# Patient Record
Sex: FEMALE | Employment: FULL TIME | URBAN - METROPOLITAN AREA
[De-identification: names, ages, dates, MRNs, and addresses within clinical notes are randomized per-mention and may not be internally consistent; named-entity substitution may affect disease eponyms.]

---

## 2017-01-02 ENCOUNTER — HOME CARE/HOSPICE - HEALTHEAST (OUTPATIENT)
Dept: HOME HEALTH SERVICES | Facility: HOME HEALTH | Age: 37
End: 2017-01-02

## 2017-01-04 ENCOUNTER — HOME CARE/HOSPICE - HEALTHEAST (OUTPATIENT)
Dept: HOME HEALTH SERVICES | Facility: HOME HEALTH | Age: 37
End: 2017-01-04

## 2017-01-05 ENCOUNTER — HOME CARE/HOSPICE - HEALTHEAST (OUTPATIENT)
Dept: HOME HEALTH SERVICES | Facility: HOME HEALTH | Age: 37
End: 2017-01-05

## 2017-01-09 ENCOUNTER — HOME CARE/HOSPICE - HEALTHEAST (OUTPATIENT)
Dept: HOME HEALTH SERVICES | Facility: HOME HEALTH | Age: 37
End: 2017-01-09

## 2017-01-09 ENCOUNTER — COMMUNICATION - HEALTHEAST (OUTPATIENT)
Dept: HOME HEALTH SERVICES | Facility: HOME HEALTH | Age: 37
End: 2017-01-09

## 2017-01-10 ENCOUNTER — HOME CARE/HOSPICE - HEALTHEAST (OUTPATIENT)
Dept: HOME HEALTH SERVICES | Facility: HOME HEALTH | Age: 37
End: 2017-01-10

## 2017-01-12 ENCOUNTER — HOME CARE/HOSPICE - HEALTHEAST (OUTPATIENT)
Dept: HOME HEALTH SERVICES | Facility: HOME HEALTH | Age: 37
End: 2017-01-12

## 2017-01-13 ENCOUNTER — HOME CARE/HOSPICE - HEALTHEAST (OUTPATIENT)
Dept: HOME HEALTH SERVICES | Facility: HOME HEALTH | Age: 37
End: 2017-01-13

## 2017-01-17 ENCOUNTER — HOME CARE/HOSPICE - HEALTHEAST (OUTPATIENT)
Dept: HOME HEALTH SERVICES | Facility: HOME HEALTH | Age: 37
End: 2017-01-17

## 2017-01-18 ENCOUNTER — HOME CARE/HOSPICE - HEALTHEAST (OUTPATIENT)
Dept: HOME HEALTH SERVICES | Facility: HOME HEALTH | Age: 37
End: 2017-01-18

## 2017-01-19 ENCOUNTER — HOME CARE/HOSPICE - HEALTHEAST (OUTPATIENT)
Dept: HOME HEALTH SERVICES | Facility: HOME HEALTH | Age: 37
End: 2017-01-19

## 2017-01-19 ENCOUNTER — AMBULATORY - HEALTHEAST (OUTPATIENT)
Dept: HOME HEALTH SERVICES | Facility: HOME HEALTH | Age: 37
End: 2017-01-19

## 2017-01-26 ENCOUNTER — HOME CARE/HOSPICE - HEALTHEAST (OUTPATIENT)
Dept: HOME HEALTH SERVICES | Facility: HOME HEALTH | Age: 37
End: 2017-01-26

## 2017-02-04 ENCOUNTER — HOME CARE/HOSPICE - HEALTHEAST (OUTPATIENT)
Dept: HOME HEALTH SERVICES | Facility: HOME HEALTH | Age: 37
End: 2017-02-04

## 2017-02-09 ENCOUNTER — HOME CARE/HOSPICE - HEALTHEAST (OUTPATIENT)
Dept: HOME HEALTH SERVICES | Facility: HOME HEALTH | Age: 37
End: 2017-02-09

## 2018-11-21 ENCOUNTER — TELEPHONE (OUTPATIENT)
Dept: FAMILY MEDICINE | Facility: CLINIC | Age: 38
End: 2018-11-21

## 2018-11-21 NOTE — TELEPHONE ENCOUNTER
OB intake completed over the phone with the use of a language line . Patricia is a new patient to our clinic, with confirmed UPT at [facility]. Patricia would like to complete OB care at [facility] and deliver at [facility]. Patricia is a Nigerian-speaking female from [facility] and is 38yo; Patricia and her  are excited for this pregnancy. Patricia is  with history of ectopic pregnancy in , SAB at unknown gestational age, and  r/t premature infant in  at approximately 28weeks gestational age. Patricia was unsure of gestational age for first pregnancy but notes the infant was premature. All previous deliveries were done in [place]. Her most recent pregnancy, in , was delivered via  in [place]. No complications noted. Patient reports LMP 10/08/2018 with ELI of 07/15/2019. 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 Patient unsure of treatment performed. Patient also endorses an oophorectomy in  following ectopic pregnancy. No records available, will review during dating US. Patient denies any allergies to medications, no other significant medical history noted. Patient is currently taking prenatal vitamins, and she wishes to see a female provider. Patient noted anxiety with this pregnancy r/t previous ectopic pregnancy. Reassured patient we will monitor with US and regular scheduled appointments. No current symptoms during this pregnancy, patient noted fatigue and intermittent nausea relieved with food. NOB appointment scheduled with  for 2018. Requested patient come into clinic for DONNELL for records from [facility], patient did not verbalize understanding and stated she will bring records to NOB appointment.     Average Risk Category  No significant risk factors: Yes    At Risk Category  (up to 3)  Teen pregnancy: No  Poor social situation: No  Domestic abuse: No  Financial difficulties: No  Smoker: No  H/O  deliver: Yes(unknown GA)  H/O drug abuse: No  Non-English speaking: Yes(Monegasque speaking)  Advanced maternal age: Yes  GDM risks: No  Previous C/S: Yes (in )  H/O PIH: No  H/O STIs: No  H/O mental health concerns: No  Onset care > 20 weeks: No  Other: Ectopic pregnancy    High Risk Category (4 or more At Risk or)  Diabetes/GDM: No  Multiple gestation: No  Chronic hypertension: No  Significant hx of asthma: No  Fetal demise > 20 weeks: No  Positive tox screen: No  Current mental health treatment: No  Other:     Risk: High Risk r/t ectopic pregnancy, advanced maternal age, non-english speaking, h/o  deliver  Date determined: 2018  Rimma HELMS

## 2018-11-21 NOTE — TELEPHONE ENCOUNTER
[facility] phone call message- general phone call:    Reason for call: Patient stated she would like to begin receiving care at [facility]. She stated she is pregnant and had her [facility]. She stated [facility] mentioned she is 6 weeks along but she believes she is now about 7 weeks. She would like to begin her care at [facility] as a new ob patient. I notified the patient a nurse will call her to do intake. Please call and advise.    Return call needed: Yes    OK to leave a message on voice mail? Yes    Primary language: Estonian      needed? Yes    Call taken on November 21, 2018 at 9:10 AM by Padma Kiran

## 2018-11-26 ENCOUNTER — HEALTH MAINTENANCE LETTER (OUTPATIENT)
Age: 38
End: 2018-11-26

## 2018-12-20 ENCOUNTER — OFFICE VISIT (OUTPATIENT)
Dept: FAMILY MEDICINE | Facility: CLINIC | Age: 38
End: 2018-12-20
Payer: COMMERCIAL

## 2018-12-20 ENCOUNTER — TRANSFERRED RECORDS (OUTPATIENT)
Dept: HEALTH INFORMATION MANAGEMENT | Facility: CLINIC | Age: 38
End: 2018-12-20

## 2018-12-20 VITALS
SYSTOLIC BLOOD PRESSURE: 128 MMHG | WEIGHT: 182 LBS | OXYGEN SATURATION: 100 % | RESPIRATION RATE: 16 BRPM | BODY MASS INDEX: 38.2 KG/M2 | HEART RATE: 93 BPM | DIASTOLIC BLOOD PRESSURE: 80 MMHG | TEMPERATURE: 98.4 F | HEIGHT: 58 IN

## 2018-12-20 DIAGNOSIS — Z32.01 PREGNANCY EXAMINATION OR TEST, POSITIVE RESULT: ICD-10-CM

## 2018-12-20 DIAGNOSIS — O09.91 SUPERVISION OF HIGH RISK PREGNANCY IN FIRST TRIMESTER: Primary | ICD-10-CM

## 2018-12-20 DIAGNOSIS — O09.521 ELDERLY MULTIGRAVIDA IN FIRST TRIMESTER: ICD-10-CM

## 2018-12-20 DIAGNOSIS — Z32.01 PREGNANCY EXAMINATION OR TEST, POSITIVE RESULT: Primary | ICD-10-CM

## 2018-12-20 DIAGNOSIS — O09.891 HISTORY OF PRETERM DELIVERY, CURRENTLY PREGNANT IN FIRST TRIMESTER: ICD-10-CM

## 2018-12-20 PROBLEM — H57.02 ANISOCORIA: Status: ACTIVE | Noted: 2017-02-02

## 2018-12-20 PROBLEM — G51.0 BELL'S PALSY: Status: ACTIVE | Noted: 2018-12-20

## 2018-12-20 PROBLEM — Z86.2 HISTORY OF IMMUNE THROMBOCYTOPENIA: Status: ACTIVE | Noted: 2018-12-20

## 2018-12-20 LAB
BILIRUBIN UR: NEGATIVE
BLOOD UR: ABNORMAL
GLUCOSE URINE: NEGATIVE
HCT VFR BLD AUTO: 36.3 % (ref 35–47)
HEMOGLOBIN: 11.7 G/DL (ref 11.7–15.7)
HIV 1+2 AB+HIV1 P24 AG SERPL QL IA: NEGATIVE
KETONES UR QL: NEGATIVE
LEUKOCYTE ESTERASE UR: NEGATIVE
MCH RBC QN AUTO: 34 PG (ref 26.5–35)
MCHC RBC AUTO-ENTMCNC: 32.2 G/DL (ref 32–36)
MCV RBC AUTO: 105.5 FL (ref 78–100)
NITRITE UR QL STRIP: NEGATIVE
OTHER: NORMAL %
PH UR STRIP: 6.5 [PH] (ref 5–7)
PLATELET # BLD AUTO: 386 K/UL (ref 150–450)
PROTEIN UR: NEGATIVE
RBC # BLD AUTO: 3.44 M/UL (ref 3.8–5.2)
SP GR UR STRIP: 1.01
UROBILINOGEN UR STRIP-ACNC: ABNORMAL
WBC # BLD AUTO: 7.8 K/UL (ref 4–11)

## 2018-12-20 RX ORDER — VITAMIN A ACETATE, BETA CAROTENE, ASCORBIC ACID, CHOLECALCIFEROL, .ALPHA.-TOCOPHEROL ACETATE, DL-, THIAMINE MONONITRATE, RIBOFLAVIN, NIACINAMIDE, PYRIDOXINE HYDROCHLORIDE, FOLIC ACID, CYANOCOBALAMIN, CALCIUM CARBONATE, FERROUS FUMARATE, ZINC OXIDE, CUPRIC OXIDE 3080; 12; 120; 400; 1; 1.84; 3; 20; 22; 920; 25; 200; 27; 10; 2 [IU]/1; UG/1; MG/1; [IU]/1; MG/1; MG/1; MG/1; MG/1; MG/1; [IU]/1; MG/1; MG/1; MG/1; MG/1; MG/1
1 TABLET, FILM COATED ORAL DAILY
COMMUNITY
Start: 2018-11-15 | End: 2018-12-20

## 2018-12-20 ASSESSMENT — MIFFLIN-ST. JEOR: SCORE: 1396.43

## 2018-12-20 NOTE — PROGRESS NOTES
"Patricia Govea is a 38 year old  female who presents to clinic for a new OB visit.  Her last menstrual period was 10/5/18, unsure.    Estimated Date of Delivery: 2019 via LMP    She has not had bleeding since her LMP. She has not had any abdominal pain or cramping since her LMP. She has had mild nausea. Weight loss has not occurred. This was not planned pregnancy.     OTHER CONCERNS: None    Significant Obstetrical History:   - Hx ruptured ectopic in 2006 at 6w4d. S/p laproscopic partial left salpingectomy   - C/s at 28w in  at [facility] in [facility], possibly secondary to placental abruption, states she was having \"a lot of bleeding\" and they told her if she did not have the c/s the infant might die. No records available currently. Unsure surgical scar. Infant had to spend 2 months in the hospital, no issues following NICU stay.   - Second vaginal delivery, was a sucessful  2006. At [facility] - able to review discharge summary stating successful , no other records available     Significant Past Medical History:   - Hx Guillain [place] after influenza vaccine in   - Hx ITP in , presented with hemoperitoneum after abdominal trauma, required RBC and platelet transfusions. Bone marrow biopsy negative for malignancy  - Hx H pylori in , treated with triple therapy   - Hypertension with steroids during issues with ITP  - Hx of mild dysplasia with MIKHAIL 1 in 2007. Repeat PAP 10/2007 was normal. Repeat pap in 2009 with LSIL. Repeat in  normal     Pre Term Labor Risk Assessment  Is the patient's age <18 or >40? No  Patint's BMI is Body mass index is 37.95 kg/m .. Does patient have a BMI < 18.5? No  If previous pregnancy, was delivery within previous 6 months? No  Have you ever delivered a baby prior to 37 weeks gestation? Yes   Did conception for this pregnancy occur via In Vitro Fertilization? 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)     History of immune thrombocytopenia     Bell's palsy     Anisocoria     Abnormal Pap smear of cervix       Obstetric History       T1      L2     SAB0   TAB0   Ectopic1   Multiple0   Live Births2       # Outcome Date GA Lbr Amol/2nd Weight Sex Delivery Anes PTL Lv   4 Current            3 Term 2006    F    SHANNAN   2 Ectopic 2006 6w4d    ECTOPIC      1   28w0d   M CS-Unspec   SHANNAN      Complications: Abruptio Placenta        Past Medical History:   Diagnosis Date     Acute idiopathic thrombocytopenic purpura (H)     bone marrow biopsy negative for malignancy     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 pylori infection        Past Surgical History:   Procedure Laterality Date      SECTION       LAPAROSCOPIC SALPINGECTOMY Left 2006       Current Outpatient Medications   Medication Sig Dispense Refill     Prenatal Vit-Fe Fumarate-FA (PRENATAL MULTIVITAMIN  PLUS IRON) 27-1 MG TABS Take by mouth daily         Do you have a history of any of the following medical conditions?  Condition Yes/No   Hypertension YES   Heart disease, mitral valve prolapse, rheumatic fever No   Asthma or another chronic lung disease No   An autoimmune disorder YES   Kidney disease No   Frequent urinary tract infections No   Migraine headaches No   Stroke, loss of sensation/function, seizures, or other neuro problem No   Diabetes No   Thyroid problems or have you taken thyroid medication No   Hepatitis, liver disease, jaundice No   Blood clots, phlebitis, pulmonary embolism or varicose veins No   Excessive bleeding after surgery or dental work No   Heavy menstrual periods requiring treatment  No   Anemia YES   Blood transfusions PRBC and platelets        Would you refuse a blood transfusion? No   Breast problems No   Abnormalities of the uterus No   Abnormal pap smear YES   Have you been treated for an emotional disturbance? No   Have you been physically, sexually, or emotionally hurt by someone? No   Have you been in a major accident or suffered serious trauma? No   Have you had surgical procedures? No        Have you ever had any complications from anesthesia? No   Have you ever been hospitalized for a nonsurgical reason? YES     Notes for positives: see above pertinent past medical history of explanations     Prenatal Genetic Screening  Do you have a history of any of the following Yes/No        A metabolic disorder (e.g. Insulin-dependent DM, PKU) No        Recurrent pregnancy loss No     Do you, the baby's father, or anyone in your families have Yes/No        Thalassemia AND MCV <80 No        Hemophilia No        Neural tube defect No        Congenital heart defect No        Sickle cell disease or trait No        Muscular dystrophy No        Cystic fibrosis No        Mental retardation or autism No        Down's syndrome No        Isaiah-Sach's disease No        Hawley's chorea No        Any other inherited genetic or chromosomal disorder No        A child with birth defects not listed above No     Infection History  At high risk for coming in contact with HIV No   Ever treated for tuberculosis No   Ever received the BCG vaccine for tuberculosis UNSURE   Ever had genital herpes (or has your partner) No   Had a rash or viral illness since LMP No   Ever had a sexually transmitted infection No   Ever had chicken pox or the vaccine UNSURE   Ever had any other serious infectious disease No   Up to date with immunizations Yes   STI History None      PERSONAL/SOCIAL HISTORY  Social History     Tobacco Use     Smoking status: Never Smoker     Smokeless tobacco: Never Used   Substance Use Topics     Alcohol  "use: No     Frequency: Never     Drug use: No       Have you used any tobacco products, alcohol or any other drugs during this pregnancy before or after your knew you were pregnant? No    REVIEW OF SYSTEMS  C: NEGATIVE for fever, chills, change in weight  E: NEGATIVE for vision changes or irritation  ENT: NEGATIVE for ear, mouth and throat problems  R: NEGATIVE for significant cough or SOB  B: NEGATIVE for masses, tenderness or discharge  CV: NEGATIVE for chest pain, palpitations or peripheral edema  GI: NEGATIVE for nausea, abdominal pain, heartburn, or change in bowel habits  : NEGATIVE for unusual urinary or vaginal symptoms.   M: NEGATIVE for significant arthralgias or myalgia  N: NEGATIVE for weakness, dizziness or paresthesias  P: NEGATIVE for changes in mood or affect    PHYSICAL EXAM:  /80   Pulse 93   Temp 98.4  F (36.9  C) (Oral)   Resp 16   Ht 1.475 m (4' 10.07\")   Wt 82.6 kg (182 lb)   LMP 10/05/2018   SpO2 100%   BMI 37.95 kg/m     GENERAL:  Pleasant pregnant female, alert, well groomed.  SKIN:  Warm and dry, without lesions or rashes  EYES:  PERRLA, EOM intact  MOUTH:  Buccal mucosa pink, moist without lesions.    NECK:  Thyroid without enlargement and nodules.  No cervical lymphadenopathy.  LUNGS:  Clear to auscultation.  HEART:  RRR without murmur.  ABDOMEN: Soft without masses , tenderness or organomegaly.  No CVA tenderness. Well healed scar from  section. Fundus palpable at symphysis pubis.   MUSCULOSKELETAL:  Full range of motion.  EXTREMITIES:  No edema. No significant varicosities.   GENITALIA:  Normal appearing anatomy, no lesions noted.  VAGINA:  Pink, normal rugae and discharge normal and physiologic  CERVIX:  smooth, without discharge and parous os; firm, closed and long on bimanual exam.    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 Successful  following in   - Hx of prior ruptured pregnancy s/p laproscopic partial left salpingectomy   - Hx of Guillain [place] after influenza vaccine in 2016  - Hx ITP in   - Hypertension with steroids during issues with ITP    - Routine prenatal labs today. CBC, type and screen, rubella, HIV, gonorrhea/chlamydia, RPR, hepatitis B, varicella, urinalysis with culture.   - Pap smear due, done today.   - Early ultrasound for dating to be done later today.     Referral(s): OB-Gyn for TOLAC at 32 weeks.  Perinatology for possible progesterone given prior  delivery at 28w     TOLAC counseling done, including cited 76.9% chance of successful vaginal delivery (via [email or web address]). Previous  for ?Placental abruption at [facility] in [place]. DONNELL signed to obtain operative report to confirm uterine incision type. Unable to determine appropriateness at this time for TOLAC due to inability to confirm uterine incision type.    Discussed:  -Recommended weight gain of < 15 lbs. for BMI of Body mass index is 37.95 kg/m ..  -Influenza vaccine - not able to give given Guillain [place] Syndrome with administration in the past   -Genetic screening options, including false positive rate of 5% with screening tests and diagnostic options (chorionic villus sampling, amniocentesis), and patient declines.  -Safe medications during pregnancy. Is currently taking prenatal vitamin daily.   -Healthy habits including not using tobacco or alcohol, exercising regularly and maintaining healthy diet  -Information given on tips for dealing with nausea, healthy habits,  exposures, safety, prenatal appointment schedule, and when to call the doctor.  -Recommendations for breastfeeding.    Will follow up in 4 weeks for routine pre- care.    Pat Ghotra DO (PGY2)  [facility] Resident  Pager: [phone]      Precepted with: Matthew Mooney MD

## 2018-12-20 NOTE — PROGRESS NOTES
Preceptor Attestation:   Patient seen, evaluated and discussed with the resident. I have verified the content of the note, which accurately reflects my assessment of the patient and the plan of care.  Supervising Physician:Matthew Mooney MD  [facility]

## 2018-12-21 ENCOUNTER — TELEPHONE (OUTPATIENT)
Dept: FAMILY MEDICINE | Facility: CLINIC | Age: 38
End: 2018-12-21

## 2018-12-21 LAB
ABO + RH BLD: NORMAL
BLD GP AB SCN SERPL QL: POSITIVE
C TRACH RRNA CVX QL NAA+PROBE: NEGATIVE
COLLECTION METHOD: NORMAL
CULTURE: NORMAL
FINAL DIAGNOSIS: NORMAL
HBV SURFACE AG SERPL QL IA: NEGATIVE
HPV 16 DNA: NEGATIVE
HPV 18 DNA: NEGATIVE
HPV SOURCE: NORMAL
LEAD BLD-MCNC: NORMAL UG/DL
LEAD RETEST: NO
Lab: NORMAL
N GONORRHOEA RRNA SPEC QL NAA+PROBE: NEGATIVE
OTHER HR HPV: NEGATIVE
REPEAT ABO/RH TYPING (HML): NORMAL
RUBV IGG SERPL QL IA: POSITIVE
SPECIMEN DESCRIPTION: NORMAL
TREPONEMA ANTIBODY (SYPHILIS): NEGATIVE
VZV IGG SER QL IF: NEGATIVE

## 2018-12-21 NOTE — TELEPHONE ENCOUNTER
[facility] phone call message- general phone call:    Reason for call: Caller stated he wants to know if the patient is using IV-IG. He stated the last time patient was there in December 2017 she was using IV-IG. Notified it may take 2 business days for a response. Please call and advise.    Return call needed: Yes    OK to leave a message on voice mail? No    Primary language: Cambodian      needed? Yes    Call taken on December 21, 2018 at 1:44 PM by Padma Kiran

## 2018-12-21 NOTE — TELEPHONE ENCOUNTER
Called blood bank and informed patient no longer receiving IV-IG. Bruna verbalized understanding. Rimma HELMS

## 2018-12-23 PROBLEM — O09.91 SUPERVISION OF HIGH RISK PREGNANCY IN FIRST TRIMESTER: Status: ACTIVE | Noted: 2018-12-23

## 2018-12-23 PROBLEM — R87.619 ABNORMAL PAP SMEAR OF CERVIX: Status: ACTIVE | Noted: 2018-12-23

## 2018-12-23 PROBLEM — O09.891 HISTORY OF PRETERM DELIVERY, CURRENTLY PREGNANT IN FIRST TRIMESTER: Status: ACTIVE | Noted: 2018-12-23

## 2018-12-23 LAB — LEAD BLDV-MCNC: <2 UG/DL (ref 0–4.9)

## 2018-12-23 SDOH — HEALTH STABILITY: MENTAL HEALTH: HOW OFTEN DO YOU HAVE A DRINK CONTAINING ALCOHOL?: NEVER

## 2018-12-23 NOTE — PATIENT INSTRUCTIONS
BIRTH AND 17-alpha hydroxyprogesterone caproate (17P) TREATMENT    What is  birth?      birth is the delivery of a baby before 37 weeks of gestation.  Approximately 1 in every 12 babies in [place] is born premature (that s approximately 6,000 babies every year)!     birth is often unexpected, but can happen for many reasons. There are some known risk factors, which include:   -pregnancy with twins or triplets   -being  race  -some problems with the uterus or cervix   -some medical problems like high blood pressure   -smoking, drinking, or using illegal drugs while pregnant   -infections like urinary tract infection, bacterial vaginosis and chlamydia while    pregnant  -depression, stress, and anxiety    But the biggest risk factor is having a previous  baby. In fact, women who have one  birth have a 30-50% chance of their next baby coming early too.     What are the risks to a baby that delivers prematurely?     birth is the number one cause of  death worldwide. This risk increases the earlier the baby is born.  Prematurity can also cause serious long term health problems for babies such as breathing problems, infections, bleeding into the brain, as well as long term developmental problems like cerebral palsy, learning impairments, hearing and vision problems.    How can I prevent  birth in my pregnancy?  Overall, the best thing to prevent  delivery is to stay healthy during your pregnancy, and follow up with your doctor for your regular visits and screening tests.  If you have a history of  birth in one of your past pregnancies, you may benefit from weekly injections of 17P.     What is 17P?  The full name is 17-alpha hydroxyprogesterone caproate. This is a form of your body s natural  pregnancy hormone , progesterone. The brand name of this is Anyi, and it is FDA approved for prevention of  birth.  This medication  is given in once weekly injections from week 16-20 through the 36th week of pregnancy. Research shows that women taking 17P can reduce their risk of  birth from 50% to 36%. The treatment has also been shown to reduce the risk of complications after birth, such as bleeding in the brain and need for supplemental oxygen.    It is important to complete the treatment once you start, as the risk of  birth goes up if you stop the injections early.    How can I get started on the Anyi treatment?  First, you should talk with your doctor to find out if this is a good option for you.  It is safe for pregnant women and for the fetus, but if you have some medical problems like uncontrolled blood pressure, breast cancer, or liver disease you should talk about it with your doctor first.  Your clinic will work with you to help determine insurance coverage and preauthorization if needed.  Then you will schedule weekly visits for 17P injections in addition to your routine prenatal visits with your doctor.    Side Effects of Mekena  The most common side effects  reported by Clover users are   injection site reactions (pain [35%], swelling  [17%], pruritus (itching) [6%], nodule [5%]), urticaria (rash) (12%), pruritus (generalized itching (8%), nausea (6%), and diarrhea (2%).

## 2018-12-26 NOTE — RESULT ENCOUNTER NOTE
Patient called back and results were given to her. To make appt if any concerns or questions before next scheduled OB appt. Patient agrees.   ~ Deuce DUGAN CMA ([place])

## 2018-12-27 ENCOUNTER — TELEPHONE (OUTPATIENT)
Dept: FAMILY MEDICINE | Facility: CLINIC | Age: 38
End: 2018-12-27

## 2018-12-27 ENCOUNTER — RECORDS - HEALTHEAST (OUTPATIENT)
Dept: ADMINISTRATIVE | Facility: OTHER | Age: 38
End: 2018-12-27

## 2018-12-27 PROBLEM — Z12.4 ENCOUNTER FOR SCREENING FOR CERVICAL CANCER: Status: ACTIVE | Noted: 2018-12-23

## 2018-12-27 LAB
CYTOLOGY CVX/VAG DOC THIN PREP: NORMAL
HIGH RISK?: NO
HPV REFLEX?: NORMAL
LAB AP ABNORMAL BLEEDING: NO
LAB AP BIRTH CONTROL/HORMONES: NORMAL
LAB AP CASE REPORT: NORMAL
LAB AP CERVICAL APPEARANCE: NORMAL
LAB AP MALIGNANT?: NORMAL
LAB AP PATIENT STATUS: NORMAL
LAB AP PREVIOUS ABNL: NO
LAB AP PREVIOUS NORMAL: NORMAL
LAST MENS PERIOD: NORMAL
SPECIMEN ADEQUACY:: NORMAL

## 2018-12-27 NOTE — TELEPHONE ENCOUNTER
Spoke to Bob at [facility], the blood sample was sent to them and wasn't enough to run all her test, they will have to draw again for the antibody screening. Due to her positive labs as well, not enough blood. Also, the blood clotted or have something in it, so they weren't able to get it done, unsure, will need redraw. Dr. Drew informed and we can probably draw at next visit for antibody screen or have pt return for lab only appt.  Pending provider respond.  CXiong, AV

## 2018-12-27 NOTE — TELEPHONE ENCOUNTER
[facility] phone call message- general phone call:    Reason for call: Caller stated they were unable to finish the the sample they received. He stated in order to complete it they would need 3 more lavender tubes for the sample. He also stated if they could receive a medication list if possible. Please call and advise.     Return call needed: Yes    OK to leave a message on voice mail? Yes    Primary language: Djiboutian      needed? Yes    Call taken on December 27, 2018 at 2:39 PM by Padma Kiran

## 2018-12-28 ENCOUNTER — RECORDS - HEALTHEAST (OUTPATIENT)
Dept: ADMINISTRATIVE | Facility: OTHER | Age: 38
End: 2018-12-28

## 2018-12-28 LAB
DEPARTMENT: NORMAL
PERFORMING LAB: NORMAL
SCAN RESULT: NORMAL
SEE NOTE: NORMAL

## 2019-01-10 DIAGNOSIS — O09.91 SUPERVISION OF HIGH RISK PREGNANCY IN FIRST TRIMESTER: Primary | ICD-10-CM

## 2019-01-10 DIAGNOSIS — O09.91 SUPERVISION OF HIGH RISK PREGNANCY IN FIRST TRIMESTER: ICD-10-CM

## 2019-01-10 LAB
OTHER: NORMAL %
OTHER: NORMAL %

## 2019-01-17 ENCOUNTER — OFFICE VISIT (OUTPATIENT)
Dept: FAMILY MEDICINE | Facility: CLINIC | Age: 39
End: 2019-01-17
Payer: COMMERCIAL

## 2019-01-17 VITALS
DIASTOLIC BLOOD PRESSURE: 84 MMHG | BODY MASS INDEX: 38.36 KG/M2 | WEIGHT: 184 LBS | RESPIRATION RATE: 16 BRPM | OXYGEN SATURATION: 100 % | SYSTOLIC BLOOD PRESSURE: 142 MMHG | TEMPERATURE: 98.1 F | HEART RATE: 105 BPM

## 2019-01-17 DIAGNOSIS — Z86.2 HISTORY OF ITP: ICD-10-CM

## 2019-01-17 DIAGNOSIS — R31.29 MICROSCOPIC HEMATURIA: ICD-10-CM

## 2019-01-17 DIAGNOSIS — Z86.2 HISTORY OF IMMUNE THROMBOCYTOPENIA: ICD-10-CM

## 2019-01-17 DIAGNOSIS — D69.6 THROMBOCYTOPENIA (H): Primary | ICD-10-CM

## 2019-01-17 DIAGNOSIS — R58 ECCHYMOSIS: ICD-10-CM

## 2019-01-17 LAB
BACTERIA: NORMAL
BILIRUBIN UR: NEGATIVE
BLOOD UR: ABNORMAL
CASTS: NORMAL /LPF
CRYSTAL URINE: NORMAL /LPF
EPITHELIAL CELLS UR: NORMAL /LPF (ref 0–2)
GLUCOSE URINE: NEGATIVE
HCT VFR BLD AUTO: 36.1 % (ref 35–47)
HEMOGLOBIN: 11.8 G/DL (ref 11.7–15.7)
KETONES UR QL: ABNORMAL
LEUKOCYTE ESTERASE UR: NEGATIVE
MCH RBC QN AUTO: 34.7 PG (ref 26.5–35)
MCHC RBC AUTO-ENTMCNC: 32.7 G/DL (ref 32–36)
MCV RBC AUTO: 106.1 FL (ref 78–100)
MUCOUS URINE: NORMAL LPF
NITRITE UR QL STRIP: NEGATIVE
PH UR STRIP: 5.5 [PH] (ref 5–7)
PLATELET # BLD AUTO: 4 K/UL (ref 150–450)
PROTEIN UR: ABNORMAL
RBC # BLD AUTO: 3.4 M/UL (ref 3.8–5.2)
RBC URINE: NORMAL /HPF
SP GR UR STRIP: 1.01
UROBILINOGEN UR STRIP-ACNC: ABNORMAL
WBC # BLD AUTO: 10.1 K/UL (ref 4–11)
WBC URINE: <2 /HPF

## 2019-01-17 NOTE — PROGRESS NOTES
Patricia Govea is a 38 year old  female who presents to clinic for bruising. She is currently 14w6d with an estimated date of delivery of 2019 via LMP at 10/8/18 consistent with US at 11w. She denies headache, chest pain, shortness of breath, abdominal pain/contractions, vaginal bleeding, or abnormal discharge. She has not felt baby move.     Bruising   - History of ITP last in    - At that time had an abdominal hematoma and required transfusion   - Reports busing of her thighs over the last week which caused her to be concerned   - Has noticed increased blood in her urine recently (blood tinged)   - Denies any vaginal bleeding   - Platelet count wnl at last check 18    Obstetric History       T1      L2     SAB0   TAB0   Ectopic1   Multiple0   Live Births2       # Outcome Date GA Lbr Amol/2nd Weight Sex Delivery Anes PTL Lv   4 Current            3 Term 2006    F    SHANNAN   2 Ectopic 2006 6w4d    ECTOPIC      1   28w0d   M CS-Unspec   SHANNAN      Complications: Abruptio Placenta        Physical exam:  /84   Pulse 105   Temp 98.1  F (36.7  C) (Oral)   Resp 16   Wt 83.5 kg (184 lb)   LMP 10/05/2018   SpO2 100%   BMI 38.36 kg/m      General: alert, female in no acute distress  Heart: regular rate and rhythm, no murmur  Lungs: Clear to auscultation bilaterally   Abdomen: gravid uterus appropriate for gestation age above pubic symphysis, non-tender. Petechia present over lower abdomen   Extremities: Ecchymoses present on superior and medial thighs.   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  Thrombocytopenia (H)  2. History of ITP  3. Ecchymosis  Patient could not stay for the results of her CBC. Platelet count resulted at 4. Given history of ITP, most likely ITP, however cannot rule out other etiologies without further workup. Called on call hematologist who recommended admission, CBC, CMP, transfusion 1u platelets, and initiation of prednisone 60mg daily. Patient informed about the recommendation and will proceed to hospital. Will be directly admitted. Discussed with on call physician who will be accepting her.     4. Microscopic hematuria  Microscopic hematuria from initial prenatal labs, given history of increased blood tinged urine will rule out infection today and obtain UA.   - Urine Microscopic ([facility])  - Urinalysis (P )  - Urine Culture ([facility])    Pat Ghotra DO (PGY2)  [facility] Resident  Pager: [phone]      Precepted with: Matthew Mooney MD

## 2019-01-18 LAB — CULTURE: NORMAL

## 2019-01-22 ENCOUNTER — COMMUNICATION - HEALTHEAST (OUTPATIENT)
Dept: ONCOLOGY | Facility: HOSPITAL | Age: 39
End: 2019-01-22

## 2019-01-22 ENCOUNTER — DOCUMENTATION ONLY (OUTPATIENT)
Dept: FAMILY MEDICINE | Facility: CLINIC | Age: 39
End: 2019-01-22

## 2019-01-22 ENCOUNTER — AMBULATORY - HEALTHEAST (OUTPATIENT)
Dept: ONCOLOGY | Facility: HOSPITAL | Age: 39
End: 2019-01-22

## 2019-01-22 ENCOUNTER — RECORDS - HEALTHEAST (OUTPATIENT)
Dept: ADMINISTRATIVE | Facility: OTHER | Age: 39
End: 2019-01-22

## 2019-01-22 DIAGNOSIS — Z86.2 HISTORY OF IMMUNE THROMBOCYTOPENIA: ICD-10-CM

## 2019-01-22 PROBLEM — D69.6 THROMBOCYTOPENIA DURING PREGNANCY (H): Status: ACTIVE | Noted: 2019-01-17

## 2019-01-22 PROBLEM — O99.119 THROMBOCYTOPENIA DURING PREGNANCY (H): Status: ACTIVE | Noted: 2019-01-17

## 2019-01-22 PROBLEM — D69.3 ACUTE ITP (H): Status: ACTIVE | Noted: 2019-01-22

## 2019-01-22 LAB
DEPARTMENT: NORMAL
PERFORMING LAB: NORMAL
SCAN RESULT: NORMAL
SEE NOTE: NORMAL

## 2019-01-22 ASSESSMENT — MIFFLIN-ST. JEOR
SCORE: 1465.13
SCORE: 1462.4

## 2019-01-22 NOTE — PROGRESS NOTES
Patient, currently [id number], and  came into clinic with concerns of intermittent bleeding from her nose (when she blows her nose), and is concerned her platelet levels are low. Discussed with , given patient's history, advised to report to [facility] for lab work and assessment. Patient weepy when she was told she would have to go to [facility]. Provided reassurance that  was informed and is aware of the situation. Patient verbalized understanding and was transported to [facility] by her . Rimma HELMS

## 2019-01-25 ASSESSMENT — MIFFLIN-ST. JEOR: SCORE: 1471.48

## 2019-01-28 ENCOUNTER — SURGERY - HEALTHEAST (OUTPATIENT)
Dept: SURGERY | Facility: HOSPITAL | Age: 39
End: 2019-01-28

## 2019-01-28 ENCOUNTER — ANESTHESIA - HEALTHEAST (OUTPATIENT)
Dept: SURGERY | Facility: HOSPITAL | Age: 39
End: 2019-01-28

## 2019-01-28 ENCOUNTER — AMBULATORY - HEALTHEAST (OUTPATIENT)
Dept: MATERNAL FETAL MEDICINE | Facility: HOSPITAL | Age: 39
End: 2019-01-28

## 2019-01-28 ENCOUNTER — RECORDS - HEALTHEAST (OUTPATIENT)
Dept: ULTRASOUND IMAGING | Facility: HOSPITAL | Age: 39
End: 2019-01-28

## 2019-01-28 ENCOUNTER — RECORDS - HEALTHEAST (OUTPATIENT)
Dept: ADMINISTRATIVE | Facility: OTHER | Age: 39
End: 2019-01-28

## 2019-01-28 DIAGNOSIS — O26.90 PREGNANCY, ANTEPARTUM, COMPLICATIONS: ICD-10-CM

## 2019-01-28 DIAGNOSIS — O26.90 PREGNANCY RELATED CONDITIONS, UNSPECIFIED, UNSPECIFIED TRIMESTER: ICD-10-CM

## 2019-01-29 ENCOUNTER — COMMUNICATION - HEALTHEAST (OUTPATIENT)
Dept: ADMINISTRATIVE | Facility: HOSPITAL | Age: 39
End: 2019-01-29

## 2019-01-30 ENCOUNTER — AMBULATORY - HEALTHEAST (OUTPATIENT)
Dept: ONCOLOGY | Facility: HOSPITAL | Age: 39
End: 2019-01-30

## 2019-01-30 DIAGNOSIS — D69.3 ACUTE ITP (H): ICD-10-CM

## 2019-02-01 ENCOUNTER — TELEPHONE (OUTPATIENT)
Dept: FAMILY MEDICINE | Facility: CLINIC | Age: 39
End: 2019-02-01

## 2019-02-01 NOTE — TELEPHONE ENCOUNTER
Date of discharge: 01/31/2019  Facility of discharge: [facility]  Patient concerns about condition: No concerns at this time.  Patient concerns about medications: No concerns at this time.  Full med reconciliation will be completed at clinic visit.  Patient concerns about transitioning: No concerns at this time.  Clinic office visit appointment date: 02/11/2019  Patient reminded to bring all medications (prescription and over-the-counter) to clinic appointment: Yes    Using the date of discharge as day 1, the 30th day post discharge is 02/28/2019.

## 2019-02-05 ENCOUNTER — AMBULATORY - HEALTHEAST (OUTPATIENT)
Dept: ONCOLOGY | Facility: HOSPITAL | Age: 39
End: 2019-02-05

## 2019-02-05 ENCOUNTER — AMBULATORY - HEALTHEAST (OUTPATIENT)
Dept: INFUSION THERAPY | Facility: HOSPITAL | Age: 39
End: 2019-02-05

## 2019-02-05 DIAGNOSIS — D69.3 ACUTE ITP (H): ICD-10-CM

## 2019-02-05 LAB
BASOPHILS # BLD AUTO: 0 THOU/UL (ref 0–0.2)
BASOPHILS NFR BLD AUTO: 0 % (ref 0–2)
EOSINOPHIL # BLD AUTO: 0.1 THOU/UL (ref 0–0.4)
EOSINOPHIL NFR BLD AUTO: 0 % (ref 0–6)
ERYTHROCYTE [DISTWIDTH] IN BLOOD BY AUTOMATED COUNT: 13.3 % (ref 11–14.5)
HCT VFR BLD AUTO: 28.6 % (ref 35–47)
HGB BLD-MCNC: 10.3 G/DL (ref 12–16)
LYMPHOCYTES # BLD AUTO: 1.1 THOU/UL (ref 0.8–4.4)
LYMPHOCYTES NFR BLD AUTO: 6 % (ref 20–40)
MCH RBC QN AUTO: 34.8 PG (ref 27–34)
MCHC RBC AUTO-ENTMCNC: 36 G/DL (ref 32–36)
MCV RBC AUTO: 97 FL (ref 80–100)
MONOCYTES # BLD AUTO: 0.6 THOU/UL (ref 0–0.9)
MONOCYTES NFR BLD AUTO: 3 % (ref 2–10)
NEUTROPHILS # BLD AUTO: 15.3 THOU/UL (ref 2–7.7)
NEUTROPHILS NFR BLD AUTO: 90 % (ref 50–70)
PLATELET # BLD AUTO: 149 THOU/UL (ref 140–440)
PMV BLD AUTO: 10.7 FL (ref 8.5–12.5)
RBC # BLD AUTO: 2.96 MILL/UL (ref 3.8–5.4)
WBC: 17.6 THOU/UL (ref 4–11)

## 2019-02-07 ENCOUNTER — TELEPHONE (OUTPATIENT)
Dept: FAMILY MEDICINE | Facility: CLINIC | Age: 39
End: 2019-02-07

## 2019-02-07 NOTE — TELEPHONE ENCOUNTER
Called patient to see how she is doing since discharge from the hospital. Patricia stated she is doing well, no current bleeding concerns or pain. She stated she went to her appointment this week to check labs. Advised Patricia to call the clinic any day for questions or concerns, she verbalized understanding. Rimma HELMS

## 2019-02-15 DIAGNOSIS — O99.119 THROMBOCYTOPENIA DURING PREGNANCY (H): Primary | ICD-10-CM

## 2019-02-15 DIAGNOSIS — D69.6 THROMBOCYTOPENIA DURING PREGNANCY (H): Primary | ICD-10-CM

## 2019-02-18 ENCOUNTER — AMBULATORY - HEALTHEAST (OUTPATIENT)
Dept: INFUSION THERAPY | Facility: HOSPITAL | Age: 39
End: 2019-02-18

## 2019-02-18 ENCOUNTER — COMMUNICATION - HEALTHEAST (OUTPATIENT)
Dept: ONCOLOGY | Facility: HOSPITAL | Age: 39
End: 2019-02-18

## 2019-02-18 ENCOUNTER — AMBULATORY - HEALTHEAST (OUTPATIENT)
Dept: ONCOLOGY | Facility: HOSPITAL | Age: 39
End: 2019-02-18

## 2019-02-18 DIAGNOSIS — Z86.2 HISTORY OF IMMUNE THROMBOCYTOPENIA: ICD-10-CM

## 2019-02-18 LAB
BASOPHILS # BLD AUTO: 0.1 THOU/UL (ref 0–0.2)
BASOPHILS NFR BLD AUTO: 0 % (ref 0–2)
EOSINOPHIL # BLD AUTO: 0.1 THOU/UL (ref 0–0.4)
EOSINOPHIL NFR BLD AUTO: 1 % (ref 0–6)
ERYTHROCYTE [DISTWIDTH] IN BLOOD BY AUTOMATED COUNT: 13.7 % (ref 11–14.5)
HCT VFR BLD AUTO: 30.3 % (ref 35–47)
HGB BLD-MCNC: 10.5 G/DL (ref 12–16)
LYMPHOCYTES # BLD AUTO: 1.8 THOU/UL (ref 0.8–4.4)
LYMPHOCYTES NFR BLD AUTO: 16 % (ref 20–40)
MCH RBC QN AUTO: 33.3 PG (ref 27–34)
MCHC RBC AUTO-ENTMCNC: 34.7 G/DL (ref 32–36)
MCV RBC AUTO: 96 FL (ref 80–100)
MONOCYTES # BLD AUTO: 0.8 THOU/UL (ref 0–0.9)
MONOCYTES NFR BLD AUTO: 7 % (ref 2–10)
NEUTROPHILS # BLD AUTO: 8.3 THOU/UL (ref 2–7.7)
NEUTROPHILS NFR BLD AUTO: 77 % (ref 50–70)
PLATELET # BLD AUTO: 13 THOU/UL (ref 140–440)
PMV BLD AUTO: ABNORMAL FL (ref 8.5–12.5)
RBC # BLD AUTO: 3.15 MILL/UL (ref 3.8–5.4)
WBC: 11.3 THOU/UL (ref 4–11)

## 2019-02-20 NOTE — PROGRESS NOTES
Maternal-Fetal Medicine Consultation    Patricia Govea  : 1980  MRN: [id number]    REFERRAL:  Patricia Govea is a 38 year old sent by Dr. Murphy for ITP in pregnancy.     HPI:  Patricia Govea is a 38 year old  at 19w5d by LMP consistent with 11w1d US presenting with concerns regarding ITP in pregnancy. Patient was first diagnosed with ITP in . She was refractory to steroids and IVIG at that time, however responded to Rituxan. Diagnosed with relapse in 2018 with Plt count of 1,000/mm3 after developing hematuria and ecchymosis. Again was refractory to steroids and IVIG so underwent splenectomy on 19. Initially had a response with Plt count of 149k/mm3 on 18, however again developed hematuria and was found to have a Plt count of 1k/mm3. Was started on Rituximab by hem/onc on 2/15/19. Currently on steroid taper as well. Last Plt count 8k/mm3 on 19.    Currently denies hematuria, epistaxis, rectal or vaginal bleeding, or ecchymosis.     Pregnancy complicated by:  - ITP with relapse during pregnancy s/p splenectomy on 19 after refractory to steroids and IVIG. Currently on Rituximab since 2/15/19 and steroid taper. Last reported platelet count reported in Epic was 13k/mm3 on 19.   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    Dating:    LMP: 10/5/18  Dating ultrasound: 11w1d on 18  ELI 7/10/19 by LMP    Obstetrics History:  Obstetric History       T1      L2     SAB0   TAB0    Ectopic1   Multiple0   Live Births2       # Outcome Date GA Lbr Amol/2nd Weight Sex Delivery Anes PTL Lv   4 Current            3 Term 2006    F    SHANNAN   2 Ectopic 2006 6w4d    ECTOPIC      1   28w0d   M CS-Unspec   SHANNAN      Complications: Abruptio Placenta          Gynecologic History:  - Last Pap: NILM, HPV: neg (2018)  - History of CIN1 in , LSIL pap in .   - Denies prior cervical surgery or procedures  - Denies any history of frequent UTIs, vaginal infections, or STIs    Past Medical History:    Past Medical History:   Diagnosis Date     Acute idiopathic thrombocytopenic purpura (H)     bone marrow biopsy negative for malignancy     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 pylori infection        Past Surgical History:  Past Surgical History:   Procedure Laterality Date      SECTION       LAPAROSCOPIC SALPINGECTOMY FOR ECTOPIC Left        Current Medications:  Current Outpatient Medications   Medication     predniSONE (DELTASONE) 20 MG tablet     Prenatal Vit-Fe Fumarate-FA (PRENATAL MULTIVITAMIN  PLUS IRON) 27-1 MG TABS     No current facility-administered medications for this visit.         Drug and lactation database from the [place] [employer]:  http://toxnet.nlm.nih.gov/cgi-bin/sis/htmlgen?LACT  Allergies:  Influenza vaccines    Social History:   Social History     Socioeconomic History     Marital status: Single     Spouse name: Not on file     Number of children: Not on file     Years of education: Not on file     Highest education level: Not on file   Occupational History     Not on file   Social Needs     Financial resource strain: Not on file     Food insecurity:     Worry: Not on file     Inability: Not on file     Transportation needs:     Medical: Not on file     Non-medical: Not on file   Tobacco Use     Smoking status: Never Smoker     Smokeless tobacco: Never Used   Substance and Sexual  Activity     Alcohol use: No     Frequency: Never     Drug use: No     Sexual activity: No   Lifestyle     Physical activity:     Days per week: Not on file     Minutes per session: Not on file     Stress: Not on file   Relationships     Social connections:     Talks on phone: Not on file     Gets together: Not on file     Attends [employer] service: Not on file     Active member of club or organization: Not on file     Attends meetings of clubs or organizations: Not on file     Relationship status: Not on file     Intimate partner violence:     Fear of current or ex partner: Not on file     Emotionally abused: Not on file     Physically abused: Not on file     Forced sexual activity: Not on file   Other Topics Concern     Not on file   Social History Narrative     Not on file       Family History:  No pertinent family history    ROS:  10-point ROS negative except as in HPI     PHYSICAL EXAM:  /62 (BP Location: Left arm, Patient Position: Sitting, Cuff Size: Adult Large)   Pulse 90   Wt 86.4 kg (190 lb 8 oz)   LMP 10/05/2018   BMI 39.72 kg/m      Gen: NAD, well appearing  Chest: Non-labored breathing  Abdomen: Gravid    Labs:   Date: 18  ABO/Rh: O Pos  ABS: warm auto-antibody positive   TPA: NR  HepBSAg: NR  HIV: NR  Rubella: Immune  Varicella: Non-immune  GC/Chlam: Neg  Urine Cx: Neg  Pap/HPV: NILM, HPV neg   HgbA1c: None    Date: 19  Hgb: 11.3  HCT: 30.3  MCV: 96  Plt: 13        Genetic Testing:   Declined    Ultrasounds:   DATE  GA  ASSESSMENT  18 11w1d  Dating  19  16w3d  Growth US     Please see Ultrasound report for Comprehensive ultrasound performed today under Imaging tab.     ASSESSMENT:  Patricia Govea is a 38 year old  at 19w5d by LMP consistent with 11w1d US presenting with concerns regarding ITP in pregnancy.  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 Currently on Rituximab since 2/15/19 and  steroid taper. Last Plt count reported to be 8k/mm3 on 19.   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 Started on Rituximab on 2/15/19 due to refractory disease despite splenectomy, s/p 2/4 doses. Patient is followed by hematology through [facility]. Risks and benefits of Rituximab in pregnancy (mainly  leukopenia) have previously been discussed with patient. Given her refractory disease, she understands benefits outweigh risks in this case.   - Continue following with hematology with [facility] to finish Rituximab treatment. Hematology service at the [facility] has been made aware of this patient, plan for transfer of hematology care here as she will be getting her OB care with us. Recommend weekly Plts until Plt count stabilizes, then q monthly   or more frequently per hematology. Discussed with Dr. Bradley Adame and has appointment scheduled with hematology.  - Treatment can be extended to used of immunosuppressive therapy (cyclosporine or azathioprine) or exchange plasmapheresis if needed to reach platelet count within safe range for patient and for delivery.  - Discussed maternal bleeding risk with Plt count <10K including intracranial or intraabdominal bleeding. Will continue to need close Plt monitoring until above this threshold as above.   - Ideally Plts >20K for vaginal delivery and >50K for  section. Given severe, refractory disease, would recommend obtaining Plt count at 36-37 weeks in order to optimize Plt count prior to delivery. If <30K,  consider admission for steroid course and IVIG.  - Recommend delivery between 38-39 weeks, timed with optimized Plt count  - Obtain Plt count on admission to L&D  - Avoid FSE and operative vaginal delivery due to risk of fetal hemorrhage as there is a 5-15% risk of  thrombocytopenia with maternal ITP. No reported  ICH among women with ITP in pregnancy.  - Anesthesia consult in third trimester  - Warm auto-antibodies on initial T&S, obtain type and cross with admission to L&D    #H/o PTD at 28w  - Per patient report, this was iatrogenic due to heavy vaginal bleeding, clinically sounds c/w placental abruption. No indication for progesterone given no h/o  labor    #Partial placenta previa at 16 weeks, resolved today    #AMA  - Patient previously declined genetic counseling or screening  - Comprehensive anatomy US today    #H/o  section at 28 weeks with unknown uterine scar  - Suspect c/s secondary to placental abruption given patient history, no records available as this occurred in  in [place]  - History of successful term  in   - TOLAC appropriate if patient desires    #PNC  -ERIN to [facility]  - NOB labs as above. No Hgb ELP in records, order at next visit given persistent anemia  - Continue PNV with iron  - GCT between 25-28 weeks  - Recommend fetal growth scan in third trimester at 28-32 weeks.    Return in 2-4 weeks for OB visit.    I acted as a scribe for Dr. Scott Vergara MD  Obstetrics and Gyncology, PGY-2  19 9:40 AM     Physician Attestation   I, Ashok Chavez, saw this patient with the resident and agree with the resident s findings and plan of care as documented in the resident s note.      I personally reviewed vital signs, medications and labs.    Key findings: ITP in patient unresponsive to IVIG or steroid treatment. Currently receiving biologic agent for immunosuppression. Can continue as needed as long as not used within 2-3 weeks of  delivery.    Ashok Chavez  Date of Service (when I saw the patient): 02/26/19    Time Spent on this Encounter   I, Ashok Chavez, spent a total of 30 minutes bedside and on the inpatient unit today managing the care of Patricia Govea.  Over 50% of my time on the unit was spent counseling the patient and /or coordinating care regarding management and transfer of care.. See note for details.

## 2019-02-22 ENCOUNTER — PRE VISIT (OUTPATIENT)
Dept: MATERNAL FETAL MEDICINE | Facility: CLINIC | Age: 39
End: 2019-02-22

## 2019-02-22 ENCOUNTER — AMBULATORY - HEALTHEAST (OUTPATIENT)
Dept: INFUSION THERAPY | Facility: HOSPITAL | Age: 39
End: 2019-02-22

## 2019-02-22 ENCOUNTER — INFUSION - HEALTHEAST (OUTPATIENT)
Dept: INFUSION THERAPY | Facility: HOSPITAL | Age: 39
End: 2019-02-22

## 2019-02-22 DIAGNOSIS — D69.3 ACUTE ITP (H): ICD-10-CM

## 2019-02-26 ENCOUNTER — OFFICE VISIT (OUTPATIENT)
Dept: MATERNAL FETAL MEDICINE | Facility: CLINIC | Age: 39
End: 2019-02-26
Attending: OBSTETRICS & GYNECOLOGY
Payer: COMMERCIAL

## 2019-02-26 ENCOUNTER — OFFICE VISIT (OUTPATIENT)
Dept: INTERPRETER SERVICES | Facility: CLINIC | Age: 39
End: 2019-02-26

## 2019-02-26 ENCOUNTER — HOSPITAL ENCOUNTER (OUTPATIENT)
Dept: ULTRASOUND IMAGING | Facility: CLINIC | Age: 39
Discharge: HOME | End: 2019-02-26
Attending: OBSTETRICS & GYNECOLOGY | Admitting: OBSTETRICS & GYNECOLOGY
Payer: COMMERCIAL

## 2019-02-26 VITALS
HEART RATE: 90 BPM | DIASTOLIC BLOOD PRESSURE: 62 MMHG | SYSTOLIC BLOOD PRESSURE: 129 MMHG | WEIGHT: 190.5 LBS | BODY MASS INDEX: 39.72 KG/M2

## 2019-02-26 DIAGNOSIS — O99.119 THROMBOCYTOPENIA DURING PREGNANCY (H): ICD-10-CM

## 2019-02-26 DIAGNOSIS — D69.6 THROMBOCYTOPENIA DURING PREGNANCY (H): ICD-10-CM

## 2019-02-26 PROCEDURE — T1013 SIGN LANG/ORAL INTERPRETER: HCPCS | Mod: U3,ZF

## 2019-02-26 PROCEDURE — 76811 OB US DETAILED SNGL FETUS: CPT

## 2019-02-26 PROCEDURE — G0463 HOSPITAL OUTPT CLINIC VISIT: HCPCS | Mod: 25,ZF

## 2019-02-26 RX ORDER — PREDNISONE 20 MG/1
20 TABLET ORAL DAILY
COMMUNITY
End: 2019-05-23

## 2019-02-26 NOTE — PROGRESS NOTES
E&M15   Pt presents to [facility] for assessment and evaluation of her pregnancy due to maternal ITP. Pt states she is doing well thru . Pt states no issues with her ITP at this time. Pt states no lof or bleeding. No contractions or cramping. States she has not felt any fetal movement at this time. Us done today and reviewed by Dr. Chavez. See epic for findings. Obv done today. See flow sheet. Pt was seen by Dr. Vergara and Dr. Chavez. Questions answered. Will return in 4 weeks for obv and Rl2. Pt states understanding. Discharged stable. Pt has PCC number to call if further issues arise. Sarah Ponce RN

## 2019-02-27 ENCOUNTER — TELEPHONE (OUTPATIENT)
Dept: MATERNAL FETAL MEDICINE | Facility: CLINIC | Age: 39
End: 2019-02-27

## 2019-02-27 NOTE — TELEPHONE ENCOUNTER
Call to pt with  re appointment for center for bleeding and clotting set for March 4th at 10 am with Dr. Adame. Pt states understanding. No further questions at this time. Sarah Ponce RN

## 2019-03-01 ENCOUNTER — OFFICE VISIT - HEALTHEAST (OUTPATIENT)
Dept: ONCOLOGY | Facility: HOSPITAL | Age: 39
End: 2019-03-01

## 2019-03-01 ENCOUNTER — INFUSION - HEALTHEAST (OUTPATIENT)
Dept: INFUSION THERAPY | Facility: HOSPITAL | Age: 39
End: 2019-03-01

## 2019-03-01 ENCOUNTER — AMBULATORY - HEALTHEAST (OUTPATIENT)
Dept: INFUSION THERAPY | Facility: HOSPITAL | Age: 39
End: 2019-03-01

## 2019-03-01 DIAGNOSIS — Z86.2 HISTORY OF IMMUNE THROMBOCYTOPENIA: ICD-10-CM

## 2019-03-01 DIAGNOSIS — D69.3 ACUTE ITP (H): ICD-10-CM

## 2019-03-01 DIAGNOSIS — D69.6 THROMBOCYTOPENIA DURING PREGNANCY (H): ICD-10-CM

## 2019-03-01 DIAGNOSIS — O99.119 THROMBOCYTOPENIA DURING PREGNANCY (H): ICD-10-CM

## 2019-03-01 LAB
BASOPHILS # BLD AUTO: 0 THOU/UL (ref 0–0.2)
BASOPHILS NFR BLD AUTO: 0 % (ref 0–2)
EOSINOPHIL COUNT (ABSOLUTE): 0.1 THOU/UL (ref 0–0.4)
EOSINOPHIL NFR BLD AUTO: 1 % (ref 0–6)
ERYTHROCYTE [DISTWIDTH] IN BLOOD BY AUTOMATED COUNT: 13.4 % (ref 11–14.5)
HCT VFR BLD AUTO: 31.4 % (ref 35–47)
HGB BLD-MCNC: 10.9 G/DL (ref 12–16)
LYMPHOCYTES # BLD AUTO: 1.7 THOU/UL (ref 0.8–4.4)
LYMPHOCYTES NFR BLD AUTO: 14 % (ref 20–40)
MCH RBC QN AUTO: 33 PG (ref 27–34)
MCHC RBC AUTO-ENTMCNC: 34.7 G/DL (ref 32–36)
MCV RBC AUTO: 95 FL (ref 80–100)
METAMYELOCYTES (ABSOLUTE): 0.1 THOU/UL
METAMYELOCYTES NFR BLD MANUAL: 1 %
MONOCYTES # BLD AUTO: 0.2 THOU/UL (ref 0–0.9)
MONOCYTES NFR BLD AUTO: 2 % (ref 2–10)
MYELOCYTES (ABSOLUTE): 0.1 THOU/UL
MYELOCYTES NFR BLD MANUAL: 1 %
PLAT MORPH BLD: ABNORMAL
PLATELET # BLD AUTO: 1 THOU/UL (ref 140–440)
PMV BLD AUTO: ABNORMAL FL (ref 8.5–12.5)
RBC # BLD AUTO: 3.3 MILL/UL (ref 3.8–5.4)
TOTAL NEUTROPHILS-ABS(DIFF): 9.9 THOU/UL (ref 2–7.7)
TOTAL NEUTROPHILS-REL(DIFF): 81 % (ref 50–70)
WBC: 12.2 THOU/UL (ref 4–11)

## 2019-03-08 ENCOUNTER — INFUSION - HEALTHEAST (OUTPATIENT)
Dept: INFUSION THERAPY | Facility: HOSPITAL | Age: 39
End: 2019-03-08

## 2019-03-08 DIAGNOSIS — Z86.2 HISTORY OF IMMUNE THROMBOCYTOPENIA: ICD-10-CM

## 2019-03-08 DIAGNOSIS — D69.3 ACUTE ITP (H): ICD-10-CM

## 2019-03-08 LAB
BASOPHILS # BLD AUTO: 0 THOU/UL (ref 0–0.2)
BASOPHILS NFR BLD AUTO: 0 % (ref 0–2)
EOSINOPHIL # BLD AUTO: 0.1 THOU/UL (ref 0–0.4)
EOSINOPHIL NFR BLD AUTO: 1 % (ref 0–6)
ERYTHROCYTE [DISTWIDTH] IN BLOOD BY AUTOMATED COUNT: 14.1 % (ref 11–14.5)
HCT VFR BLD AUTO: 29.4 % (ref 35–47)
HGB BLD-MCNC: 10.1 G/DL (ref 12–16)
LYMPHOCYTES # BLD AUTO: 2.1 THOU/UL (ref 0.8–4.4)
LYMPHOCYTES NFR BLD AUTO: 21 % (ref 20–40)
MCH RBC QN AUTO: 33 PG (ref 27–34)
MCHC RBC AUTO-ENTMCNC: 34.4 G/DL (ref 32–36)
MCV RBC AUTO: 96 FL (ref 80–100)
MONOCYTES # BLD AUTO: 0.8 THOU/UL (ref 0–0.9)
MONOCYTES NFR BLD AUTO: 8 % (ref 2–10)
NEUTROPHILS # BLD AUTO: 6.5 THOU/UL (ref 2–7.7)
NEUTROPHILS NFR BLD AUTO: 70 % (ref 50–70)
PLATELET # BLD AUTO: 94 THOU/UL (ref 140–440)
PMV BLD AUTO: 13.4 FL (ref 8.5–12.5)
RBC # BLD AUTO: 3.06 MILL/UL (ref 3.8–5.4)
WBC: 10.1 THOU/UL (ref 4–11)

## 2019-03-14 ENCOUNTER — OFFICE VISIT (OUTPATIENT)
Dept: HEMATOLOGY | Facility: CLINIC | Age: 39
End: 2019-03-14
Attending: INTERNAL MEDICINE
Payer: COMMERCIAL

## 2019-03-14 VITALS
WEIGHT: 195 LBS | OXYGEN SATURATION: 97 % | TEMPERATURE: 98.7 F | DIASTOLIC BLOOD PRESSURE: 82 MMHG | HEIGHT: 59 IN | HEART RATE: 117 BPM | RESPIRATION RATE: 12 BRPM | BODY MASS INDEX: 39.31 KG/M2 | SYSTOLIC BLOOD PRESSURE: 126 MMHG

## 2019-03-14 DIAGNOSIS — D69.3 IDIOPATHIC THROMBOCYTOPENIC PURPURA (H): Primary | ICD-10-CM

## 2019-03-14 PROCEDURE — G0463 HOSPITAL OUTPT CLINIC VISIT: HCPCS

## 2019-03-14 PROCEDURE — 99204 OFFICE O/P NEW MOD 45 MIN: CPT | Performed by: INTERNAL MEDICINE

## 2019-03-14 RX ORDER — ACETAMINOPHEN 325 MG/1
325-650 TABLET ORAL EVERY 6 HOURS PRN
COMMUNITY
End: 2021-07-23

## 2019-03-14 ASSESSMENT — MIFFLIN-ST. JEOR: SCORE: 1470.14

## 2019-03-14 ASSESSMENT — PAIN SCALES - GENERAL: PAINLEVEL: NO PAIN (0)

## 2019-03-14 NOTE — PATIENT INSTRUCTIONS
1. We will continue to follow your numbers and be a support for your doctor. He can call us with any questions, comments or concerns.  2. If your numbers remain stable (above 20) for the next few weeks you can return to work.  3. We recommend that your platelets be checked before delivery. If your number falls below 70 we do not recommend an epidural.  4. If your number remains above 100 we feel it is safe for you to deliver at your preferred hospital.   5. Please call us if you have any questions, comments or concerns about your upcoming delivery or your platelet counts.

## 2019-03-14 NOTE — PROGRESS NOTES
[facility]  [address] [place] [id number]  Phone: [phone], Fax: [phone]    Outpatient Visit Note:    Patient: Patricia Govea  MRN: [id number]  : 1980  ANASTASIA: Mar 14, 2019    Reason for Consultation:  Patricia Govea is referred by Dr. Ashok Chavez MD, for evaluation and treatment of ITP in pregnancy.    History of Present Illness:  Patricia Govea is a 38 year old female with a historical diagnosis of ITP who is 22 weeks pregnant. She was first diagnosed with ITP in  when she was brought to the hospital after a syncopal episode. During that hospitalization she had throbocytopenia to 5 and bone marrow biopsy revealed morphologically normal cells and increased megakaryocytes. She received 16 units of platelets, GCS, IVIG and prednisone.      In regards to this current pregnancy, she was admitted 19 for worsening thrombocytopenia and was given prednisone and IVIG. Her platelets initially improved and she was discharged home. Later that month, she began having epistaxis and was found to have a platelet count of 1. She was again admitted and received 2 more dose of IVIG and prednisone, however her platelet counts were refractory to this treatment. Splenectomy was then performed on 19 with good response in her platelet counts.     Platelets 1 -> 26 -> 5 -> (splenectomy) -> 90 -> 134 -> 165 -> 129 -> 137 -> 132 -> 91    Was again admitted on 19 with hematuria and thrombocytopenia during which she received 1 unit platelets, 2 doses of IVIG and a rituxan infusion. She was discharged and has received 3 subsequent infusions of rituxan as an outpatient.     Today, she reports feeling well. She is currently without bleeding and denies hematuria, blood in her stool, oral ulcerations, or ecchymoses. Her appetite has been increasing over the past several weeks and she has been gaining weight appropriately.       Past  Medical History:  Past Medical History:   Diagnosis Date     Acute idiopathic thrombocytopenic purpura (H)     bone marrow biopsy negative for malignancy     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 pylori infection        Past Surgical History:  Past Surgical History:   Procedure Laterality Date      SECTION  2000     LAPAROSCOPIC SALPINGECTOMY Left 2006       Medications:  Current Outpatient Medications   Medication Sig Dispense Refill     acetaminophen (TYLENOL) 325 MG tablet Take 325-650 mg by mouth every 6 hours as needed for mild pain       predniSONE (DELTASONE) 20 MG tablet Take 20 mg by mouth daily.       Prenatal Vit-Fe Fumarate-FA (PRENATAL MULTIVITAMIN  PLUS IRON) 27-1 MG TABS Take by mouth daily          Allergies:  Allergies   Allergen Reactions     Influenza Vaccines Other (See Comments)     Guillain Harvey Syndrome following 2016 administration     Blood-Group Specific Substance Other (See Comments)     Warm autoantibodies present.RBC for transfusion may be delayed up to 24 hrs. Draw 3 lavender and 1 red tube for Type and Screen requests.       ROS:  Denies current bleeding. No gum bleeding, No nose bleed. Denies any hematuria or blood in stools. Denies any ecchymosis. Denies any lower extremities swelling or pain. Denies any fever, no chest pain. Denies any shortness of breath.     Social History:  Denies any tobacco use. No significant alcohol use. Denies any illicit drug use.     Family History:  No known family history of abnormal bleeding.     Objectives:  Pleasant 38 year old female in no acute distress.  Vitals: B/P: 126/82, T: 98.7, P: 117, R: 12, Wt: 195 lbs 0 oz  Exam:   Gen: Appears well, no distress  HEENT: No scleral icterus or hemorrahge, no wet purpura, no lymphadenopathy  CV: regular, no murmurs  Pulm: clear  Abd: soft, nontender, no splenomegaly, Size appropriate for 22 weeks of pregnancy.   Ext: no edema    Labs:  Plt: 94  Hgb:  10.1    Imaging:  None.     Assessment:  In summary, Patricia Govea is a 38 year old female who is 22 weeks pregnant with recurrent severe ITP which is now responsive to rituximab after splenectomy failure.      Plan:  -- she will complete rituximab with her primary hematologist/oncologist  -- Discussed thresholds for intervention (70 for epidural, 50 for , 30 for all ITP patients to prevent bleeding).  -- Explained that she likely could deliver in the community if platelets continue to be stable or rise, but assuming this could worsen as we approach delivery then deliver here    The patient is given our center's contact information and is instructed to call if she should have any further questions or concerns.  Otherwise, we will plan on seeing her back as needed as the pregnancy progresses.      Marley Nguyen RN, nurse clinician is present throughout the entire clinic visit with the patient today.  Patient understands and agrees with the above plan and recommendation.    Adriel Dawn MS  [employer]     Physician Attestation   I saw this patient with the student who acted as my scribe for the visit.  I have edited the note to reflect my history, exam and medical decision making.    Total Time Spent:  I spent a total of 45 minutes face-to-face with Patricia Govea during today's office visit.  Over 50% of this time was spent counseling the patient and/or coordinating care regarding ITP.        Bradley Adame MD   of Medicine  [employer]

## 2019-03-14 NOTE — NURSING NOTE
Patricia Govea is here for a visit with Dr. Adame. She is being seen for ITP and pregnancy guidance. An  is present.    RN welcomed and introduced Patricia to our center and provided her with a contact card containing our information.    Medications, and allergies were then reviewed, updated and reconciled with outside records.    I asked Patricia if she had any specific concerns that she wanted to address and communicated these with the provider. I then reviewed which provider she was going to be seeing today and the expected timing of that provider.    Together we reviewed the plan that she would continue to follow her platelet counts with her PCP. She will make sure to have a platelet level drawn prior to pregnancy. We will continue to assist with planning as needed. The AVS instructions were then updated and given to the patient.    I then thanked Patricia for coming and encouraged her to call should she have any questions or concerns.    Marley Nguyen RN - Nurse Clinician - [facility] - [phone]

## 2019-03-15 ENCOUNTER — AMBULATORY - HEALTHEAST (OUTPATIENT)
Dept: INFUSION THERAPY | Facility: HOSPITAL | Age: 39
End: 2019-03-15

## 2019-03-15 ENCOUNTER — OFFICE VISIT - HEALTHEAST (OUTPATIENT)
Dept: ONCOLOGY | Facility: HOSPITAL | Age: 39
End: 2019-03-15

## 2019-03-15 DIAGNOSIS — D69.3 ACUTE ITP (H): ICD-10-CM

## 2019-03-15 LAB
BASOPHILS # BLD AUTO: 0 THOU/UL (ref 0–0.2)
BASOPHILS NFR BLD AUTO: 0 % (ref 0–2)
EOSINOPHIL # BLD AUTO: 0 THOU/UL (ref 0–0.4)
EOSINOPHIL NFR BLD AUTO: 0 % (ref 0–6)
ERYTHROCYTE [DISTWIDTH] IN BLOOD BY AUTOMATED COUNT: 13.9 % (ref 11–14.5)
HCT VFR BLD AUTO: 32.5 % (ref 35–47)
HGB BLD-MCNC: 10.9 G/DL (ref 12–16)
LYMPHOCYTES # BLD AUTO: 2.9 THOU/UL (ref 0.8–4.4)
LYMPHOCYTES NFR BLD AUTO: 25 % (ref 20–40)
MCH RBC QN AUTO: 32.6 PG (ref 27–34)
MCHC RBC AUTO-ENTMCNC: 33.5 G/DL (ref 32–36)
MCV RBC AUTO: 97 FL (ref 80–100)
MONOCYTES # BLD AUTO: 0.5 THOU/UL (ref 0–0.9)
MONOCYTES NFR BLD AUTO: 4 % (ref 2–10)
NEUTROPHILS # BLD AUTO: 8.1 THOU/UL (ref 2–7.7)
NEUTROPHILS NFR BLD AUTO: 71 % (ref 50–70)
PLATELET # BLD AUTO: 188 THOU/UL (ref 140–440)
PMV BLD AUTO: 10.4 FL (ref 8.5–12.5)
RBC # BLD AUTO: 3.34 MILL/UL (ref 3.8–5.4)
WBC: 11.9 THOU/UL (ref 4–11)

## 2019-03-22 ENCOUNTER — AMBULATORY - HEALTHEAST (OUTPATIENT)
Dept: INFUSION THERAPY | Facility: HOSPITAL | Age: 39
End: 2019-03-22

## 2019-03-22 ENCOUNTER — AMBULATORY - HEALTHEAST (OUTPATIENT)
Dept: ONCOLOGY | Facility: HOSPITAL | Age: 39
End: 2019-03-22

## 2019-03-22 DIAGNOSIS — D69.3 ACUTE ITP (H): ICD-10-CM

## 2019-03-22 LAB
BASOPHILS # BLD AUTO: 0 THOU/UL (ref 0–0.2)
BASOPHILS NFR BLD AUTO: 0 % (ref 0–2)
EOSINOPHIL # BLD AUTO: 0.2 THOU/UL (ref 0–0.4)
EOSINOPHIL NFR BLD AUTO: 1 % (ref 0–6)
ERYTHROCYTE [DISTWIDTH] IN BLOOD BY AUTOMATED COUNT: 14 % (ref 11–14.5)
HCT VFR BLD AUTO: 32.4 % (ref 35–47)
HGB BLD-MCNC: 11.2 G/DL (ref 12–16)
LYMPHOCYTES # BLD AUTO: 3 THOU/UL (ref 0.8–4.4)
LYMPHOCYTES NFR BLD AUTO: 28 % (ref 20–40)
MCH RBC QN AUTO: 32.7 PG (ref 27–34)
MCHC RBC AUTO-ENTMCNC: 34.6 G/DL (ref 32–36)
MCV RBC AUTO: 95 FL (ref 80–100)
MONOCYTES # BLD AUTO: 0.7 THOU/UL (ref 0–0.9)
MONOCYTES NFR BLD AUTO: 7 % (ref 2–10)
NEUTROPHILS # BLD AUTO: 6.6 THOU/UL (ref 2–7.7)
NEUTROPHILS NFR BLD AUTO: 63 % (ref 50–70)
PLATELET # BLD AUTO: 210 THOU/UL (ref 140–440)
PMV BLD AUTO: 10 FL (ref 8.5–12.5)
RBC # BLD AUTO: 3.43 MILL/UL (ref 3.8–5.4)
WBC: 10.6 THOU/UL (ref 4–11)

## 2019-03-22 NOTE — PROGRESS NOTES
Maternal-Fetal Medicine OB Visit    S:  Patient is feeling well. States she finished taking her prednisone last Thursday and her platelet count last Friday was 210K. She denies any hematuria, epistaxis, rectal or vaginal bleeding, or ecchymosis. No contractions or LOF. Feeling good fetal movement. Wondering if she will be able to transfer care back to her regular OB at [facility] - sees Dr. Ghotra and was planning on delivering at [facility]. Also said her hematologist said she could go back to work, wondering if we feel this is safe. 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     O:  /72 (BP Location: Left arm, Patient Position: Chair)   Pulse 85   Resp 18   Wt 88 kg (193 lb 14.4 oz)   LMP 10/05/2018   BMI 39.16 kg/m     Gen: Well appearing, NAD  Resp: Non-labored breathing  Abd: Gravid    Labs:  Plts: 8 ()>1 (3/1)>94 (3/8)>188 (3/15)>210 (3/22)    Please see Ultrasound report for Comprehensive ultrasound performed today under Imaging tab. Normal fetal growth. Normal amniotic fluid volume.     ASSESSMENT:  Patricia Govea is a 38 year old  at 24w4d by LMP consistent with 11w1d US here for routine OB visit.   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 She is s/p 4/4 doses Rituximab and steroid taper (last dose 3/21) with stable platelet count of 210K on 3/22/19.  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 Started on Rituximab on 2/15/19 due to refractory disease  despite splenectomy, s/p 4/4 doses and steroid taper now with stable platelet count. Patient is followed by hematology through [facility]. Risks and benefits of Rituximab in pregnancy (mainly  leukopenia) have previously been discussed with patient. Given her refractory disease, she understands benefits outweigh risks in this case.   - Continue following with hematology with [facility]. Hematology service at the [facility] is also seeing patient to help coordinate care as she will be getting her OB care with us. Recommend monthly platelets now that counts have stabilized or more frequently per hematology.  - Treatment can be extended to used of immunosuppressive therapy (cyclosporine or azathioprine) or exchange plasmapheresis if needed to reach platelet count within safe range for patient and for delivery.  - Discussed maternal bleeding risk with Plt count <10K including intracranial or intraabdominal bleeding.   - Ideally Plts >20K for vaginal delivery and >50K for  section. Given severe, refractory disease, would recommend obtaining Plt count at 36-37 weeks in order to optimize Plt count prior to delivery. If <30K, consider admission for steroid course and IVIG.  - Recommend delivery between 38-39 weeks, timed with optimized Plt count  - Obtain Plt count on admission to L&D  - Avoid FSE and operative vaginal delivery due to risk of fetal hemorrhage as there is a 5-15% risk of  thrombocytopenia with maternal ITP. No reported  ICH among women with ITP in pregnancy.  - Anesthesia consult in third trimester  - Warm auto-antibodies on initial T&S, obtain type and cross with admission to L&D  - Discussed with patient we plan to continue seeing her for her prenatal care for now, but that she should reach out to her primary OB to discuss if they would feel comfortable having her deliver with them if her platelet count remains stable at the end of pregnancy. Will plan to assess at 34 weeks.  Will send today's note to Dr. Ghotra to keep their team informed.   - Discussed with patient it is safe for her to return to work    #H/o PTD at 28w  - Per patient report, this was iatrogenic due to heavy vaginal bleeding, clinically sounds c/w placental abruption. No indication for progesterone given no h/o  labor    #Partial placenta previa at 16 weeks, resolved today. Normal fetal growth. Follow up US 6 weeks.     #AMA  - Patient previously declined genetic counseling or screening  - Comprehensive anatomy US today    #H/o  section at 28 weeks with unknown uterine scar  - Suspect c/s secondary to placental abruption given patient history, no records available as this occurred in  in [place]  - History of successful term  in   - TOLAC appropriate if patient desires    #PNC  - NOB labs as above. No Hgb ELP in records, ordered today given persistent anemia  - Continue PNV with iron  - Will plan 1 hour GCT next week at the time of her next platelet count as she will then be 2 weeks from her steroid taper.   - Normal comprehensive anatomy scan at 20 weeks, follow up today with normal growth  - Fetal growth scan in third trimester at 32 weeks.    Return in 4 weeks for OB visit and 6 weeks for growth ultrasound and OB visit.    I acted as a scribe for Dr. Jeffrey Vergara MD  Obstetrics and Gyncology, PGY-2    This note, as scribed, accurately reflects the examination, my impressions, and the plan as I discussed it with the patient.       Ana Murphy MD

## 2019-03-26 ENCOUNTER — HOSPITAL ENCOUNTER (OUTPATIENT)
Dept: ULTRASOUND IMAGING | Facility: CLINIC | Age: 39
Discharge: HOME | End: 2019-03-26
Attending: OBSTETRICS & GYNECOLOGY | Admitting: OBSTETRICS & GYNECOLOGY
Payer: COMMERCIAL

## 2019-03-26 ENCOUNTER — AMBULATORY - HEALTHEAST (OUTPATIENT)
Dept: MATERNAL FETAL MEDICINE | Facility: HOSPITAL | Age: 39
End: 2019-03-26

## 2019-03-26 ENCOUNTER — OFFICE VISIT (OUTPATIENT)
Dept: MATERNAL FETAL MEDICINE | Facility: CLINIC | Age: 39
End: 2019-03-26
Attending: OBSTETRICS & GYNECOLOGY
Payer: COMMERCIAL

## 2019-03-26 VITALS
BODY MASS INDEX: 39.16 KG/M2 | HEART RATE: 85 BPM | SYSTOLIC BLOOD PRESSURE: 120 MMHG | RESPIRATION RATE: 18 BRPM | DIASTOLIC BLOOD PRESSURE: 72 MMHG | WEIGHT: 193.9 LBS

## 2019-03-26 DIAGNOSIS — O99.119 THROMBOCYTOPENIA DURING PREGNANCY (H): ICD-10-CM

## 2019-03-26 DIAGNOSIS — D69.6 THROMBOCYTOPENIA DURING PREGNANCY (H): ICD-10-CM

## 2019-03-26 PROCEDURE — 76816 OB US FOLLOW-UP PER FETUS: CPT

## 2019-03-26 PROCEDURE — G0463 HOSPITAL OUTPT CLINIC VISIT: HCPCS | Mod: 25,ZF

## 2019-03-26 NOTE — NURSING NOTE
Patricia accompanied by [place] interprter. Pt seen in clinic today for follow up growth US and OB visit at 24w2d gestation due to pregnancy c/b ITP (see report/notes). VSS. Pt reports + fetal movement. Pt denies SOB, bleeding, or buising. Pt denies bldg/lof/change in discharge/contractions/headache/vision changes/chest pain/edema.  Dr. Vergara and Dr. Murphy met with pt and discussed POC. Pt finished prednisone last week. Continues to follow with hematology with weekly lab draws. Plan 1hr GCT an hgb electropheresis to be drawn on 4/5 with hematology labs. Pt aware she should go to [facility] lab on 4/5 for these labs. Plan to return to [facility] in 4 weeks for OB visit and 6 weeks for OB visit and follow up growth US.  New pt folder and contact information reviewed with patient. [place] teaching/pt sheets given and reviewed. Pt discharged stable and ambulatory.         Bruna Ornelas RN

## 2019-03-29 ENCOUNTER — TELEPHONE (OUTPATIENT)
Dept: MATERNAL FETAL MEDICINE | Facility: CLINIC | Age: 39
End: 2019-03-29

## 2019-03-29 ENCOUNTER — AMBULATORY - HEALTHEAST (OUTPATIENT)
Dept: ONCOLOGY | Facility: HOSPITAL | Age: 39
End: 2019-03-29

## 2019-03-29 ENCOUNTER — AMBULATORY - HEALTHEAST (OUTPATIENT)
Dept: INFUSION THERAPY | Facility: HOSPITAL | Age: 39
End: 2019-03-29

## 2019-03-29 ENCOUNTER — DOCUMENTATION ONLY (OUTPATIENT)
Dept: MATERNAL FETAL MEDICINE | Facility: CLINIC | Age: 39
End: 2019-03-29

## 2019-03-29 DIAGNOSIS — D69.3 ACUTE ITP (H): ICD-10-CM

## 2019-03-29 LAB
BASOPHILS # BLD AUTO: 0 THOU/UL (ref 0–0.2)
BASOPHILS NFR BLD AUTO: 0 % (ref 0–2)
EOSINOPHIL # BLD AUTO: 0.1 THOU/UL (ref 0–0.4)
EOSINOPHIL NFR BLD AUTO: 2 % (ref 0–6)
ERYTHROCYTE [DISTWIDTH] IN BLOOD BY AUTOMATED COUNT: 14 % (ref 11–14.5)
HCT VFR BLD AUTO: 32.2 % (ref 35–47)
HGB BLD-MCNC: 11.3 G/DL (ref 12–16)
LYMPHOCYTES # BLD AUTO: 2.7 THOU/UL (ref 0.8–4.4)
LYMPHOCYTES NFR BLD AUTO: 30 % (ref 20–40)
MCH RBC QN AUTO: 33 PG (ref 27–34)
MCHC RBC AUTO-ENTMCNC: 35.1 G/DL (ref 32–36)
MCV RBC AUTO: 94 FL (ref 80–100)
MONOCYTES # BLD AUTO: 0.7 THOU/UL (ref 0–0.9)
MONOCYTES NFR BLD AUTO: 8 % (ref 2–10)
NEUTROPHILS # BLD AUTO: 5.4 THOU/UL (ref 2–7.7)
NEUTROPHILS NFR BLD AUTO: 61 % (ref 50–70)
PLATELET # BLD AUTO: 325 THOU/UL (ref 140–440)
PMV BLD AUTO: 9.4 FL (ref 8.5–12.5)
RBC # BLD AUTO: 3.42 MILL/UL (ref 3.8–5.4)
WBC: 9 THOU/UL (ref 4–11)

## 2019-03-29 NOTE — TELEPHONE ENCOUNTER
Pt called requesting a return to work note. Note faxed to pt employer. Nothing further at this time. Pt has follow up at this time. Sarah Ponce RN

## 2019-04-05 ENCOUNTER — TRANSFERRED RECORDS (OUTPATIENT)
Dept: HEALTH INFORMATION MANAGEMENT | Facility: CLINIC | Age: 39
End: 2019-04-05

## 2019-04-05 ENCOUNTER — AMBULATORY - HEALTHEAST (OUTPATIENT)
Dept: LAB | Facility: HOSPITAL | Age: 39
End: 2019-04-05

## 2019-04-05 DIAGNOSIS — D69.6 THROMBOCYTOPENIA DURING PREGNANCY (H): ICD-10-CM

## 2019-04-05 DIAGNOSIS — O99.119 THROMBOCYTOPENIA DURING PREGNANCY (H): ICD-10-CM

## 2019-04-05 DIAGNOSIS — D69.3 ACUTE ITP (H): ICD-10-CM

## 2019-04-05 LAB
FASTING STATUS PATIENT QL REPORTED: YES
GLUCOSE 1H P 50 G GLC PO SERPL-MCNC: 153 MG/DL (ref 70–139)

## 2019-04-08 LAB
MISCELLANEOUS TEST DEPT. - HE HISTORICAL: NORMAL
PERFORMING LAB: NORMAL
SPECIMEN STATUS: NORMAL
TEST NAME: NORMAL

## 2019-04-12 ENCOUNTER — OFFICE VISIT - HEALTHEAST (OUTPATIENT)
Dept: ONCOLOGY | Facility: HOSPITAL | Age: 39
End: 2019-04-12

## 2019-04-12 ENCOUNTER — AMBULATORY - HEALTHEAST (OUTPATIENT)
Dept: INFUSION THERAPY | Facility: HOSPITAL | Age: 39
End: 2019-04-12

## 2019-04-12 DIAGNOSIS — D69.6 THROMBOCYTOPENIA DURING PREGNANCY (H): ICD-10-CM

## 2019-04-12 DIAGNOSIS — O99.119 THROMBOCYTOPENIA DURING PREGNANCY (H): ICD-10-CM

## 2019-04-12 DIAGNOSIS — D69.3 ACUTE ITP (H): ICD-10-CM

## 2019-04-12 LAB
BASOPHILS # BLD AUTO: 0 THOU/UL (ref 0–0.2)
BASOPHILS NFR BLD AUTO: 0 % (ref 0–2)
EOSINOPHIL # BLD AUTO: 0.1 THOU/UL (ref 0–0.4)
EOSINOPHIL NFR BLD AUTO: 1 % (ref 0–6)
ERYTHROCYTE [DISTWIDTH] IN BLOOD BY AUTOMATED COUNT: 14 % (ref 11–14.5)
HCT VFR BLD AUTO: 33.7 % (ref 35–47)
HGB BLD-MCNC: 11.5 G/DL (ref 12–16)
LYMPHOCYTES # BLD AUTO: 3 THOU/UL (ref 0.8–4.4)
LYMPHOCYTES NFR BLD AUTO: 32 % (ref 20–40)
MCH RBC QN AUTO: 32.2 PG (ref 27–34)
MCHC RBC AUTO-ENTMCNC: 34.1 G/DL (ref 32–36)
MCV RBC AUTO: 94 FL (ref 80–100)
MONOCYTES # BLD AUTO: 0.9 THOU/UL (ref 0–0.9)
MONOCYTES NFR BLD AUTO: 10 % (ref 2–10)
NEUTROPHILS # BLD AUTO: 5.4 THOU/UL (ref 2–7.7)
NEUTROPHILS NFR BLD AUTO: 58 % (ref 50–70)
PLATELET # BLD AUTO: 411 THOU/UL (ref 140–440)
PMV BLD AUTO: 9.6 FL (ref 8.5–12.5)
RBC # BLD AUTO: 3.57 MILL/UL (ref 3.8–5.4)
WBC: 9.5 THOU/UL (ref 4–11)

## 2019-04-19 ENCOUNTER — AMBULATORY - HEALTHEAST (OUTPATIENT)
Dept: MATERNAL FETAL MEDICINE | Facility: HOSPITAL | Age: 39
End: 2019-04-19

## 2019-04-19 ENCOUNTER — TELEPHONE (OUTPATIENT)
Dept: MATERNAL FETAL MEDICINE | Facility: CLINIC | Age: 39
End: 2019-04-19

## 2019-04-19 NOTE — TELEPHONE ENCOUNTER
Writer called and spoke with Aptricia via [employer] phone  explaining that she failed the 1 hour GTT and needs to complete a 3 hour. Patricia requests to do that next Friday at  with her next lab draw. Orders placed for GCT and RPR in [facility].   Savanna Crowder RN.

## 2019-04-23 ENCOUNTER — HOSPITAL ENCOUNTER (OUTPATIENT)
Dept: ULTRASOUND IMAGING | Facility: CLINIC | Age: 39
Discharge: HOME | End: 2019-04-23
Attending: OBSTETRICS & GYNECOLOGY | Admitting: OBSTETRICS & GYNECOLOGY
Payer: COMMERCIAL

## 2019-04-23 ENCOUNTER — OFFICE VISIT (OUTPATIENT)
Dept: MATERNAL FETAL MEDICINE | Facility: CLINIC | Age: 39
End: 2019-04-23
Attending: OBSTETRICS & GYNECOLOGY
Payer: COMMERCIAL

## 2019-04-23 ENCOUNTER — OFFICE VISIT (OUTPATIENT)
Dept: INTERPRETER SERVICES | Facility: CLINIC | Age: 39
End: 2019-04-23
Payer: COMMERCIAL

## 2019-04-23 VITALS
HEART RATE: 96 BPM | SYSTOLIC BLOOD PRESSURE: 120 MMHG | WEIGHT: 195.8 LBS | BODY MASS INDEX: 39.55 KG/M2 | DIASTOLIC BLOOD PRESSURE: 44 MMHG

## 2019-04-23 DIAGNOSIS — O99.119 THROMBOCYTOPENIA DURING PREGNANCY (H): ICD-10-CM

## 2019-04-23 DIAGNOSIS — D69.6 THROMBOCYTOPENIA DURING PREGNANCY (H): ICD-10-CM

## 2019-04-23 PROCEDURE — G0463 HOSPITAL OUTPT CLINIC VISIT: HCPCS | Mod: ZF

## 2019-04-23 PROCEDURE — 76819 FETAL BIOPHYS PROFIL W/O NST: CPT

## 2019-04-23 PROCEDURE — T1013 SIGN LANG/ORAL INTERPRETER: HCPCS | Mod: U3,ZF

## 2019-04-23 NOTE — PROGRESS NOTES
Pt presents to [facility] for assessment and evaluation of her pregnancy due to thrombocytopenia. Pt here with . Pt states she is doing well. Offers no complaints at this time. States she has felt slightly less fm over the last few days. No lof or bleeding. No contractions. Fht by Kymberly Roger. States she will be having her labs along with her 3 hour GTT at [facility] this Friday. No issues with bleeding or nose bleeds. Has not required any additional mediations for her platelets at this time. Obv done. See flow sheets. Pt met with Dr. Finch and Dr. Grimaldo. Dr. Grimaldo called pharmacy re tdap and at this time will hold due to allergy to influenza. Pt given handout on fetal kick count in both english and Lithuanian and states understanding. Bpp done today due to decreased fm. Has a follow up growth in 2 weeks with obv. Knows when to call with further concerns. Will be called with lab results. Questions answered today. Discharged stable. Sarah Ponce RN     Name: Giana Martins  ID:[id number]  Agency Name: [employer]

## 2019-04-23 NOTE — PROGRESS NOTES
"Maternal-Fetal Medicine OB Visit     S:  Patient is feeling well today, only complaints is some discomfort and numbness, especially in the am or when writing in her thumb, pointer and middle finger. She denies cramping, bleeding or leakage of fluid, though does report decreased fetal movements over the past two days. She has an appointment on Friday for GTT and 28 week labs, reports feeling very \"hungry\" during her steroid course so was concerned about her elevated Glucola test.      O:  Wt 90 kg (198 lb 8 oz)   LMP 10/05/2018   BMI 40.09 kg/m    Gen: Well appearing, NAD  Resp: Non-labored breathing  Abd: Gravid  FHR 135s     Labs:  Plts: 8 ()>1 (3/1)>94 (3/8)>188 (3/15)>210 (3/22)> 411 ()     A/P:  Patricia Govea is a 38 year old  at 28w4d by LMP consistent with 11w1d US here for routine OB visit.   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   -s/p / doses Rituximab and steroid taper (last dose 3/21) with stable platelet count now 411 on .  - Continue following with Hematology with [facility]. Hematology service at the [facility] is also seeing patient to help coordinate care as she will be getting her OB care with us. Recommend monthly platelets now that counts have stabilized(or per Heme recommendations).   - Treatment can be extended to use of immunosuppressive therapy (cyclosporine or azathioprine) or exchange plasmapheresis if needed to reach platelet count within safe range for patient and for delivery.   - Ideally Plts >20K for vaginal delivery and >50K for  section. Given severe, refractory disease, would recommend obtaining Plt count at 36-37 weeks in order to optimize Plt count prior to delivery. If <30K, consider admission for steroid course and IVIG.  - Recommend delivery between 38-39 weeks, timed with optimized Plt count  - Obtain Plt count on admission to L&D  - Avoid FSE and operative vaginal delivery " Please address the my chart message.  MINDY Mohan RN     due to risk of fetal hemorrhage as there is a 5-15% risk of  thrombocytopenia with maternal ITP. No reported  ICH among women with ITP in pregnancy.  - Anesthesia consult in third trimester   - Discussed with patient we plan to continue seeing her for her prenatal care for now, but that she should reach out to her primary OB to discuss if they would feel comfortable having her deliver with them if her platelet count remains stable at the end of pregnancy. Will plan to assess at 34 weeks.      #H/o PTD at 28w  - Per patient report, this was iatrogenic due to heavy vaginal bleeding, clinically sounds c/w placental abruption. No indication for progesterone given no h/o  labor.     #AMA  - Patient previously declined genetic counseling or screening     #H/o  section at 28 weeks with unknown uterine scar  - Suspect c/s secondary to placental abruption given patient history, no records available as this occurred in  in [place]  - History of successful term  in   - TOLAC appropriate if patient desires     #PNC   - NOB labs as above. Hgb ELP performed  - Continue PNV with iron  - 1hr GCT back at 153 (2 weeks from steroid taper) - has not yet completed 3hr GTT - plans to have this done with 28 week labs on Friday at [facility]. Will defer TdaP due to history of G-B with influenza vaccine and similar concerns per pharmacy with TdaP vaccine.  - s/p Normal comprehensive anatomy scan at 20 weeks, normal growth 3/26   - Fetal growth scan in third trimester at 32 weeks.  - Patient with decreased FM today, plan for BPP (see imaging tab for results), info on kick counts given and discussed.     Return in 2 weeks for OB visit and growth ultrasound.     I acted as a scribe for Dr. Dillan Finch MD  [facility] Fellow    Attending Attestation:    The documentation recorded by the scribe accurately reflects the services I personally performed and the decisions made by me.  The patient  was seen for an outpatient visit. The majority of time (>50%) was spent on counseling and coordination of care of this patient and/or family members. Total face-to-face time was 10 minutes.     Naye Grimaldo DO  Maternal-Fetal Medicine

## 2019-04-26 ENCOUNTER — AMBULATORY - HEALTHEAST (OUTPATIENT)
Dept: MATERNAL FETAL MEDICINE | Facility: HOSPITAL | Age: 39
End: 2019-04-26

## 2019-04-26 ENCOUNTER — AMBULATORY - HEALTHEAST (OUTPATIENT)
Dept: LAB | Facility: HOSPITAL | Age: 39
End: 2019-04-26

## 2019-04-26 ENCOUNTER — AMBULATORY - HEALTHEAST (OUTPATIENT)
Dept: INFUSION THERAPY | Facility: HOSPITAL | Age: 39
End: 2019-04-26

## 2019-04-26 DIAGNOSIS — D69.3 ACUTE ITP (H): ICD-10-CM

## 2019-04-26 DIAGNOSIS — O24.410 DIET CONTROLLED GESTATIONAL DIABETES MELLITUS (GDM), ANTEPARTUM: Primary | ICD-10-CM

## 2019-04-26 DIAGNOSIS — O99.810 ABNORMAL MATERNAL GLUCOSE TOLERANCE, ANTEPARTUM: ICD-10-CM

## 2019-04-26 LAB
BASOPHILS # BLD AUTO: 0 THOU/UL (ref 0–0.2)
BASOPHILS NFR BLD AUTO: 0 % (ref 0–2)
EOSINOPHIL # BLD AUTO: 0.1 THOU/UL (ref 0–0.4)
EOSINOPHIL NFR BLD AUTO: 1 % (ref 0–6)
ERYTHROCYTE [DISTWIDTH] IN BLOOD BY AUTOMATED COUNT: 14.5 % (ref 11–14.5)
FASTING STATUS PATIENT QL REPORTED: YES
GLUCOSE 1H P 100 G GLC PO SERPL-MCNC: 198 MG/DL (ref 70–179)
GLUCOSE 2H P 100 G GLC PO SERPL-MCNC: 154 MG/DL (ref 70–154)
GLUCOSE 3H P 100 G GLC PO SERPL-MCNC: 56 MG/DL (ref 70–139)
GLUCOSE P FAST SERPL-MCNC: 88 MG/DL (ref 70–94)
HCT VFR BLD AUTO: 34.2 % (ref 35–47)
HGB BLD-MCNC: 12.1 G/DL (ref 12–16)
LYMPHOCYTES # BLD AUTO: 2.4 THOU/UL (ref 0.8–4.4)
LYMPHOCYTES NFR BLD AUTO: 23 % (ref 20–40)
MCH RBC QN AUTO: 33.1 PG (ref 27–34)
MCHC RBC AUTO-ENTMCNC: 35.4 G/DL (ref 32–36)
MCV RBC AUTO: 93 FL (ref 80–100)
MONOCYTES # BLD AUTO: 0.7 THOU/UL (ref 0–0.9)
MONOCYTES NFR BLD AUTO: 6 % (ref 2–10)
NEUTROPHILS # BLD AUTO: 7.4 THOU/UL (ref 2–7.7)
NEUTROPHILS NFR BLD AUTO: 70 % (ref 50–70)
PLATELET # BLD AUTO: 458 THOU/UL (ref 140–440)
PMV BLD AUTO: 9.4 FL (ref 8.5–12.5)
RBC # BLD AUTO: 3.66 MILL/UL (ref 3.8–5.4)
WBC: 10.7 THOU/UL (ref 4–11)

## 2019-05-02 ENCOUNTER — TELEPHONE (OUTPATIENT)
Dept: OBGYN | Facility: CLINIC | Age: 39
End: 2019-05-02

## 2019-05-02 NOTE — TELEPHONE ENCOUNTER
Diabetes Education Scheduling Outreach #1:    Call to patient to schedule. Left message with phone number to call to schedule.    Plan for 2nd outreach attempt within 1 business day.    Bobby Finley OnCall  Diabetes and Nutrition Scheduling

## 2019-05-06 NOTE — TELEPHONE ENCOUNTER
Pt declined to scheduled due to location.  Was also offered [place].     [facility] Specialist  Diabetes & Nutrition Scheduling  [address] [place] [address] [phone]

## 2019-05-06 NOTE — TELEPHONE ENCOUNTER
Diabetes Education Scheduling Outreach #1:    Call to patient to schedule. Left message with phone number to call to schedule.    Plan for 2nd outreach attempt within 1 week.    Bobby Finley OnCall  Diabetes and Nutrition Scheduling

## 2019-05-07 ENCOUNTER — TELEPHONE (OUTPATIENT)
Dept: FAMILY MEDICINE | Facility: CLINIC | Age: 39
End: 2019-05-07

## 2019-05-07 ENCOUNTER — OFFICE VISIT (OUTPATIENT)
Dept: INTERPRETER SERVICES | Facility: CLINIC | Age: 39
End: 2019-05-07
Payer: COMMERCIAL

## 2019-05-07 ENCOUNTER — OFFICE VISIT (OUTPATIENT)
Dept: MATERNAL FETAL MEDICINE | Facility: CLINIC | Age: 39
End: 2019-05-07
Attending: OBSTETRICS & GYNECOLOGY
Payer: COMMERCIAL

## 2019-05-07 ENCOUNTER — HOSPITAL ENCOUNTER (OUTPATIENT)
Dept: ULTRASOUND IMAGING | Facility: CLINIC | Age: 39
Discharge: HOME | End: 2019-05-07
Attending: OBSTETRICS & GYNECOLOGY | Admitting: OBSTETRICS & GYNECOLOGY
Payer: COMMERCIAL

## 2019-05-07 VITALS
BODY MASS INDEX: 39.34 KG/M2 | HEART RATE: 89 BPM | DIASTOLIC BLOOD PRESSURE: 62 MMHG | SYSTOLIC BLOOD PRESSURE: 115 MMHG | WEIGHT: 194.8 LBS

## 2019-05-07 DIAGNOSIS — O99.119 THROMBOCYTOPENIA DURING PREGNANCY (H): ICD-10-CM

## 2019-05-07 DIAGNOSIS — D69.6 THROMBOCYTOPENIA DURING PREGNANCY (H): ICD-10-CM

## 2019-05-07 PROCEDURE — 76816 OB US FOLLOW-UP PER FETUS: CPT

## 2019-05-07 PROCEDURE — 76819 FETAL BIOPHYS PROFIL W/O NST: CPT | Performed by: OBSTETRICS & GYNECOLOGY

## 2019-05-07 PROCEDURE — T1013 SIGN LANG/ORAL INTERPRETER: HCPCS | Mod: U3,ZF

## 2019-05-07 PROCEDURE — G0463 HOSPITAL OUTPT CLINIC VISIT: HCPCS | Mod: 25,ZF

## 2019-05-07 NOTE — PROGRESS NOTES
"Maternal-Fetal Medicine OB Visit     S:  Patient feels well.  No complaints.  +FM.  No LOF or VB.  Denies contractions.  Plts now stable, off rituximab.  Sees Renzo on Friday, May 10th.  Desires transfer of care back to [facility] in order to deliver at [facility].        O:  Vital signs:                      Weight: 88.4 kg (194 lb 12.8 oz)  Estimated body mass index is 39.34 kg/m  as calculated from the following:    Height as of 3/14/19: 1.499 m (4' 11\").    Weight as of this encounter: 88.4 kg (194 lb 12.8 oz).    Gen: Well appearing, NAD  Resp: Non-labored breathing  Abd: Gravid  See US for details.      Labs:  Plts: 8 ()>1 (3/1)>94 (3/8)>188 (3/15)>210 (3/22)> 411 ()> 458 ()     A/P:  Patricia Govea is a 38 year old  at 30w4d by LMP consistent with 11w1d US here for routine OB visit.     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   -s/p / doses Rituximab and steroid taper (last dose 3/21) with stable platelet count now 458 on .  - Continue following with Hematology with [facility]. Hematology service at the [facility] is also seeing patient to help coordinate care as she will be getting her OB care with us. Recommend monthly platelets now that counts have stabilized(or per Heme recommendations).   - Treatment can be extended to use of immunosuppressive therapy (cyclosporine or azathioprine) or exchange plasmapheresis if needed to reach platelet count within safe range for patient and for delivery.   - Ideally Plts >20K for vaginal delivery and >50K for  section. Given severe, refractory disease, would recommend obtaining Plt count at 36-37 weeks in order to optimize Plt count prior to delivery. If <30K, consider admission for steroid course and IVIG.  - Unless platelets begin to drop again   - Obtain Plt count on admission to L&D  - Avoid FSE and operative vaginal delivery due to risk of fetal " hemorrhage as there is a 5-15% risk of  thrombocytopenia with maternal ITP. No reported  ICH among women with ITP in pregnancy.  - Anesthesia consult in third trimester if platelets are <100,000k   - Discussed with primary ob (Dr. Pat Ghotra).  Since platelets are stable s/p splenectomy and rituximab she is comfortable caring for patient.  Care transferred back.  They will call patient to arrange next follow up.      #H/o PTD at 28w  - Per patient report, this was iatrogenic due to heavy vaginal bleeding, clinically sounds c/w placental abruption. No indication for progesterone given no h/o  labor.     #AMA  - Patient previously declined genetic counseling or screening     #H/o  section at 28 weeks with unknown uterine scar  - Suspect c/s secondary to placental abruption given patient history, no records available as this occurred in  in [place]  - History of successful term  in   - TOLAC appropriate if patient desires     #PNC   - NOB labs as above. Hgb ELP performed  - Continue PNV with iron  - 1hr GCT back at 153 (2 weeks from steroid taper) -completed 3hr GTT, passed ( ) 88/198/154/56   Will defer TdaP due to history of G-B with influenza vaccine and similar concerns per pharmacy with TdaP vaccine.  - s/p Normal comprehensive anatomy scan at 20 weeks, normal growth 3/26   - Fetal growth scan in third trimester (normal today).       Patient to now follow with [facility], Dr. Ghotra.     Alisson Langley DO [facility]  Maternal Fetal Medicine Specialist  Pager: [phone]  Mobile: [phone]    The patient was seen for an established outpatient visit.  I spent a total of 15 minutes face to face with Patricia Govea during today's visit. Over 50% of this time was spent counseling the patient and/or coordinating care regarding ITP.

## 2019-05-07 NOTE — TELEPHONE ENCOUNTER
[facility] phone call message- general phone call:    Reason for call: Caller states the patient is looking to transfer OB care from their clinic to [facility]. She states Dr. Alisson Langley would like to speak with another provider regarding the patient wanting to transfer care. Please call and advise.     Return call needed: Yes    OK to leave a message on voice mail? Yes    Primary language: Niuean      needed? Yes    Call taken on May 7, 2019 at 10:38 AM by Padma Kiran

## 2019-05-07 NOTE — PROGRESS NOTES
Pt presents to [facility] for assessment and evaluation of her pregnancy due to thrombocytopenia. Pt states thru  she is doing well. States she continues to have some numbness in her fingers. States she is using braces for her hands at night but her fingers are still feeling numb at times. States +fm, no lof or bleeding, no contractions. Us done today and reviewed by Dr. Langley. See epic for today's findings. Obv done. See flow sheet. Pt was seen by Dr. Langley. Discussed platelets are within normal limits at this time and she would like to go back to her PCP she was seeing before. Call to [facility] and message left for Dr. Pat Ghotra to call and discuss ERIN back to clinic and having pt deliver at [facility] as this is closer to pt home. No further visits at [facility] at this time. She will continue with PNC at her PCP if appropriate. If not [facility] will call to set up more appointments. Will await doctor to doctor communication. Questions answered. Discharged stable. Sarah Ponce RN    [facility]  [place] [id number]

## 2019-05-07 NOTE — TELEPHONE ENCOUNTER
Called Krysta and advised  is available tomorrow to reach out to Dr.Katherine Langley. Krysta verbalized understanding and stated  is in tomorrow. Was advised to call the same [phone] and they will alert . Rimma HELMS

## 2019-05-08 ENCOUNTER — TELEPHONE (OUTPATIENT)
Dept: FAMILY MEDICINE | Facility: CLINIC | Age: 39
End: 2019-05-08

## 2019-05-08 DIAGNOSIS — O34.219 HISTORY OF SUCCESSFUL VAGINAL BIRTH AFTER CESAREAN, CURRENTLY PREGNANT: Primary | ICD-10-CM

## 2019-05-08 NOTE — TELEPHONE ENCOUNTER
Scheduled routine OB visit with Dr Ghotra as Patricia will be returning for prenatal care at [facility].     TOLAC consultation appointment scheduled with [facility]- Dr Teresa Knapp, May 17, 2019 at 1030am. Patricia verbally informed of both appts. Patient asked to arrive at [facility] by 1015am to complete new patient paperwork. Referral, demographics information, pregnancy episode,labs and ultrasound faxed to [facility] [phone].    Per request by Patricia, OB/Gyn appointment information also mailed to her home address. Jazmín HELMS

## 2019-05-08 NOTE — TELEPHONE ENCOUNTER
Called Dr. Alisson Langley today. I will be taking over prenatal care of patient and no additional MFM visits needed going forward. Patient to be scheduled for next prenatal visit and referral sent to OB for TOLAC consult.     Pat Ghotra DO (PGY2)  [facility] Resident  Pager: [phone]

## 2019-05-10 ENCOUNTER — AMBULATORY - HEALTHEAST (OUTPATIENT)
Dept: INFUSION THERAPY | Facility: HOSPITAL | Age: 39
End: 2019-05-10

## 2019-05-10 ENCOUNTER — OFFICE VISIT - HEALTHEAST (OUTPATIENT)
Dept: ONCOLOGY | Facility: HOSPITAL | Age: 39
End: 2019-05-10

## 2019-05-10 DIAGNOSIS — O99.119 THROMBOCYTOPENIA DURING PREGNANCY (H): ICD-10-CM

## 2019-05-10 DIAGNOSIS — D69.6 THROMBOCYTOPENIA DURING PREGNANCY (H): ICD-10-CM

## 2019-05-10 DIAGNOSIS — D69.3 ACUTE ITP (H): ICD-10-CM

## 2019-05-10 DIAGNOSIS — Z86.2 HISTORY OF IMMUNE THROMBOCYTOPENIA: ICD-10-CM

## 2019-05-10 LAB
ANION GAP SERPL CALCULATED.3IONS-SCNC: 9 MMOL/L (ref 5–18)
BASOPHILS # BLD AUTO: 0 THOU/UL (ref 0–0.2)
BASOPHILS NFR BLD AUTO: 0 % (ref 0–2)
BUN SERPL-MCNC: 3 MG/DL (ref 8–22)
CALCIUM SERPL-MCNC: 9.5 MG/DL (ref 8.5–10.5)
CHLORIDE BLD-SCNC: 107 MMOL/L (ref 98–107)
CO2 SERPL-SCNC: 20 MMOL/L (ref 22–31)
CREAT SERPL-MCNC: 0.48 MG/DL (ref 0.6–1.1)
EOSINOPHIL # BLD AUTO: 0.1 THOU/UL (ref 0–0.4)
EOSINOPHIL NFR BLD AUTO: 1 % (ref 0–6)
ERYTHROCYTE [DISTWIDTH] IN BLOOD BY AUTOMATED COUNT: 14.8 % (ref 11–14.5)
GFR SERPL CREATININE-BSD FRML MDRD: >60 ML/MIN/1.73M2
GLUCOSE BLD-MCNC: 90 MG/DL (ref 70–125)
HCT VFR BLD AUTO: 35.7 % (ref 35–47)
HGB BLD-MCNC: 12.6 G/DL (ref 12–16)
LYMPHOCYTES # BLD AUTO: 2.4 THOU/UL (ref 0.8–4.4)
LYMPHOCYTES NFR BLD AUTO: 26 % (ref 20–40)
MCH RBC QN AUTO: 32.5 PG (ref 27–34)
MCHC RBC AUTO-ENTMCNC: 35.3 G/DL (ref 32–36)
MCV RBC AUTO: 92 FL (ref 80–100)
MONOCYTES # BLD AUTO: 0.8 THOU/UL (ref 0–0.9)
MONOCYTES NFR BLD AUTO: 9 % (ref 2–10)
NEUTROPHILS # BLD AUTO: 5.8 THOU/UL (ref 2–7.7)
NEUTROPHILS NFR BLD AUTO: 64 % (ref 50–70)
PLATELET # BLD AUTO: 485 THOU/UL (ref 140–440)
PMV BLD AUTO: 9.5 FL (ref 8.5–12.5)
POTASSIUM BLD-SCNC: 3.8 MMOL/L (ref 3.5–5)
RBC # BLD AUTO: 3.88 MILL/UL (ref 3.8–5.4)
SODIUM SERPL-SCNC: 136 MMOL/L (ref 136–145)
WBC: 9.2 THOU/UL (ref 4–11)

## 2019-05-21 NOTE — PROGRESS NOTES
Patricia Govea is a 38 year old  female who presents to clinic for a follow up OB visit. She is currently 32w5d with an estimated date of delivery of 2019 via LMP, consistent with US at 11w. She denies headache, chest pain, shortness of breath, abdominal pain/contractions, vaginal bleeding, or abnormal discharge. She has felt baby move.     New concerns today:     Carpal Tunnel Syndrome   - Bilateral numbness, tingling and pain in digits 1-3. Swelling on 1-3 digits only. No swelling of legs or feet   - Has used an OTC wrist brace at night only, but frequently takes it off in the middle of the night as it gets too hot.   - Feels this has been somewhat helpful, but pain is very bothersome to her   - Denies weakness     Recently had TOLAC consult with OB and patient reports that they are okay with TOLAC as long as infant is cephalic. On most recent US breech     OB History    Para Term  AB Living   4 2 1 1 1 2   SAB TAB Ectopic Multiple Live Births   0 0 1 0 2      # Outcome Date GA Lbr Amol/2nd Weight Sex Delivery Anes PTL Lv   4 Current            3 Term 2006    F    SHANNAN   2 Ectopic 2006 6w4d    ECTOPIC      1   28w0d   M CS-Unspec   SHANNAN      Complications: Abruptio Placenta       Physical exam:  LMP 10/05/2018     General: alert, female in no acute distress  Abdomen: gravid uterus appropriate for gestation age at 33 cm above pubic symphysis, non-tender, FHTs 130  Extremities: no lower extremity edema. Ph[facility] sign positive. Full ROM of wrists. 5/5 strength in hand and wrist. Decreased sensation over digits 1-3 on palmar side. Tinels sign negative at wrist. Swelling present of digits 1-3 bilaterally     Plts: 8 ()>1 (3)>94 (3)>188 (3/15)>210 (3/22)> 411 ()> 458 () > 485 (5/10)    Assessment/Plan:  Patient Active Problem List   Diagnosis     GBS (Guillain Cohagen syndrome) (H)     History of immune thrombocytopenia     Bell's palsy     Anisocoria      Encounter for screening for cervical cancer      Supervision of high risk pregnancy in first trimester     History of  delivery, currently pregnant in first trimester     Thrombocytopenia during pregnancy (H)     Acute ITP (H)     Will discuss breastfeeding plans at next appointment   Post-partum contraception plans: Will discuss at next appointment   Reviewed  care and baby-friendly practices done at hospital after delivery.    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 S/p 4/4 doses Rituximab and steroid taper (last dose 3/21) with stable platelet count now 485 on 5/10. Platelet count slightly elevated, as to be expected in post-splenectomy state. Recheck labs planned for  and 10.  - Given Rituximab administration risk of leukopenia in  after delivery (though several studies showing no increased risk of infection). No live vaccines until 6 months of age  - Continue following with [facility] with [facility]. Recommend monthly platelets now that counts have stabilized (or per Heme recommendations).   - Ideally Plts >20K for vaginal delivery and >50K for  section.  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 No reported  ICH among women with ITP in pregnancy.  - Anesthesia consult in third trimester if platelets are <100,000k    Bilateral Carpal Tunnel Syndrome    Exam consistent with bilateral carpal tunnel syndrome. Discussed that this is common in pregnancy and will likely improve following delivery. Has tried OTC wrist splints, but not using regularly as they are uncomfortable and hot. Patient given wrist splints today in clinic and  recommended wearing as much as possible and at night.     Blood Pressure   Initial BP reading slightly elevated at 146/91, but normal on recheck at 133/81. No headache, vision changes, RUQ pain. Discussed letting us know ASAP if these occur or presenting to [facility]. Given initial elevated reading will recheck in 1w with follow up exam.      H/o  delivery at 28w  Per patient report, this was iatrogenic due to heavy vaginal bleeding, clinically sounds c/w placental abruption. No indication for progesterone given no h/o  labor.     AMA  Declined genetic counseling or screening     H/o  section at 28 weeks with unknown uterine scar  Suspect c/s secondary to placental abruption given patient history, no records available as this occurred in  in [place]. History of successful term  in   - [facility] felt TOLAC was appropriate if patient desired. Patient sent to OB for TOLAC consult as well and per patient reported appropriate for TOLAC if cephalic presentation (currently breech), though will obtain formal notes.      Anemia in pregnancy   Most recent Hb 12.6. Hgb ELP performed and normal.  - Continue PNV with iron      - 1hr GCT back at 153 (2 weeks from steroid taper) -completed 3hr GTT, passed () 88/198/154/56   Defer TdaP due to history of GB with influenza vaccine and similar concerns per pharmacy with TdaP vaccine.  - Normal comprehensive anatomy scan at 20 weeks, normal growth 3/26 and    - Varicella negative - needs vaccination immediately postpartum and 6w postpartum   - Contraception: desires tubal ligation. Forms signed 19. Copied form and faxed to [facility] at visit today     Follow up in 1 week for prenatal visit.     Pat Ghotra DO (PGY2)  [facility] Resident  Pager: [phone]      Precepted with: Matthew Mooney MD

## 2019-05-22 ENCOUNTER — OFFICE VISIT (OUTPATIENT)
Dept: FAMILY MEDICINE | Facility: CLINIC | Age: 39
End: 2019-05-22
Payer: COMMERCIAL

## 2019-05-22 VITALS
RESPIRATION RATE: 18 BRPM | BODY MASS INDEX: 40.55 KG/M2 | DIASTOLIC BLOOD PRESSURE: 81 MMHG | WEIGHT: 193.2 LBS | TEMPERATURE: 98 F | OXYGEN SATURATION: 96 % | HEART RATE: 94 BPM | HEIGHT: 58 IN | SYSTOLIC BLOOD PRESSURE: 133 MMHG

## 2019-05-22 DIAGNOSIS — O34.219 HISTORY OF CESAREAN DELIVERY, CURRENTLY PREGNANT: ICD-10-CM

## 2019-05-22 DIAGNOSIS — O09.93 SUPERVISION OF HIGH RISK PREGNANCY IN THIRD TRIMESTER: Primary | ICD-10-CM

## 2019-05-22 DIAGNOSIS — O99.119 THROMBOCYTOPENIA DURING PREGNANCY (H): ICD-10-CM

## 2019-05-22 DIAGNOSIS — G56.03 BILATERAL CARPAL TUNNEL SYNDROME: ICD-10-CM

## 2019-05-22 DIAGNOSIS — O09.891 HISTORY OF PRETERM DELIVERY, CURRENTLY PREGNANT IN FIRST TRIMESTER: ICD-10-CM

## 2019-05-22 DIAGNOSIS — D69.6 THROMBOCYTOPENIA DURING PREGNANCY (H): ICD-10-CM

## 2019-05-22 ASSESSMENT — MIFFLIN-ST. JEOR: SCORE: 1444.1

## 2019-05-22 NOTE — NURSING NOTE
Due to patient being non-English speaking/uses sign language, an  was used for this visit. Only for face-to-face interpretation by an external agency, date and length of interpretation can be found on the scanned worksheet.     name: Maribell Campbell  Agency: [employer]  Language: Czech   Telephone number: [phone]  Type of interpretation: Face-to-face, spoken

## 2019-05-22 NOTE — NURSING NOTE
Consent for sterilization faxed to [facility]. Completed form placed in medical records to be scanned in. Rimma HELMS

## 2019-05-22 NOTE — PATIENT INSTRUCTIONS
[facility] Information  Your resident OB Doctor is Dr. Pat Ghotra.    If you have any further concerns or wish to schedule another appointment, please call our office at [phone] during normal business hours (8-5, M-F).     For uncomplicated pregnancies, you will be seeing your doctor once a month until you are 28 weeks, then you will see your doctor twice a month. You will begin weekly visits at 36 weeks until you deliver.     If you have urgent medical questions that cannot wait, you may call [phone] at any time of day.     If you have a medical emergency, please call [phone].     When to call or come in to [facility] ([phone] for Labor & Delivery unit)  If you notice a decrease in your baby's movement.   If your begin to experience contractions that are 5 minutes or less apart and lasting for over 45 seconds, or if contractions are becoming more painful.  If you have any bleeding or leakage of fluids.   If you have a headache not resolved with tylenol, right upper abdominal pain, or sudden onset of swelling.  You know your body best. Never hesitate to call or go to the hospital if something doesn't feel right!    After-baby Birth Control Methods   It is important to consider contraception after your baby is born if you would like to prevent a pregnancy in the near future. If you are breast feeding, talk with your doctor to determine the best method for you. It is recommended that you wait 12 months after the birth of your baby to get pregnant again.   Natural Family Planning  It is possible to avoid pregnancy or time them based on your family goals without any hormones or medications or need for condoms. Talk with Dr. Knox about the many options that are available to track your menstrual cycle and identify days which you are fertile and not fertile. The Sympto-thermal method is as effective as birth control pills.  Condoms   Latex condoms can prevent pregnancy and STDs. Condoms  work best when used with spermicide that is placed inside the vagina as well as inside the condom. Use only water-based lubricants. Petroleum based products (such as Vaseline and many massage oils) can weaken the latex and cause it to break.   IUD   IUD's are made of flexible plastic and must be inserted into your uterus by a doctor. The IUD works by stopping the fertilized egg from attaching itself to the uterus. IUDs may increase the risk of getting a pelvic infection (PID), which can lead to infertility if not diagnosed and treated early. This is a great option after delivery of your baby! These last for 3, 5, or 10 years depending up on which type you choose, but can be removed earlier if you decide you would like to get pregnant sooner.    Tubal Ligation & Vasectomy   These are good choices for women and men who know that they do not want to have any more children.     HORMONES   Birth control pills, hormone implants and shots work by stopping ovulation (release of the egg from the ovary). Implants are placed in the upper arm by a minor surgical incision. They last for five years, but can be removed by your doctor if you decide to get pregnant. Hormone injections must be repeated every three months. The Pill must be taken every day.   All hormones can have side effects and create certain health risks. They are very effective in preventing pregnancy but they do not prevent STDs. You can talk more about the risks and benefits with your doctor.     Controlling Back Pain  As your body changes during pregnancy, your back must work in new ways. Back pain is due to many causes. Physical changes in your body can strain your back and its supporting muscles. Also, hormones (chemicals that carry messages throughout the body) increase during pregnancy. This can affect how the muscles and joints work together. All of these changes can lead to pain in the upper or lower back or pelvis. Some pregnant women have sciatica, pain  caused by pressure on the sciatic nerve running down the back of the leg. Ask your healthcare provider for specific tips and exercises to help control your back pain.    Tips to Help You Rest  Good rest and sleep will help you feel better. Here are some ideas:  Ask your partner to massage your shoulders, neck, or back.  Limit the errands you do each day.  Lie down in the afternoon or after work for a few minutes.  Take a warm bath before you go to sleep.  Drink warm milk or teas without caffeine.  Avoid coffee, black tea, and cola.    Preventing Heartburn  Avoid spicy or acidic foods.   Eat small amounts more often.  Wait 2 hours after eating before lying down   Sleep with your upper body raised 6 inches.  May use Tums as needed. Talk to your doctor about other medications to try.

## 2019-05-23 PROBLEM — D69.3 ACUTE ITP (H): Status: RESOLVED | Noted: 2019-01-22 | Resolved: 2019-05-23

## 2019-05-23 PROBLEM — D69.3 CHRONIC ITP (IDIOPATHIC THROMBOCYTOPENIA) (H): Status: ACTIVE | Noted: 2019-01-22

## 2019-05-23 PROBLEM — Z86.2 HISTORY OF IMMUNE THROMBOCYTOPENIA: Status: RESOLVED | Noted: 2018-12-20 | Resolved: 2019-05-23

## 2019-05-29 ENCOUNTER — OFFICE VISIT (OUTPATIENT)
Dept: FAMILY MEDICINE | Facility: CLINIC | Age: 39
End: 2019-05-29
Payer: COMMERCIAL

## 2019-05-29 VITALS
HEART RATE: 83 BPM | TEMPERATURE: 98.6 F | SYSTOLIC BLOOD PRESSURE: 133 MMHG | OXYGEN SATURATION: 97 % | RESPIRATION RATE: 20 BRPM | HEIGHT: 58 IN | DIASTOLIC BLOOD PRESSURE: 84 MMHG | WEIGHT: 196 LBS | BODY MASS INDEX: 41.14 KG/M2

## 2019-05-29 DIAGNOSIS — O99.119 THROMBOCYTOPENIA DURING PREGNANCY (H): ICD-10-CM

## 2019-05-29 DIAGNOSIS — O09.93 SUPERVISION OF HIGH RISK PREGNANCY IN THIRD TRIMESTER: Primary | ICD-10-CM

## 2019-05-29 DIAGNOSIS — D69.6 THROMBOCYTOPENIA DURING PREGNANCY (H): ICD-10-CM

## 2019-05-29 DIAGNOSIS — G56.03 BILATERAL CARPAL TUNNEL SYNDROME: ICD-10-CM

## 2019-05-29 ASSESSMENT — MIFFLIN-ST. JEOR: SCORE: 1466.18

## 2019-05-29 NOTE — NURSING NOTE
"Chief Complaint   Patient presents with     Prenatal Care     33w 5d     Medication Reconciliation     completed.        /84   Pulse 83   Temp 98.6  F (37  C) (Oral)   Resp 20   Ht 1.485 m (4' 10.47\")   Wt 88.9 kg (196 lb)   LMP 10/05/2018   SpO2 97%   BMI 40.32 kg/m          Due to patient being non-English speaking/uses sign language, an  was used for this visit. Only for face-to-face interpretation by an external agency, date and length of interpretation can be found on the scanned worksheet.     name: Ashok Adair  Language: Bulgarian  Agency: [facility]  Phone number: [phone]  Type of interpretation: Face to Face, Spoken    "

## 2019-05-29 NOTE — PROGRESS NOTES
Patricia Govea is a 38 year old  female who presents to clinic for a follow up OB visit. She is currently 33w5d with an estimated date of delivery of 2019 via LMP, consistent with US at 11w. She denies headache, chest pain, shortness of breath, abdominal pain/contractions, vaginal bleeding, or abnormal discharge. She has felt baby move.     New concerns today:  None    BP: 133/84    Carpal Tunnel Syndrome   - Pain has improved with the braces, but sometimes has worse pain   - Overall feels she can tolerate pain with current regimen until delivery     OB History    Para Term  AB Living   4 2 1 1 1 2   SAB TAB Ectopic Multiple Live Births   0 0 1 0 2      # Outcome Date GA Lbr Amol/2nd Weight Sex Delivery Anes PTL Lv   4 Current            3 Term 2006    F    SHANNAN   2 Ectopic 2006 6w4d    ECTOPIC      1   28w0d   M CS-Unspec   SHANNAN      Complications: Abruptio Placenta       Physical exam:  LMP 10/05/2018     General: alert, female in no acute distress   Abdomen: gravid uterus appropriate for gestation age at 33 cm above pubic symphysis, non-tender, FHTs 130  Extremities: no lower extremity edema.     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  Baby BOY!   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 S/p 4/4 doses Rituximab and steroid taper (last dose 3/21) with stable platelet count now 485 on 5/10. Platelet count slightly elevated, as to be expected in post-splenectomy state. Recheck labs planned for  and 10.  - Given Rituximab administration risk of leukopenia in  after delivery (though several studies showing no increased risk of infection). No live vaccines until 6 months of age  - Continue following with Hematology with [facility]. Recommend monthly platelets now that counts have stabilized (or per Heme recommendations).   - Ideally Plts >20K for vaginal delivery and >50K for  section.  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 No reported  ICH among women with ITP in pregnancy.  - Anesthesia consult in third trimester if platelets are <100,000k    Bilateral Carpal Tunnel Syndrome    Discussed that this is common in pregnancy and will likely improve following delivery. Improvement with bilateral splinting and encouraged her to continue to wear as much as possible.     H/o  delivery at 28w  Per patient report, this was iatrogenic due to heavy vaginal bleeding, clinically sounds c/w placental abruption. No indication for progesterone given no h/o  labor.     AMA  Declined genetic counseling or screening     H/o  section at 28 weeks with unknown uterine scar  Suspect c/s secondary to placental abruption given patient history, no records available as this occurred in  in [place]. History of successful term  in   - M felt TOLAC was appropriate if patient desired. Patient sent to OB for TOLAC consult as well and per patient reported appropriate  for TOLAC if cephalic presentation (currently breech), though will obtain formal notes.      Anemia in pregnancy   Most recent Hb 12.6. Hgb ELP performed and normal.  - Continue PNV with iron      - 1hr GCT back at 153 (2 weeks from steroid taper) -completed 3hr GTT, passed (4/26) 88/198/154/56   Defer TdaP due to history of GB with influenza vaccine and similar concerns per pharmacy with TdaP vaccine.  - Normal comprehensive anatomy scan at 20 weeks, normal growth 3/26 and 5/7   - Varicella negative - needs vaccination immediately postpartum and 6w postpartum   - Contraception: desires tubal ligation. Forms signed 5/7/19. Copied form and faxed to [facility] at visit today     Follow up in 2 weeks for prenatal visit.     Pat Ghotra DO (PGY2)  [facility] Resident  Pager: [phone]      Precepted with: Dr. Han Burnett

## 2019-05-29 NOTE — PATIENT INSTRUCTIONS
[facility] Information  Your resident OB Doctor is Dr. Pat Ghotra.    If you have any further concerns or wish to schedule another appointment, please call our office at [phone] during normal business hours (8-5, M-F).     For uncomplicated pregnancies, you will be seeing your doctor once a month until you are 28 weeks, then you will see your doctor twice a month. You will begin weekly visits at 36 weeks until you deliver.     If you have urgent medical questions that cannot wait, you may call [phone] at any time of day.     If you have a medical emergency, please call [phone].     When to call or come in to [facility] ([phone] for Labor & Delivery unit)  If you notice a decrease in your baby's movement.   If your begin to experience contractions that are 5 minutes or less apart and lasting for over 45 seconds, or if contractions are becoming more painful.  If you have any bleeding or leakage of fluids.   If you have a headache not resolved with tylenol, right upper abdominal pain, or sudden onset of swelling.  You know your body best. Never hesitate to call or go to the hospital if something doesn't feel right!    Preparing for the hospital  You re likely feeling anxious as your child s birth approaches. This is normal. To give yourself some peace of mind, pack a bag 3-4 weeks before your due date. Here is a list of things to remember:  Personal care items like toothbrush, hair brush, lip balm, lotion, shampoo, glasses, contacts  Nightgown, bathrobe, slippers  Several pairs of underwear  Comfortable clothes for you to wear home  Camera with new batteries  Cell phone and   Insurance information and any other paperwork needed for your hospital stay  Clothes for baby to wear home  An infant, rear-facing car seat for bringing home your baby (this is required by law)    Managing Labor Pain   There are many ways to manage pain during labor. It can often be done with no anesthesia or  strong pain medications. Talk to your health care provider about any options you would like to explore.     Help from Relaxation  Some of these are learned in special classes. Your health care provider can help you find classes. The hospital has a tub you can use during early labor. These methods may be of help to you:   Breathing techniques   A warm tub between contractions   Massage and therapeutic touch by your support person or labor    Reading materials that are comforting or inspiring   Music that is soothing   Hypnosis   Acupuncture and acupressure   Heat and cold application    Help From Analgesics   Analgesics are mild medications that reduce pain. They can be used along with some relaxation methods. They can give you pain relief without total loss of feeling. They may lessen the pain of strong contractions. You may feel well enough to nap between contractions. They have little effect on your baby if given early in labor. This may be done by injection or by IV.     Help From Anesthetics   Anesthesia involves blockage of all feeling including pain. It can be given in the form of local anesthesia or general anesthesia.  Anesthesia is a type of medication to prevent pain. It is often used in labor. It may numb only one region of your body. This is called regional anesthesia. Or it may let you sleep during surgery. This is called general anesthesia. This type of medication is given by a trained specialist. When possible, regional anesthesia will be used. This is so you can be awake during your baby s birth.         Regional Anesthesia   Regional anesthesia may be used to numb your lower body for a vaginal or  birth. It does not go into your bloodstream. This means that little or none of it will reach your baby. There are two kinds:   Epidural. This is most often given while you sit up or lie on your side. A needle with a flexible tube (catheter) is put in your lower back. The needle is then  removed. The anesthetic is sent through the catheter. A pump may be attached. This gives you a constant level of anesthetic. An epidural often only partly affects muscle control. This means you should still be able to push for a vaginal birth.   Spinal. This is most often given in one dose right before delivery. It acts fast. You may sit up or lie down when it is injected. It may affect muscle control in your lower body. This includes the ability to push.    General Anesthesia   General anesthesia lets you sleep and keeps you free of pain during surgery. It may be used for a . It may be given as an injection. It may be given as an inhaled gas. Or it may be given as both. Delivery often occurs before the medication has reached the baby.

## 2019-06-03 ENCOUNTER — TELEPHONE (OUTPATIENT)
Dept: FAMILY MEDICINE | Facility: CLINIC | Age: 39
End: 2019-06-03

## 2019-06-03 NOTE — TELEPHONE ENCOUNTER
Called Patricia Govea with  ID# [id number]( [facility]) to inform pt and her  that I will route this message to Pat Ghotra MD to complete letter and we can then mail it or pt can . Left a message to call back with any questions.      Shanell Ritchie, CMA       normal patient pattern

## 2019-06-03 NOTE — PROGRESS NOTES
Preceptor Attestation:   Patient seen, evaluated and discussed with the resident. I have verified the content of the note, which accurately reflects my assessment of the patient and the plan of care.    Supervising Physician:Han Burnett MD    [facility]

## 2019-06-03 NOTE — TELEPHONE ENCOUNTER
[facility] phone call message- general phone call:    Reason for call: Spouse called regarding a medical letter for his wife. He states the medical letter will be for the patients  for legal purposes. He states the letter needs to states the patients diagnosis and problems and explain specifically what the diagnosis is and the problems. He states this letter will be used to see if the patients sister in law is able to get a visa to help take care of the patient. He states he took some time off to help the patient but is unable to now due to having to go back to work again. He states they are trying to have the patients sister in law (Clau) get a visa so that she has someone to help her. He would like for the letter to explain how having someone take care of the patient will benefit or how having her sister in law will benefit her care. Spouse states to call with any questions, spouse spoke Arabic. Please call and advise.     Return call needed: Yes    OK to leave a message on voice mail? Yes    Primary language: Albanian      needed? Yes    Call taken on Mayelin 3, 2019 at 11:58 AM by Padma Kiran

## 2019-06-07 ENCOUNTER — AMBULATORY - HEALTHEAST (OUTPATIENT)
Dept: ONCOLOGY | Facility: HOSPITAL | Age: 39
End: 2019-06-07

## 2019-06-07 ENCOUNTER — AMBULATORY - HEALTHEAST (OUTPATIENT)
Dept: INFUSION THERAPY | Facility: HOSPITAL | Age: 39
End: 2019-06-07

## 2019-06-07 DIAGNOSIS — O99.119 THROMBOCYTOPENIA DURING PREGNANCY (H): ICD-10-CM

## 2019-06-07 DIAGNOSIS — D69.6 THROMBOCYTOPENIA DURING PREGNANCY (H): ICD-10-CM

## 2019-06-07 LAB
BASOPHILS # BLD AUTO: 0 THOU/UL (ref 0–0.2)
BASOPHILS NFR BLD AUTO: 0 % (ref 0–2)
EOSINOPHIL # BLD AUTO: 0.1 THOU/UL (ref 0–0.4)
EOSINOPHIL NFR BLD AUTO: 1 % (ref 0–6)
ERYTHROCYTE [DISTWIDTH] IN BLOOD BY AUTOMATED COUNT: 15.3 % (ref 11–14.5)
HCT VFR BLD AUTO: 35.7 % (ref 35–47)
HGB BLD-MCNC: 12.6 G/DL (ref 12–16)
LYMPHOCYTES # BLD AUTO: 2.9 THOU/UL (ref 0.8–4.4)
LYMPHOCYTES NFR BLD AUTO: 33 % (ref 20–40)
MCH RBC QN AUTO: 32.3 PG (ref 27–34)
MCHC RBC AUTO-ENTMCNC: 35.3 G/DL (ref 32–36)
MCV RBC AUTO: 92 FL (ref 80–100)
MONOCYTES # BLD AUTO: 0.7 THOU/UL (ref 0–0.9)
MONOCYTES NFR BLD AUTO: 8 % (ref 2–10)
NEUTROPHILS # BLD AUTO: 5 THOU/UL (ref 2–7.7)
NEUTROPHILS NFR BLD AUTO: 58 % (ref 50–70)
PLATELET # BLD AUTO: 463 THOU/UL (ref 140–440)
PMV BLD AUTO: 9.8 FL (ref 8.5–12.5)
RBC # BLD AUTO: 3.9 MILL/UL (ref 3.8–5.4)
WBC: 8.7 THOU/UL (ref 4–11)

## 2019-06-11 NOTE — PROGRESS NOTES
Patricia Govea is a 38 year old  female who presents to clinic for a follow up OB visit. She is currently 34w5d with an estimated date of delivery of 2019 via LMP, consistent with US at 11w. She denies headache, chest pain, shortness of breath, abdominal pain/contractions, vaginal bleeding, or abnormal discharge. She has felt baby move.     New concerns today:  None    Occasionally gets swelling in her legs which resolves with elevation at the end of the day. Denies any headache, vision changes, right upper quadrant pain and BP is normal today    Notes one small clot of blood when she wiped after using the bathroom the other day. None noted since. No recent intercourse      OB History    Para Term  AB Living   4 2 1 1 1 2   SAB TAB Ectopic Multiple Live Births   0 0 1 0 2      # Outcome Date GA Lbr Amol/2nd Weight Sex Delivery Anes PTL Lv   4 Current            3 Term 2006    F    SHANNAN   2 Ectopic 2006 6w4d    ECTOPIC      1   28w0d   M CS-Unspec   SHANNAN      Complications: Abruptio Placenta       Physical exam:  LMP 10/05/2018     General: alert, female in no acute distress   Abdomen: gravid uterus appropriate for gestation age at 36 cm above pubic symphysis, non-tender, FHTs 140  Extremities: no lower extremity edema.     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  Baby BOY!   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 S/p 4/4 doses Rituximab and steroid taper (last dose 3/21) with stable platelet count now 485 on 5/10. Platelet count slightly elevated, as to be expected in post-splenectomy state. Recheck labs planned for  and 10.  - Given Rituximab administration risk of leukopenia in  after delivery (though several studies showing no increased risk of infection). No live vaccines until 6 months of age  - Continue following with Hematology with [facility]. Recommend monthly platelets now that counts have stabilized (or per Heme recommendations).   - Ideally Plts >20K for vaginal delivery and >50K for  section.  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 No reported  ICH among women with ITP in pregnancy.    Spotting   Overall does not sound concerning given one episode which only consisted of one spot of dried blood. However patient getting US today at OB clinic to check position of infant and verify viability. Discussed letting us know right away if any further bleeding occurs.     Bilateral Carpal Tunnel Syndrome    Discussed that this is common in pregnancy and will likely improve following delivery. Improvement with bilateral splinting and encouraged her to continue to wear as much as possible.     H/o  delivery at 28w  Per patient report, this was iatrogenic due to heavy vaginal bleeding, clinically sounds c/w placental abruption. No indication for progesterone given no h/o  labor.     AMA  Declined genetic counseling or screening     H/o   section at 28 weeks with unknown uterine scar  Suspect c/s secondary to placental abruption given patient history, no records available as this occurred in  in [place]. History of successful term  in   - M felt TOLAC was appropriate if patient desired. Patient sent to OB for TOLAC consult as well and per patient reported appropriate for TOLAC if cephalic presentation, though will obtain formal notes.      Anemia in pregnancy   Most recent Hb 12.6. Hgb ELP performed and normal.  - Continue PNV with iron      - 1hr GCT back at 153 (2 weeks from steroid taper) -completed 3hr GTT, passed () 88/198/154/56   Defer TdaP due to history of GB with influenza vaccine and similar concerns per pharmacy with TdaP vaccine.  - Normal comprehensive anatomy scan at 20 weeks, normal growth 3/26 and    - Varicella negative - needs vaccination immediately postpartum and 6w postpartum   - Contraception: desires tubal ligation. Forms signed 19.   - GBS obtained and discussed today     Follow up in 1 week for prenatal visit.     Pat Ghotra DO (PGY2)  [facility] Resident  Pager: [phone]      Precepted with: Dr. Christie York MD

## 2019-06-12 ENCOUNTER — OFFICE VISIT (OUTPATIENT)
Dept: FAMILY MEDICINE | Facility: CLINIC | Age: 39
End: 2019-06-12
Payer: COMMERCIAL

## 2019-06-12 ENCOUNTER — TRANSFERRED RECORDS (OUTPATIENT)
Dept: HEALTH INFORMATION MANAGEMENT | Facility: CLINIC | Age: 39
End: 2019-06-12

## 2019-06-12 VITALS
OXYGEN SATURATION: 98 % | DIASTOLIC BLOOD PRESSURE: 68 MMHG | WEIGHT: 195 LBS | RESPIRATION RATE: 20 BRPM | SYSTOLIC BLOOD PRESSURE: 105 MMHG | TEMPERATURE: 98.9 F | BODY MASS INDEX: 40.93 KG/M2 | HEART RATE: 90 BPM | HEIGHT: 58 IN

## 2019-06-12 DIAGNOSIS — O99.119 THROMBOCYTOPENIA DURING PREGNANCY (H): ICD-10-CM

## 2019-06-12 DIAGNOSIS — D69.6 THROMBOCYTOPENIA DURING PREGNANCY (H): ICD-10-CM

## 2019-06-12 DIAGNOSIS — O09.893 HISTORY OF PRETERM DELIVERY, CURRENTLY PREGNANT, THIRD TRIMESTER: ICD-10-CM

## 2019-06-12 DIAGNOSIS — O26.853 SPOTTING AFFECTING PREGNANCY IN THIRD TRIMESTER: ICD-10-CM

## 2019-06-12 DIAGNOSIS — O09.93 SUPERVISION OF HIGH RISK PREGNANCY IN THIRD TRIMESTER: Primary | ICD-10-CM

## 2019-06-12 ASSESSMENT — MIFFLIN-ST. JEOR: SCORE: 1461.64

## 2019-06-12 NOTE — NURSING NOTE
"Chief Complaint   Patient presents with     Prenatal Care     35w 5d. Some bloody show after wiping yesterday morning. None since. Denies any pain.      Medication Reconciliation     completed.        /68   Pulse 90   Temp 98.9  F (37.2  C) (Oral)   Resp 20   Ht 1.485 m (4' 10.47\")   Wt 88.5 kg (195 lb)   LMP 10/05/2018   SpO2 98%   BMI 40.11 kg/m        Due to patient being non-English speaking/uses sign language, an  was used for this visit. Only for face-to-face interpretation by an external agency, date and length of interpretation can be found on the scanned worksheet.     name: Albert Austin  Agency: [employer]  Language: Kazakh   Telephone number: [phone]  Type of interpretation: Face-to-face, spoken      "

## 2019-06-12 NOTE — LETTER
2019      To whom it may concern,     Patricia Govea is currently under my care as she is pregnant with a due date of 7/15/19. Her pregnancy is complicated by idiopathic thrombocytopenia in pregnancy (low platelets in pregnancy), refractory to splenectomy, steroids and IVIG in January, now responsive to Rituximab. This diagnosis required/requires frequent lab visits and follow up with hematology and perinatology. Patient also required additional care during recovery time in January secondary to splenectomy surgery. Pregnancy is also complicated by bilateral carpal tunnel syndrome, hx  delivery, and TOLAC. Bilateral carpal tunnel syndrome now makes it difficult for patient to lift and perform everyday tasks secondary to wrist pain. Given multiple medical issues during this pregnancy it would be beneficial for patient to have additional assistance for household work and care of other children.    Sincerely,        Pat Ghotra, DO

## 2019-06-12 NOTE — PATIENT INSTRUCTIONS
[facility] Information  Your resident OB Doctor is Dr. Pat Ghotra.    If you have any further concerns or wish to schedule another appointment, please call our office at [phone] during normal business hours (8-5, M-F).     For uncomplicated pregnancies, You will begin weekly visits at 36 weeks until you deliver.     If you have urgent medical questions that cannot wait, you may call [phone] at any time of day.     If you have a medical emergency, please call 911.     When to call or come in to [facility] ([phone] for Labor & Delivery unit)  If you notice a decrease in your baby's movement.   If your begin to experience contractions that are 5 minutes or less apart and lasting for over 45 seconds, or if contractions are becoming more painful.  If you have any bleeding or leakage of fluids.   If you have a headache not resolved with tylenol, right upper abdominal pain, or sudden onset of swelling.  You know your body best. Never hesitate to call or go to the hospital if something doesn't feel right!    Preparing for the hospital  You re likely feeling anxious as your child s birth approaches. This is normal. To give yourself some peace of mind, pack a bag 3-4 weeks before your due date. Here is a list of things to remember:  Personal care items like toothbrush, hair brush, lip balm, lotion, shampoo, glasses, contacts  Nightgown, bathrobe, slippers  Several pairs of underwear  Comfortable clothes for you to wear home  Camera with new batteries  Cell phone and   Insurance information and any other paperwork needed for your hospital stay  Clothes for baby to wear home  An infant, rear-facing car seat for bringing home your baby (this is required by law)    What Is Group B Strep?   Group B strep (streptococcus) is a common bacteria. It can grow in a woman s vagina, rectum, or urinary tract. It is almost always harmless in adults. But in rare cases, a woman who has group B strep  can infect her baby during the birth. Infection can cause serious illness in the . The good news is that treating the mother during labor reduces the risk of the baby becoming infected. And if a  is infected, the infection can be treated. You will be screened for Group B strep at 35-36 weeks gestation.    Facts about group B strep   Learning more about group B strep can help you understand how testing and treatment can help. Here are some basic facts about group B strep:   It is not a sexually transmitted disease.   It is not the same as strep throat. (This is caused by group A strep.)   It often has no symptoms and may cause no problems in adults.   Group B strep can be transmitted during vaginal delivery. It cannot be passed during  (surgical) birth.   A mother with group B strep rarely infects her . (Infection occurs only about 1% to 2% of the time.)   When a mother is treated during labor and delivery, her baby almost never becomes infected.   Certain factors during pregnancy increase the risk of a baby becoming infected.    Possible effects on your baby   Group B strep can infect the blood. It can also cause inflammation of the baby s lungs, brain, or spinal cord. Long-term effects can include blindness, deafness, mental retardation, or cerebral palsy. And in rare cases, infection causes death. Infection is most often detected soon after the baby is born.     How your baby may become infected   Group B strep often lives in the vagina or rectum. If the amniotic sac breaks early, bacteria from the vagina can travel to the uterus, reaching the baby. Or, as the baby passes through the birth canal, it can come in contact with the bacteria. In rare cases, group B strep can also be passed to the baby after delivery. This is called late-onset group B strep. The source of this type of infection is not well understood. But some experts believe that it happens if the baby is exposed to group B  strep in the home, from the parents or siblings, or in the community.     What increases the risk?   Certain risk factors increase the chance that a baby will be infected. They include:   Breaking or leaking of the amniotic sac earlier than 37 weeks gestation   Labor earlier than 37 weeks gestation   Breaking of the amniotic sac more than 18 hours before labor begins   Fever during labor   A urinary tract infection with group B strep at any point in the pregnancy   A previous baby born with a group B strep infection    BaBaby Feeding in the Hospital: Information, Support and      Resources    As you prepare for the birth of your child, you will want to consider options for feeding your baby cluding breast-feeding and/or baby formula. The American Academy of Pediatrics recommends exclusive breast-feeding for the first six months (although any amount of breast-feeding is beneficial).  However, we also understand that breast-feeding is  a personal choice and not for everyone. Whether or not you choose to breast-feed, your decision will be respected by our staff.        There are numerous benefits of breast-feeding; here are a few to consider:    Provides antibodies to protect your baby from infections and diseases    The cost: formula can cost over $1,500 per year    Convenience, no warming up or sterilizing bottles and supplies    The physical contact with breastfeeding can make babies feel secure, warm and comforted    Whatever my feeding choice, what can I expect after I deliver my baby?    Your baby will usually be placed skin-to-skin immediately following birth. The skin to skin contact between you and your baby will be a special and memorable time. The bonding and attachment comforts your baby and has a positive effect on baby s brain development.     Having your baby  room in  with you also helps you start to learn your baby s body rhythms and sleep cycle.      You will also begin to learn your baby s cues  (signals) that he or she is ready to feed.    When do I start to feed my baby?   As soon as possible after your baby s birth, you will be encouraged to begin feeding. In the first couple of weeks, your baby will eat often. Breastfeeding babies usually eat at least 8 times in 24 hours. Babies fed formula usually eat at least 7 times in 24 hours.      Breast-feeding tips:    Get comfortable and use pillows for support.    Have your baby at the level of your breast, facing you,  tummy to tummy.       Touch your nipple to your baby s lips so your baby s mouth opens wide (rooting reflex).  Aim the nipple toward the roof of your baby s mouth. When your baby opens his or her mouth, pull your baby toward your breast to help your baby  latch on  to your nipple and much of the areola area.    Hand expressing your breast milk can assist with latching your baby to your breast, if needed.    Ask for help, breastfeeding may seem awkward or uncomfortable at first, this is normal. There are numerous resources available at [facility], clinics and beyond.     If your goal is to exclusively breastfeed, avoid using any formula or artificial nipples (including bottles and pacifiers) while you and your baby are learning to breastfeed unless there is a medical reason.       Mixing breastfeeding and formula can interfere with how you begin building your milk supply. It can impact how you and your baby learn to breastfeeding together and alter the natural growth of  good  bacteria in your baby s stomach.    Delay a pacifier or a bottle in the first few weeks until breastfeeding is well established. This is often around 3 weeks of age.    Ask your nurse to show you how to hand express. Breast milk can be kept in the refrigerator orfreezer for later use.     Hospital Resources:  [facility] Clinics located at [facility], [facility] and [facility]  Call: [phone].    Inpatient  support    Outpatient appointments    Telephone consultation    Breast-feeding classes available through [employer]            Online Resources:    [email or web address] sign up for free online weekly e-mail    [email or web address]    [email or web address]    [employer] (La Leche League)    Normalfed.com    [email or web address]    Workandpump.com    Breast-feeding Supplies & Pumps:  Talk to your insurance provider or WIC (Women, Infants and Children) to learn more about options available to you. Recent health insurance changes may include additional coverage for supplies and pumps.    Public Health:  Women, Infants and Children Nutrition program (WIC): provides breast-feeding support and education in addition to formal feeding moms. 139-IJM-5707 or [email or web address]    Family Health Home Visiting: [facility] Nurse home visits are available. Talk to your provider to see if you qualify. Most [facility] have a program available.    Additional Resources:  La Leche League is an international, nonprofit, nonsectarian organization offering information, education, and support to mothers who want to breast-feed their babies. Local groups offer phone help and monthly meetings. Visit [employer] or [employer] and us the  Find local support  drop down menu or click on the  Resources  tab.    [place] Breastfeeding Resources: 7-868-890-BABY (222) toll free    National Breastfeeding Help Line trained breastfeeding peer counselors can help answer common breast-feeding questions by phone. Monday-Friday: English/Kyrgyz  5-471- 823-4195 toll free, 1-479.285.3611 (TTY)    Care Connection: 928-001-CARE (4436)

## 2019-06-13 ENCOUNTER — AMBULATORY - HEALTHEAST (OUTPATIENT)
Dept: MATERNAL FETAL MEDICINE | Facility: HOSPITAL | Age: 39
End: 2019-06-13

## 2019-06-13 ENCOUNTER — TELEPHONE (OUTPATIENT)
Dept: FAMILY MEDICINE | Facility: CLINIC | Age: 39
End: 2019-06-13

## 2019-06-13 DIAGNOSIS — O26.90 PREGNANCY, ANTEPARTUM, COMPLICATIONS: ICD-10-CM

## 2019-06-13 NOTE — TELEPHONE ENCOUNTER
[facility] phone call message- general phone call:    Reason for call: Caller return record with note to patient. Note state glucose is elevated, if patient pass the screening she is fine if not please reevaluated. Per our clinic patient has pass her screening but per their end they would like patient to be reevaluate for gestation diabetes as her blood sugar is high.    Return call needed: No    OK to leave a message on voice mail? Yes    Primary language: Paraguayan      needed? Yes    Call taken on June 13, 2019 at 1:45 PM by Jose Antonio Gonzales

## 2019-06-14 LAB
ALLERGIC TO PENICILLIN: NO
GP B STREP AG SPEC QL LA: NEGATIVE

## 2019-06-18 ENCOUNTER — OFFICE VISIT (OUTPATIENT)
Dept: FAMILY MEDICINE | Facility: CLINIC | Age: 39
End: 2019-06-18
Payer: COMMERCIAL

## 2019-06-18 VITALS
DIASTOLIC BLOOD PRESSURE: 86 MMHG | HEART RATE: 90 BPM | HEIGHT: 58 IN | TEMPERATURE: 98.7 F | SYSTOLIC BLOOD PRESSURE: 135 MMHG | RESPIRATION RATE: 20 BRPM | BODY MASS INDEX: 41.1 KG/M2 | OXYGEN SATURATION: 97 % | WEIGHT: 195.8 LBS

## 2019-06-18 DIAGNOSIS — R73.9 ELEVATED BLOOD SUGAR: Primary | ICD-10-CM

## 2019-06-18 ASSESSMENT — MIFFLIN-ST. JEOR: SCORE: 1465.27

## 2019-06-18 NOTE — RESULT ENCOUNTER NOTE
Patient calls back. Results given without . Patient understood and agrees with plan. Has appt at 10:00 today and will ask Dr. Ghotra if questions.   ~ Deuce DUGAN CMA ([place]i)

## 2019-06-18 NOTE — NURSING NOTE
Due to patient being non-English speaking/uses sign language, an  was used for this visit. Only for face-to-face interpretation by an external agency, date and length of interpretation can be found on the scanned worksheet.     name: Fozia Lindsey  Agency: [employer]  Language: Turkish   Telephone number: [phone]  Type of interpretation: Face-to-face, spoken

## 2019-06-18 NOTE — PROGRESS NOTES
Patricia Govea is a 38 year old  female who presents to clinic for a follow up OB visit. She is currently 36w3d with an estimated date of delivery of 2019 via LMP, consistent with US at 11w. She denies headache, chest pain, shortness of breath, abdominal pain/contractions, vaginal bleeding, or abnormal discharge. She has felt baby move.     New concerns today: None  /86 - denies vision changes, headaches, RUQ pain  Elevated glucose noted with blood work at her OB visit recently (random glucose of 148), note was sent to our clinic recommending screening for gestational diabetes be repeated. Passed prior 3 hour glucose test (1 elevated reading). Discussed with patient and she is in agreement to have her 3 hour glucose test repeated to assure she does not have gestational diabetes.     OB History    Para Term  AB Living   4 2 1 1 1 2   SAB TAB Ectopic Multiple Live Births   0 0 1 0 2      # Outcome Date GA Lbr Amol/2nd Weight Sex Delivery Anes PTL Lv   4 Current            3 Term 2006    F    SHANNAN   2 Ectopic 2006 6w4d    ECTOPIC      1   28w0d   M CS-Unspec   SHANNAN      Complications: Abruptio Placenta       Physical exam:  LMP 10/05/2018     General: alert, female in no acute distress  Abdomen: gravid uterus appropriate for gestation age at 37 cm above pubic symphysis, non-tender, FHTs 137, Leopold's maneuver reveals cephalic positioning  Gyn: 2cm/40%/-3, normal discharge  Extremities: no edema    Plts: 8 ()>1 (3/1)>94 (3/8)>188 (3/15)>210 (3/22)> 411 ()> 458 () > 485 (5/10) > 463 ()    Assessment/Plan:  Patient Active Problem List   Diagnosis     GBS (Guillain Briscoe syndrome) (H)     Bell's palsy     Anisocoria     Encounter for screening for cervical cancer      Supervision of high risk pregnancy in third trimester     History of  delivery, currently pregnant, third trimester     Thrombocytopenia during pregnancy (H)     Chronic ITP  (idiopathic thrombocytopenia) (H)     Plans to breastfeed   Post-partum contraception plans: tubal ligation  Pain control: epidural   Reviewed  care and baby-friendly practices done at hospital after delivery.  Baby BOY!   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 S/p 4/4 doses Rituximab and steroid taper (last dose 3/21) with stable platelet count now. Platelet count slightly elevated, as to be expected in post-splenectomy state.  - Given Rituximab administration risk of leukopenia in  after delivery (though several studies showing no increased risk of infection). No live vaccines until 6 months of age  - Continue following with Hematology at [facility]. Recommend monthly platelets now that counts have stabilized (or per Heme recommendations).   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 No reported  ICH among women with ITP in pregnancy.    Elevated Random Glucose   Random glucose mildly elevated to 148. Clinic called by OB who recommended repeat 3 hour glucose tolerance test. Prior 3 hour testing passed with one elevated reading (shortly after completing steroids). Discussed with patient and she is willing to have this test performed again after discussing possible side effects of undiagnosed GDM.    - Repeat 3 hour glucose test      Bilateral Carpal Tunnel Syndrome    Improvement with bilateral splinting and encouraged her to continue to wear as much as possible.     H/o  delivery at 28w  Per patient report, this was iatrogenic due to heavy vaginal bleeding, clinically sounds c/w placental abruption. No indication for  progesterone given no h/o  labor.     AMA  Declined genetic counseling or screening     H/o  section at 28 weeks with unknown uterine scar  Suspect c/s secondary to placental abruption given patient history, no records available as this occurred in  in [place]. History of successful term  in   - MFM felt TOLAC was appropriate if patient desired and fetus vertex      Anemia in pregnancy   Most recent Hb 12.6. Hgb ELP performed and normal.  - Continue PNV with iron       - 1hr GCT back at 153 (2 weeks from steroid taper) -completed 3hr GTT, passed () 88/198/154/56   Defer TdaP due to history of GB with influenza vaccine and similar concerns per pharmacy with TdaP vaccine.  - Varicella negative - needs vaccination immediately postpartum and 6w postpartum   - Contraception: desires tubal ligation. Forms signed 19.      Follow up in 1 week for prenatal visit.     Pat Ghotra DO (PGY2)  [facility] Resident  Pager: [phone]      Precepted with: Jeannie Ramsey DO

## 2019-06-18 NOTE — PATIENT INSTRUCTIONS
[facility] Information  Your resident OB Doctor is Dr. Pat Ghotra.    If you have any further concerns or wish to schedule another appointment, please call our office at [phone] during normal business hours (8-5, M-F).     For uncomplicated pregnancies, You will begin weekly visits at 36 weeks until you deliver.     If you have urgent medical questions that cannot wait, you may call [phone] at any time of day.     If you have a medical emergency, please call 911.     When to call or come in to [facility] ([phone] for Labor & Delivery unit)  If you notice a decrease in your baby's movement.   If your begin to experience contractions that are 5 minutes or less apart and lasting for over 45 seconds, or if contractions are becoming more painful.  If you have any bleeding or leakage of fluids.   If you have a headache not resolved with tylenol, right upper abdominal pain, or sudden onset of swelling.  You know your body best. Never hesitate to call or go to the hospital if something doesn't feel right!    Preparing for the hospital  You re likely feeling anxious as your child s birth approaches. This is normal. To give yourself some peace of mind, pack a bag 3-4 weeks before your due date. Here is a list of things to remember:  Personal care items like toothbrush, hair brush, lip balm, lotion, shampoo, glasses, contacts  Nightgown, bathrobe, slippers  Several pairs of underwear  Comfortable clothes for you to wear home  Camera with new batteries  Cell phone and   Insurance information and any other paperwork needed for your hospital stay  Clothes for baby to wear home  An infant, rear-facing car seat for bringing home your baby (this is required by law)    What Is Group B Strep?   Group B strep (streptococcus) is a common bacteria. It can grow in a woman s vagina, rectum, or urinary tract. It is almost always harmless in adults. But in rare cases, a woman who has group B strep  can infect her baby during the birth. Infection can cause serious illness in the . The good news is that treating the mother during labor reduces the risk of the baby becoming infected. And if a  is infected, the infection can be treated. You will be screened for Group B strep at 35-36 weeks gestation.    Facts about group B strep   Learning more about group B strep can help you understand how testing and treatment can help. Here are some basic facts about group B strep:   It is not a sexually transmitted disease.   It is not the same as strep throat. (This is caused by group A strep.)   It often has no symptoms and may cause no problems in adults.   Group B strep can be transmitted during vaginal delivery. It cannot be passed during  (surgical) birth.   A mother with group B strep rarely infects her . (Infection occurs only about 1% to 2% of the time.)   When a mother is treated during labor and delivery, her baby almost never becomes infected.   Certain factors during pregnancy increase the risk of a baby becoming infected.    Possible effects on your baby   Group B strep can infect the blood. It can also cause inflammation of the baby s lungs, brain, or spinal cord. Long-term effects can include blindness, deafness, mental retardation, or cerebral palsy. And in rare cases, infection causes death. Infection is most often detected soon after the baby is born.     How your baby may become infected   Group B strep often lives in the vagina or rectum. If the amniotic sac breaks early, bacteria from the vagina can travel to the uterus, reaching the baby. Or, as the baby passes through the birth canal, it can come in contact with the bacteria. In rare cases, group B strep can also be passed to the baby after delivery. This is called late-onset group B strep. The source of this type of infection is not well understood. But some experts believe that it happens if the baby is exposed to group B  strep in the home, from the parents or siblings, or in the community.     What increases the risk?   Certain risk factors increase the chance that a baby will be infected. They include:   Breaking or leaking of the amniotic sac earlier than 37 weeks gestation   Labor earlier than 37 weeks gestation   Breaking of the amniotic sac more than 18 hours before labor begins   Fever during labor   A urinary tract infection with group B strep at any point in the pregnancy   A previous baby born with a group B strep infection    BaBaby Feeding in the Hospital: Information, Support and      Resources    As you prepare for the birth of your child, you will want to consider options for feeding your baby cluding breast-feeding and/or baby formula. The American Academy of Pediatrics recommends exclusive breast-feeding for the first six months (although any amount of breast-feeding is beneficial).  However, we also understand that breast-feeding is  a personal choice and not for everyone. Whether or not you choose to breast-feed, your decision will be respected by our staff.        There are numerous benefits of breast-feeding; here are a few to consider:    Provides antibodies to protect your baby from infections and diseases    The cost: formula can cost over $1,500 per year    Convenience, no warming up or sterilizing bottles and supplies    The physical contact with breastfeeding can make babies feel secure, warm and comforted    Whatever my feeding choice, what can I expect after I deliver my baby?    Your baby will usually be placed skin-to-skin immediately following birth. The skin to skin contact between you and your baby will be a special and memorable time. The bonding and attachment comforts your baby and has a positive effect on baby s brain development.     Having your baby  room in  with you also helps you start to learn your baby s body rhythms and sleep cycle.      You will also begin to learn your baby s cues  (signals) that he or she is ready to feed.    When do I start to feed my baby?   As soon as possible after your baby s birth, you will be encouraged to begin feeding. In the first couple of weeks, your baby will eat often. Breastfeeding babies usually eat at least 8 times in 24 hours. Babies fed formula usually eat at least 7 times in 24 hours.      Breast-feeding tips:    Get comfortable and use pillows for support.    Have your baby at the level of your breast, facing you,  tummy to tummy.       Touch your nipple to your baby s lips so your baby s mouth opens wide (rooting reflex).  Aim the nipple toward the roof of your baby s mouth. When your baby opens his or her mouth, pull your baby toward your breast to help your baby  latch on  to your nipple and much of the areola area.    Hand expressing your breast milk can assist with latching your baby to your breast, if needed.    Ask for help, breastfeeding may seem awkward or uncomfortable at first, this is normal. There are numerous resources available at [facility], clinics and beyond.     If your goal is to exclusively breastfeed, avoid using any formula or artificial nipples (including bottles and pacifiers) while you and your baby are learning to breastfeed unless there is a medical reason.       Mixing breastfeeding and formula can interfere with how you begin building your milk supply. It can impact how you and your baby learn to breastfeeding together and alter the natural growth of  good  bacteria in your baby s stomach.    Delay a pacifier or a bottle in the first few weeks until breastfeeding is well established. This is often around 3 weeks of age.    Ask your nurse to show you how to hand express. Breast milk can be kept in the refrigerator orfreezer for later use.     Hospital Resources:  [facility] Lactation Clinics located at [facility], [facility] and [facility]  Call: [phone].    Inpatient  support    Outpatient appointments    Telephone consultation    Breast-feeding classes available through [employer]            Online Resources:    [email or web address] sign up for free online weekly e-mail    [email or web address]    Breastfeedingmadesimple.com    STATS Group.Epivios (La Leche League)    Normalfed.com    [facility] Hospit[facility].gov/breastfeeding    Workandpump.com    Breast-feeding Supplies & Pumps:  Talk to your insurance provider or WIC (Women, Infants and Children) to learn more about options available to you. Recent health insurance changes may include additional coverage for supplies and pumps.    Public Health:  Women, Infants and Children Nutrition program (WIC): provides breast-feeding support and education in addition to formal feeding moms. 526-SYO-5419 or [email or web address]/fh/wic    Family Health Home Visiting: [facility] Nurse home visits are available. Talk to your provider to see if you qualify. Most [facility] have a program available.    Additional Resources:  La Leche League is an international, nonprofit, nonsectarian organization offering information, education, and support to mothers who want to breast-feed their babies. Local groups offer phone help and monthly meetings. Visit [employer].Epivios or Taplister and us the  Find local support  drop down menu or click on the  Resources  tab.    [place] Breastfeeding Resources: 9-551-452-BABY (222) toll free    National Breastfeeding Help Line trained breastfeeding peer counselors can help answer common breast-feeding questions by phone. Monday-Friday: English/Divehi  0-644- [phone] toll free, 1-921.217.2708 (TTY)    Care Connection: 616-259-CARE (1344)

## 2019-06-19 ENCOUNTER — AMBULATORY - HEALTHEAST (OUTPATIENT)
Dept: MATERNAL FETAL MEDICINE | Facility: HOSPITAL | Age: 39
End: 2019-06-19

## 2019-06-19 DIAGNOSIS — O26.90 PREGNANCY, ANTEPARTUM, COMPLICATIONS: ICD-10-CM

## 2019-06-20 DIAGNOSIS — R73.9 ELEVATED BLOOD SUGAR: ICD-10-CM

## 2019-06-20 LAB
GLU, 1 HOUR, 100 G: 129 MG/DL
GLU, 2 HOUR, 100 G: 154 MG/DL
GLU, 3 HOUR, 100 G: 59 MG/DL
GLUCOSE P FAST SERPL-MCNC: 70 MG/DL

## 2019-06-20 NOTE — LETTER
June 21, 2019      Patricia Ibarra Jeferson  [address] [place] [address] [address]   [facility] [id number]        Dear Patricia,    Please see below for your test results.    The results of your 3 hour glucose test show that you do not have gestational diabetes which is great!     Please call our clinic with any questions. We look forward to seeing you again.     Resulted Orders   Glucose Tolerance 100 GM 3 HR (LabDAQ)   Result Value Ref Range    Glucose Fasting 100 Grams 70 <95 mg/dL mg/dl    Glucose 1 Hour 100 Grams 129 <180 mg/dL mg/dl    Glucose 2 Hour 100 Grams 154 <155 mg/dL mg/dl    Glucose 3 Hour 100 Grams 59 <140 mg/dL mg/dl    Narrative    pt inform of results. --HHer, CMA       If you have any questions, please call the clinic to make an appointment.    Sincerely,    Dr. Ghotra

## 2019-06-21 ENCOUNTER — RECORDS - HEALTHEAST (OUTPATIENT)
Dept: ULTRASOUND IMAGING | Facility: HOSPITAL | Age: 39
End: 2019-06-21

## 2019-06-21 ENCOUNTER — OFFICE VISIT - HEALTHEAST (OUTPATIENT)
Dept: MATERNAL FETAL MEDICINE | Facility: HOSPITAL | Age: 39
End: 2019-06-21

## 2019-06-21 ENCOUNTER — RECORDS - HEALTHEAST (OUTPATIENT)
Dept: ADMINISTRATIVE | Facility: OTHER | Age: 39
End: 2019-06-21

## 2019-06-21 DIAGNOSIS — O36.5930 MATERNAL CARE FOR OTHER KNOWN OR SUSPECTED POOR FETAL GROWTH, THIRD TRIMESTER, NOT APPLICABLE OR UNSPECIFIED: ICD-10-CM

## 2019-06-21 DIAGNOSIS — O26.90 PREGNANCY RELATED CONDITIONS, UNSPECIFIED, UNSPECIFIED TRIMESTER: ICD-10-CM

## 2019-06-24 NOTE — PROGRESS NOTES
Preceptor Attestation:   Patient seen, evaluated and discussed with the resident. I have verified the content of the note, which accurately reflects my assessment of the patient and the plan of care.  Supervising Physician:Jeannie Ramsey DO [facility]

## 2019-06-28 ENCOUNTER — DOCUMENTATION ONLY (OUTPATIENT)
Dept: FAMILY MEDICINE | Facility: CLINIC | Age: 39
End: 2019-06-28

## 2019-06-28 ENCOUNTER — TELEPHONE (OUTPATIENT)
Dept: FAMILY MEDICINE | Facility: CLINIC | Age: 39
End: 2019-06-28

## 2019-06-28 ENCOUNTER — OFFICE VISIT - HEALTHEAST (OUTPATIENT)
Dept: ONCOLOGY | Facility: HOSPITAL | Age: 39
End: 2019-06-28

## 2019-06-28 ENCOUNTER — AMBULATORY - HEALTHEAST (OUTPATIENT)
Dept: INFUSION THERAPY | Facility: HOSPITAL | Age: 39
End: 2019-06-28

## 2019-06-28 DIAGNOSIS — D69.6 THROMBOCYTOPENIA DURING PREGNANCY (H): ICD-10-CM

## 2019-06-28 DIAGNOSIS — Z86.2 HISTORY OF IMMUNE THROMBOCYTOPENIA: ICD-10-CM

## 2019-06-28 DIAGNOSIS — O99.119 THROMBOCYTOPENIA DURING PREGNANCY (H): ICD-10-CM

## 2019-06-28 LAB
BASOPHILS # BLD AUTO: 0.1 THOU/UL (ref 0–0.2)
BASOPHILS NFR BLD AUTO: 1 % (ref 0–2)
EOSINOPHIL # BLD AUTO: 0.2 THOU/UL (ref 0–0.4)
EOSINOPHIL NFR BLD AUTO: 3 % (ref 0–6)
ERYTHROCYTE [DISTWIDTH] IN BLOOD BY AUTOMATED COUNT: 15.7 % (ref 11–14.5)
HCT VFR BLD AUTO: 31.9 % (ref 35–47)
HGB BLD-MCNC: 11.2 G/DL (ref 12–16)
LYMPHOCYTES # BLD AUTO: 2.6 THOU/UL (ref 0.8–4.4)
LYMPHOCYTES NFR BLD AUTO: 31 % (ref 20–40)
MCH RBC QN AUTO: 33 PG (ref 27–34)
MCHC RBC AUTO-ENTMCNC: 35.1 G/DL (ref 32–36)
MCV RBC AUTO: 94 FL (ref 80–100)
MONOCYTES # BLD AUTO: 0.7 THOU/UL (ref 0–0.9)
MONOCYTES NFR BLD AUTO: 8 % (ref 2–10)
NEUTROPHILS # BLD AUTO: 4.9 THOU/UL (ref 2–7.7)
NEUTROPHILS NFR BLD AUTO: 58 % (ref 50–70)
PLATELET # BLD AUTO: 612 THOU/UL (ref 140–440)
PMV BLD AUTO: 8.9 FL (ref 8.5–12.5)
RBC # BLD AUTO: 3.39 MILL/UL (ref 3.8–5.4)
WBC: 8.5 THOU/UL (ref 4–11)

## 2019-06-28 NOTE — TELEPHONE ENCOUNTER
Called patient with use of Latvian language line  to schedule patient for next week with . Patient needs hospital f/u r/t complicated pregnancy and to discuss  status per 's request. No answer, left VM to call clinic and schedule. Patient to be scheduled for the week of 2019. Rimma HELMS

## 2019-06-28 NOTE — PROGRESS NOTES
Patricia successfully delivered a term (Mane) at 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 Platelet count of 444 on admission (slightly elevated to be expected in post splenectomy state). She was followed closely by MFM throughout her pregnancy. Patricia also had hypertension during labor(170s systolic), HELLP labs negative, unable to give antihypertensives r/t rapidly progressing labor. Mane was delivered via  and Patricia had delayed PP hemorrhage of 1373mL requiring pitocin, cytotec, hemabate and TXA.     Mane was born with Trisomy 21 and brought to NICU for hypoxia, hypoglycemia and hypotonia secondary to trisomy 21. [place] still in NICU.      requested close follow-up of Patricia within 2 weeks of delivery in clinic r/t complicated pregnancy and post partum. Outreach call has been made and will continue to call.     Rimma HELMS

## 2019-07-08 NOTE — PROGRESS NOTES
Patricia Govea is a 38 year old  female presenting for postpartum follow up.    Date of delivery was 19 via .    Complications noted during the pregnancy include:   - ITP in pregnancy, refractory to steroids, IVIG and splenectomy. Followed by Rituximab with stable platelets following. Platelet count normal on admission and discharge (slightly elevated to be expected in post splenectomy state).   - AMA, mother declined genetic testing in pregnancy   - Anemia in pregancy. Normal hemoglobin on admission  - Varicella non-immune     Complications at the time of delivery include  - Elevated blood pressures at time of delivery, though delivered quickly reduction in BPs following, therefore did not receive any medications. Slightly elevated BPs at the end of pregnancy and postpartum, not reaching threshold for treatment for preeclampsia. Suspect possible underlying chronic hypertension, therefore recommended follow up today with BP check.   - Delayed postpartum hemorrhage of 1300mL. Hemoglobin stable and minimal bleeding at discharge. Hemoglobin 11.2 at appointment with Heme on     The patient notes no worrisome bleeding since since the time of delivery.  She has no continued pain.  Patient is pumping and bottle feeding infant in NICU and has tried breast feeding once.  Patient states she has had some worries and feels lightly down about her baby as she is concerned he is still in the NICU. Patient is interested in contraception at this time. Requesting Depo. Infant born with Trisomy 21 and still in the NICU secondary to respiratory distress.    ROS:  General: No fevers.  Cardiac: No chest pain or palpitations.  Breasts: No redness, warmth, pain.   Pulmonary: No shortness of breath or respiratory distress.  GI: No abdominal pain.  Normal bowel habits, no incontinence.  : No dysuria, hematuria, no incontinence.  Extremities: No edema.    Allergies   Allergen Reactions     Influenza Vaccines  Other (See Comments)     Guillain [place] Syndrome following 2016 administration     Blood-Group Specific Substance Other (See Comments)     Warm autoantibodies present.RBC for transfusion may be delayed up to 24 hrs. Draw 3 lavender and 1 red tube for Type and Screen requests.       Current Outpatient Medications   Medication Sig Dispense Refill     acetaminophen (TYLENOL) 325 MG tablet Take 325-650 mg by mouth every 6 hours as needed for mild pain       Prenatal Vit-Fe Fumarate-FA (PRENATAL MULTIVITAMIN  PLUS IRON) 27-1 MG TABS Take by mouth daily         Past Medical History:   Diagnosis Date     Acute idiopathic thrombocytopenic purpura (H)     bone marrow biopsy negative for malignancy     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 pylori infection        OB History    Para Term  AB Living   4 3 2 1 1 3   SAB TAB Ectopic Multiple Live Births   0 0 1 0 3      # Outcome Date GA Lbr Amol/2nd Weight Sex Delivery Anes PTL Lv   4 Term 19 37w2d  2.69 kg (5 lb 14.9 oz) M  None N SHANNAN      Birth Comments: Infant in NICU at mother's discharge for hypoxia, hypoglycemia and hypotonia secondary to trisomy 21      Complications: Preeclampsia/Hypertension      Name: Mane      Apgar1: 8  Apgar5: 9   3 Term 2006    F    SHANNAN   2 Ectopic 2006 6w4d    ECTOPIC      1   28w0d   M CS-Unspec   SHANNAN      Complications: Abruptio Placenta      Obstetric Comments    ITP in pregnancy, refractory to steroids, IVIG and splenectomy. Followed by Rituximab with stable platelets following. Platelet count of 444 on admission (slightly elevated to be expected in post splenectomy state).    - AMA, mother declined genetic testing in pregnancy    - Anemia in pregancy. Normal hemoglobin on admission   - Varicella non-immune   -HTN during labor, HELLP labs negative      Delayed postpartum hemorrhage of 1300mL requiring pitocin, cytotec, hemabate and TXA.   Rimma RN       BP  "133/83   Pulse 81   Temp 98  F (36.7  C) (Oral)   Ht 1.47 m (4' 9.87\")   Wt 80.7 kg (178 lb)   SpO2 98%   BMI 37.36 kg/m     General: no apparent distress, appears well  Cardiovascular: regular rate and rhythm without murmur  Pulmonary: clear to auscultation bilaterally without wheezing or crackles  Abdomen: Soft, non-tender.  No rebound or guarding.   Lower extremity: no significant swelling    Assessment/Plan  1. Routine Post Partum Care: Overall doing well. Will continue to monitor closely for post partum depression given son still in the NICU and having medical issues. Will continue to work on breastfeeding and pumping while son is in the NICU. Recommended continuing prenatal vitamin during breastfeeding. Ok to resume normal activities without restriction. Hemoglobin okay at recent recheck with hematology.    2. Encounter for initial prescription of injectable contraceptive  Desires Depo for contraception. HCG drawn today per protocol, and noted to be elevated. Denies any intercourse since delivery. HCG can be elevated up to 4 weeks postpartum, so okay to give DEPO today. Discussed potential side effect of spotting between periods. Patient has tolerated this medication well in the past.   - medroxyPROGESTERone (DEPO-PROVERA) injection 150 mg  7/9/2019  Plan Documentation  Service ordered Depo Provera injection (150mg IM) may be given every 3 months for one year per protoccol.  Plan and order should be renewed one year from 7/9/2019 .  Ordered by Pat Ghotra.    3. Elevated BP without the diagnosis of hypertension   BP elevated at the end or pregnancy, prior to delivery and postpartum, but not reaching threshold for treatment for hypertension.   - Recommend recheck of BP at 6w postpartum     Pat Ghotra DO (PGY3)  [facility] Resident  Pager: [phone]    Precepted with: Rad Knox MD      "

## 2019-07-09 ENCOUNTER — OFFICE VISIT (OUTPATIENT)
Dept: FAMILY MEDICINE | Facility: CLINIC | Age: 39
End: 2019-07-09
Payer: COMMERCIAL

## 2019-07-09 VITALS
WEIGHT: 178 LBS | HEART RATE: 81 BPM | OXYGEN SATURATION: 98 % | TEMPERATURE: 98 F | SYSTOLIC BLOOD PRESSURE: 133 MMHG | BODY MASS INDEX: 37.36 KG/M2 | DIASTOLIC BLOOD PRESSURE: 83 MMHG | HEIGHT: 58 IN

## 2019-07-09 DIAGNOSIS — Z30.013 ENCOUNTER FOR INITIAL PRESCRIPTION OF INJECTABLE CONTRACEPTIVE: ICD-10-CM

## 2019-07-09 DIAGNOSIS — Z32.00 ENCOUNTER FOR PREGNANCY TEST, RESULT UNKNOWN: ICD-10-CM

## 2019-07-09 DIAGNOSIS — R03.0 ELEVATED BLOOD PRESSURE READING WITHOUT DIAGNOSIS OF HYPERTENSION: ICD-10-CM

## 2019-07-09 PROBLEM — O09.93 SUPERVISION OF HIGH RISK PREGNANCY IN THIRD TRIMESTER: Status: RESOLVED | Noted: 2018-12-23 | Resolved: 2019-07-09

## 2019-07-09 PROBLEM — D69.6 THROMBOCYTOPENIA DURING PREGNANCY (H): Status: RESOLVED | Noted: 2019-01-17 | Resolved: 2019-07-09

## 2019-07-09 PROBLEM — O99.119 THROMBOCYTOPENIA DURING PREGNANCY (H): Status: RESOLVED | Noted: 2019-01-17 | Resolved: 2019-07-09

## 2019-07-09 PROBLEM — O09.893 HISTORY OF PRETERM DELIVERY, CURRENTLY PREGNANT, THIRD TRIMESTER: Status: RESOLVED | Noted: 2018-12-23 | Resolved: 2019-07-09

## 2019-07-09 LAB — HCG UR QL: POSITIVE

## 2019-07-09 RX ORDER — DOCUSATE SODIUM 250 MG
250 CAPSULE ORAL DAILY
COMMUNITY
Start: 2019-06-25 | End: 2021-07-23

## 2019-07-09 RX ORDER — MEDROXYPROGESTERONE ACETATE 150 MG/ML
150 INJECTION, SUSPENSION INTRAMUSCULAR ONCE
Status: COMPLETED | OUTPATIENT
Start: 2019-07-09 | End: 2019-07-09

## 2019-07-09 RX ADMIN — MEDROXYPROGESTERONE ACETATE 150 MG: 150 INJECTION, SUSPENSION INTRAMUSCULAR at 10:30

## 2019-07-09 ASSESSMENT — MIFFLIN-ST. JEOR: SCORE: 1375.15

## 2019-07-09 NOTE — PROGRESS NOTES
Preceptor Attestation:   Patient seen, evaluated and discussed with the resident. I have verified the content of the note, which accurately reflects my assessment of the patient and the plan of care.  Supervising Physician:Rad Knox MD  [facility]

## 2019-07-09 NOTE — NURSING NOTE
Due to patient being non-English speaking/uses sign language, an  was used for this visit. Only for face-to-face interpretation by an external agency, date and length of interpretation can be found on the scanned worksheet.     name: Carolina  Agency: [employer]  Language: Persian   Telephone number: [phone]  Type of interpretation: Face-to-face, spoken     Shanell Ritchie CMA

## 2019-07-09 NOTE — NURSING NOTE
Clinic Administered Medication Documentation      Depo Provera Documentation    Prior to injection, verified patient identity using patient's name and date of birth. Medication was administered. Please see MAR and medication order for additional information. Patient instructed to remain in clinic for 15 minutes.    BP: 133/83    LAST PAP/EXAM: 12/20/2018  URINE HCG:positive 2 weeks Post partum,ok per     NEXT INJECTION DUE: 9/24/19 - 10/15/19    Was entire vial of medication used? Yes  Vial/Syringe: Single dose vial  Expiration Date:  10/20

## 2019-07-18 ENCOUNTER — TELEPHONE (OUTPATIENT)
Dept: FAMILY MEDICINE | Facility: CLINIC | Age: 39
End: 2019-07-18

## 2019-07-18 NOTE — TELEPHONE ENCOUNTER
Called patient with Sammarinese language line , no answer. Left  requesting a call back to schedule Mane () in clinic for tomorrow as soon as possible. Rimma HELMS

## 2019-09-26 ENCOUNTER — ALLIED HEALTH/NURSE VISIT (OUTPATIENT)
Dept: FAMILY MEDICINE | Facility: CLINIC | Age: 39
End: 2019-09-26
Payer: COMMERCIAL

## 2019-09-26 VITALS
HEART RATE: 73 BPM | OXYGEN SATURATION: 99 % | TEMPERATURE: 98.9 F | WEIGHT: 180 LBS | HEIGHT: 58 IN | RESPIRATION RATE: 16 BRPM | SYSTOLIC BLOOD PRESSURE: 121 MMHG | DIASTOLIC BLOOD PRESSURE: 82 MMHG | BODY MASS INDEX: 37.79 KG/M2

## 2019-09-26 DIAGNOSIS — Z30.013 ENCOUNTER FOR INITIAL PRESCRIPTION OF INJECTABLE CONTRACEPTIVE: Primary | ICD-10-CM

## 2019-09-26 RX ORDER — MEDROXYPROGESTERONE ACETATE 150 MG/ML
150 INJECTION, SUSPENSION INTRAMUSCULAR
Status: DISCONTINUED | OUTPATIENT
Start: 2019-09-26 | End: 2021-02-18

## 2019-09-26 RX ADMIN — MEDROXYPROGESTERONE ACETATE 150 MG: 150 INJECTION, SUSPENSION INTRAMUSCULAR at 15:13

## 2019-09-26 ASSESSMENT — MIFFLIN-ST. JEOR: SCORE: 1384.22

## 2019-09-26 NOTE — NURSING NOTE
"Chief Complaint   Patient presents with     Contraception     On time for depo injection.        /82   Pulse 73   Temp 98.9  F (37.2  C) (Oral)   Resp 16   Ht 1.47 m (4' 9.87\")   Wt 81.6 kg (180 lb)   SpO2 99%   BMI 37.78 kg/m        Clinic Administered Medication Documentation    MEDICATION LIST:   Depo Provera Documentation    Prior to injection, verified patient identity using patient's name and date of birth. Medication was administered. Please see MAR and medication order for additional information.     BP: 121/82    LAST PAP/EXAM: No results found for: PAP  URINE HCG:not indicated      Was entire vial of medication used? Yes  Vial/Syringe: Single dose vial  Expiration Date:  12/20      Name of provider who requested the medication administration: Dr. Ghotra  Name of provider on site (faculty or community preceptor) at the time of performing the medication administration: Dr. Ramsey.     This medication order was signed by Dr. Drew    Date of next administration: 12/13/2019-1/2/2020  Date of next office visit with provider to renew medication plan (must be seen annually): 7/9/2020    ~ Deuce DUGAN CMA ([place])        "

## 2019-10-25 ENCOUNTER — OFFICE VISIT - HEALTHEAST (OUTPATIENT)
Dept: ONCOLOGY | Facility: HOSPITAL | Age: 39
End: 2019-10-25

## 2019-10-25 ENCOUNTER — AMBULATORY - HEALTHEAST (OUTPATIENT)
Dept: INFUSION THERAPY | Facility: HOSPITAL | Age: 39
End: 2019-10-25

## 2019-10-25 DIAGNOSIS — Z86.2 HISTORY OF IMMUNE THROMBOCYTOPENIA: ICD-10-CM

## 2019-10-25 LAB
BASOPHILS # BLD AUTO: 0.1 THOU/UL (ref 0–0.2)
BASOPHILS NFR BLD AUTO: 1 % (ref 0–2)
EOSINOPHIL # BLD AUTO: 0.2 THOU/UL (ref 0–0.4)
EOSINOPHIL NFR BLD AUTO: 3 % (ref 0–6)
ERYTHROCYTE [DISTWIDTH] IN BLOOD BY AUTOMATED COUNT: 14 % (ref 11–14.5)
HCT VFR BLD AUTO: 38.2 % (ref 35–47)
HGB BLD-MCNC: 13.6 G/DL (ref 12–16)
LYMPHOCYTES # BLD AUTO: 2.8 THOU/UL (ref 0.8–4.4)
LYMPHOCYTES NFR BLD AUTO: 45 % (ref 20–40)
MCH RBC QN AUTO: 32.5 PG (ref 27–34)
MCHC RBC AUTO-ENTMCNC: 35.6 G/DL (ref 32–36)
MCV RBC AUTO: 91 FL (ref 80–100)
MONOCYTES # BLD AUTO: 0.6 THOU/UL (ref 0–0.9)
MONOCYTES NFR BLD AUTO: 10 % (ref 2–10)
NEUTROPHILS # BLD AUTO: 2.5 THOU/UL (ref 2–7.7)
NEUTROPHILS NFR BLD AUTO: 40 % (ref 50–70)
PLATELET # BLD AUTO: 568 THOU/UL (ref 140–440)
PMV BLD AUTO: 8.5 FL (ref 8.5–12.5)
RBC # BLD AUTO: 4.19 MILL/UL (ref 3.8–5.4)
WBC: 6.1 THOU/UL (ref 4–11)

## 2019-11-30 ENCOUNTER — ANESTHESIA - HEALTHEAST (OUTPATIENT)
Dept: SURGERY | Facility: HOSPITAL | Age: 39
End: 2019-11-30

## 2019-11-30 ENCOUNTER — SURGERY - HEALTHEAST (OUTPATIENT)
Dept: SURGERY | Facility: HOSPITAL | Age: 39
End: 2019-11-30

## 2019-11-30 ASSESSMENT — MIFFLIN-ST. JEOR
SCORE: 1423.85
SCORE: 1436.56

## 2019-12-05 ENCOUNTER — OFFICE VISIT (OUTPATIENT)
Dept: FAMILY MEDICINE | Facility: CLINIC | Age: 39
End: 2019-12-05
Payer: COMMERCIAL

## 2019-12-05 VITALS
BODY MASS INDEX: 36.73 KG/M2 | RESPIRATION RATE: 16 BRPM | DIASTOLIC BLOOD PRESSURE: 85 MMHG | SYSTOLIC BLOOD PRESSURE: 126 MMHG | HEIGHT: 59 IN | OXYGEN SATURATION: 98 % | TEMPERATURE: 98 F | WEIGHT: 182.2 LBS | HEART RATE: 85 BPM

## 2019-12-05 DIAGNOSIS — Z90.49 S/P LAPAROSCOPIC CHOLECYSTECTOMY: Primary | ICD-10-CM

## 2019-12-05 RX ORDER — ACETAMINOPHEN 500 MG
1000 TABLET ORAL 3 TIMES DAILY
Qty: 90 TABLET | Refills: 3 | Status: SHIPPED | OUTPATIENT
Start: 2019-12-05 | End: 2024-01-08

## 2019-12-05 RX ORDER — IBUPROFEN 600 MG/1
600 TABLET, FILM COATED ORAL EVERY 6 HOURS PRN
Qty: 90 TABLET | Refills: 3 | Status: SHIPPED | OUTPATIENT
Start: 2019-12-05 | End: 2021-02-18

## 2019-12-05 ASSESSMENT — MIFFLIN-ST. JEOR: SCORE: 1406.69

## 2019-12-05 NOTE — LETTER
December 5, 2019      To whom it may concern,     Please excuse Hamilton Carnes from work from 11/30-12/9 as he has been caring for his child while his wife is recovering from a surgery.       Sincerely,      Pat Ghotra, DO

## 2019-12-05 NOTE — PROGRESS NOTES
HPI:       Patricia Govea is a 38 year old  female with a past medical history of chronic ITP, Guillain Butler Syndrome who presents for the following concern:    Hospital follow up   - Patient was hospitalized from 11/30-12/1 for acute cholecystitis   - Laparoscopic cholecystectomy on 11/30  - Given 12 tablets hydrocodone-acetaminophen at discharge along with ibuprofen. Had been taking hydrocodone q4-6h. Took last dose of hydrocodone-acetaminophen yesterday. Has not tried anything else for pain control since discharge   - Has been eating and drinking normally  - Still having postoperative pain, not constant but becomes worse when coughing, feels over abdomen near incisions and in back.    A  was used for this visit.          PMHX:     Patient Active Problem List   Diagnosis     GBS (Guillain Butler syndrome) (H)     Bell's palsy     Anisocoria     Encounter for screening for cervical cancer      Chronic ITP (idiopathic thrombocytopenia) (H)     Encounter for initial prescription of injectable contraceptive     Elevated blood pressure reading without diagnosis of hypertension       Current Outpatient Medications   Medication Sig Dispense Refill     acetaminophen (TYLENOL) 325 MG tablet Take 325-650 mg by mouth every 6 hours as needed for mild pain       acetaminophen (TYLENOL) 500 MG tablet Take 2 tablets (1,000 mg) by mouth 3 times daily 90 tablet 3     ibuprofen (ADVIL/MOTRIN) 600 MG tablet Take 1 tablet (600 mg) by mouth every 6 hours as needed for moderate pain 90 tablet 3     docusate sodium (COLACE) 250 MG capsule Take 250 mg by mouth daily       Prenatal Vit-Fe Fumarate-FA (PRENATAL MULTIVITAMIN  PLUS IRON) 27-1 MG TABS Take by mouth daily         Social History     Tobacco Use     Smoking status: Never Smoker     Smokeless tobacco: Never Used   Substance Use Topics     Alcohol use: No     Frequency: Never     Drug use: No          Allergies   Allergen Reactions     Influenza  "Vaccines Other (See Comments)     Guillain [place] Syndrome following fall 2016 administration     Blood-Group Specific Substance Other (See Comments)     Warm autoantibodies present.RBC for transfusion may be delayed up to 24 hrs. Draw 3 lavender and 1 red tube for Type and Screen requests.       Family History   Problem Relation Age of Onset     Cancer No family hx of      Diabetes No family hx of      Coronary Artery Disease No family hx of      Heart Disease No family hx of      Hypertension No family hx of      Thrombosis No family hx of             Review of Systems:       10 point review of systems negative except for noted in HPI             Physical Exam:     Vitals:    12/05/19 0907 12/05/19 0912   BP: (!) 152/83 126/85   Pulse: 85    Resp: 16    Temp: 98  F (36.7  C)    TempSrc: Oral    SpO2: 98%    Weight: 82.6 kg (182 lb 3.2 oz)    Height: 1.49 m (4' 10.66\")      Body mass index is 37.23 kg/m .    Exam:  Constitutional: healthy, alert and no distress  Cardiovascular: Regular rate and rhythm. No murmurs.  Respiratory: Lungs clear to auscultation. No wheezing or crackles present   Abdomen:  Abdomen soft, minimal tenderness to palpation over surgical sites. 4 laparoscopic port incisions healing well without signs of infection or dehiscence. Prior healed incision scars from splenectomy noted.  Psychiatric: mentation appears normal and affect normal/bright      Assessment and Plan     1. S/P laparoscopic cholecystectomy   Overall doing very well, pain appears well-controlled. Reassured that pain is within expected limits for a post-cholecystectomy patient  -For pain, will try scheduling tylenol 1000mg TID and prn ibuprofen 600mg q6H, instructed to take with food   -Instructed to follow up if pain is not well tolerated   -Per surgery instructions, able to return to work on Monday and no lifting restrictions.   -Instructed to schedule follow up with surgery within 2 weeks post-op    Options for treatment and " follow-up care were reviewed with the patient and/or guardian. Patricia Govea and/or guardian engaged in the decision making process and verbalized understanding of the options discussed and agreed with the final plan.      Segun Ca, MS-3  [facility] [facility] [employer] [employer] [facility] [employer]    I was present with the medical student who participated in the service and in the documentation of this note. I have verified the history and personally performed the physical exam and medical decision making, and have verified the content of the note, which accurately reflects my assessment of the patient and the plan of care.     Pat Ghotra DO (PGY3)  [facility] Resident  Pager: [phone]      Precepted today with: Rad Knox MD

## 2019-12-05 NOTE — LETTER
December 5, 2019      To whom it may concern,     Please excuse Patricia Govea from work 11/30-12/8 as she had a surgery and required days off for recovers. She will be able to return to work on 12/9 without any restrictions     Sincerely,      Pat Ghotra, DO

## 2019-12-05 NOTE — NURSING NOTE
Due to patient being non-English speaking/uses sign language, an  was used for this visit. Only for face-to-face interpretation by an external agency, date and length of interpretation can be found on the scanned worksheet.     name: emily severino  Agency: [employer]  Language: Tajik   Telephone number: [phone]  Type of interpretation: Face-to-face, spoken

## 2019-12-30 ENCOUNTER — ALLIED HEALTH/NURSE VISIT (OUTPATIENT)
Dept: FAMILY MEDICINE | Facility: CLINIC | Age: 39
End: 2019-12-30
Payer: COMMERCIAL

## 2019-12-30 VITALS
RESPIRATION RATE: 16 BRPM | OXYGEN SATURATION: 98 % | TEMPERATURE: 98 F | HEART RATE: 73 BPM | SYSTOLIC BLOOD PRESSURE: 132 MMHG | DIASTOLIC BLOOD PRESSURE: 80 MMHG

## 2019-12-30 DIAGNOSIS — Z30.013 ENCOUNTER FOR INITIAL PRESCRIPTION OF INJECTABLE CONTRACEPTIVE: Primary | ICD-10-CM

## 2019-12-30 RX ADMIN — MEDROXYPROGESTERONE ACETATE 150 MG: 150 INJECTION, SUSPENSION INTRAMUSCULAR at 08:52

## 2019-12-30 NOTE — NURSING NOTE
Clinic Administered Medication Documentation    MEDICATION LIST:   Depo Provera Documentation    Prior to injection, verified patient identity using patient's name and date of birth. Medication was administered. Please see MAR and medication order for additional information. Patient instructed to return 03/17/20-04/07/20.    BP: 132/80    LAST PAP/EXAM: No results found for: PAP  URINE HCG:not indicated    NEXT INJECTION DUE: 3/17/20 - 4/7/20    Was entire vial of medication used? Yes  Vial/Syringe: Single dose vial  Expiration Date:  12/20      Name of provider who requested the medication administration: Dr. Ghotra  Name of provider on site (faculty or community preceptor) at the time of performing the medication administration: Dr. York    Date of next administration: 03/17/20-04/07/20  Date of next office visit with provider to renew medication plan (must be seen annually): 07/09/2020

## 2020-03-23 ENCOUNTER — ALLIED HEALTH/NURSE VISIT (OUTPATIENT)
Dept: FAMILY MEDICINE | Facility: CLINIC | Age: 40
End: 2020-03-23
Payer: COMMERCIAL

## 2020-03-23 VITALS
WEIGHT: 187 LBS | OXYGEN SATURATION: 98 % | DIASTOLIC BLOOD PRESSURE: 82 MMHG | TEMPERATURE: 98.7 F | BODY MASS INDEX: 39.25 KG/M2 | HEART RATE: 68 BPM | RESPIRATION RATE: 22 BRPM | SYSTOLIC BLOOD PRESSURE: 137 MMHG | HEIGHT: 58 IN

## 2020-03-23 DIAGNOSIS — Z30.013 ENCOUNTER FOR INITIAL PRESCRIPTION OF INJECTABLE CONTRACEPTIVE: Primary | ICD-10-CM

## 2020-03-23 RX ADMIN — MEDROXYPROGESTERONE ACETATE 150 MG: 150 INJECTION, SUSPENSION INTRAMUSCULAR at 15:00

## 2020-03-23 ASSESSMENT — MIFFLIN-ST. JEOR: SCORE: 1410.98

## 2020-03-23 NOTE — NURSING NOTE
Clinic Administered Medication Documentation      Depo Provera Documentation    URINE HCG: not indicated    Depo-Provera Standing Order inclusion/exclusion criteria reviewed.   Patient meets: inclusion criteria     BP: 137/82  LAST PAP/EXAM: No results found for: PAP    Prior to injection, verified patient identity using patient's name and date of birth. Medication was administered. Please see MAR and medication order for additional information.     Was entire vial of medication used? Yes  Vial/Syringe: Single dose vial  Expiration Date:  10/21    Patient instructed to report any adverse reaction to staff immediately .  NEXT INJECTION DUE: 6/8/20 - 6/22/20      Name of provider who requested the medication administration: Dr. Ghotra   Name of provider on site (faculty or community preceptor) at the time of performing the medication administration: Dr. Vargas    Date of next administration: 6/8/20 - 6/22/20   Date of next office visit with provider to renew medication plan (must be seen annually): 7/9/20

## 2020-03-30 RX ORDER — MEDROXYPROGESTERONE ACETATE 150 MG/ML
150 INJECTION, SUSPENSION INTRAMUSCULAR ONCE
Status: CANCELLED | OUTPATIENT
Start: 2020-03-23

## 2021-02-02 ENCOUNTER — COMMUNICATION - HEALTHEAST (OUTPATIENT)
Dept: ONCOLOGY | Facility: HOSPITAL | Age: 41
End: 2021-02-02

## 2021-02-02 ENCOUNTER — AMBULATORY - HEALTHEAST (OUTPATIENT)
Dept: INFUSION THERAPY | Facility: HOSPITAL | Age: 41
End: 2021-02-02

## 2021-02-02 ENCOUNTER — AMBULATORY - HEALTHEAST (OUTPATIENT)
Dept: ONCOLOGY | Facility: HOSPITAL | Age: 41
End: 2021-02-02

## 2021-02-02 DIAGNOSIS — D69.6 THROMBOCYTOPENIA DURING PREGNANCY (H): ICD-10-CM

## 2021-02-02 DIAGNOSIS — O99.119 THROMBOCYTOPENIA DURING PREGNANCY (H): ICD-10-CM

## 2021-02-02 DIAGNOSIS — Z86.2 HISTORY OF IMMUNE THROMBOCYTOPENIA: ICD-10-CM

## 2021-02-02 LAB
ERYTHROCYTE [DISTWIDTH] IN BLOOD BY AUTOMATED COUNT: 13.3 % (ref 11–14.5)
HCT VFR BLD AUTO: 41.3 % (ref 35–47)
HGB BLD-MCNC: 14.7 G/DL (ref 12–16)
MCH RBC QN AUTO: 32.9 PG (ref 27–34)
MCHC RBC AUTO-ENTMCNC: 35.6 G/DL (ref 32–36)
MCV RBC AUTO: 92 FL (ref 80–100)
PLATELET # BLD AUTO: 16 THOU/UL (ref 140–440)
RBC # BLD AUTO: 4.47 MILL/UL (ref 3.8–5.4)
WBC: 5.8 THOU/UL (ref 4–11)

## 2021-02-03 ENCOUNTER — AMBULATORY - HEALTHEAST (OUTPATIENT)
Dept: ONCOLOGY | Facility: HOSPITAL | Age: 41
End: 2021-02-03

## 2021-02-03 DIAGNOSIS — D69.3 IDIOPATHIC THROMBOCYTOPENIC PURPURA (H): ICD-10-CM

## 2021-02-03 DIAGNOSIS — Z86.2 HISTORY OF IMMUNE THROMBOCYTOPENIA: ICD-10-CM

## 2021-02-04 ENCOUNTER — OFFICE VISIT - HEALTHEAST (OUTPATIENT)
Dept: ONCOLOGY | Facility: HOSPITAL | Age: 41
End: 2021-02-04

## 2021-02-04 ENCOUNTER — AMBULATORY - HEALTHEAST (OUTPATIENT)
Dept: INFUSION THERAPY | Facility: HOSPITAL | Age: 41
End: 2021-02-04

## 2021-02-04 ENCOUNTER — INFUSION - HEALTHEAST (OUTPATIENT)
Dept: INFUSION THERAPY | Facility: HOSPITAL | Age: 41
End: 2021-02-04

## 2021-02-04 DIAGNOSIS — D69.3 IDIOPATHIC THROMBOCYTOPENIC PURPURA (H): ICD-10-CM

## 2021-02-04 DIAGNOSIS — Z86.2 HISTORY OF IMMUNE THROMBOCYTOPENIA: ICD-10-CM

## 2021-02-04 LAB
ALBUMIN SERPL-MCNC: 4.1 G/DL (ref 3.5–5)
ALP SERPL-CCNC: 110 U/L (ref 45–120)
ALT SERPL W P-5'-P-CCNC: 20 U/L (ref 0–45)
ANION GAP SERPL CALCULATED.3IONS-SCNC: 8 MMOL/L (ref 5–18)
AST SERPL W P-5'-P-CCNC: 21 U/L (ref 0–40)
BASOPHILS # BLD AUTO: 0.1 THOU/UL (ref 0–0.2)
BASOPHILS NFR BLD AUTO: 2 % (ref 0–2)
BILIRUB SERPL-MCNC: 0.5 MG/DL (ref 0–1)
BUN SERPL-MCNC: 9 MG/DL (ref 8–22)
CALCIUM SERPL-MCNC: 9.1 MG/DL (ref 8.5–10.5)
CHLORIDE BLD-SCNC: 105 MMOL/L (ref 98–107)
CO2 SERPL-SCNC: 25 MMOL/L (ref 22–31)
CREAT SERPL-MCNC: 0.66 MG/DL (ref 0.6–1.1)
EOSINOPHIL # BLD AUTO: 0.2 THOU/UL (ref 0–0.4)
EOSINOPHIL NFR BLD AUTO: 4 % (ref 0–6)
ERYTHROCYTE [DISTWIDTH] IN BLOOD BY AUTOMATED COUNT: 13.5 % (ref 11–14.5)
GFR SERPL CREATININE-BSD FRML MDRD: >60 ML/MIN/1.73M2
GLUCOSE BLD-MCNC: 90 MG/DL (ref 70–125)
HBV SURFACE AG SERPL QL IA: NEGATIVE
HCT VFR BLD AUTO: 43.5 % (ref 35–47)
HGB BLD-MCNC: 15.3 G/DL (ref 12–16)
IMM GRANULOCYTES # BLD: 0 THOU/UL
IMM GRANULOCYTES NFR BLD: 1 %
LDH SERPL L TO P-CCNC: 303 U/L (ref 125–220)
LYMPHOCYTES # BLD AUTO: 1.8 THOU/UL (ref 0.8–4.4)
LYMPHOCYTES NFR BLD AUTO: 37 % (ref 20–40)
MCH RBC QN AUTO: 32.7 PG (ref 27–34)
MCHC RBC AUTO-ENTMCNC: 35.2 G/DL (ref 32–36)
MCV RBC AUTO: 93 FL (ref 80–100)
MONOCYTES # BLD AUTO: 0.4 THOU/UL (ref 0–0.9)
MONOCYTES NFR BLD AUTO: 9 % (ref 2–10)
NEUTROPHILS # BLD AUTO: 2.3 THOU/UL (ref 2–7.7)
NEUTROPHILS NFR BLD AUTO: 48 % (ref 50–70)
PLATELET # BLD AUTO: 8 THOU/UL (ref 140–440)
PMV BLD AUTO: 11 FL (ref 8.5–12.5)
POTASSIUM BLD-SCNC: 4 MMOL/L (ref 3.5–5)
PROT SERPL-MCNC: 8.5 G/DL (ref 6–8)
RBC # BLD AUTO: 4.68 MILL/UL (ref 3.8–5.4)
SODIUM SERPL-SCNC: 138 MMOL/L (ref 136–145)
WBC: 4.9 THOU/UL (ref 4–11)

## 2021-02-04 ASSESSMENT — MIFFLIN-ST. JEOR: SCORE: 1479.19

## 2021-02-05 ENCOUNTER — AMBULATORY - HEALTHEAST (OUTPATIENT)
Dept: ONCOLOGY | Facility: HOSPITAL | Age: 41
End: 2021-02-05

## 2021-02-05 LAB — HBV CORE AB SERPL QL IA: NEGATIVE

## 2021-02-10 ENCOUNTER — AMBULATORY - HEALTHEAST (OUTPATIENT)
Dept: ONCOLOGY | Facility: HOSPITAL | Age: 41
End: 2021-02-10

## 2021-02-10 ENCOUNTER — COMMUNICATION - HEALTHEAST (OUTPATIENT)
Dept: ONCOLOGY | Facility: HOSPITAL | Age: 41
End: 2021-02-10

## 2021-02-10 DIAGNOSIS — D69.3 IDIOPATHIC THROMBOCYTOPENIC PURPURA (H): ICD-10-CM

## 2021-02-11 ENCOUNTER — AMBULATORY - HEALTHEAST (OUTPATIENT)
Dept: INFUSION THERAPY | Facility: HOSPITAL | Age: 41
End: 2021-02-11

## 2021-02-11 ENCOUNTER — INFUSION - HEALTHEAST (OUTPATIENT)
Dept: INFUSION THERAPY | Facility: HOSPITAL | Age: 41
End: 2021-02-11

## 2021-02-11 DIAGNOSIS — D69.3 IDIOPATHIC THROMBOCYTOPENIC PURPURA (H): ICD-10-CM

## 2021-02-11 LAB
BASOPHILS # BLD AUTO: 0.1 THOU/UL (ref 0–0.2)
BASOPHILS NFR BLD AUTO: 2 % (ref 0–2)
EOSINOPHIL # BLD AUTO: 0.3 THOU/UL (ref 0–0.4)
EOSINOPHIL NFR BLD AUTO: 4 % (ref 0–6)
ERYTHROCYTE [DISTWIDTH] IN BLOOD BY AUTOMATED COUNT: 13.2 % (ref 11–14.5)
HCT VFR BLD AUTO: 40.6 % (ref 35–47)
HGB BLD-MCNC: 14.3 G/DL (ref 12–16)
IMM GRANULOCYTES # BLD: 0.1 THOU/UL
IMM GRANULOCYTES NFR BLD: 1 %
LYMPHOCYTES # BLD AUTO: 2.2 THOU/UL (ref 0.8–4.4)
LYMPHOCYTES NFR BLD AUTO: 34 % (ref 20–40)
MCH RBC QN AUTO: 32.7 PG (ref 27–34)
MCHC RBC AUTO-ENTMCNC: 35.2 G/DL (ref 32–36)
MCV RBC AUTO: 93 FL (ref 80–100)
MONOCYTES # BLD AUTO: 0.6 THOU/UL (ref 0–0.9)
MONOCYTES NFR BLD AUTO: 9 % (ref 2–10)
NEUTROPHILS # BLD AUTO: 3.3 THOU/UL (ref 2–7.7)
NEUTROPHILS NFR BLD AUTO: 51 % (ref 50–70)
PLATELET # BLD AUTO: 13 THOU/UL (ref 140–440)
PMV BLD AUTO: ABNORMAL FL
RBC # BLD AUTO: 4.37 MILL/UL (ref 3.8–5.4)
WBC: 6.5 THOU/UL (ref 4–11)

## 2021-02-12 LAB — HCG-TM SERPL-ACNC: <3 IU/L

## 2021-02-16 ENCOUNTER — COMMUNICATION - HEALTHEAST (OUTPATIENT)
Dept: ADMINISTRATIVE | Facility: HOSPITAL | Age: 41
End: 2021-02-16

## 2021-02-18 ENCOUNTER — OFFICE VISIT (OUTPATIENT)
Dept: FAMILY MEDICINE | Facility: CLINIC | Age: 41
End: 2021-02-18
Payer: COMMERCIAL

## 2021-02-18 ENCOUNTER — INFUSION - HEALTHEAST (OUTPATIENT)
Dept: INFUSION THERAPY | Facility: HOSPITAL | Age: 41
End: 2021-02-18

## 2021-02-18 ENCOUNTER — AMBULATORY - HEALTHEAST (OUTPATIENT)
Dept: INFUSION THERAPY | Facility: HOSPITAL | Age: 41
End: 2021-02-18

## 2021-02-18 VITALS
HEIGHT: 59 IN | BODY MASS INDEX: 39.47 KG/M2 | HEART RATE: 100 BPM | WEIGHT: 195.8 LBS | TEMPERATURE: 98.4 F | OXYGEN SATURATION: 96 % | DIASTOLIC BLOOD PRESSURE: 93 MMHG | SYSTOLIC BLOOD PRESSURE: 134 MMHG | RESPIRATION RATE: 20 BRPM

## 2021-02-18 DIAGNOSIS — D69.6 THROMBOCYTOPENIA (H): ICD-10-CM

## 2021-02-18 DIAGNOSIS — R03.0 ELEVATED BLOOD PRESSURE READING WITHOUT DIAGNOSIS OF HYPERTENSION: ICD-10-CM

## 2021-02-18 DIAGNOSIS — D69.3 CHRONIC ITP (IDIOPATHIC THROMBOCYTOPENIA) (H): ICD-10-CM

## 2021-02-18 DIAGNOSIS — D69.3 IDIOPATHIC THROMBOCYTOPENIC PURPURA (H): ICD-10-CM

## 2021-02-18 DIAGNOSIS — Z30.013 ENCOUNTER FOR INITIAL PRESCRIPTION OF INJECTABLE CONTRACEPTIVE: Primary | ICD-10-CM

## 2021-02-18 LAB
BASOPHILS # BLD AUTO: 0.1 THOU/UL (ref 0–0.2)
BASOPHILS NFR BLD AUTO: 2 % (ref 0–2)
EOSINOPHIL # BLD AUTO: 0.3 THOU/UL (ref 0–0.4)
EOSINOPHIL NFR BLD AUTO: 4 % (ref 0–6)
ERYTHROCYTE [DISTWIDTH] IN BLOOD BY AUTOMATED COUNT: 12.8 % (ref 11–14.5)
HCG UR QL: NEGATIVE
HCT VFR BLD AUTO: 41.5 % (ref 35–47)
HGB BLD-MCNC: 14.9 G/DL (ref 12–16)
IMM GRANULOCYTES # BLD: 0.1 THOU/UL
IMM GRANULOCYTES NFR BLD: 1 %
LYMPHOCYTES # BLD AUTO: 1.9 THOU/UL (ref 0.8–4.4)
LYMPHOCYTES NFR BLD AUTO: 27 % (ref 20–40)
MCH RBC QN AUTO: 33.3 PG (ref 27–34)
MCHC RBC AUTO-ENTMCNC: 35.9 G/DL (ref 32–36)
MCV RBC AUTO: 93 FL (ref 80–100)
MONOCYTES # BLD AUTO: 0.6 THOU/UL (ref 0–0.9)
MONOCYTES NFR BLD AUTO: 8 % (ref 2–10)
NEUTROPHILS # BLD AUTO: 4.1 THOU/UL (ref 2–7.7)
NEUTROPHILS NFR BLD AUTO: 59 % (ref 50–70)
PLATELET # BLD AUTO: 5 THOU/UL (ref 140–440)
PMV BLD AUTO: ABNORMAL FL
RBC # BLD AUTO: 4.48 MILL/UL (ref 3.8–5.4)
WBC: 7 THOU/UL (ref 4–11)

## 2021-02-18 PROCEDURE — 96372 THER/PROPH/DIAG INJ SC/IM: CPT | Performed by: STUDENT IN AN ORGANIZED HEALTH CARE EDUCATION/TRAINING PROGRAM

## 2021-02-18 PROCEDURE — 81025 URINE PREGNANCY TEST: CPT | Performed by: STUDENT IN AN ORGANIZED HEALTH CARE EDUCATION/TRAINING PROGRAM

## 2021-02-18 PROCEDURE — 99214 OFFICE O/P EST MOD 30 MIN: CPT | Mod: GC | Performed by: STUDENT IN AN ORGANIZED HEALTH CARE EDUCATION/TRAINING PROGRAM

## 2021-02-18 RX ORDER — MEDROXYPROGESTERONE ACETATE 150 MG/ML
150 INJECTION, SUSPENSION INTRAMUSCULAR
Status: ACTIVE | OUTPATIENT
Start: 2021-02-18 | End: 2022-02-13

## 2021-02-18 RX ADMIN — MEDROXYPROGESTERONE ACETATE 150 MG: 150 INJECTION, SUSPENSION INTRAMUSCULAR at 11:55

## 2021-02-18 ASSESSMENT — MIFFLIN-ST. JEOR: SCORE: 1458.38

## 2021-02-18 ASSESSMENT — PATIENT HEALTH QUESTIONNAIRE - PHQ9: SUM OF ALL RESPONSES TO PHQ QUESTIONS 1-9: 3

## 2021-02-18 NOTE — NURSING NOTE
Due to patient being non-English speaking/uses sign language, an  was used for this visit. Only for face-to-face interpretation by an external agency, date and length of interpretation can be found on the scanned worksheet.     name: Bing  Agency: [employer]  Language: Japanese   Telephone number:   Type of interpretation: ipad

## 2021-02-18 NOTE — PATIENT INSTRUCTIONS
Patient Education     Birth Control: IUD (Intrauterine Device)    The IUD (intrauterine device) is small, flexible, and T-shaped. A trained healthcare provider places it in the uterus. The IUD is one of the most effective birth control methods. It's also reversible. This means it can be removed at any time by a trained healthcare provider. New IUDs are safe and don't have the risks of older types of IUDs.   Pregnancy rates  Talk to your healthcare provider about the effectiveness of this birth control method.  Types of IUDs  IUD insertion is done in the healthcare provider s office. Two types of IUDs are available:    The copper IUD releases a small amount of copper into the uterus. The copper makes it harder for sperm to reach the egg. The device works for at least 10 years.    The progestin IUD releases a hormone called progestin. It causes changes in the uterus to help prevent pregnancy. The device works for 3 to 5 years, depending on which device is chosen. It may be recommended for women who have anemia or heavy and painful periods.  IUDs have thin strings that hang from the opening of the uterus into the vagina. This lets you check that the IUD stays in place.   Things to know about IUDs    IUDs can be used by women who have never been pregnant or by women with a history of sexually transmitted infections (STIs) or tubal pregnancy.    It won't move from the uterus to any other part of the body.    There is a slight risk of the device coming out of the vagina (expulsion).    It may not work in women who have an abnormally shaped uterus.    A copper IUD may cause heavier periods and cramping.    Progestin IUD may cause light periods or no periods at all (irregular bleeding or spotting is possible and normal during first 3 to 6 months).    If you get a sexually transmitted infection with an IUD in place, symptoms may be more severe.    What to report to your healthcare provider  Be sure your healthcare provider  knows if you have:    A sexually transmitted infection (STI) or possible STI    Liver problems    Blood clots (for progestin IUD only)    Breast cancer or a history of breast cancer (progestin IUD only)  Paulie last reviewed this educational content on 5/1/2020 2000-2020 [employer], "[employer]. [address] [place] [id number]. All rights reserved. This information is not intended as a substitute for professional medical care. Always follow your healthcare professional's instructions.

## 2021-02-18 NOTE — NURSING NOTE
Clinic Administered Medication Documentation      Depo Provera Documentation    URINE HCG: negative    Depo-Provera Standing Order inclusion/exclusion criteria reviewed.   Patient meets: inclusion criteria     BP: 134/93  LAST PAP/EXAM: No results found for: PAP    Prior to injection, verified patient identity using patient's name and date of birth. Medication was administered. Please see MAR and medication order for additional information.     Was entire vial of medication used? Yes  Vial/Syringe: Single dose vial  Expiration Date:  8/30/22    Patient instructed to stay in clinic after the injection but patient declined.  NEXT INJECTION DUE: 5/6/21 - 5/20/21      Name of provider who requested the medication administration:Dr. Prajapati  Name of provider on site (faculty or community preceptor) at the time of performing the medication administration: Dr. Bradley    Date of next administration: 5/6/21 to 5/20/21  Date of next office visit with provider to renew medication plan (must be seen annually): 2/18/21

## 2021-02-18 NOTE — PROGRESS NOTES
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  Patient reports going to infusion center later today for 3rd of 4th treatment  By review of records, being treated with rituxan due to ITP relapse  Has responded to rituxan in the past  Platelets 13 one week ago  Has noticed easy bruising as well as bleeding from small scratches    LMP: Last month 20-25th, unsure  Irregular periods  One time period was very heavy when platelets were low, before 19mo son was born  Previous periods in the past 6 months were not heavy  Has used Depo in the past for contraception which decreased menstrual bleeding  By review of records, Depo last administered 7/2019  Was feeling hungry and gained weight with Depo so hadn't plan to continue but desires now to continue due to ITP relapse and low platelets  In general has a big appetite, have to watch what I eat  Previous pregnancies: 19mo (Down syndrome), 13yo  No plan for more pregnancies  Other forms of contraception in the past: None  Had wanted sterilization but on hold due to low platelets     was required for this visit.    Patient Active Problem List   Diagnosis     GBS (Guillain [place] syndrome) (H)     Bell's palsy     Anisocoria     Encounter for screening for cervical cancer      Chronic ITP (idiopathic thrombocytopenia) (H)     Encounter for initial prescription of injectable contraceptive     Elevated blood pressure reading without diagnosis of hypertension       Current Outpatient Medications   Medication  "Sig Dispense Refill     acetaminophen (TYLENOL) 500 MG tablet Take 2 tablets (1,000 mg) by mouth 3 times daily 90 tablet 3     acetaminophen (TYLENOL) 325 MG tablet Take 325-650 mg by mouth every 6 hours as needed for mild pain       docusate sodium (COLACE) 250 MG capsule Take 250 mg by mouth daily       Prenatal Vit-Fe Fumarate-FA (PRENATAL MULTIVITAMIN  PLUS IRON) 27-1 MG TABS Take by mouth daily            Allergies   Allergen Reactions     Influenza Vaccines Other (See Comments)     Guillain [place] Syndrome following fall 2016 administration     Blood-Group Specific Substance Other (See Comments)     Warm autoantibodies present.RBC for transfusion may be delayed up to 24 hrs. Draw 3 lavender and 1 red tube for Type and Screen requests.            Review of Systems:   Complete 6-point ROS negative except as per HPI           Physical Exam:     Vitals:    02/18/21 1106   BP: (!) 134/93   BP Location: Right arm   Patient Position: Sitting   Cuff Size: Adult Regular   Pulse: 100   Resp: 20   Temp: 98.4  F (36.9  C)   TempSrc: Oral   SpO2: 96%   Weight: 88.8 kg (195 lb 12.8 oz)   Height: 1.49 m (4' 10.66\")     Body mass index is 40.01 kg/m .    BP Readings from Last 6 Encounters:   02/18/21 (!) 134/93   03/23/20 137/82   12/30/19 132/80   12/05/19 126/85   09/26/19 121/82   07/09/19 133/83     GENERAL: healthy, alert and no distress  NECK: no tenderness, no adenopathy, no asymmetry, no masses, no stiffness; thyroid- normal to palpation  RESP: lungs clear to auscultation - no rales, no rhonchi, no wheezes  CV: regular rates and rhythm, normal S1 S2, no S3 or S4 and no murmur, no click or rub  ABDOMEN: obese, soft, no tenderness  SKIN: large ecchymosis ~6-7cm in diameter right forearm, smaller ecchymoses scattered left upper arm, left flank, below left mandible      Results for orders placed or performed in visit on 02/18/21 (from the past 24 hour(s))   HCG Qualitative Urine (UPT)  ([facility])   Result Value Ref Range    " HCG Qual Urine Negative Negative       Assessment and Plan     Patricia was seen today for contraception and medication reconciliation.    Diagnoses and all orders for this visit:    Encounter for initial prescription of injectable contraceptive  Restart Depo.  Pregnancy test negative.  Does not desire future pregnancy.  Patient's platelets are severely low due to ITP relapse, and goals of Depo for this patient are for contraception and to decrease risk of heavy menstrual bleeding.  Patient would like to plan for sterilization when platelets have improved.  Also introduced patient to Mirena IUD as a potential option in the future that would also likely decreased menstrual bleeding and could be considered when platelets have improved.  -     HCG Qualitative Urine (UPT)  ([facility])  -     medroxyPROGESTERone (DEPO-PROVERA) injection 150 mg    Chronic ITP (idiopathic thrombocytopenia) (H)  Thrombocytopenia (H)  Follows with Hematology, records reviewed.  Severe thrombocytopenia, undergoing treatment.  Scattered bruising on exam.    Elevated blood pressure reading without diagnosis of hypertension    Schedule BP follow-up.    Options for treatment and follow-up care were reviewed with the patient and/or guardian. Patricia Govea and/or guardian engaged in the decision making process and verbalized understanding of the options discussed and agreed with the final plan.  Precepted with Dr. Mady Prajapati MD  [facility] Medicine Resident  Pager [phone]

## 2021-02-18 NOTE — PROGRESS NOTES
Preceptor Attestation:   Patient seen, evaluated and discussed with the resident. I have verified the content of the note, which accurately reflects my assessment of the patient and the plan of care.  Supervising Physician:Mady Bradley MD  [facility]

## 2021-02-22 ENCOUNTER — AMBULATORY - HEALTHEAST (OUTPATIENT)
Dept: ONCOLOGY | Facility: HOSPITAL | Age: 41
End: 2021-02-22

## 2021-02-22 ENCOUNTER — APPOINTMENT (OUTPATIENT)
Dept: INTERPRETER SERVICES | Facility: CLINIC | Age: 41
End: 2021-02-22
Payer: COMMERCIAL

## 2021-02-23 ENCOUNTER — COMMUNICATION - HEALTHEAST (OUTPATIENT)
Dept: ONCOLOGY | Facility: HOSPITAL | Age: 41
End: 2021-02-23

## 2021-02-24 ENCOUNTER — AMBULATORY - HEALTHEAST (OUTPATIENT)
Dept: ONCOLOGY | Facility: HOSPITAL | Age: 41
End: 2021-02-24

## 2021-02-24 ENCOUNTER — INFUSION - HEALTHEAST (OUTPATIENT)
Dept: INFUSION THERAPY | Facility: HOSPITAL | Age: 41
End: 2021-02-24

## 2021-02-24 ENCOUNTER — AMBULATORY - HEALTHEAST (OUTPATIENT)
Dept: INFUSION THERAPY | Facility: HOSPITAL | Age: 41
End: 2021-02-24

## 2021-02-24 DIAGNOSIS — Z86.2 HISTORY OF IMMUNE THROMBOCYTOPENIA: ICD-10-CM

## 2021-02-24 DIAGNOSIS — D69.3 IDIOPATHIC THROMBOCYTOPENIC PURPURA (H): ICD-10-CM

## 2021-02-24 DIAGNOSIS — R29.898 PROXIMAL LEG WEAKNESS: ICD-10-CM

## 2021-02-24 LAB
BASOPHILS # BLD AUTO: 0.1 THOU/UL (ref 0–0.2)
BASOPHILS NFR BLD AUTO: 1 % (ref 0–2)
EOSINOPHIL # BLD AUTO: 0.2 THOU/UL (ref 0–0.4)
EOSINOPHIL NFR BLD AUTO: 3 % (ref 0–6)
ERYTHROCYTE [DISTWIDTH] IN BLOOD BY AUTOMATED COUNT: 12.7 % (ref 11–14.5)
HCT VFR BLD AUTO: 41.3 % (ref 35–47)
HGB BLD-MCNC: 14.6 G/DL (ref 12–16)
IMM GRANULOCYTES # BLD: 0.1 THOU/UL
IMM GRANULOCYTES NFR BLD: 1 %
LYMPHOCYTES # BLD AUTO: 2.2 THOU/UL (ref 0.8–4.4)
LYMPHOCYTES NFR BLD AUTO: 30 % (ref 20–40)
MCH RBC QN AUTO: 32.4 PG (ref 27–34)
MCHC RBC AUTO-ENTMCNC: 35.4 G/DL (ref 32–36)
MCV RBC AUTO: 92 FL (ref 80–100)
MONOCYTES # BLD AUTO: 0.5 THOU/UL (ref 0–0.9)
MONOCYTES NFR BLD AUTO: 7 % (ref 2–10)
NEUTROPHILS # BLD AUTO: 4.4 THOU/UL (ref 2–7.7)
NEUTROPHILS NFR BLD AUTO: 59 % (ref 50–70)
PLATELET # BLD AUTO: 7 THOU/UL (ref 140–440)
PMV BLD AUTO: ABNORMAL FL
RBC # BLD AUTO: 4.5 MILL/UL (ref 3.8–5.4)
WBC: 7.5 THOU/UL (ref 4–11)

## 2021-02-25 ENCOUNTER — AMBULATORY - HEALTHEAST (OUTPATIENT)
Dept: ONCOLOGY | Facility: HOSPITAL | Age: 41
End: 2021-02-25

## 2021-03-05 ENCOUNTER — OFFICE VISIT - HEALTHEAST (OUTPATIENT)
Dept: ONCOLOGY | Facility: HOSPITAL | Age: 41
End: 2021-03-05

## 2021-03-05 ENCOUNTER — AMBULATORY - HEALTHEAST (OUTPATIENT)
Dept: INFUSION THERAPY | Facility: HOSPITAL | Age: 41
End: 2021-03-05

## 2021-03-05 DIAGNOSIS — Z86.2 HISTORY OF IMMUNE THROMBOCYTOPENIA: ICD-10-CM

## 2021-03-05 DIAGNOSIS — D69.3 IDIOPATHIC THROMBOCYTOPENIC PURPURA (H): ICD-10-CM

## 2021-03-05 LAB
ALBUMIN SERPL-MCNC: 4.1 G/DL (ref 3.5–5)
ALP SERPL-CCNC: 106 U/L (ref 45–120)
ALT SERPL W P-5'-P-CCNC: 19 U/L (ref 0–45)
ANION GAP SERPL CALCULATED.3IONS-SCNC: 7 MMOL/L (ref 5–18)
AST SERPL W P-5'-P-CCNC: 14 U/L (ref 0–40)
BASOPHILS # BLD AUTO: 0.1 THOU/UL (ref 0–0.2)
BASOPHILS NFR BLD AUTO: 1 % (ref 0–2)
BILIRUB SERPL-MCNC: 0.5 MG/DL (ref 0–1)
BUN SERPL-MCNC: 11 MG/DL (ref 8–22)
CALCIUM SERPL-MCNC: 8.9 MG/DL (ref 8.5–10.5)
CHLORIDE BLD-SCNC: 104 MMOL/L (ref 98–107)
CO2 SERPL-SCNC: 25 MMOL/L (ref 22–31)
CREAT SERPL-MCNC: 0.7 MG/DL (ref 0.6–1.1)
EOSINOPHIL # BLD AUTO: 0.3 THOU/UL (ref 0–0.4)
EOSINOPHIL NFR BLD AUTO: 4 % (ref 0–6)
ERYTHROCYTE [DISTWIDTH] IN BLOOD BY AUTOMATED COUNT: 13 % (ref 11–14.5)
GFR SERPL CREATININE-BSD FRML MDRD: >60 ML/MIN/1.73M2
GLUCOSE BLD-MCNC: 118 MG/DL (ref 70–125)
HCT VFR BLD AUTO: 42.1 % (ref 35–47)
HGB BLD-MCNC: 15.3 G/DL (ref 12–16)
IMM GRANULOCYTES # BLD: 0.2 THOU/UL
IMM GRANULOCYTES NFR BLD: 2 %
LYMPHOCYTES # BLD AUTO: 2.3 THOU/UL (ref 0.8–4.4)
LYMPHOCYTES NFR BLD AUTO: 29 % (ref 20–40)
MCH RBC QN AUTO: 33.2 PG (ref 27–34)
MCHC RBC AUTO-ENTMCNC: 36.3 G/DL (ref 32–36)
MCV RBC AUTO: 91 FL (ref 80–100)
MONOCYTES # BLD AUTO: 0.8 THOU/UL (ref 0–0.9)
MONOCYTES NFR BLD AUTO: 10 % (ref 2–10)
NEUTROPHILS # BLD AUTO: 4.4 THOU/UL (ref 2–7.7)
NEUTROPHILS NFR BLD AUTO: 55 % (ref 50–70)
PLATELET # BLD AUTO: 2 THOU/UL (ref 140–440)
PMV BLD AUTO: ABNORMAL FL
POTASSIUM BLD-SCNC: 3.8 MMOL/L (ref 3.5–5)
PROT SERPL-MCNC: 8 G/DL (ref 6–8)
RBC # BLD AUTO: 4.61 MILL/UL (ref 3.8–5.4)
SODIUM SERPL-SCNC: 136 MMOL/L (ref 136–145)
WBC: 8 THOU/UL (ref 4–11)

## 2021-03-09 ENCOUNTER — AMBULATORY - HEALTHEAST (OUTPATIENT)
Dept: INFUSION THERAPY | Facility: HOSPITAL | Age: 41
End: 2021-03-09

## 2021-03-09 ENCOUNTER — AMBULATORY - HEALTHEAST (OUTPATIENT)
Dept: ONCOLOGY | Facility: HOSPITAL | Age: 41
End: 2021-03-09

## 2021-03-09 DIAGNOSIS — Z86.2 HISTORY OF IMMUNE THROMBOCYTOPENIA: ICD-10-CM

## 2021-03-09 LAB
BASOPHILS # BLD AUTO: 0.1 THOU/UL (ref 0–0.2)
BASOPHILS NFR BLD AUTO: 1 % (ref 0–2)
EOSINOPHIL # BLD AUTO: 0.3 THOU/UL (ref 0–0.4)
EOSINOPHIL NFR BLD AUTO: 4 % (ref 0–6)
ERYTHROCYTE [DISTWIDTH] IN BLOOD BY AUTOMATED COUNT: 13.1 % (ref 11–14.5)
HCT VFR BLD AUTO: 40.9 % (ref 35–47)
HGB BLD-MCNC: 14.4 G/DL (ref 12–16)
IMM GRANULOCYTES # BLD: 0.1 THOU/UL
IMM GRANULOCYTES NFR BLD: 1 %
LYMPHOCYTES # BLD AUTO: 1.7 THOU/UL (ref 0.8–4.4)
LYMPHOCYTES NFR BLD AUTO: 22 % (ref 20–40)
MCH RBC QN AUTO: 32.4 PG (ref 27–34)
MCHC RBC AUTO-ENTMCNC: 35.2 G/DL (ref 32–36)
MCV RBC AUTO: 92 FL (ref 80–100)
MONOCYTES # BLD AUTO: 0.8 THOU/UL (ref 0–0.9)
MONOCYTES NFR BLD AUTO: 10 % (ref 2–10)
NEUTROPHILS # BLD AUTO: 4.8 THOU/UL (ref 2–7.7)
NEUTROPHILS NFR BLD AUTO: 62 % (ref 50–70)
PLATELET # BLD AUTO: 3 THOU/UL (ref 140–440)
PMV BLD AUTO: ABNORMAL FL
RBC # BLD AUTO: 4.45 MILL/UL (ref 3.8–5.4)
WBC: 7.7 THOU/UL (ref 4–11)

## 2021-03-16 ENCOUNTER — OFFICE VISIT - HEALTHEAST (OUTPATIENT)
Dept: ONCOLOGY | Facility: HOSPITAL | Age: 41
End: 2021-03-16

## 2021-03-16 ENCOUNTER — COMMUNICATION - HEALTHEAST (OUTPATIENT)
Dept: ONCOLOGY | Facility: HOSPITAL | Age: 41
End: 2021-03-16

## 2021-03-16 ENCOUNTER — AMBULATORY - HEALTHEAST (OUTPATIENT)
Dept: INFUSION THERAPY | Facility: HOSPITAL | Age: 41
End: 2021-03-16

## 2021-03-16 DIAGNOSIS — D69.3 IDIOPATHIC THROMBOCYTOPENIC PURPURA (H): ICD-10-CM

## 2021-03-16 LAB
BASOPHILS # BLD AUTO: 0.1 THOU/UL (ref 0–0.2)
BASOPHILS # BLD AUTO: 0.1 THOU/UL (ref 0–0.2)
BASOPHILS NFR BLD AUTO: 1 % (ref 0–2)
BASOPHILS NFR BLD AUTO: 2 % (ref 0–2)
EOSINOPHIL # BLD AUTO: 0.4 THOU/UL (ref 0–0.4)
EOSINOPHIL # BLD AUTO: 0.4 THOU/UL (ref 0–0.4)
EOSINOPHIL NFR BLD AUTO: 5 % (ref 0–6)
EOSINOPHIL NFR BLD AUTO: 6 % (ref 0–6)
ERYTHROCYTE [DISTWIDTH] IN BLOOD BY AUTOMATED COUNT: 13.2 % (ref 11–14.5)
ERYTHROCYTE [DISTWIDTH] IN BLOOD BY AUTOMATED COUNT: 13.2 % (ref 11–14.5)
HCT VFR BLD AUTO: 41.3 % (ref 35–47)
HCT VFR BLD AUTO: 42.1 % (ref 35–47)
HGB BLD-MCNC: 14.7 G/DL (ref 12–16)
HGB BLD-MCNC: 14.9 G/DL (ref 12–16)
IMM GRANULOCYTES # BLD: 0.1 THOU/UL
IMM GRANULOCYTES # BLD: 0.1 THOU/UL
IMM GRANULOCYTES NFR BLD: 1 %
IMM GRANULOCYTES NFR BLD: 1 %
LYMPHOCYTES # BLD AUTO: 2.1 THOU/UL (ref 0.8–4.4)
LYMPHOCYTES # BLD AUTO: 2.2 THOU/UL (ref 0.8–4.4)
LYMPHOCYTES NFR BLD AUTO: 30 % (ref 20–40)
LYMPHOCYTES NFR BLD AUTO: 31 % (ref 20–40)
MCH RBC QN AUTO: 32.3 PG (ref 27–34)
MCH RBC QN AUTO: 32.4 PG (ref 27–34)
MCHC RBC AUTO-ENTMCNC: 35.4 G/DL (ref 32–36)
MCHC RBC AUTO-ENTMCNC: 35.6 G/DL (ref 32–36)
MCV RBC AUTO: 91 FL (ref 80–100)
MCV RBC AUTO: 92 FL (ref 80–100)
MONOCYTES # BLD AUTO: 0.6 THOU/UL (ref 0–0.9)
MONOCYTES # BLD AUTO: 0.6 THOU/UL (ref 0–0.9)
MONOCYTES NFR BLD AUTO: 8 % (ref 2–10)
MONOCYTES NFR BLD AUTO: 8 % (ref 2–10)
NEUTROPHILS # BLD AUTO: 3.8 THOU/UL (ref 2–7.7)
NEUTROPHILS # BLD AUTO: 3.9 THOU/UL (ref 2–7.7)
NEUTROPHILS NFR BLD AUTO: 53 % (ref 50–70)
NEUTROPHILS NFR BLD AUTO: 54 % (ref 50–70)
PATH REPORT.MICROSCOPIC SPEC OTHER STN: ABNORMAL
PLATELET # BLD AUTO: 5 THOU/UL (ref 140–440)
PLATELET # BLD AUTO: 5 THOU/UL (ref 140–440)
PMV BLD AUTO: ABNORMAL FL
PMV BLD AUTO: ABNORMAL FL
RBC # BLD AUTO: 4.55 MILL/UL (ref 3.8–5.4)
RBC # BLD AUTO: 4.6 MILL/UL (ref 3.8–5.4)
WBC: 7 THOU/UL (ref 4–11)
WBC: 7.3 THOU/UL (ref 4–11)

## 2021-03-16 ASSESSMENT — MIFFLIN-ST. JEOR: SCORE: 1493.71

## 2021-03-17 LAB
LAB AP CHARGES (HE HISTORICAL CONVERSION): NORMAL
PATH REPORT.COMMENTS IMP SPEC: NORMAL
PATH REPORT.COMMENTS IMP SPEC: NORMAL
PATH REPORT.FINAL DX SPEC: NORMAL
PATH REPORT.MICROSCOPIC SPEC OTHER STN: NORMAL
PATH REPORT.RELEVANT HX SPEC: NORMAL

## 2021-03-23 ENCOUNTER — AMBULATORY - HEALTHEAST (OUTPATIENT)
Dept: ONCOLOGY | Facility: HOSPITAL | Age: 41
End: 2021-03-23

## 2021-03-23 ENCOUNTER — AMBULATORY - HEALTHEAST (OUTPATIENT)
Dept: INFUSION THERAPY | Facility: HOSPITAL | Age: 41
End: 2021-03-23

## 2021-03-23 ENCOUNTER — COMMUNICATION - HEALTHEAST (OUTPATIENT)
Dept: ONCOLOGY | Facility: HOSPITAL | Age: 41
End: 2021-03-23

## 2021-03-23 DIAGNOSIS — D69.3 IDIOPATHIC THROMBOCYTOPENIC PURPURA (H): ICD-10-CM

## 2021-03-23 LAB
BASOPHILS # BLD AUTO: 0.1 THOU/UL (ref 0–0.2)
BASOPHILS NFR BLD AUTO: 2 % (ref 0–2)
EOSINOPHIL # BLD AUTO: 0.3 THOU/UL (ref 0–0.4)
EOSINOPHIL NFR BLD AUTO: 5 % (ref 0–6)
ERYTHROCYTE [DISTWIDTH] IN BLOOD BY AUTOMATED COUNT: 13.3 % (ref 11–14.5)
HCT VFR BLD AUTO: 39.6 % (ref 35–47)
HGB BLD-MCNC: 14 G/DL (ref 12–16)
IMM GRANULOCYTES # BLD: 0.1 THOU/UL
IMM GRANULOCYTES NFR BLD: 2 %
LYMPHOCYTES # BLD AUTO: 2.5 THOU/UL (ref 0.8–4.4)
LYMPHOCYTES NFR BLD AUTO: 39 % (ref 20–40)
MCH RBC QN AUTO: 32.6 PG (ref 27–34)
MCHC RBC AUTO-ENTMCNC: 35.4 G/DL (ref 32–36)
MCV RBC AUTO: 92 FL (ref 80–100)
MONOCYTES # BLD AUTO: 0.6 THOU/UL (ref 0–0.9)
MONOCYTES NFR BLD AUTO: 10 % (ref 2–10)
NEUTROPHILS # BLD AUTO: 2.7 THOU/UL (ref 2–7.7)
NEUTROPHILS NFR BLD AUTO: 43 % (ref 50–70)
PLATELET # BLD AUTO: 8 THOU/UL (ref 140–440)
PMV BLD AUTO: ABNORMAL FL
RBC # BLD AUTO: 4.3 MILL/UL (ref 3.8–5.4)
WBC: 6.3 THOU/UL (ref 4–11)

## 2021-03-30 ENCOUNTER — AMBULATORY - HEALTHEAST (OUTPATIENT)
Dept: INFUSION THERAPY | Facility: HOSPITAL | Age: 41
End: 2021-03-30

## 2021-03-30 ENCOUNTER — OFFICE VISIT - HEALTHEAST (OUTPATIENT)
Dept: ONCOLOGY | Facility: HOSPITAL | Age: 41
End: 2021-03-30

## 2021-03-30 DIAGNOSIS — D69.3 IDIOPATHIC THROMBOCYTOPENIC PURPURA (H): ICD-10-CM

## 2021-03-30 LAB
BASOPHILS # BLD AUTO: 0.1 THOU/UL (ref 0–0.2)
BASOPHILS NFR BLD AUTO: 2 % (ref 0–2)
EOSINOPHIL # BLD AUTO: 0.3 THOU/UL (ref 0–0.4)
EOSINOPHIL NFR BLD AUTO: 5 % (ref 0–6)
ERYTHROCYTE [DISTWIDTH] IN BLOOD BY AUTOMATED COUNT: 13.6 % (ref 11–14.5)
HCT VFR BLD AUTO: 39.4 % (ref 35–47)
HGB BLD-MCNC: 13.7 G/DL (ref 12–16)
IMM GRANULOCYTES # BLD: 0.2 THOU/UL
IMM GRANULOCYTES NFR BLD: 2 %
LYMPHOCYTES # BLD AUTO: 3 THOU/UL (ref 0.8–4.4)
LYMPHOCYTES NFR BLD AUTO: 40 % (ref 20–40)
MCH RBC QN AUTO: 32.2 PG (ref 27–34)
MCHC RBC AUTO-ENTMCNC: 34.8 G/DL (ref 32–36)
MCV RBC AUTO: 93 FL (ref 80–100)
MONOCYTES # BLD AUTO: 0.8 THOU/UL (ref 0–0.9)
MONOCYTES NFR BLD AUTO: 10 % (ref 2–10)
NEUTROPHILS # BLD AUTO: 3.1 THOU/UL (ref 2–7.7)
NEUTROPHILS NFR BLD AUTO: 42 % (ref 50–70)
PLATELET # BLD AUTO: 49 THOU/UL (ref 140–440)
PMV BLD AUTO: 9.8 FL (ref 8.5–12.5)
RBC # BLD AUTO: 4.25 MILL/UL (ref 3.8–5.4)
WBC: 7.5 THOU/UL (ref 4–11)

## 2021-04-05 ENCOUNTER — AMBULATORY - HEALTHEAST (OUTPATIENT)
Dept: ONCOLOGY | Facility: HOSPITAL | Age: 41
End: 2021-04-05

## 2021-04-06 ENCOUNTER — COMMUNICATION - HEALTHEAST (OUTPATIENT)
Dept: ONCOLOGY | Facility: HOSPITAL | Age: 41
End: 2021-04-06

## 2021-04-06 ENCOUNTER — AMBULATORY - HEALTHEAST (OUTPATIENT)
Dept: INFUSION THERAPY | Facility: HOSPITAL | Age: 41
End: 2021-04-06

## 2021-04-06 DIAGNOSIS — D69.3 IDIOPATHIC THROMBOCYTOPENIC PURPURA (H): ICD-10-CM

## 2021-04-06 LAB
BASOPHILS # BLD AUTO: 0.1 THOU/UL (ref 0–0.2)
BASOPHILS NFR BLD AUTO: 2 % (ref 0–2)
EOSINOPHIL # BLD AUTO: 0.4 THOU/UL (ref 0–0.4)
EOSINOPHIL NFR BLD AUTO: 4 % (ref 0–6)
ERYTHROCYTE [DISTWIDTH] IN BLOOD BY AUTOMATED COUNT: 13.2 % (ref 11–14.5)
HCT VFR BLD AUTO: 40.1 % (ref 35–47)
HGB BLD-MCNC: 14.1 G/DL (ref 12–16)
IMM GRANULOCYTES # BLD: 0.1 THOU/UL
IMM GRANULOCYTES NFR BLD: 1 %
LYMPHOCYTES # BLD AUTO: 3.7 THOU/UL (ref 0.8–4.4)
LYMPHOCYTES NFR BLD AUTO: 44 % (ref 20–40)
MCH RBC QN AUTO: 32.3 PG (ref 27–34)
MCHC RBC AUTO-ENTMCNC: 35.2 G/DL (ref 32–36)
MCV RBC AUTO: 92 FL (ref 80–100)
MONOCYTES # BLD AUTO: 0.6 THOU/UL (ref 0–0.9)
MONOCYTES NFR BLD AUTO: 7 % (ref 2–10)
NEUTROPHILS # BLD AUTO: 3.5 THOU/UL (ref 2–7.7)
NEUTROPHILS NFR BLD AUTO: 42 % (ref 50–70)
PLATELET # BLD AUTO: 170 THOU/UL (ref 140–440)
PMV BLD AUTO: 10.7 FL (ref 8.5–12.5)
RBC # BLD AUTO: 4.37 MILL/UL (ref 3.8–5.4)
WBC: 8.4 THOU/UL (ref 4–11)

## 2021-04-13 ENCOUNTER — AMBULATORY - HEALTHEAST (OUTPATIENT)
Dept: ONCOLOGY | Facility: HOSPITAL | Age: 41
End: 2021-04-13

## 2021-04-13 ENCOUNTER — AMBULATORY - HEALTHEAST (OUTPATIENT)
Dept: INFUSION THERAPY | Facility: HOSPITAL | Age: 41
End: 2021-04-13

## 2021-04-13 DIAGNOSIS — D69.3 IDIOPATHIC THROMBOCYTOPENIC PURPURA (H): ICD-10-CM

## 2021-04-13 LAB
BASOPHILS # BLD AUTO: 0.1 THOU/UL (ref 0–0.2)
BASOPHILS NFR BLD AUTO: 1 % (ref 0–2)
EOSINOPHIL # BLD AUTO: 0.5 THOU/UL (ref 0–0.4)
EOSINOPHIL NFR BLD AUTO: 6 % (ref 0–6)
ERYTHROCYTE [DISTWIDTH] IN BLOOD BY AUTOMATED COUNT: 13.2 % (ref 11–14.5)
HCT VFR BLD AUTO: 40.9 % (ref 35–47)
HGB BLD-MCNC: 14.2 G/DL (ref 12–16)
IMM GRANULOCYTES # BLD: 0.1 THOU/UL
IMM GRANULOCYTES NFR BLD: 1 %
LYMPHOCYTES # BLD AUTO: 3.4 THOU/UL (ref 0.8–4.4)
LYMPHOCYTES NFR BLD AUTO: 44 % (ref 20–40)
MCH RBC QN AUTO: 32.1 PG (ref 27–34)
MCHC RBC AUTO-ENTMCNC: 34.7 G/DL (ref 32–36)
MCV RBC AUTO: 92 FL (ref 80–100)
MONOCYTES # BLD AUTO: 0.5 THOU/UL (ref 0–0.9)
MONOCYTES NFR BLD AUTO: 7 % (ref 2–10)
NEUTROPHILS # BLD AUTO: 3.2 THOU/UL (ref 2–7.7)
NEUTROPHILS NFR BLD AUTO: 41 % (ref 50–70)
PLATELET # BLD AUTO: 450 THOU/UL (ref 140–440)
PMV BLD AUTO: 9.7 FL (ref 8.5–12.5)
RBC # BLD AUTO: 4.43 MILL/UL (ref 3.8–5.4)
WBC: 7.8 THOU/UL (ref 4–11)

## 2021-04-16 ENCOUNTER — AMBULATORY - HEALTHEAST (OUTPATIENT)
Dept: ONCOLOGY | Facility: HOSPITAL | Age: 41
End: 2021-04-16

## 2021-04-16 ENCOUNTER — AMBULATORY - HEALTHEAST (OUTPATIENT)
Dept: INFUSION THERAPY | Facility: HOSPITAL | Age: 41
End: 2021-04-16

## 2021-04-16 DIAGNOSIS — D69.3 IDIOPATHIC THROMBOCYTOPENIC PURPURA (H): ICD-10-CM

## 2021-04-16 LAB
BASOPHILS # BLD AUTO: 0.1 THOU/UL (ref 0–0.2)
BASOPHILS NFR BLD AUTO: 2 % (ref 0–2)
EOSINOPHIL # BLD AUTO: 0.5 THOU/UL (ref 0–0.4)
EOSINOPHIL NFR BLD AUTO: 7 % (ref 0–6)
ERYTHROCYTE [DISTWIDTH] IN BLOOD BY AUTOMATED COUNT: 13.2 % (ref 11–14.5)
HCT VFR BLD AUTO: 38.3 % (ref 35–47)
HGB BLD-MCNC: 13.3 G/DL (ref 12–16)
IMM GRANULOCYTES # BLD: 0 THOU/UL
IMM GRANULOCYTES NFR BLD: 1 %
LYMPHOCYTES # BLD AUTO: 3.5 THOU/UL (ref 0.8–4.4)
LYMPHOCYTES NFR BLD AUTO: 47 % (ref 20–40)
MCH RBC QN AUTO: 32.1 PG (ref 27–34)
MCHC RBC AUTO-ENTMCNC: 34.7 G/DL (ref 32–36)
MCV RBC AUTO: 93 FL (ref 80–100)
MONOCYTES # BLD AUTO: 0.5 THOU/UL (ref 0–0.9)
MONOCYTES NFR BLD AUTO: 7 % (ref 2–10)
NEUTROPHILS # BLD AUTO: 2.8 THOU/UL (ref 2–7.7)
NEUTROPHILS NFR BLD AUTO: 38 % (ref 50–70)
PLATELET # BLD AUTO: 494 THOU/UL (ref 140–440)
PMV BLD AUTO: 9.1 FL (ref 8.5–12.5)
RBC # BLD AUTO: 4.14 MILL/UL (ref 3.8–5.4)
WBC: 7.4 THOU/UL (ref 4–11)

## 2021-04-20 ENCOUNTER — AMBULATORY - HEALTHEAST (OUTPATIENT)
Dept: ONCOLOGY | Facility: HOSPITAL | Age: 41
End: 2021-04-20

## 2021-04-20 ENCOUNTER — AMBULATORY - HEALTHEAST (OUTPATIENT)
Dept: INFUSION THERAPY | Facility: HOSPITAL | Age: 41
End: 2021-04-20

## 2021-04-20 DIAGNOSIS — D69.3 IDIOPATHIC THROMBOCYTOPENIC PURPURA (H): ICD-10-CM

## 2021-04-20 LAB
BASOPHILS # BLD AUTO: 0.1 THOU/UL (ref 0–0.2)
BASOPHILS NFR BLD AUTO: 2 % (ref 0–2)
EOSINOPHIL # BLD AUTO: 0.6 THOU/UL (ref 0–0.4)
EOSINOPHIL NFR BLD AUTO: 8 % (ref 0–6)
ERYTHROCYTE [DISTWIDTH] IN BLOOD BY AUTOMATED COUNT: 13.2 % (ref 11–14.5)
HCT VFR BLD AUTO: 39.1 % (ref 35–47)
HGB BLD-MCNC: 13.7 G/DL (ref 12–16)
IMM GRANULOCYTES # BLD: 0 THOU/UL
IMM GRANULOCYTES NFR BLD: 1 %
LYMPHOCYTES # BLD AUTO: 3.7 THOU/UL (ref 0.8–4.4)
LYMPHOCYTES NFR BLD AUTO: 47 % (ref 20–40)
MCH RBC QN AUTO: 31.9 PG (ref 27–34)
MCHC RBC AUTO-ENTMCNC: 35 G/DL (ref 32–36)
MCV RBC AUTO: 91 FL (ref 80–100)
MONOCYTES # BLD AUTO: 0.6 THOU/UL (ref 0–0.9)
MONOCYTES NFR BLD AUTO: 8 % (ref 2–10)
NEUTROPHILS # BLD AUTO: 2.9 THOU/UL (ref 2–7.7)
NEUTROPHILS NFR BLD AUTO: 36 % (ref 50–70)
PLATELET # BLD AUTO: 321 THOU/UL (ref 140–440)
PMV BLD AUTO: 8.3 FL (ref 8.5–12.5)
RBC # BLD AUTO: 4.29 MILL/UL (ref 3.8–5.4)
WBC: 8 THOU/UL (ref 4–11)

## 2021-04-23 ENCOUNTER — AMBULATORY - HEALTHEAST (OUTPATIENT)
Dept: ONCOLOGY | Facility: HOSPITAL | Age: 41
End: 2021-04-23

## 2021-04-23 ENCOUNTER — AMBULATORY - HEALTHEAST (OUTPATIENT)
Dept: INFUSION THERAPY | Facility: HOSPITAL | Age: 41
End: 2021-04-23

## 2021-04-23 DIAGNOSIS — D69.3 IDIOPATHIC THROMBOCYTOPENIC PURPURA (H): ICD-10-CM

## 2021-04-23 LAB
BASOPHILS # BLD AUTO: 0.1 THOU/UL (ref 0–0.2)
BASOPHILS NFR BLD AUTO: 2 % (ref 0–2)
EOSINOPHIL # BLD AUTO: 0.5 THOU/UL (ref 0–0.4)
EOSINOPHIL NFR BLD AUTO: 7 % (ref 0–6)
ERYTHROCYTE [DISTWIDTH] IN BLOOD BY AUTOMATED COUNT: 13.1 % (ref 11–14.5)
HCT VFR BLD AUTO: 40.1 % (ref 35–47)
HGB BLD-MCNC: 14.1 G/DL (ref 12–16)
IMM GRANULOCYTES # BLD: 0 THOU/UL
IMM GRANULOCYTES NFR BLD: 1 %
LYMPHOCYTES # BLD AUTO: 3.8 THOU/UL (ref 0.8–4.4)
LYMPHOCYTES NFR BLD AUTO: 47 % (ref 20–40)
MCH RBC QN AUTO: 32.1 PG (ref 27–34)
MCHC RBC AUTO-ENTMCNC: 35.2 G/DL (ref 32–36)
MCV RBC AUTO: 91 FL (ref 80–100)
MONOCYTES # BLD AUTO: 0.5 THOU/UL (ref 0–0.9)
MONOCYTES NFR BLD AUTO: 7 % (ref 2–10)
NEUTROPHILS # BLD AUTO: 3 THOU/UL (ref 2–7.7)
NEUTROPHILS NFR BLD AUTO: 37 % (ref 50–70)
PLATELET # BLD AUTO: 199 THOU/UL (ref 140–440)
PMV BLD AUTO: 8.8 FL (ref 8.5–12.5)
RBC # BLD AUTO: 4.39 MILL/UL (ref 3.8–5.4)
WBC: 8 THOU/UL (ref 4–11)

## 2021-04-28 ENCOUNTER — AMBULATORY - HEALTHEAST (OUTPATIENT)
Dept: INFUSION THERAPY | Facility: HOSPITAL | Age: 41
End: 2021-04-28

## 2021-04-28 ENCOUNTER — OFFICE VISIT - HEALTHEAST (OUTPATIENT)
Dept: ONCOLOGY | Facility: HOSPITAL | Age: 41
End: 2021-04-28

## 2021-04-28 DIAGNOSIS — D69.3 IDIOPATHIC THROMBOCYTOPENIC PURPURA (H): ICD-10-CM

## 2021-04-28 LAB
BASOPHILS # BLD AUTO: 0.1 THOU/UL (ref 0–0.2)
BASOPHILS NFR BLD AUTO: 1 % (ref 0–2)
EOSINOPHIL # BLD AUTO: 0.3 THOU/UL (ref 0–0.4)
EOSINOPHIL NFR BLD AUTO: 4 % (ref 0–6)
ERYTHROCYTE [DISTWIDTH] IN BLOOD BY AUTOMATED COUNT: 13.3 % (ref 11–14.5)
HCT VFR BLD AUTO: 39.4 % (ref 35–47)
HGB BLD-MCNC: 13.8 G/DL (ref 12–16)
IMM GRANULOCYTES # BLD: 0 THOU/UL
IMM GRANULOCYTES NFR BLD: 0 %
LYMPHOCYTES # BLD AUTO: 3.3 THOU/UL (ref 0.8–4.4)
LYMPHOCYTES NFR BLD AUTO: 39 % (ref 20–40)
MCH RBC QN AUTO: 31.9 PG (ref 27–34)
MCHC RBC AUTO-ENTMCNC: 35 G/DL (ref 32–36)
MCV RBC AUTO: 91 FL (ref 80–100)
MONOCYTES # BLD AUTO: 0.6 THOU/UL (ref 0–0.9)
MONOCYTES NFR BLD AUTO: 8 % (ref 2–10)
NEUTROPHILS # BLD AUTO: 4 THOU/UL (ref 2–7.7)
NEUTROPHILS NFR BLD AUTO: 48 % (ref 50–70)
PLATELET # BLD AUTO: 151 THOU/UL (ref 140–440)
PMV BLD AUTO: 9 FL (ref 8.5–12.5)
RBC # BLD AUTO: 4.33 MILL/UL (ref 3.8–5.4)
WBC: 8.4 THOU/UL (ref 4–11)

## 2021-04-28 ASSESSMENT — MIFFLIN-ST. JEOR: SCORE: 1520.92

## 2021-05-04 ENCOUNTER — AMBULATORY - HEALTHEAST (OUTPATIENT)
Dept: INFUSION THERAPY | Facility: HOSPITAL | Age: 41
End: 2021-05-04

## 2021-05-04 ENCOUNTER — COMMUNICATION - HEALTHEAST (OUTPATIENT)
Dept: ONCOLOGY | Facility: HOSPITAL | Age: 41
End: 2021-05-04

## 2021-05-04 DIAGNOSIS — D69.3 IDIOPATHIC THROMBOCYTOPENIC PURPURA (H): ICD-10-CM

## 2021-05-04 LAB
BASOPHILS # BLD AUTO: 0.1 THOU/UL (ref 0–0.2)
BASOPHILS NFR BLD AUTO: 1 % (ref 0–2)
EOSINOPHIL # BLD AUTO: 0.5 THOU/UL (ref 0–0.4)
EOSINOPHIL NFR BLD AUTO: 7 % (ref 0–6)
ERYTHROCYTE [DISTWIDTH] IN BLOOD BY AUTOMATED COUNT: 13.4 % (ref 11–14.5)
HCT VFR BLD AUTO: 39.1 % (ref 35–47)
HGB BLD-MCNC: 13.5 G/DL (ref 12–16)
IMM GRANULOCYTES # BLD: 0 THOU/UL
IMM GRANULOCYTES NFR BLD: 0 %
LYMPHOCYTES # BLD AUTO: 3.9 THOU/UL (ref 0.8–4.4)
LYMPHOCYTES NFR BLD AUTO: 49 % (ref 20–40)
MCH RBC QN AUTO: 31.7 PG (ref 27–34)
MCHC RBC AUTO-ENTMCNC: 34.5 G/DL (ref 32–36)
MCV RBC AUTO: 92 FL (ref 80–100)
MONOCYTES # BLD AUTO: 0.6 THOU/UL (ref 0–0.9)
MONOCYTES NFR BLD AUTO: 8 % (ref 2–10)
NEUTROPHILS # BLD AUTO: 2.7 THOU/UL (ref 2–7.7)
NEUTROPHILS NFR BLD AUTO: 35 % (ref 50–70)
PLATELET # BLD AUTO: 259 THOU/UL (ref 140–440)
PMV BLD AUTO: 8.7 FL (ref 8.5–12.5)
RBC # BLD AUTO: 4.26 MILL/UL (ref 3.8–5.4)
WBC: 7.8 THOU/UL (ref 4–11)

## 2021-05-11 ENCOUNTER — COMMUNICATION - HEALTHEAST (OUTPATIENT)
Dept: ONCOLOGY | Facility: HOSPITAL | Age: 41
End: 2021-05-11

## 2021-05-11 ENCOUNTER — AMBULATORY - HEALTHEAST (OUTPATIENT)
Dept: INFUSION THERAPY | Facility: HOSPITAL | Age: 41
End: 2021-05-11

## 2021-05-11 DIAGNOSIS — D69.3 IDIOPATHIC THROMBOCYTOPENIC PURPURA (H): ICD-10-CM

## 2021-05-11 LAB
BASOPHILS # BLD AUTO: 0.1 THOU/UL (ref 0–0.2)
BASOPHILS NFR BLD AUTO: 1 % (ref 0–2)
EOSINOPHIL # BLD AUTO: 0.3 THOU/UL (ref 0–0.4)
EOSINOPHIL NFR BLD AUTO: 4 % (ref 0–6)
ERYTHROCYTE [DISTWIDTH] IN BLOOD BY AUTOMATED COUNT: 13.2 % (ref 11–14.5)
HCT VFR BLD AUTO: 39.7 % (ref 35–47)
HGB BLD-MCNC: 13.7 G/DL (ref 12–16)
IMM GRANULOCYTES # BLD: 0 THOU/UL
IMM GRANULOCYTES NFR BLD: 0 %
LYMPHOCYTES # BLD AUTO: 3.7 THOU/UL (ref 0.8–4.4)
LYMPHOCYTES NFR BLD AUTO: 49 % (ref 20–40)
MCH RBC QN AUTO: 31.6 PG (ref 27–34)
MCHC RBC AUTO-ENTMCNC: 34.5 G/DL (ref 32–36)
MCV RBC AUTO: 92 FL (ref 80–100)
MONOCYTES # BLD AUTO: 0.6 THOU/UL (ref 0–0.9)
MONOCYTES NFR BLD AUTO: 8 % (ref 2–10)
NEUTROPHILS # BLD AUTO: 2.8 THOU/UL (ref 2–7.7)
NEUTROPHILS NFR BLD AUTO: 38 % (ref 50–70)
PLATELET # BLD AUTO: 314 THOU/UL (ref 140–440)
PMV BLD AUTO: 8.6 FL (ref 8.5–12.5)
RBC # BLD AUTO: 4.33 MILL/UL (ref 3.8–5.4)
WBC: 7.5 THOU/UL (ref 4–11)

## 2021-05-12 ENCOUNTER — ALLIED HEALTH/NURSE VISIT (OUTPATIENT)
Dept: FAMILY MEDICINE | Facility: CLINIC | Age: 41
End: 2021-05-12
Payer: COMMERCIAL

## 2021-05-12 VITALS
HEIGHT: 58 IN | OXYGEN SATURATION: 98 % | WEIGHT: 204 LBS | TEMPERATURE: 98.8 F | BODY MASS INDEX: 42.82 KG/M2 | HEART RATE: 80 BPM | RESPIRATION RATE: 16 BRPM | SYSTOLIC BLOOD PRESSURE: 124 MMHG | DIASTOLIC BLOOD PRESSURE: 78 MMHG

## 2021-05-12 DIAGNOSIS — Z30.013 ENCOUNTER FOR INITIAL PRESCRIPTION OF INJECTABLE CONTRACEPTIVE: Primary | ICD-10-CM

## 2021-05-12 PROCEDURE — 96372 THER/PROPH/DIAG INJ SC/IM: CPT

## 2021-05-12 RX ADMIN — MEDROXYPROGESTERONE ACETATE 150 MG: 150 INJECTION, SUSPENSION INTRAMUSCULAR at 15:22

## 2021-05-12 ASSESSMENT — MIFFLIN-ST. JEOR: SCORE: 1492.47

## 2021-05-12 NOTE — NURSING NOTE
Clinic Administered Medication Documentation      Depo Provera Documentation    URINE HCG: not indicated    Depo-Provera Standing Order inclusion/exclusion criteria reviewed.   Patient meets: inclusion criteria     BP: 124/78  LAST PAP/EXAM: No results found for: PAP    Prior to injection, verified patient identity using patient's name and date of birth. Medication was administered. Please see MAR and medication order for additional information.     Was entire vial of medication used? Yes  Vial/Syringe: Single dose vial  Expiration Date:  09/30/22    Patient instructed to remain in clinic for 15 minutes, report any adverse reaction to staff immediately  and stay in clinic after the injection but patient declined.  NEXT INJECTION DUE: 7/28/21 - 8/11/21      Name of provider who requested the medication administration: Dr. Prajapati  Name of provider on site (faculty or community preceptor) at the time of performing the medication administration: Dr. Burnett    Date of next administration: 7/29/21-8/11/21  Date of next office visit with provider to renew medication plan (must be seen annually): 2/18/22

## 2021-05-25 ENCOUNTER — COMMUNICATION - HEALTHEAST (OUTPATIENT)
Dept: ONCOLOGY | Facility: HOSPITAL | Age: 41
End: 2021-05-25

## 2021-05-25 ENCOUNTER — OFFICE VISIT - HEALTHEAST (OUTPATIENT)
Dept: ONCOLOGY | Facility: HOSPITAL | Age: 41
End: 2021-05-25

## 2021-05-25 ENCOUNTER — AMBULATORY - HEALTHEAST (OUTPATIENT)
Dept: INFUSION THERAPY | Facility: HOSPITAL | Age: 41
End: 2021-05-25

## 2021-05-25 DIAGNOSIS — D69.3 IDIOPATHIC THROMBOCYTOPENIC PURPURA (H): ICD-10-CM

## 2021-05-25 LAB
BASOPHILS # BLD AUTO: 0.1 THOU/UL (ref 0–0.2)
BASOPHILS NFR BLD AUTO: 1 % (ref 0–2)
EOSINOPHIL # BLD AUTO: 0.3 THOU/UL (ref 0–0.4)
EOSINOPHIL NFR BLD AUTO: 4 % (ref 0–6)
ERYTHROCYTE [DISTWIDTH] IN BLOOD BY AUTOMATED COUNT: 13.4 % (ref 11–14.5)
HCT VFR BLD AUTO: 38.9 % (ref 35–47)
HGB BLD-MCNC: 13.7 G/DL (ref 12–16)
IMM GRANULOCYTES # BLD: 0 THOU/UL
IMM GRANULOCYTES NFR BLD: 0 %
LYMPHOCYTES # BLD AUTO: 3.2 THOU/UL (ref 0.8–4.4)
LYMPHOCYTES NFR BLD AUTO: 45 % (ref 20–40)
MCH RBC QN AUTO: 31.9 PG (ref 27–34)
MCHC RBC AUTO-ENTMCNC: 35.2 G/DL (ref 32–36)
MCV RBC AUTO: 91 FL (ref 80–100)
MONOCYTES # BLD AUTO: 0.5 THOU/UL (ref 0–0.9)
MONOCYTES NFR BLD AUTO: 7 % (ref 2–10)
NEUTROPHILS # BLD AUTO: 3.1 THOU/UL (ref 2–7.7)
NEUTROPHILS NFR BLD AUTO: 43 % (ref 50–70)
PLATELET # BLD AUTO: 369 THOU/UL (ref 140–440)
PMV BLD AUTO: 8.5 FL (ref 8.5–12.5)
RBC # BLD AUTO: 4.29 MILL/UL (ref 3.8–5.4)
WBC: 7.1 THOU/UL (ref 4–11)

## 2021-05-25 ASSESSMENT — MIFFLIN-ST. JEOR: SCORE: 1530

## 2021-05-26 NOTE — PROGRESS NOTES
Patient in today for labs.  She waited in lobby for results.  I printed out her results and let her know that her platelet count is 210.  She should continue weekly labs and then see us in clinic as previously scheduled on 4/12/19.  She verbalized understanding.    Cecy Crump RN

## 2021-05-27 VITALS
SYSTOLIC BLOOD PRESSURE: 139 MMHG | HEART RATE: 79 BPM | OXYGEN SATURATION: 96 % | DIASTOLIC BLOOD PRESSURE: 82 MMHG | TEMPERATURE: 99.1 F

## 2021-05-27 VITALS — DIASTOLIC BLOOD PRESSURE: 81 MMHG | HEART RATE: 77 BPM | SYSTOLIC BLOOD PRESSURE: 143 MMHG

## 2021-05-27 VITALS
HEART RATE: 90 BPM | SYSTOLIC BLOOD PRESSURE: 138 MMHG | TEMPERATURE: 99 F | OXYGEN SATURATION: 96 % | DIASTOLIC BLOOD PRESSURE: 95 MMHG

## 2021-05-27 VITALS
HEART RATE: 74 BPM | DIASTOLIC BLOOD PRESSURE: 90 MMHG | OXYGEN SATURATION: 98 % | TEMPERATURE: 98.5 F | SYSTOLIC BLOOD PRESSURE: 146 MMHG

## 2021-05-27 NOTE — PROGRESS NOTES
Patricia was in today for labs.  She waited in the lobby for results.  Plt count today is 325.  Dr Haywood said that we can start going to every 2 week lab checks.  She states that she rather have it done next week when she is in the hospital lab getting other things drawn with her OB doctor.  She then has an appt with Dr Haywood on 4/12/19.  After that time, we can go to every 2 weeks if she would like.    Cecy Crump RN

## 2021-05-27 NOTE — PROGRESS NOTES
[facility] Hematology and Oncology Progress Note    Patient: Patricia Govea  MRN: [id number]  Date of Service: 04/12/2019        Reason for Visit    Chief Complaint   Patient presents with     Benign Hematology     Thrombocytopenia during pregnancy        Assessment and Plan    1. ITP: relapsed. Pt is 27 weeks pregnant.  Pt has completed 4 weekly doses of Rituxan on 3/8/19.  Off prednisone.  Her platelets started improving on the fourth week of her Rituxan.  Ever since then they have continued to improve.  She is feeling good.  No bleeding or bruising.  She will call us if they occur.  We will do her labs every 2-3 weeks and she will see Dr. Haywood in about 6 weeks.    2.  Some swelling in her hands with some numbness: This would not be a typical side effect of Rituxan.  Her hands do look a little swollen which could be from pregnancy her blood pressure looks okay.  I told her to discuss this with her OB/GYN.      ECOG Performance   ECOG Performance Status: 0     Distress Assessment  Distress Assessment Score: No distress    Pain  Currently in Pain: No/denies      Problem List    1. Thrombocytopenia during pregnancy (H)          ______________________________________________________________________________    History of Present Illness    DIAGNOSIS: ITP is pregnancy. Recurrent and refractory. originially had ITP in 2011.     CURRENT TREATMENT: Rituxan and prednisone was her last treatment.  Rituxan last dose was March 8, 2019    PAST TREATMENT: Steroids  IVIG  Splenectomy 1/28/19. Initial response, but then rapid decrease in platelets.     INTERIM HISTORY:   She is here today for a follow-up visit.  He is doing quite well.  She is very happy to see that her platelets have continued to improve.  She does not have any bleeding or bruising.  Her only complaint is that she feels that in the morning her hands are swollen and tight and feel some numbness and tingling in the first 3 fingers on both hands.  It  does get a little bit better as the day goes on and that is worse at night again.  No other complaints.  States she is 27 weeks pregnant with a little boy.  Is due in July    Pain Status  Currently in Pain: No/denies    Review of Systems    Oncology Nurse Assessment/CMA Intake: Constitutional  Constitutional (WDL): All constitutional elements are within defined limits  Fatigue: No Concerns  Fever: No Concerns  Chills: No Concerns  Weight Gain: No Concerns  Weight Loss: No Concerns  Hot flashes/Night Sweats: No Concerns  Neurosensory  Neurosensory (WDL): Exceptions to WDL  Peripheral Motor Neuropathy: Concerns(both hands into fingers)  Ataxia: No Concerns  Peripheral Sensory Neuropathy: Concerns  Confusion: No Concerns  Dizziness: No Concerns  Syncope: No Concerns  Eye   Eye Disorder (WDL): All eye disorder elements are within defined limits  Blurred Vision: No Concerns  Dry Eye: No Concerns  Eye Pain: No Concerns  Watering Eyes: No Concerns  Ear  Ear Disorder (WDL): All ear disorder elements are within defined limits  Ear Pain: No Concerns  Tinnitus: No Concerns  Cardiovascular  Cardiovascular (WDL): All cardiovascular elements are within defined limits  Palpitations: No Concerns  Edema: No Concerns  SVT, DVT/PE: No Concerns  Chest Pain - Cardiac: No Concerns  Pulmonary  Respiratory (WDL): Within Defined Limits  Cough: No Concerns  Dyspnea: No Concerns  Hypoxia: No Concerns  Gastrointestinal  Gastrointestinal (WDL): All gastrointestinal elements are within defined limits  Anorexia: No Concerns  Nausea: No Concerns  Vomiting: No Concerns  Dehydration: No Concerns  Dysgeusia: No Concerns  Dysphagia: No Concerns  Mucositis Oral: No Concerns  Esophagitis: No Concerns  Constipation: No Concerns  Diarrhea: No Concerns  Pharyngitis: No Concerns  Dry Mouth: No Concerns  Genitourinary  Genitourinary (WDL): All genitourinary elements are within defined limits  Urinary Frequency: No Concerns  Urinary Retention: No  Concerns  Urinary Tract Pain: No Concerns  Lymphatic  Lymph (WDL): All lymph disorder elements are within defined limits  Lymphedema: No Concerns  Lymph Node Discomfort: No Concerns  Musculoskeletal and Connective Tissue  Musculoskeletal and Connetive Tissue Disorders (WDL): Exceptions to WDL  Arthralgia: Concerns(hands into fingers)  Bone Pain: No Concerns  Muscle Weakness : No Concerns  Myalgia: No Concerns  Integumentary  Integumentary (WDL): All integumentary elements are within defined limits  Alopecia: No Concerns  Rash Maculo-Papular: No Concerns  Pruritus: No Concerns  Urticaria: No Concerns  Palmar-Plantar Erythrodysesthesia Syndrome: No Concerns  Flushing: No Concerns  Patient Coping  Patient Coping: Accepting  Accompanied by  Accompanied by: Alone  Oral Chemo Adherence         Past History  Past Medical History:   Diagnosis Date     Bell's palsy      Immune thrombocytopenia (H)     2012---saw Dr. Chastity PEÑA Heme     Obesity        PHYSICAL EXAM:  /67   Pulse 97   Wt 196 lb (88.9 kg)   SpO2 97%   BMI 35.85 kg/m    GENERAL: no acute distress. Cooperative in conversation. Here alone  HEENT: pupils are equal, round and reactive. Oromucosa is clean and intact. No ulcerations or mucositis noted. No bleeding noted.  RESP: lungs are clear bilaterally per auscultation. Regular respiratory rate. No wheezes or rhonchi.  CV: Regular, rate and rhythm. No murmurs.  ABD: soft, nontender. Positive bowel sounds. Pregnant belly  MUSCULOSKELETAL: No lower extremity swelling. Mild swelling in bilateral hands.   NEURO: non focal. Alert and oriented x3.   PSYCH: within normal limits. No depression or anxiety.  SKIN: warm dry intact  LYMPH: no cervical, supraclavicular lymphadenopathy      Lab Results    Recent Results (from the past 168 hour(s))   HM1 (CBC with Diff)   Result Value Ref Range    WBC 9.5 4.0 - 11.0 thou/uL    RBC 3.57 (L) 3.80 - 5.40 mill/uL    Hemoglobin 11.5 (L) 12.0 - 16.0 g/dL    Hematocrit 33.7 (L)  35.0 - 47.0 %    MCV 94 80 - 100 fL    MCH 32.2 27.0 - 34.0 pg    MCHC 34.1 32.0 - 36.0 g/dL    RDW 14.0 11.0 - 14.5 %    Platelets 411 140 - 440 thou/uL    MPV 9.6 8.5 - 12.5 fL    Neutrophils % 58 50 - 70 %    Lymphocytes % 32 20 - 40 %    Monocytes % 10 2 - 10 %    Eosinophils % 1 0 - 6 %    Basophils % 0 0 - 2 %    Neutrophils Absolute 5.4 2.0 - 7.7 thou/uL    Lymphocytes Absolute 3.0 0.8 - 4.4 thou/uL    Monocytes Absolute 0.9 0.0 - 0.9 thou/uL    Eosinophils Absolute 0.1 0.0 - 0.4 thou/uL    Basophils Absolute 0.0 0.0 - 0.2 thou/uL       Imaging    No results found.      Signed by: Caprice Elena, CNP

## 2021-05-28 NOTE — PROGRESS NOTES
[facility] Hematology and Oncology Progress Note    Patient: Patricia Govea  MRN: [id number]  Date of Service: 05/10/2019      Assessment and Plan:    1.  Thrombocytopenia in pregnancy: She has responded to Rituxan.  Platelet count is now actually a little high as one would expect in a post splenectomy state.  She is otherwise doing well with the pregnancy.  We will see her back in 4 weeks for labs only.  We will see her in clinic on June 28.  Her due date is July 12.  We will check platelets again July 9 and 10. Notes from the [facility] were reviewed.    2.  Status post splenectomy.  She will need vaccinations updated in 2024.    ECOG Performance   ECOG Performance Status: 0    Distress Assessment  Distress Assessment Score: 1    Pain  Currently in Pain: No/denies    Diagnosis:    1.  Immune thrombocytopenia during pregnancy: Relapsed.    Treatment:    She failed steroids.  She was then treated with splenectomy on January 28, 2019.  She had an initial response but then relapsed.  She then received rituximab, 4 cycles, given February 15 through March 8, 2019.  She has had a complete response.    Interim History:    Patricia returns today for follow-up visit.  She has been doing well.  Pregnancy is proceeding normally.  No bleeding or bruising problems.  No fevers or chills.    Review of Systems:    Constitutional  Constitutional (WDL): Exceptions to WDL  Fatigue: Fatigue relieved by rest  Weight Gain: 5 - <10% from baseline(pregnant)  Neurosensory  Neurosensory (WDL): All neurosensory elements are within defined limits  Cardiovascular  Cardiovascular (WDL): All cardiovascular elements are within defined limits  Pulmonary  Respiratory (WDL): Within Defined Limits  Gastrointestinal  Gastrointestinal (WDL): All gastrointestinal elements are within defined limits  Genitourinary  Genitourinary (WDL): All genitourinary elements are within defined limits  Integumentary  Integumentary (WDL): All  integumentary elements are within defined limits  Patient Coping  Patient Coping: Accepting  Accompanied by  Accompanied by: Alone    Past History:    Past Medical History:   Diagnosis Date     Bell's palsy      Immune thrombocytopenia (H)     2012---saw Dr. Haywood [facility] Heme     Obesity      Physical Exam:    Recent Vitals 5/10/2019   Weight 194 lbs   BSA (m2) 1.96 m2   /66   Pulse 91   Temp 98   Temp src 1   SpO2 97   Some recent data might be hidden     General: patient appears stated age of 38 y.o.. Nontoxic and in no distress.   HEENT: Head: atraumatic, normocephalic. Sclerae anicteric.  Chest:  Normal respiratory effort  Cardiac:  No edema.   Abdomen: abdomen is gravid.  Extremities: normal tone and muscle bulk.    Skin: no lesions or rash. Warm and dry.   CNS: alert and oriented. Grossly non-focal.   Psychiatric: normal mood and affect.     Lab Results:    Recent Results (from the past 168 hour(s))   Basic Metabolic Panel   Result Value Ref Range    Sodium 136 136 - 145 mmol/L    Potassium 3.8 3.5 - 5.0 mmol/L    Chloride 107 98 - 107 mmol/L    CO2 20 (L) 22 - 31 mmol/L    Anion Gap, Calculation 9 5 - 18 mmol/L    Glucose 90 70 - 125 mg/dL    Calcium 9.5 8.5 - 10.5 mg/dL    BUN 3 (L) 8 - 22 mg/dL    Creatinine 0.48 (L) 0.60 - 1.10 mg/dL    GFR MDRD Af Amer >60 >60 mL/min/1.73m2    GFR MDRD Non Af Amer >60 >60 mL/min/1.73m2   HM1 (CBC with Diff)   Result Value Ref Range    WBC 9.2 4.0 - 11.0 thou/uL    RBC 3.88 3.80 - 5.40 mill/uL    Hemoglobin 12.6 12.0 - 16.0 g/dL    Hematocrit 35.7 35.0 - 47.0 %    MCV 92 80 - 100 fL    MCH 32.5 27.0 - 34.0 pg    MCHC 35.3 32.0 - 36.0 g/dL    RDW 14.8 (H) 11.0 - 14.5 %    Platelets 485 (H) 140 - 440 thou/uL    MPV 9.5 8.5 - 12.5 fL    Neutrophils % 64 50 - 70 %    Lymphocytes % 26 20 - 40 %    Monocytes % 9 2 - 10 %    Eosinophils % 1 0 - 6 %    Basophils % 0 0 - 2 %    Neutrophils Absolute 5.8 2.0 - 7.7 thou/uL    Lymphocytes Absolute 2.4 0.8 - 4.4 thou/uL    Monocytes  Absolute 0.8 0.0 - 0.9 thou/uL    Eosinophils Absolute 0.1 0.0 - 0.4 thou/uL    Basophils Absolute 0.0 0.0 - 0.2 thou/uL     Imaging:    No results found.      Signed by: Han Haywood MD

## 2021-05-29 ENCOUNTER — RECORDS - HEALTHEAST (OUTPATIENT)
Dept: ADMINISTRATIVE | Facility: CLINIC | Age: 41
End: 2021-05-29

## 2021-05-29 NOTE — PROGRESS NOTES
"Please see \"Imaging\" tab under \"Chart Review\" for details of today's visit.    Ziyad Leon        "

## 2021-05-29 NOTE — PROGRESS NOTES
Patient in today for labs.  She waited in lobby for results.  Platelet count looks good.  She is back on 6/28/19 for lab and MD.  She verbalized understanding.    Cecy Crump RN

## 2021-05-30 NOTE — PROGRESS NOTES
[facility] Hematology and Oncology Progress Note    Patient: Patricia Govea  MRN: [id number]  Date of Service: 06/28/2019      Assessment and Plan:    1.  Thrombocytopenia in pregnancy: She has delivered a baby.  There are no bleeding complications.  Counts today remain elevated as expected in a postsplenectomy state.  We will see her back in 4 months.  She will let us know the interim if she develops any bruising or bullae.    2.  Status post splenectomy.  She will need vaccinations updated in 2024.    ECOG Performance   ECOG Performance Status: 1    Distress Assessment  Distress Assessment Score: 5(baby born 1 week ago)    Pain  Currently in Pain: No/denies    Diagnosis:    1.  Immune thrombocytopenia during pregnancy: Relapsed.    Treatment:    She failed steroids.  She was then treated with splenectomy on January 28, 2019.  She had an initial response but then relapsed.  She then received rituximab, 4 cycles, given February 15 through March 8, 2019.  She has had a complete response.    Interim History:    Patricia returns today for follow-up visit.  She delivered her baby on June 23, 5 days ago.  Unfortunately they think he may have Down syndrome.  I explained to her that steroids or Rituxan would not have been anyway because this has this would have been determined prior to her treatment.  She is otherwise doing okay.  No acute complaints today.    Review of Systems:    Constitutional  Constitutional (WDL): Exceptions to WDL  Fatigue: Fatigue relieved by rest  Neurosensory  Neurosensory (WDL): Exceptions to WDL  Peripheral Sensory Neuropathy: Asymptomatic, loss of deep tendon reflexes or paresthesia(right and left, first three finger numbness)  Cardiovascular  Cardiovascular (WDL): Exceptions to WDL  Edema: Yes  Pulmonary  Respiratory (WDL): Within Defined Limits  Gastrointestinal  Gastrointestinal (WDL): Exceptions to WDL  Constipation: Occasional or intermittent symptoms, occasional use of stool  softeners, laxatives, dietary modification, or enema  Genitourinary  Genitourinary (WDL): All genitourinary elements are within defined limits  Integumentary  Integumentary (WDL): All integumentary elements are within defined limits  Patient Coping  Patient Coping: Accepting  Accompanied by  Accompanied by: Other(with interpretor)    Past History:    Past Medical History:   Diagnosis Date     Bell's palsy      Immune thrombocytopenia (H)     2012---saw Dr. Haywood [facility] Heme     Obesity      Physical Exam:    Recent Vitals 6/28/2019   Height -   Weight 183 lbs 14 oz   BSA (m2) 1.83 m2   /81   Pulse 96   Temp 97.8   Temp src 1   SpO2 97   Some recent data might be hidden     General: patient appears stated age of 38 y.o.. Nontoxic and in no distress.   HEENT: Head: atraumatic, normocephalic. Sclerae anicteric.  Chest:  Normal respiratory effort  Cardiac:  No edema.   Abdomen: abdomen is soft and nondistended.  Extremities: normal tone and muscle bulk.    Skin: no lesions or rash. Warm and dry.   CNS: alert and oriented. Grossly non-focal.   Psychiatric: normal mood and affect.     Lab Results:    Results for RICH KRISHNA (MRN [id number]) as of 6/29/2019 16:54   Ref. Range 6/28/2019 14:18   WBC Latest Ref Range: 4.0 - 11.0 thou/uL 8.5   RBC Latest Ref Range: 3.80 - 5.40 mill/uL 3.39 (L)   Hemoglobin Latest Ref Range: 12.0 - 16.0 g/dL 11.2 (L)   Hematocrit Latest Ref Range: 35.0 - 47.0 % 31.9 (L)   MCV Latest Ref Range: 80 - 100 fL 94   MCH Latest Ref Range: 27.0 - 34.0 pg 33.0   MCHC Latest Ref Range: 32.0 - 36.0 g/dL 35.1   RDW Latest Ref Range: 11.0 - 14.5 % 15.7 (H)   Platelets Latest Ref Range: 140 - 440 thou/uL 612 (H)   MPV Latest Ref Range: 8.5 - 12.5 fL 8.9   Neutrophils % Latest Ref Range: 50 - 70 % 58   Lymphocytes % Latest Ref Range: 20 - 40 % 31   Monocytes % Latest Ref Range: 2 - 10 % 8   Eosinophils % Latest Ref Range: 0 - 6 % 3   Basophils % Latest Ref Range: 0 - 2 % 1   Neutrophils  Absolute Latest Ref Range: 2.0 - 7.7 thou/uL 4.9   Lymphocytes Absolute Latest Ref Range: 0.8 - 4.4 thou/uL 2.6   Monocytes Absolute Latest Ref Range: 0.0 - 0.9 thou/uL 0.7   Eosinophils Absolute Latest Ref Range: 0.0 - 0.4 thou/uL 0.2   Basophils Absolute Latest Ref Range: 0.0 - 0.2 thou/uL 0.1     Imaging:    No new imaging      Signed by: Han Haywood MD

## 2021-06-02 VITALS — WEIGHT: 190.1 LBS | BODY MASS INDEX: 34.77 KG/M2

## 2021-06-02 VITALS — WEIGHT: 196 LBS | BODY MASS INDEX: 39.59 KG/M2

## 2021-06-02 VITALS — BODY MASS INDEX: 34.92 KG/M2 | WEIGHT: 190.9 LBS

## 2021-06-02 VITALS — HEIGHT: 62 IN | WEIGHT: 187 LBS | BODY MASS INDEX: 34.41 KG/M2

## 2021-06-02 VITALS — BODY MASS INDEX: 39.08 KG/M2 | WEIGHT: 193.5 LBS

## 2021-06-02 NOTE — PROGRESS NOTES
[facility] Hematology and Oncology Progress Note    Patient: Patricia Govea  MRN: [id number]  Date of Service: 10/25/2019      Assessment and Plan:    1.  Thrombocytopenia in pregnancy: Platelet counts have recovered and remained stable.  Clinically she is doing well.  I reviewed with her that she possibly may relapse in the future.  She is aware of the signs and symptoms including hemorrhagic bullae in the mouth and petechiae.  She knows to let us know if she has any new symptoms or any new bleeding or bruising.  I would think that it is unlikely that she would relapse unless she has some other inciting factors such as pregnancy or infection.  She will return to our clinic on an as-needed basis.  Questions were answered.     2.  Status post splenectomy.  She will need vaccinations updated in 2024.  I reviewed this with her as well.    ECOG Performance   ECOG Performance Status: 0    Distress Assessment  Distress Assessment Score: 1    Pain  Currently in Pain: No/denies    Diagnosis:    1.  Immune thrombocytopenia: Initial diagnosis was in 2011.  Relapsed during pregnancy in January 2019.  Extensive work-up at the time of first diagnosis in 2011 was negative, this included a bone marrow biopsy.    Treatment:    At relapse, she failed an initial course of steroids.  She was then treated with splenectomy on January 28, 2019.  She had an initial response but then relapsed.  She was treated with IVIG to which she had a transient response but then relapsed.  She then received rituximab, 4 cycles, given February 15 through March 8, 2019.  She has had a complete response.    She failed steroids initially and achieved remission with subsequent Rituxan at her initial diagnosis.    Interim History:    Patricia returns today for follow-up visit.  Continues to do well.  No complaints today.  No bleeding or bruising.  No petechiae.  Baby is doing okay.  The baby has not had any infectious complications.    Review of  Systems:    Constitutional  Constitutional (WDL): All constitutional elements are within defined limits  Neurosensory  Neurosensory (WDL): All neurosensory elements are within defined limits  Cardiovascular  Cardiovascular (WDL): All cardiovascular elements are within defined limits  Pulmonary  Respiratory (WDL): Within Defined Limits  Gastrointestinal  Gastrointestinal (WDL): All gastrointestinal elements are within defined limits  Genitourinary  Genitourinary (WDL): All genitourinary elements are within defined limits  Integumentary  Integumentary (WDL): All integumentary elements are within defined limits  Patient Coping  Patient Coping: Accepting  Accompanied by  Accompanied by: Family Member(children)    Past History:    Past Medical History:   Diagnosis Date     Bell's palsy      Immune thrombocytopenia (H)     2012---saw Dr. Haywood [facility] Heme     Obesity      Physical Exam:    Recent Vitals 10/25/2019   Height -   Weight 183 lbs   BSA (m2) 1.83 m2   /88   Pulse 72   Temp 98   Temp src 1   SpO2 97   Some recent data might be hidden     General: patient appears stated age of 38 y.o.. Nontoxic and in no distress.   HEENT: Head: atraumatic, normocephalic. Sclerae anicteric.  Chest:  Normal respiratory effort  Cardiac:  No edema.   Abdomen: abdomen is nondistended.  Extremities: normal tone and muscle bulk.    Skin: no lesions or rash. Warm and dry.   CNS: alert and oriented. Grossly non-focal.   Psychiatric: normal mood and affect.     Lab Results:    Recent Results (from the past 240 hour(s))   HM1 (CBC with Diff)    Collection Time: 10/25/19  2:28 PM   Result Value Ref Range    WBC 6.1 4.0 - 11.0 thou/uL    RBC 4.19 3.80 - 5.40 mill/uL    Hemoglobin 13.6 12.0 - 16.0 g/dL    Hematocrit 38.2 35.0 - 47.0 %    MCV 91 80 - 100 fL    MCH 32.5 27.0 - 34.0 pg    MCHC 35.6 32.0 - 36.0 g/dL    RDW 14.0 11.0 - 14.5 %    Platelets 568 (H) 140 - 440 thou/uL    MPV 8.5 8.5 - 12.5 fL    Neutrophils % 40 (L) 50 - 70 %     Lymphocytes % 45 (H) 20 - 40 %    Monocytes % 10 2 - 10 %    Eosinophils % 3 0 - 6 %    Basophils % 1 0 - 2 %    Neutrophils Absolute 2.5 2.0 - 7.7 thou/uL    Lymphocytes Absolute 2.8 0.8 - 4.4 thou/uL    Monocytes Absolute 0.6 0.0 - 0.9 thou/uL    Eosinophils Absolute 0.2 0.0 - 0.4 thou/uL    Basophils Absolute 0.1 0.0 - 0.2 thou/uL     Imaging:    No new imaging      Signed by: Han Haywood MD

## 2021-06-03 VITALS — BODY MASS INDEX: 39.18 KG/M2 | WEIGHT: 194 LBS

## 2021-06-03 VITALS
TEMPERATURE: 98 F | BODY MASS INDEX: 38.41 KG/M2 | DIASTOLIC BLOOD PRESSURE: 88 MMHG | OXYGEN SATURATION: 97 % | HEART RATE: 72 BPM | SYSTOLIC BLOOD PRESSURE: 141 MMHG | WEIGHT: 183 LBS

## 2021-06-03 VITALS — WEIGHT: 183.9 LBS | BODY MASS INDEX: 39.8 KG/M2

## 2021-06-03 NOTE — ANESTHESIA PREPROCEDURE EVALUATION
Anesthesia Evaluation      Patient summary reviewed   No history of anesthetic complications     Airway   Mallampati: II  Neck ROM: full   Pulmonary - negative ROS and normal exam                          Cardiovascular - normal exam  Exercise tolerance: > or = 4 METS   Neuro/Psych    (+) neuromuscular disease (Bell's palsy),      Comments: GBS (Guillain [place] syndrome) from flu vaccine, resolved    Endo/Other    (+) obesity (BMI 35),      Comments: ITP  S/p splenectomy 1/2019    GI/Hepatic/Renal    (-) GERD    Comments: Cholecystitis          Dental - normal exam                        Anesthesia Plan  Planned anesthetic: general endotracheal  RSI with rocuronium  Background propofol  Decadron 10 mg  Zofran  ASA 2 - emergent   Induction: intravenous   Anesthetic plan and risks discussed with: patient  Anesthesia plan special considerations: antiemetics,   Post-op plan: routine recovery

## 2021-06-03 NOTE — ANESTHESIA CARE TRANSFER NOTE
Last vitals:   Vitals:    11/30/19 1308   BP: 140/63   Pulse: (!) 107   Resp: 18   Temp: 37.4  C (99.4  F)   SpO2: 98%     Patient's level of consciousness is drowsy  Spontaneous respirations: yes  Maintains airway independently: yes  Dentition unchanged: yes  Oropharynx: oropharynx clear of all foreign objects    QCDR Measures:  ASA# 20 - Surgical Safety Checklist: WHO surgical safety checklist completed prior to induction    PQRS# 430 - Adult PONV Prevention: 4558F - Pt received => 2 anti-emetic agents (different classes) preop & intraop  ASA# 8 - Peds PONV Prevention: NA - Not pediatric patient, not GA or 2 or more risk factors NOT present  PQRS# 424 - Vidhi-op Temp Management: 4559F - At least one body temp DOCUMENTED => 35.5C or 95.9F within required timeframe  PQRS# 426 - PACU Transfer Protocol: - Transfer of care checklist used  ASA# 14 - Acute Post-op Pain: ASA14B - Patient did NOT experience pain >= 7 out of 10

## 2021-06-03 NOTE — ANESTHESIA POSTPROCEDURE EVALUATION
Patient: Patricia Ibarra Govea  CHOLECYSTECTOMY, LAPAROSCOPIC  Anesthesia type: general    Patient location: PACU  Last vitals:   Vitals Value Taken Time   /77 11/30/2019  3:00 PM   Temp 36.5  C (97.7  F) 11/30/2019  3:00 PM   Pulse 100 11/30/2019  3:00 PM   Resp 16 11/30/2019  3:00 PM   SpO2 96 % 11/30/2019  3:00 PM     Post vital signs: stable  Level of consciousness: awake, alert and responds to simple questions  Post-anesthesia pain: pain controlled  Post-anesthesia nausea and vomiting: no  Pulmonary: unassisted, return to baseline  Cardiovascular: stable, blood pressure at baseline and hypertensive  Hydration: adequate  Anesthetic events: no    QCDR Measures:  ASA# 11 - Vidhi-op Cardiac Arrest: ASA11B - Patient did NOT experience unanticipated cardiac arrest  ASA# 12 - Vidhi-op Mortality Rate: ASA12B - Patient did NOT die  ASA# 13 - PACU Re-Intubation Rate: ASA13B - Patient did NOT require a new airway mgmt  ASA# 10 - Composite Anes Safety: ASA10A - No serious adverse event    Additional Notes:

## 2021-06-04 VITALS — HEIGHT: 61 IN | WEIGHT: 182.8 LBS | BODY MASS INDEX: 34.51 KG/M2

## 2021-06-05 VITALS
HEART RATE: 81 BPM | TEMPERATURE: 98.3 F | DIASTOLIC BLOOD PRESSURE: 104 MMHG | HEIGHT: 61 IN | SYSTOLIC BLOOD PRESSURE: 158 MMHG | WEIGHT: 192.2 LBS | BODY MASS INDEX: 36.29 KG/M2 | OXYGEN SATURATION: 96 %

## 2021-06-05 VITALS
TEMPERATURE: 98.6 F | OXYGEN SATURATION: 98 % | HEART RATE: 89 BPM | WEIGHT: 193 LBS | BODY MASS INDEX: 39.43 KG/M2 | DIASTOLIC BLOOD PRESSURE: 90 MMHG | SYSTOLIC BLOOD PRESSURE: 156 MMHG

## 2021-06-05 VITALS
OXYGEN SATURATION: 98 % | SYSTOLIC BLOOD PRESSURE: 139 MMHG | TEMPERATURE: 98.6 F | DIASTOLIC BLOOD PRESSURE: 84 MMHG | HEART RATE: 73 BPM | WEIGHT: 199.2 LBS | BODY MASS INDEX: 37.64 KG/M2

## 2021-06-05 VITALS
HEIGHT: 61 IN | WEIGHT: 201.4 LBS | SYSTOLIC BLOOD PRESSURE: 131 MMHG | OXYGEN SATURATION: 99 % | HEART RATE: 70 BPM | BODY MASS INDEX: 38.02 KG/M2 | DIASTOLIC BLOOD PRESSURE: 82 MMHG

## 2021-06-05 VITALS
WEIGHT: 195.4 LBS | HEIGHT: 61 IN | BODY MASS INDEX: 36.89 KG/M2 | HEART RATE: 73 BPM | OXYGEN SATURATION: 98 % | SYSTOLIC BLOOD PRESSURE: 171 MMHG | DIASTOLIC BLOOD PRESSURE: 80 MMHG

## 2021-06-14 NOTE — TELEPHONE ENCOUNTER
Call placed to patient after lab results were reviewed by Dr. Haywood.  Platelet count is 16.  Per Dr. Haywood patient needs to come in and be seen by MD/NP sometime this week followed by a Rituxan infusion.  She has had this in the past and this was the only thing that helped get her platelets back up.  Patient was notified and verbalized understanding.  She again denies any bleeding and will await for a call from the schedulers on date and time of appointment.    Cecy Crump RN

## 2021-06-14 NOTE — TELEPHONE ENCOUNTER
Pt stopped in cancer care clinic today to have labs drawn. Pt states she was told to come in for labs if any new bruising/bleeding arises. Pt states he has noticed some new bruising. No bleeding. Pt states she is not pregnant.   Hem 2 drawn per Dr. Rollins's order. Pt left clinic and was told we will call with results and further f/u if needed.

## 2021-06-15 ENCOUNTER — COMMUNICATION - HEALTHEAST (OUTPATIENT)
Dept: ONCOLOGY | Facility: HOSPITAL | Age: 41
End: 2021-06-15

## 2021-06-15 ENCOUNTER — AMBULATORY - HEALTHEAST (OUTPATIENT)
Dept: INFUSION THERAPY | Facility: HOSPITAL | Age: 41
End: 2021-06-15

## 2021-06-15 DIAGNOSIS — D69.3 IDIOPATHIC THROMBOCYTOPENIC PURPURA (H): ICD-10-CM

## 2021-06-15 LAB
BASOPHILS # BLD AUTO: 0.1 THOU/UL (ref 0–0.2)
BASOPHILS NFR BLD AUTO: 1 % (ref 0–2)
EOSINOPHIL # BLD AUTO: 0.2 THOU/UL (ref 0–0.4)
EOSINOPHIL NFR BLD AUTO: 3 % (ref 0–6)
ERYTHROCYTE [DISTWIDTH] IN BLOOD BY AUTOMATED COUNT: 13.6 % (ref 11–14.5)
HCT VFR BLD AUTO: 40.1 % (ref 35–47)
HGB BLD-MCNC: 14.4 G/DL (ref 12–16)
IMM GRANULOCYTES # BLD: 0 THOU/UL
IMM GRANULOCYTES NFR BLD: 0 %
LYMPHOCYTES # BLD AUTO: 3.2 THOU/UL (ref 0.8–4.4)
LYMPHOCYTES NFR BLD AUTO: 44 % (ref 20–40)
MCH RBC QN AUTO: 32.3 PG (ref 27–34)
MCHC RBC AUTO-ENTMCNC: 35.9 G/DL (ref 32–36)
MCV RBC AUTO: 90 FL (ref 80–100)
MONOCYTES # BLD AUTO: 0.6 THOU/UL (ref 0–0.9)
MONOCYTES NFR BLD AUTO: 9 % (ref 2–10)
NEUTROPHILS # BLD AUTO: 3.1 THOU/UL (ref 2–7.7)
NEUTROPHILS NFR BLD AUTO: 44 % (ref 50–70)
PLATELET # BLD AUTO: 514 THOU/UL (ref 140–440)
PMV BLD AUTO: 8.1 FL (ref 8.5–12.5)
RBC # BLD AUTO: 4.46 MILL/UL (ref 3.8–5.4)
WBC: 7.2 THOU/UL (ref 4–11)

## 2021-06-15 NOTE — PROGRESS NOTES
Patient was in today for a lab recheck.  She waited in the lobby for results.  Lab called critical platelet count of 3 to us at 9:41 AM.  Dr. Haywood was notified right away and states that patient should go up to taking 2 pills of Promacta daily versus 1 pill that she is currently doing.  Patient has an appointment in 1 week for lab, provider.  Patient was given this information and verbalized understanding.    Cecy Crump RN

## 2021-06-15 NOTE — TELEPHONE ENCOUNTER
Patient was originally prescribed promacta 25mg. The copay was very high for the patient. The free trial was approved, however the free drug program was denied. I did get the patient a copay card, but the cost of the medication is still too high. Messaged Dr Haywood about this, and he wanted a test claim run through for NPlate injection. This injection requires a prior auth. I messaged Sulma Potts and Gwen Sullivan ( they handle PA's for injectible meds given in the clinic). Do we still want the free trial, are we going to do an appeal for free drug program?

## 2021-06-15 NOTE — PROGRESS NOTES
Patricia Govea, 40 y.o., female arrived ambulatory to clinic at 1201 for Cycle #1 Day #8 of her treatment regimen. Patient assessed and vital signs stable. PIV easily inserted into left hand with great blood return and flushed well with normal saline. Administered premedications and treatment per provider order. She  tolerated infusion well, no s/s of infusion reaction. At completion of infusion, PIV flushed well with normal saline and discontinued. Site covered with gauze and coban wrap. Patricia Govea verbalized understanding of plan of care and return to clinic. Discharged to [facility] at 1437 alert and ambulatory.

## 2021-06-15 NOTE — CONSULTS
[facility] Hematology and Oncology Progress Note    Patient: Patricia Govea  MRN: [id number]  Date of Service: 03/05/2021      Assessment and Plan:    1.  Immune thrombocytopenia, relapsed: She completed her fourth weekly dose of Rituxan on February 24, just over a week ago.  So far no response.  With her previous course of Rituxan at first relapse she had responded by this time point.  She started Promacta 25 mg daily on March 2.  We will continue this now as her main therapeutic regimen.  We will check her platelet count every Tuesday.  Will titrate the Promacta dose to a platelet count of 30,000.  If she has not responded by Tuesday the ninth, I am going to increase the dose to 50 mg daily.    She has not had any clinically significant bleeding.  I reviewed with her today that if she develops any significant headache, bleeding, or bruising that she needs to come to the ER for assessment.  She voiced understanding.    2.  Status post splenectomy.  She will need vaccinations updated in 2024.  I reviewed this with her as well.    ECOG Performance   ECOG Performance Status: 0    Distress Assessment  Distress Assessment Score: No distress    Pain  Currently in Pain: No/denies    Diagnosis:    1.  Immune thrombocytopenia: Initial diagnosis was in 2011.  Relapsed during pregnancy in January 2019.  Extensive work-up at the time of first diagnosis in 2011 was negative, this included a bone marrow biopsy.    Treatment:    She failed steroids initially and achieved remission with subsequent Rituxan at her initial diagnosis.    At first relapse, she failed an initial course of steroids.  She was then treated with splenectomy on January 28, 2019.  She had an initial response but then relapsed.  She was treated with IVIG to which she had a transient response but then relapsed.  She then received rituximab, 4 cycles, given February 15 through March 8, 2019.  She has had a complete response by C4D1.    Current  treatment (second relapse):   Four weekly doses of Rituxan completed February 24, 2021.  Promacta 25 mg daily started March 2, 2021.    Interim History:    Patricia returns today for follow-up visit.  Continues to be stable clinically.  She had a nosebleed a couple days ago which stopped on its own.  No more petechiae.  No headaches.  No fevers.  No acute complaints today.  No abdominal pain.  No neurologic symptoms.    Review of Systems:    Constitutional  Constitutional (WDL): All constitutional elements are within defined limits  Neurosensory  Neurosensory (WDL): All neurosensory elements are within defined limits  Cardiovascular  Cardiovascular (WDL): All cardiovascular elements are within defined limits  Pulmonary  Respiratory (WDL): Within Defined Limits  Gastrointestinal  Gastrointestinal (WDL): All gastrointestinal elements are within defined limits  Genitourinary  Genitourinary (WDL): All genitourinary elements are within defined limits  Integumentary  Integumentary (WDL): All integumentary elements are within defined limits  Patient Coping  Patient Coping: Accepting  Accompanied by  Accompanied by: Alone    Past History:    Past Medical History:   Diagnosis Date     Bell's palsy      Immune thrombocytopenia (H)     2012---saw Dr. Haywood HE Heme     Obesity      Physical Exam:    Recent Vitals 3/5/2021   Height -   Weight 193 lbs   BSA (m2) 1.94 m2   /90   Pulse 89   Temp 98.6   Temp src 1   SpO2 98   Some recent data might be hidden     General: patient appears stated age of 40 y.o.. Nontoxic and in no distress.   HEENT: Head: atraumatic, normocephalic. Sclerae anicteric.  Chest:  Normal respiratory effort  Cardiac:  No edema.   Abdomen: abdomen is nondistended.  Extremities: normal tone and muscle bulk.    Skin: no lesions or rash on visible skin. Warm and dry.   CNS: alert and oriented. Grossly non-focal.   Psychiatric: normal mood and affect.     Lab Results:    Recent Results (from the past 240  hour(s))   HM1 (CBC with Diff)    Collection Time: 02/24/21 12:19 PM   Result Value Ref Range    WBC 7.5 4.0 - 11.0 thou/uL    RBC 4.50 3.80 - 5.40 mill/uL    Hemoglobin 14.6 12.0 - 16.0 g/dL    Hematocrit 41.3 35.0 - 47.0 %    MCV 92 80 - 100 fL    MCH 32.4 27.0 - 34.0 pg    MCHC 35.4 32.0 - 36.0 g/dL    RDW 12.7 11.0 - 14.5 %    Platelets 7 (LL) 140 - 440 thou/uL    MPV      Neutrophils % 59 50 - 70 %    Lymphocytes % 30 20 - 40 %    Monocytes % 7 2 - 10 %    Eosinophils % 3 0 - 6 %    Basophils % 1 0 - 2 %    Immature Granulocyte % 1 (H) <=0 %    Neutrophils Absolute 4.4 2.0 - 7.7 thou/uL    Lymphocytes Absolute 2.2 0.8 - 4.4 thou/uL    Monocytes Absolute 0.5 0.0 - 0.9 thou/uL    Eosinophils Absolute 0.2 0.0 - 0.4 thou/uL    Basophils Absolute 0.1 0.0 - 0.2 thou/uL    Immature Granulocyte Absolute 0.1 (H) <=0.0 thou/uL   Comprehensive Metabolic Panel    Collection Time: 03/05/21  9:31 AM   Result Value Ref Range    Sodium 136 136 - 145 mmol/L    Potassium 3.8 3.5 - 5.0 mmol/L    Chloride 104 98 - 107 mmol/L    CO2 25 22 - 31 mmol/L    Anion Gap, Calculation 7 5 - 18 mmol/L    Glucose 118 70 - 125 mg/dL    BUN 11 8 - 22 mg/dL    Creatinine 0.70 0.60 - 1.10 mg/dL    GFR MDRD Af Amer >60 >60 mL/min/1.73m2    GFR MDRD Non Af Amer >60 >60 mL/min/1.73m2    Bilirubin, Total 0.5 0.0 - 1.0 mg/dL    Calcium 8.9 8.5 - 10.5 mg/dL    Protein, Total 8.0 6.0 - 8.0 g/dL    Albumin 4.1 3.5 - 5.0 g/dL    Alkaline Phosphatase 106 45 - 120 U/L    AST 14 0 - 40 U/L    ALT 19 0 - 45 U/L   HM1 (CBC with Diff)    Collection Time: 03/05/21  9:31 AM   Result Value Ref Range    WBC 8.0 4.0 - 11.0 thou/uL    RBC 4.61 3.80 - 5.40 mill/uL    Hemoglobin 15.3 12.0 - 16.0 g/dL    Hematocrit 42.1 35.0 - 47.0 %    MCV 91 80 - 100 fL    MCH 33.2 27.0 - 34.0 pg    MCHC 36.3 (H) 32.0 - 36.0 g/dL    RDW 13.0 11.0 - 14.5 %    Platelets 2 (LL) 140 - 440 thou/uL    MPV      Neutrophils % 55 50 - 70 %    Lymphocytes % 29 20 - 40 %    Monocytes % 10 2 - 10  %    Eosinophils % 4 0 - 6 %    Basophils % 1 0 - 2 %    Immature Granulocyte % 2 (H) <=0 %    Neutrophils Absolute 4.4 2.0 - 7.7 thou/uL    Lymphocytes Absolute 2.3 0.8 - 4.4 thou/uL    Monocytes Absolute 0.8 0.0 - 0.9 thou/uL    Eosinophils Absolute 0.3 0.0 - 0.4 thou/uL    Basophils Absolute 0.1 0.0 - 0.2 thou/uL    Immature Granulocyte Absolute 0.2 (H) <=0.0 thou/uL     Imaging:    No new imaging      Signed by: Han Haywood MD

## 2021-06-15 NOTE — PROGRESS NOTES
[facility] Hematology and Oncology Progress Note    Patient: Patricia Govea  MRN: [id number]  Date of Service: 02/04/2021        Reason for Visit    Chief Complaint   Patient presents with     Benign Hematology     History of immune thrombocytopenia       Assessment and Plan    1. ITP: relapsed again. Last rituxan was in March 2019. She also had splenectomy in January 2019. Was pregnant the last time. She hasn't responded to steroids well in the past, but did respond to rituxan. We will start weekly dosing today. We will give 4-8 weekly doses. I will have her see Dr. Haywood in 3-4 weeks. Do weekly labs. encourage her to go to ER with any bleeding complications or if she falls/hits head. Pt did sign consent for rituxan. Has had in the past and tolerated.         ECOG Performance   ECOG Performance Status: 0     Distress Assessment  Distress Assessment Score: No distress    Pain  Currently in Pain: No/denies      Problem List    1. History of immune thrombocytopenia  CC OFFICE VISIT LONG        ______________________________________________________________________________    History of Present Illness    DIAGNOSIS:  Recurrent and refractory. originially had ITP in 2011. Then again in 2019 in pregnancy. Now recurrent again.     CURRENT TREATMENT: will start Rituxan again today. Will do weekly for at least 4 weeks.     PAST TREATMENT:   Rituxan and prednisone. Rituxan last dose was March 8, 2019  Steroids  IVIG  Splenectomy 1/28/19. Initial response, but then rapid decrease in platelets.     INTERIM HISTORY:   She is here today for a follow-up visit. She started noticing bruises this week. Also when she is blowing her nose, there is a little blood. No epistaxis. No rectal bleeding. No falls.       Review of Systems    Constitutional  Constitutional (WDL): Exceptions to WDL  Fever: Concerns(occ, goes away with tylenol)  Hot flashes/Night Sweats: Concerns(occ night sweats)  Neurosensory  Neurosensory (WDL):  "Exceptions to WDL  Peripheral Sensory Neuropathy: Concerns(right middle finger, chronic)  Eye   Eye Disorder (WDL): All eye disorder elements are within defined limits  Ear  Ear Disorder (WDL): All ear disorder elements are within defined limits  Cardiovascular  Cardiovascular (WDL): All cardiovascular elements are within defined limits  Pulmonary  Respiratory (WDL): Within Defined Limits  Gastrointestinal  Gastrointestinal (WDL): All gastrointestinal elements are within defined limits  Genitourinary  Genitourinary (WDL): All genitourinary elements are within defined limits  Lymphatic  Lymph (WDL): All lymph disorder elements are within defined limits  Musculoskeletal and Connective Tissue  Musculoskeletal and Connetive Tissue Disorders (WDL): All Musculoskeletal and Connetive Tissue Disorder elements are within defined limits  Integumentary  Integumentary (WDL): Exceptions to WDL(easily bruised)  Patient Coping  Patient Coping: Accepting;Open/discussion  Accompanied by  Accompanied by: Alone  Oral Chemo Adherence           Past History  Past Medical History:   Diagnosis Date     Bell's palsy      Immune thrombocytopenia (H)     2012---saw Dr. Haywood HE Heme     Obesity        PHYSICAL EXAM  BP (!) 158/104   Pulse 81   Temp 98.3  F (36.8  C) (Oral)   Ht 5' 1\" (1.549 m)   Wt 192 lb 3.2 oz (87.2 kg)   SpO2 96%   BMI 36.32 kg/m      GENERAL: no acute distress. Cooperative in conversation. Here alone due to visitor restrictions. Mask on  RESP: Regular respiratory rate. No expiratory wheezes   MUSCULOSKELETAL: no bilateral leg swelling  NEURO: non focal. Alert and oriented x3.   PSYCH: within normal limits. No depression or anxiety.  SKIN: exposed skin is dry intact. Some bruises on arms.       Lab Results    Recent Results (from the past 168 hour(s))   HM2 (CBC W/O DIFF)   Result Value Ref Range    WBC 5.8 4.0 - 11.0 thou/uL    RBC 4.47 3.80 - 5.40 mill/uL    Hemoglobin 14.7 12.0 - 16.0 g/dL    Hematocrit 41.3 " 35.0 - 47.0 %    MCV 92 80 - 100 fL    MCH 32.9 27.0 - 34.0 pg    MCHC 35.6 32.0 - 36.0 g/dL    RDW 13.3 11.0 - 14.5 %    Platelets 16 (LL) 140 - 440 thou/uL   Comprehensive Metabolic Panel   Result Value Ref Range    Sodium 138 136 - 145 mmol/L    Potassium 4.0 3.5 - 5.0 mmol/L    Chloride 105 98 - 107 mmol/L    CO2 25 22 - 31 mmol/L    Anion Gap, Calculation 8 5 - 18 mmol/L    Glucose 90 70 - 125 mg/dL    BUN 9 8 - 22 mg/dL    Creatinine 0.66 0.60 - 1.10 mg/dL    GFR MDRD Af Amer >60 >60 mL/min/1.73m2    GFR MDRD Non Af Amer >60 >60 mL/min/1.73m2    Bilirubin, Total 0.5 0.0 - 1.0 mg/dL    Calcium 9.1 8.5 - 10.5 mg/dL    Protein, Total 8.5 (H) 6.0 - 8.0 g/dL    Albumin 4.1 3.5 - 5.0 g/dL    Alkaline Phosphatase 110 45 - 120 U/L    AST 21 0 - 40 U/L    ALT 20 0 - 45 U/L   HM1 (CBC with Diff)   Result Value Ref Range    WBC 4.9 4.0 - 11.0 thou/uL    RBC 4.68 3.80 - 5.40 mill/uL    Hemoglobin 15.3 12.0 - 16.0 g/dL    Hematocrit 43.5 35.0 - 47.0 %    MCV 93 80 - 100 fL    MCH 32.7 27.0 - 34.0 pg    MCHC 35.2 32.0 - 36.0 g/dL    RDW 13.5 11.0 - 14.5 %    Platelets 8 (LL) 140 - 440 thou/uL    MPV 11.0 8.5 - 12.5 fL    Neutrophils % 48 (L) 50 - 70 %    Lymphocytes % 37 20 - 40 %    Monocytes % 9 2 - 10 %    Eosinophils % 4 0 - 6 %    Basophils % 2 0 - 2 %    Immature Granulocyte % 1 (H) <=0 %    Neutrophils Absolute 2.3 2.0 - 7.7 thou/uL    Lymphocytes Absolute 1.8 0.8 - 4.4 thou/uL    Monocytes Absolute 0.4 0.0 - 0.9 thou/uL    Eosinophils Absolute 0.2 0.0 - 0.4 thou/uL    Basophils Absolute 0.1 0.0 - 0.2 thou/uL    Immature Granulocyte Absolute 0.0 <=0.0 thou/uL       Imaging    No results found.   was involved in entire conversation on [employer].       Signed by: Caprice Elena, WHITNEY

## 2021-06-15 NOTE — PROGRESS NOTES
Patricia came to chemo infusion this afternoon for her 4th dose of Rituxan.  Peripheral lab draw was done and results noted and reviewed with Dr Haywood.  Platelet count today was 7K.  Dr Haywood is ordering dexamethasone for her to take for 4 days.  She was educated on this and verbalizes good understanding.  Peripheral IV was inserted with good blood return.  She received treatment as ordered and tolerated it well while in clinic.  IV was flushed with ns then removed and site covered.  Patricia left clinic ambulatory.  She will return next week for lab and MD visit.  Prior auth is being done to see if Nplate is a option for this patient.

## 2021-06-15 NOTE — PATIENT INSTRUCTIONS - HE
Lab Standing Order on 02/04/2021   Component Date Value Ref Range Status     Sodium 02/04/2021 138  136 - 145 mmol/L Final     Potassium 02/04/2021 4.0  3.5 - 5.0 mmol/L Final     Chloride 02/04/2021 105  98 - 107 mmol/L Final     CO2 02/04/2021 25  22 - 31 mmol/L Final     Anion Gap, Calculation 02/04/2021 8  5 - 18 mmol/L Final     Glucose 02/04/2021 90  70 - 125 mg/dL Final     BUN 02/04/2021 9  8 - 22 mg/dL Final     Creatinine 02/04/2021 0.66  0.60 - 1.10 mg/dL Final     GFR MDRD Af Amer 02/04/2021 >60  >60 mL/min/1.73m2 Final     GFR MDRD Non Af Amer 02/04/2021 >60  >60 mL/min/1.73m2 Final     Bilirubin, Total 02/04/2021 0.5  0.0 - 1.0 mg/dL Final     Calcium 02/04/2021 9.1  8.5 - 10.5 mg/dL Final     Protein, Total 02/04/2021 8.5* 6.0 - 8.0 g/dL Final     Albumin 02/04/2021 4.1  3.5 - 5.0 g/dL Final     Alkaline Phosphatase 02/04/2021 110  45 - 120 U/L Final     AST 02/04/2021 21  0 - 40 U/L Final     ALT 02/04/2021 20  0 - 45 U/L Final     WBC 02/04/2021 4.9  4.0 - 11.0 thou/uL Final     RBC 02/04/2021 4.68  3.80 - 5.40 mill/uL Final     Hemoglobin 02/04/2021 15.3  12.0 - 16.0 g/dL Final     Hematocrit 02/04/2021 43.5  35.0 - 47.0 % Final     MCV 02/04/2021 93  80 - 100 fL Final     MCH 02/04/2021 32.7  27.0 - 34.0 pg Final     MCHC 02/04/2021 35.2  32.0 - 36.0 g/dL Final     RDW 02/04/2021 13.5  11.0 - 14.5 % Final     Platelets 02/04/2021 8* 140 - 440 thou/uL Final     MPV 02/04/2021 11.0  8.5 - 12.5 fL Final     Neutrophils % 02/04/2021 48* 50 - 70 % Final     Lymphocytes % 02/04/2021 37  20 - 40 % Final     Monocytes % 02/04/2021 9  2 - 10 % Final     Eosinophils % 02/04/2021 4  0 - 6 % Final     Basophils % 02/04/2021 2  0 - 2 % Final     Immature Granulocyte % 02/04/2021 1* <=0 % Final     Neutrophils Absolute 02/04/2021 2.3  2.0 - 7.7 thou/uL Final     Lymphocytes Absolute 02/04/2021 1.8  0.8 - 4.4 thou/uL Final     Monocytes Absolute 02/04/2021 0.4  0.0 - 0.9 thou/uL Final     Eosinophils Absolute  02/04/2021 0.2  0.0 - 0.4 thou/uL Final     Basophils Absolute 02/04/2021 0.1  0.0 - 0.2 thou/uL Final     Immature Granulocyte Absolute 02/04/2021 0.0  <=0.0 thou/uL Final   Lab Standing Order on 02/02/2021   Component Date Value Ref Range Status     WBC 02/02/2021 5.8  4.0 - 11.0 thou/uL Final     RBC 02/02/2021 4.47  3.80 - 5.40 mill/uL Final     Hemoglobin 02/02/2021 14.7  12.0 - 16.0 g/dL Final     Hematocrit 02/02/2021 41.3  35.0 - 47.0 % Final     MCV 02/02/2021 92  80 - 100 fL Final     MCH 02/02/2021 32.9  27.0 - 34.0 pg Final     MCHC 02/02/2021 35.6  32.0 - 36.0 g/dL Final     RDW 02/02/2021 13.3  11.0 - 14.5 % Final     Platelets 02/02/2021 16* 140 - 440 thou/uL Final    Slide reviewed, platelet estimate agrees with instrument results    ]

## 2021-06-15 NOTE — TELEPHONE ENCOUNTER
PA Initiation    Medication: Promacta  Insurance Company: [employer]  Pharmacy Filling Rx: [facility]  Start Date: pending PA

## 2021-06-15 NOTE — PROGRESS NOTES
Patricia came to chemo infusion following lab and NP appointments for her first dose of Rituxan.  She has had Rituxan in the past and education on this medication was reviewed.  She has been consented. Peripheral IV inserted with good blood return.  She was premedicated as ordered. Rituxan infused as ordered and she tolerated it well while in clinic without any evidence of reaction.  IV was flushed with ns then removed and site covered.  Patricia left clinic ambulatory.  She will return next week for dose #2.
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
## 2021-06-16 NOTE — TELEPHONE ENCOUNTER
PA Initiation    Medication: Promacta 25mg (2 tablets daily)  Insurance Company: [employer]  Pharmacy Filling Rx: [facility]  Start Date: ASAP    Your Tracking Number is [id number]

## 2021-06-16 NOTE — PROGRESS NOTES
[facility] Hematology and Oncology Progress Note    Patient: Patricia Govea  MRN: [id number]  Date of Service: 03/16/2021        Reason for Visit    Chief Complaint   Patient presents with     Benign Hematology     Idiopathic thrombocytopenic purpura       Assessment and Plan    1. ITP: relapsed again. Last rituxan was in March 2019. We just repeated this in Feb for 4 weeks and it has not helped. She also had splenectomy in January 2019. We started promacta around 3/2. Increased dose last week to 50mg. Tolerating fine. Platelets still not really responding. Mild bleeding. We will continue promacta at same dose. Do labs on tuesdays. See provider in 2 weeks. If platelets are still below 50 next week, we will increase to 75mg daily. We are also going to do peripheral smear.         ECOG Performance   ECOG Performance Status: 0     Distress Assessment  Distress Assessment Score: No distress    Pain  Currently in Pain: No/denies      Problem List    1. Idiopathic thrombocytopenic purpura (H)  Morphology,Smear Review (MORP)    eltrombopag (PROMACTA) 25 MG tablet    HM1(CBC and Differential)        ______________________________________________________________________________    History of Present Illness    DIAGNOSIS:  Recurrent and refractory. originially had ITP in 2011. Then again in 2019 in pregnancy. Now recurrent again.     CURRENT TREATMENT: Rituxan for 4 weeks starting 2/4/21 without any response.   Promacta. Started 25mg on 3/2/21. Increased to 50mg on 3/9.     PAST TREATMENT:   Rituxan and prednisone. Rituxan last dose was March 8, 2019  Steroids  IVIG  Splenectomy 1/28/19. Initial response, but then rapid decrease in platelets.     INTERIM HISTORY:   She is here today for a follow-up visit. She is having mild bruising. Mild nose bleeds, but nothing significant. Mild headache.       Review of Systems    Constitutional  Constitutional (WDL): All constitutional elements are within defined  "limits  Neurosensory  Neurosensory (WDL): All neurosensory elements are within defined limits  Eye   Eye Disorder (WDL): All eye disorder elements are within defined limits  Ear  Ear Disorder (WDL): All ear disorder elements are within defined limits  Cardiovascular  Cardiovascular (WDL): All cardiovascular elements are within defined limits  Pulmonary  Respiratory (WDL): Within Defined Limits  Gastrointestinal  Gastrointestinal (WDL): All gastrointestinal elements are within defined limits  Genitourinary  Genitourinary (WDL): All genitourinary elements are within defined limits  Lymphatic  Lymph (WDL): All lymph disorder elements are within defined limits  Musculoskeletal and Connective Tissue  Musculoskeletal and Connetive Tissue Disorders (WDL): All Musculoskeletal and Connetive Tissue Disorder elements are within defined limits  Integumentary  Integumentary (WDL): Exceptions to WDL(bruising present)  Patient Coping  Patient Coping: Accepting;Open/discussion  Accompanied by  Accompanied by: Alone  Oral Chemo Adherence           Past History  Past Medical History:   Diagnosis Date     Bell's palsy      Immune thrombocytopenia (H)     2012---saw Dr. Haywood HE Heme     Obesity        PHYSICAL EXAM  /80   Pulse 73   Ht 5' 1\" (1.549 m)   Wt 195 lb 6.4 oz (88.6 kg)   SpO2 98%   BMI 36.92 kg/m      GENERAL: no acute distress. Cooperative in conversation. Here alone due to visitor restrictions. Mask on  RESP: Regular respiratory rate. No expiratory wheezes   MUSCULOSKELETAL: no bilateral leg swelling  NEURO: non focal. Alert and oriented x3.   PSYCH: within normal limits. No depression or anxiety.  SKIN: exposed skin is dry intact. Some bruises on arms.       Lab Results    Recent Results (from the past 168 hour(s))   HM1 (CBC with Diff)   Result Value Ref Range    WBC 7.0 4.0 - 11.0 thou/uL    RBC 4.55 3.80 - 5.40 mill/uL    Hemoglobin 14.7 12.0 - 16.0 g/dL    Hematocrit 41.3 35.0 - 47.0 %    MCV 91 80 - 100 " fL    MCH 32.3 27.0 - 34.0 pg    MCHC 35.6 32.0 - 36.0 g/dL    RDW 13.2 11.0 - 14.5 %    Platelets 5 (LL) 140 - 440 thou/uL    MPV      Neutrophils % 54 50 - 70 %    Lymphocytes % 30 20 - 40 %    Monocytes % 8 2 - 10 %    Eosinophils % 5 0 - 6 %    Basophils % 1 0 - 2 %    Immature Granulocyte % 1 (H) <=0 %    Neutrophils Absolute 3.8 2.0 - 7.7 thou/uL    Lymphocytes Absolute 2.1 0.8 - 4.4 thou/uL    Monocytes Absolute 0.6 0.0 - 0.9 thou/uL    Eosinophils Absolute 0.4 0.0 - 0.4 thou/uL    Basophils Absolute 0.1 0.0 - 0.2 thou/uL    Immature Granulocyte Absolute 0.1 (H) <=0.0 thou/uL       Imaging    No results found.   was involved in entire conversation on [employer].   Discussed with Dr. Haywood.     Signed by: Caprice Elena, WHITNEY

## 2021-06-16 NOTE — PROGRESS NOTES
Patient was in today for a lab only check to recheck her blood counts in regards to her diagnosis of ITP.  Patient is currently taking Promacta 75 mg daily.  Lab results from today have been reviewed by Caprice Elena CNP.  Platelet count is 450.  Patient should hold her Promacta until her platelets get down to 150.  Labs should be checked twice weekly until this happens.  Once platelets are down to 150, we will resume Promacta at 50 mg daily.  Patient should have her labs checked again on Friday, 4/16.  She is already scheduled for a lab draw next Tuesday 4/20.  I let her know she may need another lab draw next Friday depending on her platelet count.  Patient verbalized understanding stating that she will stop her Promacta today.  Patient was brought to the  to get that lab appointment for 4/16 made.      Cecy Crump RN

## 2021-06-16 NOTE — PROGRESS NOTES
[facility] Hematology and Oncology Progress Note    Patient: Patricia Govea  MRN: [id number]  Date of Service: 03/30/2021        Reason for Visit    Chief Complaint   Patient presents with     Benign Hematology       Assessment and Plan    1. ITP: relapsed again. Had rituxan in March 2019 and then repeated this in Feb 2020 with relapse for 4 weeks and it has not helped. She also had splenectomy in January 2019. We started promacta around 3/2 at 25mg. Increased dose to 50mg.  Platelets still not really responding. Platelets will still only 8 last week so after 2 weeks at 50mg, we increased to 75mg. Labs are now starting to respond. We will continue that dose.   Do labs on tuesdays. See provider in 3-4 weeks.         ECOG Performance   ECOG Performance Status: 0     Distress Assessment  Distress Assessment Score: No distress    Pain  Currently in Pain: No/denies      Problem List    No diagnosis found.     ______________________________________________________________________________    History of Present Illness    DIAGNOSIS:  Recurrent and refractory. originially had ITP in 2011. Then again in 2019 in pregnancy. Now recurrent again.     CURRENT TREATMENT: Rituxan for 4 weeks starting 2/4/21 without any response.   Promacta. Started 25mg on 3/2/21. Increased to 50mg on 3/9. Increased to 75mg on 3/23/21.     PAST TREATMENT:   Rituxan and prednisone. Rituxan last dose was March 8, 2019  Steroids  IVIG  Splenectomy 1/28/19. Initial response, but then rapid decrease in platelets.     INTERIM HISTORY:   She is here today for a follow-up visit. She is having mild bruising but better. No bleeding.       Review of Systems    Constitutional  Fatigue: None  Fever: None  Chills: None  Weight Gain: 5 - <10% from baseline  Weight Loss: None  Neurosensory  Peripheral Motor Neuropathy: None  Ataxia: None  Peripheral Sensory Neuropathy: None  Confusion: None  Syncope: None  Eye   Eye Disorder (WDL): All eye disorder  elements are within defined limits  Ear  Ear Disorder (WDL): All ear disorder elements are within defined limits  Cardiovascular  Cardiovascular (WDL): All cardiovascular elements are within defined limits  Pulmonary  Respiratory (WDL): Within Defined Limits  Gastrointestinal  Anorexia: None  Constipation: None  Diarrhea: None  Dysphagia: None  Esophagitis: None  Nausea: None  Pharyngitis: None  Vomiting: None  Dysgeusia: None  Dry Mouth: None  Genitourinary  Genitourinary (WDL): All genitourinary elements are within defined limits  Lymphatic  Lymph (WDL): All lymph disorder elements are within defined limits  Musculoskeletal and Connective Tissue  Musculoskeletal and Connetive Tissue Disorders (WDL): All Musculoskeletal and Connetive Tissue Disorder elements are within defined limits  Integumentary  Integumentary (WDL): All integumentary elements are within defined limits  Patient Coping  Patient Coping: Accepting  Distress Assessment  Distress Assessment Score: No distress  Accompanied by  Accompanied by: Alone  Oral Chemo Adherence           Past History  Past Medical History:   Diagnosis Date     Bell's palsy      Immune thrombocytopenia (H)     2012---saw Dr. Haywood HE Heme     Obesity        PHYSICAL EXAM  /84   Pulse 73   Temp 98.6  F (37  C) (Oral)   Wt 199 lb 3.2 oz (90.4 kg)   SpO2 98%   BMI 37.64 kg/m      GENERAL: no acute distress. Cooperative in conversation. Here alone due to visitor restrictions. Mask on  RESP: Regular respiratory rate. No expiratory wheezes   MUSCULOSKELETAL: no bilateral leg swelling  NEURO: non focal. Alert and oriented x3.   PSYCH: within normal limits. No depression or anxiety.  SKIN: exposed skin is dry intact. Some bruises on arms.       Lab Results    Recent Results (from the past 168 hour(s))   HM1 (CBC with Diff)   Result Value Ref Range    WBC 7.5 4.0 - 11.0 thou/uL    RBC 4.25 3.80 - 5.40 mill/uL    Hemoglobin 13.7 12.0 - 16.0 g/dL    Hematocrit 39.4 35.0 -  47.0 %    MCV 93 80 - 100 fL    MCH 32.2 27.0 - 34.0 pg    MCHC 34.8 32.0 - 36.0 g/dL    RDW 13.6 11.0 - 14.5 %    Platelets 49 (LL) 140 - 440 thou/uL    MPV 9.8 8.5 - 12.5 fL    Neutrophils % 42 (L) 50 - 70 %    Lymphocytes % 40 20 - 40 %    Monocytes % 10 2 - 10 %    Eosinophils % 5 0 - 6 %    Basophils % 2 0 - 2 %    Immature Granulocyte % 2 (H) <=0 %    Neutrophils Absolute 3.1 2.0 - 7.7 thou/uL    Lymphocytes Absolute 3.0 0.8 - 4.4 thou/uL    Monocytes Absolute 0.8 0.0 - 0.9 thou/uL    Eosinophils Absolute 0.3 0.0 - 0.4 thou/uL    Basophils Absolute 0.1 0.0 - 0.2 thou/uL    Immature Granulocyte Absolute 0.2 (H) <=0.0 thou/uL       Imaging    No results found.   was involved in entire conversation on telephone.       Signed by: Caprice Elena, WHITNEY

## 2021-06-16 NOTE — PATIENT INSTRUCTIONS - HE
Recent Results (from the past 24 hour(s))   HM1 (CBC with Diff)   Result Value Ref Range    WBC 7.5 4.0 - 11.0 thou/uL    RBC 4.25 3.80 - 5.40 mill/uL    Hemoglobin 13.7 12.0 - 16.0 g/dL    Hematocrit 39.4 35.0 - 47.0 %    MCV 93 80 - 100 fL    MCH 32.2 27.0 - 34.0 pg    MCHC 34.8 32.0 - 36.0 g/dL    RDW 13.6 11.0 - 14.5 %    Platelets 49 (LL) 140 - 440 thou/uL    MPV 9.8 8.5 - 12.5 fL    Neutrophils % 42 (L) 50 - 70 %    Lymphocytes % 40 20 - 40 %    Monocytes % 10 2 - 10 %    Eosinophils % 5 0 - 6 %    Basophils % 2 0 - 2 %    Immature Granulocyte % 2 (H) <=0 %    Neutrophils Absolute 3.1 2.0 - 7.7 thou/uL    Lymphocytes Absolute 3.0 0.8 - 4.4 thou/uL    Monocytes Absolute 0.8 0.0 - 0.9 thou/uL    Eosinophils Absolute 0.3 0.0 - 0.4 thou/uL    Basophils Absolute 0.1 0.0 - 0.2 thou/uL    Immature Granulocyte Absolute 0.2 (H) <=0.0 thou/uL

## 2021-06-16 NOTE — PROGRESS NOTES
Patient is in today for a lab only check.    Notes from previous encounters:    Note from 4/13/2021 is as follows Patient was in today for a lab only check to recheck her blood counts in regards to her diagnosis of ITP.  Patient is currently taking Promacta 75 mg daily.  Lab results from today have been reviewed by Caprice Elena CNP.  Platelet count is 450.  Patient should hold her Promacta until her platelets get down to 150.  Labs should be checked twice weekly until this happens.  Once platelets are down to 150, we will resume Promacta at 50 mg daily.  Patient should have her labs checked again on Friday, 4/16.  She is already scheduled for a lab draw next Tuesday 4/20.  I let her know she may need another lab draw next Friday depending on her platelet count.  Patient verbalized understanding stating that she will stop her Promacta today.  Patient was brought to the  to get that lab appointment for 4/16 made.       4/20/2021 = platelet count of 321.  Patient should continue to hold her Promacta until her platelets get down to 150.  Labs should continue to be checked twice weekly.  Patient does not have another one scheduled for later this week but I advised her to do so before she leaves.  She does have an appointment next week.  Patient verbalized understanding     4/23/2021 - PLT count is 199, pt advised to continue to hold her Promacta and check labs again Wednesday 4/28/21 prior to her OV with Dr Haywood.  Pt verbalized understanding.      Mayuri Valenzuela RN

## 2021-06-16 NOTE — PROGRESS NOTES
Patient is in today for a lab only check.  Note from 4/13/2021 is as follows Patient was in today for a lab only check to recheck her blood counts in regards to her diagnosis of ITP.  Patient is currently taking Promacta 75 mg daily.  Lab results from today have been reviewed by Caprice Elena CNP.  Platelet count is 450.  Patient should hold her Promacta until her platelets get down to 150.  Labs should be checked twice weekly until this happens.  Once platelets are down to 150, we will resume Promacta at 50 mg daily.  Patient should have her labs checked again on Friday, 4/16.  She is already scheduled for a lab draw next Tuesday 4/20.  I let her know she may need another lab draw next Friday depending on her platelet count.  Patient verbalized understanding stating that she will stop her Promacta today.  Patient was brought to the  to get that lab appointment for 4/16 made.      4/20/2021 = platelet count of 321.  Patient should continue to hold her Promacta until her platelets get down to 150.  Labs should continue to be checked twice weekly.  Patient does not have another one scheduled for later this week but I advised her to do so before she leaves.  She does have an appointment next week.  Patient verbalized understanding    Cecy Crump RN

## 2021-06-16 NOTE — PROGRESS NOTES
Patient was in today for lab only and waited in lobby for results.  Critical call came from lab at 1352 that her platelet count was 8.  Patient is currently on promacta for her low platelet count.  Promacta 25mg started 3/2/21.  Promacta 50mg started 3/9/21.  We have asked the patient to increase Promacta to 75mg daily today and we will recheck on 3/30/21 when she comes back for labs and to see Caprice Elena CNP.  She verbalized understanding and I am sending in a new prescription today to show increase in dosage per Caprice Elena CNP.    Cecy Crump RN

## 2021-06-16 NOTE — TELEPHONE ENCOUNTER
Patient was in for a lab only check today to recheck her blood counts in regards to her diagnosis of ITP.  Patient is currently taking Promacta 75 mg daily.  Platelet count today has been reviewed by both Dr. Haywood as well as Caprice Elena CNP.  Platelet count is 170.  Patient should remain on the same dose of Promacta at 75 mg daily and recheck her counts next week.  Patient verbalized understanding.  She was also told that if her platelet count goes above 200, we will likely decrease the dose of Promacta.  She again verbalized understanding.    Cecy Crump RN

## 2021-06-17 NOTE — TELEPHONE ENCOUNTER
Patient was in today for a lab only appointment for a recheck of her platelet count for her diagnosis of idiopathic thrombocytopenic purpura.  Platelet count today is 314.  Patient should continue to hold her Promacta and we will push her appointment out to the week of 5/24 for lab and to see Caprice Elena CNP.  Patient verbalized understanding and was transferred to the  to change her appointment from 5/18 to the week after.    Cecy Crump RN

## 2021-06-17 NOTE — TELEPHONE ENCOUNTER
Patient was in today for a lab only appointment.  We are watching her platelet count for her diagnosis of idiopathic thrombocytopenic purpura.  Platelet count today is 259.  Patient should continue to hold her Promacta and keep her already scheduled weekly lab appointment for next Tuesday as well as lab, NP on 5/18.  Patient verbalized understanding and was appreciative.    Per Caprice Elena, CNP note from 4/28/21... Labs then were actually elevated so we have been holding since 4/16. Still in the normal range. Once they drop below 150, we will restart promacta at 50mg daily. Continue weekly labs. If platelets go back above 200, drop to 25mg. If they go above 400 again, hold medication until back below 150. I will see her back in 2-3 weeks.     Cecy Crump RN

## 2021-06-17 NOTE — TELEPHONE ENCOUNTER
Telephone Encounter by Pat La at 2/15/2021 11:11 AM     Author: Pat La Service: -- Author Type: Financial Resource Guide    Filed: 2/15/2021 11:12 AM Encounter Date: 2/10/2021 Status: Signed    : Pat La (Financial Resource Guide)       Received the fax for LAURA, I faxed this in but it is a little bit blurry so if we could find the original copy and have it emailed to us it might be more legible, but hopefully LAURA can read it.

## 2021-06-17 NOTE — PROGRESS NOTES
"04/28/21 3:21 PM    Patricia Govea is a 40 y.o. female who presents for:    Chief Complaint   Patient presents with     Benign Hematology     Idiopathic thrombocytopenic purpura     Initial Vitals: /82   Pulse 70   Ht 5' 1\" (1.549 m)   Wt 201 lb 6.4 oz (91.4 kg)   SpO2 99%   BMI 38.05 kg/m       Estimated body mass index is 38.05 kg/m  as calculated from the following:    Height as of this encounter: 5' 1\" (1.549 m).    Weight as of this encounter: 201 lb 6.4 oz (91.4 kg).     Body surface area is 1.98 meters squared.      Allergies reviewed: Yes  Medications reviewed: Yes    Refills needed: No    Clinical concerns: no concerns      Marie Wang CMA      "

## 2021-06-17 NOTE — PROGRESS NOTES
[facility] Hematology and Oncology Progress Note    Patient: Patricia Govea  MRN: [id number]  Date of Service: 04/28/2021        Reason for Visit    Chief Complaint   Patient presents with     Benign Hematology     Idiopathic thrombocytopenic purpura       Assessment and Plan    1. ITP: relapsed again. Had rituxan in March 2019 and then repeated this in Feb 2020 with relapse for 4 weeks and it has not helped. She also had splenectomy in January 2019. We started promacta around 3/2/21 at 25mg. Increased dose to 50mg.  Platelets still not really responding. Platelets will still only 8 last week so after 2 weeks at 50mg, we increased to 75mg. Labs then were actually elevated so we have been holding since 4/16. Still in the normal range. Once they drop below 150, we will restart promacta at 50mg daily. Continue weekly labs. If platelets go back above 200, drop to 25mg. If they go above 400 again, hold medication until back below 150. I will see her back in 2-3 weeks.         ECOG Performance   ECOG Performance Status: 0     Distress Assessment  Distress Assessment Score: No distress    Pain  Currently in Pain: No/denies      Problem List    1. Idiopathic thrombocytopenic purpura (H)          ______________________________________________________________________________    History of Present Illness    DIAGNOSIS:  Recurrent and refractory. originially had ITP in 2011. Then again in 2019 in pregnancy. Now recurrent again.     CURRENT TREATMENT: Rituxan for 4 weeks starting 2/4/21 without any response.   Promacta. Started 25mg on 3/2/21. Increased to 50mg on 3/9. Increased to 75mg on 3/23/21. Held starting 4/16/21 due to platelets of 494.     PAST TREATMENT:   Rituxan and prednisone. Rituxan last dose was March 8, 2019  Steroids  IVIG  Splenectomy 1/28/19. Initial response, but then rapid decrease in platelets.     INTERIM HISTORY:   She is here today for a follow-up visit. She is doing well.  No bleeding.  "Currently still holding promacta.       Review of Systems    Constitutional  Constitutional (WDL): All constitutional elements are within defined limits  Neurosensory  Neurosensory (WDL): All neurosensory elements are within defined limits  Eye   Eye Disorder (WDL): All eye disorder elements are within defined limits  Ear  Ear Disorder (WDL): All ear disorder elements are within defined limits  Cardiovascular  Cardiovascular (WDL): All cardiovascular elements are within defined limits  Pulmonary  Respiratory (WDL): Within Defined Limits  Gastrointestinal  Gastrointestinal (WDL): All gastrointestinal elements are within defined limits  Genitourinary  Genitourinary (WDL): All genitourinary elements are within defined limits  Lymphatic  Lymph (WDL): All lymph disorder elements are within defined limits  Musculoskeletal and Connective Tissue  Musculoskeletal and Connetive Tissue Disorders (WDL): All Musculoskeletal and Connetive Tissue Disorder elements are within defined limits  Integumentary  Integumentary (WDL): All integumentary elements are within defined limits  Patient Coping  Patient Coping: Accepting;Open/discussion  Accompanied by  Accompanied by: Alone  Oral Chemo Adherence             Past History  Past Medical History:   Diagnosis Date     Bell's palsy      Immune thrombocytopenia (H)     2012---saw Dr. Haywood HE Heme     Obesity        PHYSICAL EXAM  /82   Pulse 70   Ht 5' 1\" (1.549 m)   Wt 201 lb 6.4 oz (91.4 kg)   SpO2 99%   BMI 38.05 kg/m      GENERAL: no acute distress. Cooperative in conversation. Here alone due to visitor restrictions. Mask on  RESP: Regular respiratory rate. No expiratory wheezes   MUSCULOSKELETAL: no bilateral leg swelling  NEURO: non focal. Alert and oriented x3.   PSYCH: within normal limits. No depression or anxiety.  SKIN: exposed skin is dry intact. Some bruises on arms.       Lab Results    Recent Results (from the past 168 hour(s))   HM1 (CBC with Diff)   Result " Value Ref Range    WBC 8.0 4.0 - 11.0 thou/uL    RBC 4.39 3.80 - 5.40 mill/uL    Hemoglobin 14.1 12.0 - 16.0 g/dL    Hematocrit 40.1 35.0 - 47.0 %    MCV 91 80 - 100 fL    MCH 32.1 27.0 - 34.0 pg    MCHC 35.2 32.0 - 36.0 g/dL    RDW 13.1 11.0 - 14.5 %    Platelets 199 140 - 440 thou/uL    MPV 8.8 8.5 - 12.5 fL    Neutrophils % 37 (L) 50 - 70 %    Lymphocytes % 47 (H) 20 - 40 %    Monocytes % 7 2 - 10 %    Eosinophils % 7 (H) 0 - 6 %    Basophils % 2 0 - 2 %    Immature Granulocyte % 1 (H) <=0 %    Neutrophils Absolute 3.0 2.0 - 7.7 thou/uL    Lymphocytes Absolute 3.8 0.8 - 4.4 thou/uL    Monocytes Absolute 0.5 0.0 - 0.9 thou/uL    Eosinophils Absolute 0.5 (H) 0.0 - 0.4 thou/uL    Basophils Absolute 0.1 0.0 - 0.2 thou/uL    Immature Granulocyte Absolute 0.0 <=0.0 thou/uL   HM1 (CBC with Diff)   Result Value Ref Range    WBC 8.4 4.0 - 11.0 thou/uL    RBC 4.33 3.80 - 5.40 mill/uL    Hemoglobin 13.8 12.0 - 16.0 g/dL    Hematocrit 39.4 35.0 - 47.0 %    MCV 91 80 - 100 fL    MCH 31.9 27.0 - 34.0 pg    MCHC 35.0 32.0 - 36.0 g/dL    RDW 13.3 11.0 - 14.5 %    Platelets 151 140 - 440 thou/uL    MPV 9.0 8.5 - 12.5 fL    Neutrophils % 48 (L) 50 - 70 %    Lymphocytes % 39 20 - 40 %    Monocytes % 8 2 - 10 %    Eosinophils % 4 0 - 6 %    Basophils % 1 0 - 2 %    Immature Granulocyte % 0 <=0 %    Neutrophils Absolute 4.0 2.0 - 7.7 thou/uL    Lymphocytes Absolute 3.3 0.8 - 4.4 thou/uL    Monocytes Absolute 0.6 0.0 - 0.9 thou/uL    Eosinophils Absolute 0.3 0.0 - 0.4 thou/uL    Basophils Absolute 0.1 0.0 - 0.2 thou/uL    Immature Granulocyte Absolute 0.0 <=0.0 thou/uL       Imaging    No results found.  Pt declined  today.       Signed by: Caprice Elena, WHITNEY

## 2021-06-17 NOTE — TELEPHONE ENCOUNTER
Telephone Encounter by Monica Beltre at 2/19/2021 10:30 AM     Author: Monica Beltre Service: -- Author Type: Financial Resource Guide    Filed: 2/19/2021 10:31 AM Encounter Date: 2/10/2021 Status: Signed    : Monica Beltre (Financial Resource Guide)

## 2021-06-23 NOTE — ANESTHESIA POSTPROCEDURE EVALUATION
Patient: Patricia Govea  SPLENECTOMY, ROBOT-ASSISTED, LAPAROSCOPIC, USING DA STEPHEN XI  Anesthesia type: general    Patient location: PACU  Last vitals:   Vitals:    01/28/19 1920   BP: 117/55   Pulse: 92   Resp: 22   Temp:    SpO2: 95%     Post vital signs: stable  Level of consciousness: awake and responds to simple questions  Post-anesthesia pain: pain controlled  Post-anesthesia nausea and vomiting: no  Pulmonary: unassisted  Cardiovascular: stable  Hydration: adequate  Anesthetic events: no    QCDR Measures:  ASA# 11 - Vidhi-op Cardiac Arrest: ASA11B - Patient did NOT experience unanticipated cardiac arrest  ASA# 12 - Vidhi-op Mortality Rate: ASA12B - Patient did NOT die  ASA# 13 - PACU Re-Intubation Rate: ASA13B - Patient did NOT require a new airway mgmt  ASA# 10 - Composite Anes Safety: ASA10A - No serious adverse event    Additional Notes:

## 2021-06-23 NOTE — PROGRESS NOTES
Patient in today for lab only prior to a lab and MD visit next week.  Her Platelet count today is 149, so it has come back to normal limits.  She was happy to hear this.  She waited in the lobby for results.  She will be back next week.    Cecy Crump RN

## 2021-06-23 NOTE — ANESTHESIA CARE TRANSFER NOTE
Last vitals:   Vitals:    01/28/19 1854   BP: 134/68   Pulse: (!) 103   Resp: 17   Temp: 37.1  C (98.7  F)   SpO2: 98%     Volatile agents turned off, muscle relaxant reversed, 4/4 twitches with sustained tetany. Pt breathing spontaneously with adequate tidal volumes, following commands, gently suctioned oropharynx, extubated without issue. 10LPM O2 applied via face mask.Transported by CRNA, SRNA, and RN to recovery.      Patient's level of consciousness is drowsy  Spontaneous respirations: yes  Maintains airway independently: yes  Dentition unchanged: yes  Oropharynx: oropharynx clear of all foreign objects    QCDR Measures:  ASA# 20 - Surgical Safety Checklist: WHO surgical safety checklist completed prior to induction    PQRS# 430 - Adult PONV Prevention: 4558F - Pt received => 2 anti-emetic agents (different classes) preop & intraop  ASA# 8 - Peds PONV Prevention: NA - Not pediatric patient, not GA or 2 or more risk factors NOT present  PQRS# 424 - Vidhi-op Temp Management: 4559F - At least one body temp DOCUMENTED => 35.5C or 95.9F within required timeframe  PQRS# 426 - PACU Transfer Protocol: - Transfer of care checklist used  ASA# 14 - Acute Post-op Pain: ASA14B - Patient did NOT experience pain >= 7 out of 10

## 2021-06-24 NOTE — PROGRESS NOTES
Pt arrived ambulatory to clinic for Cycle # 1 Day # 22 of her Rituxan regimen.  Per Caprice's note, pt should have labs today so HM1 was drawn.  PLTs are 94 today, updated pt who was very happy with results.  IV was started using aseptic technique without difficulties with excellent blood return.  Administered premedications and Rituxan per MD order.  Pt tolerated infusion well, no s/s of infusion reaction.  IV was flushed with NS then D/C'd using 2x2 and Coban.  Pt verbalized understanding of plan of care and return to clinic.

## 2021-06-24 NOTE — PROGRESS NOTES
Patient arrived ambulatory with her  for Rituxan infusion. IV access obtained in right hand with blood return confirmed. Lab results reviewed and noted platelets at 8/ Hgb at 10.4 and reviewed with Dr. Haywood at 0955 and Per Dr. Haywood proceed as scheduled with Rituxan today/pharmacist notified. Pre-medications given as prescribed. Patient tolerated Rituxan and tolerated in multistep fashion over 90 minute infusion as ordered. Patient resting in chair at times during visit and patient did eat lunch today. Patient independent with ambulation to bathroom to void - patient reports no signs of blood in urine. AVS printed and reviewed with patient. Patient discharged to home ambulatory with her . Patient reports she has been directed per her MD to call her MD/report to ED if any signs of bleeding. Patient reports will return as scheduled for next appointment.

## 2021-06-24 NOTE — PATIENT INSTRUCTIONS - HE
Patient Education     Rituximab Solution for injection  What is this medicine?  RITUXIMAB (ri TUX i mab) is a monoclonal antibody. This medicine changes the way the body's immune system works. It is used commonly to treat non-Hodgkin's lymphoma and other conditions. In cancer cells, this drug targets a specific protein within cancer cells and stops the cancer cells from growing. It is also used to treat rhuematoid arthritis (RA). In RA, this medicine slow the inflammatory process and help reduce joint pain and swelling. This medicine is often used with other cancer or arthritis medications.  This medicine may be used for other purposes; ask your health care provider or pharmacist if you have questions.  What should I tell my health care provider before I take this medicine?  They need to know if you have any of these conditions:    blood disorders    heart disease    history of hepatitis B    infection (especially a virus infection such as chickenpox, cold sores, or herpes)    irregular heartbeat    kidney disease    lung or breathing disease, like asthma    lupus    an unusual or allergic reaction to rituximab, mouse proteins, other medicines, foods, dyes, or preservatives    pregnant or trying to get pregnant    breast-feeding  How should I use this medicine?  This medicine is for infusion into a vein. It is administered in a hospital or clinic by a specially trained health care professional.  A special MedGuide will be given to you by the pharmacist with each prescription and refill. Be sure to read this information carefully each time.  Talk to your pediatrician regarding the use of this medicine in children. This medicine is not approved for use in children.  Overdosage: If you think you have taken too much of this medicine contact a poison control center or emergency room at once.  NOTE: This medicine is only for you. Do not share this medicine with others.  What if I miss a dose?  It is important not to miss  a dose. Call your doctor or health care professional if you are unable to keep an appointment.  What may interact with this medicine?    cisplatin    medicines for blood pressure    some other medicines for arthritis    vaccines  This list may not describe all possible interactions. Give your health care provider a list of all the medicines, herbs, non-prescription drugs, or dietary supplements you use. Also tell them if you smoke, drink alcohol, or use illegal drugs. Some items may interact with your medicine.  What should I watch for while using this medicine?  Report any side effects that you notice during your treatment right away, such as changes in your breathing, fever, chills, dizziness or lightheadedness. These effects are more common with the first dose.  Visit your prescriber or health care professional for checks on your progress. You will need to have regular blood work. Report any other side effects. The side effects of this medicine can continue after you finish your treatment. Continue your course of treatment even though you feel ill unless your doctor tells you to stop.  Call your doctor or health care professional for advice if you get a fever, chills or sore throat, or other symptoms of a cold or flu. Do not treat yourself. This drug decreases your body's ability to fight infections. Try to avoid being around people who are sick.  This medicine may increase your risk to bruise or bleed. Call your doctor or health care professional if you notice any unusual bleeding.  Be careful brushing and flossing your teeth or using a toothpick because you may get an infection or bleed more easily. If you have any dental work done, tell your dentist you are receiving this medicine.  Avoid taking products that contain aspirin, acetaminophen, ibuprofen, naproxen, or ketoprofen unless instructed by your doctor. These medicines may hide a fever.  Do not become pregnant while taking this medicine. Women should  inform their doctor if they wish to become pregnant or think they might be pregnant. There is a potential for serious side effects to an unborn child. Talk to your health care professional or pharmacist for more information. Do not breast-feed an infant while taking this medicine.  What side effects may I notice from receiving this medicine?  Side effects that you should report to your doctor or health care professional as soon as possible:    allergic reactions like skin rash, itching or hives, swelling of the face, lips, or tongue    low blood counts - this medicine may decrease the number of white blood cells, red blood cells and platelets. You may be at increased risk for infections and bleeding.    signs of infection - fever or chills, cough, sore throat, pain or difficulty passing urine    signs of decreased platelets or bleeding - bruising, pinpoint red spots on the skin, black, tarry stools, blood in the urine    signs of decreased red blood cells - unusually weak or tired, fainting spells, lightheadedness    breathing problems    confused, not responsive    chest pain    fast, irregular heartbeat    feeling faint or lightheaded, falls    mouth sores    redness, blistering, peeling or loosening of the skin, including inside the mouth    stomach pain    swelling of the ankles, feet, or hands    trouble passing urine or change in the amount of urine  Side effects that usually do not require medical attention (report to your doctor or other health care professional if they continue or are bothersome):    anxiety    headache    loss of appetite    muscle aches    nausea    night sweats  This list may not describe all possible side effects. Call your doctor for medical advice about side effects. You may report side effects to FDA at 2-063-FDA-4386.  Where should I keep my medicine?  This drug is given in a hospital or clinic and will not be stored at home.  NOTE:This sheet is a summary. It may not cover all  possible information. If you have questions about this medicine, talk to your doctor, pharmacist, or health care provider. Copyright  2015 Gold Standard

## 2021-06-24 NOTE — PROGRESS NOTES
[facility] Hematology and Oncology Progress Note    Patient: Patricia Govea  MRN: [id number]  Date of Service: 03/01/2019        Reason for Visit    Chief Complaint   Patient presents with     Benign Hematology     Acute ITP       Assessment and Plan    1. ITP: relapsed. Pt is 21 weeks pregnant.  Pt has now been started on Rituxan.  She has had 2 weeks and is here for week 3.  So far her platelet count has not responded.  She is currently 1000 today.  Is not having any bleeding or bruising complications.  Will continue on weekly Rituxan she will get her third week today.  She will return in a week for labs in her fourth and final dose of Rituxan.  She will then be seen the following Friday which is the 15th with labs.  She will see Dr. Navarro on that day since Dr. Haywood is out of the office.  Patient is slightly anxious about her platelets being low.  I did tell her that at any signs of any bleeding whatsoever she needs to go to the ER immediately.  Does continue on 20 mg of prednisone.  We will taper that off she will do 10 mg daily for the next 7-10 days and then she will stop.  She will just use up the tablets that she currently has.    ECOG Performance   ECOG Performance Status: 0     Distress Assessment  Distress Assessment Score: No distress    Pain  Currently in Pain: No/denies      Problem List    1. Thrombocytopenia during pregnancy (H)          ______________________________________________________________________________    History of Present Illness    DIAGNOSIS: ITP is pregnancy. Recurrent and refractory. originially had ITP in 2011.     CURRENT TREATMENT: Rituxan and prednisone.     PAST TREATMENT: Steroids  IVIG  Splenectomy 1/28/19. Initial response, but then rapid decrease in platelets.     INTERIM HISTORY:   She is here today for a follow-up visit, labs and to continue on Rituxan.  She states that she is having no issues with the Rituxan is tolerating it well.  He has questions about  her prednisone dosing and whether she needs to continue on that.  She is currently taking 2 mg a day which she has been doing for about 2 weeks.  She denies any new bone or back pain.  She states that she has very mild bruising says she noticed a bruise where she had her IV from last week.  Has not noticed any bleeding.  She says if she does blow her nose a lot she gets a little bit of bloody drainage but it is not actually a bloody nose.  No bleeding.  No rectal bleeding.  Not noticed any petechiae    Pain Status  Currently in Pain: No/denies    Review of Systems    Oncology Nurse Assessment/CMA Intake: Constitutional  Constitutional (WDL): All constitutional elements are within defined limits  Fatigue: No Concerns  Fever: No Concerns  Chills: No Concerns  Weight Gain: No Concerns  Weight Loss: No Concerns  Hot flashes/Night Sweats: No Concerns  Neurosensory  Neurosensory (WDL): All neurosensory elements are within defined limits  Peripheral Motor Neuropathy: No Concerns  Ataxia: No Concerns  Peripheral Sensory Neuropathy: No Concerns  Confusion: No Concerns  Dizziness: No Concerns  Syncope: No Concerns  Eye   Eye Disorder (WDL): All eye disorder elements are within defined limits  Blurred Vision: No Concerns  Dry Eye: No Concerns  Eye Pain: No Concerns  Watering Eyes: No Concerns  Ear  Ear Disorder (WDL): All ear disorder elements are within defined limits  Ear Pain: No Concerns  Tinnitus: No Concerns  Cardiovascular  Cardiovascular (WDL): All cardiovascular elements are within defined limits  Palpitations: No Concerns  Edema: No Concerns  SVT, DVT/PE: No Concerns  Chest Pain - Cardiac: No Concerns  Pulmonary  Respiratory (WDL): Within Defined Limits  Cough: No Concerns  Dyspnea: No Concerns  Hypoxia: No Concerns  Gastrointestinal  Gastrointestinal (WDL): All gastrointestinal elements are within defined limits  Anorexia: No Concerns  Nausea: No Concerns  Vomiting: No Concerns  Dehydration: No Concerns  Dysgeusia:  No Concerns  Dysphagia: No Concerns  Mucositis Oral: No Concerns  Esophagitis: No Concerns  Constipation: No Concerns  Diarrhea: No Concerns  Pharyngitis: No Concerns  Dry Mouth: No Concerns  Genitourinary  Genitourinary (WDL): All genitourinary elements are within defined limits  Urinary Frequency: No Concerns  Urinary Retention: No Concerns  Urinary Tract Pain: No Concerns  Lymphatic  Lymph (WDL): All lymph disorder elements are within defined limits  Lymphedema: No Concerns  Lymph Node Discomfort: No Concerns  Musculoskeletal and Connective Tissue  Musculoskeletal and Connetive Tissue Disorders (WDL): All Musculoskeletal and Connetive Tissue Disorder elements are within defined limits  Arthralgia: No Concerns  Bone Pain: No Concerns  Muscle Weakness : No Concerns  Myalgia: No Concerns  Integumentary  Integumentary (WDL): All integumentary elements are within defined limits  Alopecia: No Concerns  Rash Maculo-Papular: No Concerns  Pruritus: No Concerns  Urticaria: No Concerns  Palmar-Plantar Erythrodysesthesia Syndrome: No Concerns  Flushing: No Concerns  Patient Coping  Patient Coping: Accepting  Accompanied by  Accompanied by: Family Member  Oral Chemo Adherence         Past History  Past Medical History:   Diagnosis Date     Bell's palsy      Immune thrombocytopenia (H)     2012---saw Dr. Haywood [facility] Heme     Obesity        PHYSICAL EXAM:  /70   Pulse (!) 108   Temp 97.8  F (36.6  C) (Oral)   Wt 190 lb 1.6 oz (86.2 kg)   SpO2 98%   BMI 34.77 kg/m    GENERAL: no acute distress. Cooperative in conversation. Here with   HEENT: pupils are equal, round and reactive. Oromucosa is clean and intact. No ulcerations or mucositis noted. No bleeding noted.  RESP: lungs are clear bilaterally per auscultation. Regular respiratory rate. No wheezes or rhonchi.  CV: Regular, rate and rhythm. No murmurs.  ABD: soft, nontender. Positive bowel sounds. Pregnant belly  MUSCULOSKELETAL: No lower extremity swelling.    NEURO: non focal. Alert and oriented x3.   PSYCH: within normal limits. No depression or anxiety.  SKIN: warm dry intact, 2 small bruises noted.   LYMPH: no cervical, supraclavicular lymphadenopathy      Lab Results    Recent Results (from the past 168 hour(s))   HM1 (CBC with Diff)   Result Value Ref Range    WBC 12.2 (H) 4.0 - 11.0 thou/uL    RBC 3.30 (L) 3.80 - 5.40 mill/uL    Hemoglobin 10.9 (L) 12.0 - 16.0 g/dL    Hematocrit 31.4 (L) 35.0 - 47.0 %    MCV 95 80 - 100 fL    MCH 33.0 27.0 - 34.0 pg    MCHC 34.7 32.0 - 36.0 g/dL    RDW 13.4 11.0 - 14.5 %    Platelets 1 (LL) 140 - 440 thou/uL    MPV  8.5 - 12.5 fL   Manual Differential   Result Value Ref Range    Total Neutrophils % 81 (H) 50 - 70 %    Lymphocytes % 14 (L) 20 - 40 %    Monocytes % 2 2 - 10 %    Eosinophils %  1 0 - 6 %    Basophils % 0 0 - 2 %    Metamyelocytes % 1 <=1 %    Myelocytes % 1 <=1 %    Total Neutrophils Absolute 9.9 (H) 2.0 - 7.7 thou/ul    Lymphocytes Absolute 1.7 0.8 - 4.4 thou/uL    Monocytes Absolute 0.2 0.0 - 0.9 thou/uL    Eosinophils Absolute 0.1 0.0 - 0.4 thou/uL    Basophils Absolute 0.0 0.0 - 0.2 thou/uL    Metamyelocytes Absolute 0.1 <=0.1 thou/uL    Myelocytes Absolute 0.1 <=0.1 thou/uL    Platelet Estimate Decreased (!) Normal       Imaging    Mr Liver Without Contrast    Result Date: 2/12/2019  MR LIVER WO CONTRAST 2/12/2019 4:44 PM INDICATION: Idiopathic thrombocytopenic purpura while pregnant. Recent splenectomy. Concern for remnant splenic tissue. TECHNIQUE: Routine MRI liver protocol with multiple axial and coronal T1 and T2 sequences. Exam without and with IV contrast. IV CONTRAST: None. COMPARISON: Abdominal ultrasound 01/25/2019. Obstetric ultrasound 01/29/2019. FINDINGS: Post splenectomy. No residual splenic tissue identified by noncontrast MR. Cholelithiasis. No pericholecystic inflammation. 12 mm and 6 mm circumscribed lesions of the right hepatic lobe with moderately high T2-weighted signal intensity of a  benign appearance suggestive of a small hemangioma. Pancreas, adrenal glands, and kidneys are negative. Hernandez intrauterine gestation in transverse position incompletely evaluated.     CONCLUSION: 1.  Post splenectomy. No remnant splenic tissue reliably detected. 2.  Cholelithiasis. 3.  Small hepatic lesions may be hemangiomata. These may be more specifically characterized by nonemergent follow-up ultrasound. 4.  Single intrauterine gestation incompletely evaluated.        Signed by: Caprice Elena, CNP

## 2021-06-25 NOTE — PROGRESS NOTES
[facility] Hematology and Oncology Progress Note    Patient: Patricia Govea  MRN: [id number]  Date of Service: 03/15/2019        Reason for Visit    Chief Complaint   Patient presents with     Benign Hematology     Thrombocytopenia during pregnancy        Assessment and Plan    Refractory ITP    Platelet count today up to 188,000.  Excellent response to Rituxan which she finished just last week.  Continue steroid taper.  Continue observation.  Will monitor CBC weekly and have her return to see Dr. Haywood in about 4 weeks.    Plan: Check weekly CBC  Continue steroid taper  Follow-up with Dr. Haywood in 4 weeks    Measurable disease: CBC    Current therapy: Prednisone taper    Treatment history: Rituxan weekly for 4 weeks completed March 8, 2019  Splenectomy January 28, 2019    Cancer Staging  No matching staging information was found for the patient.    ECOG Performance   ECOG Performance Status: 0    Distress Assessment  Distress Assessment Score: No distress    Pain           Problem List    1. Acute ITP (H)  HM1(CBC and Differential)    HM1(CBC and Differential)        CC: Clinic, [place]    ______________________________________________________________________________    History of Present Illness    Ms. Patricia Govea returns for follow-up.  She was seen 2 weeks ago for visit.  She completed fourth weekly dose of Rituxan last week.  Doing fine with no bleeding symptoms.  No pain or distress.  Continues on steroid taper.  No other issues.    Pain Status  Currently in Pain: No/denies    Review of Systems    Constitutional     Neurosensory     Cardiovascular     Pulmonary     Gastrointestinal     Genitourinary     Integumentary     Patient Coping  Patient Coping: Accepting  Distress Assessment  Distress Assessment Score: No distress  Accompanied by  Accompanied by: Alone    Past History  Past Medical History:   Diagnosis Date     Bell's palsy      Immune thrombocytopenia (H)      ---saw Dr. Haywood [facility] Heme     Obesity          Past Surgical History:   Procedure Laterality Date      SECTION       MA LAP,SPLENECTOMY N/A 2019    Procedure: SPLENECTOMY, ROBOT-ASSISTED, LAPAROSCOPIC, USING DA STEPHEN XI;  Surgeon: Bob Nicole MD;  Location: [facility] [place];  Service: General       Physical Exam    Recent Vitals 3/15/2019   Weight 193 lbs 8 oz   BSA (m2) 1.96 m2   /67   Pulse 104   Temp 97.4   Temp src 1   SpO2 97   Some recent data might be hidden       GENERAL: Alert and oriented. Seated comfortably. In no distress.    HEAD: Atraumatic and normocephalic.  Has a full head of hair.    EYES: DARREL, EOMI.  No pallor.  No icterus.    Oral cavity: no mucosal lesion or tonsillar enlargement.    NECK: supple. JVP normal.  No thyroid enlargement.    LYMPH NODES: No palpable, cervical, axillary or inguinal lymphadenopathy.    CHEST: clear to auscultation bilaterally.  Resonant to percussion throughout bilaterally.  Symmetrical breath movements bilaterally.    CVS: S1 and S2 are heard. Regular rate and rhythm.  No murmur or gallop or rub heard.    ABDOMEN: Soft. Not tender. Not distended.  No palpable hepatomegaly or splenomegaly.  No other mass palpable.  Bowel sounds heard.    EXTREMITIES: Warm.  No peripheral edema.    SKIN: no rash, or bruising or purpura.        Lab Results    Recent Results (from the past 168 hour(s))   HM1 (CBC with Diff)   Result Value Ref Range    WBC 11.9 (H) 4.0 - 11.0 thou/uL    RBC 3.34 (L) 3.80 - 5.40 mill/uL    Hemoglobin 10.9 (L) 12.0 - 16.0 g/dL    Hematocrit 32.5 (L) 35.0 - 47.0 %    MCV 97 80 - 100 fL    MCH 32.6 27.0 - 34.0 pg    MCHC 33.5 32.0 - 36.0 g/dL    RDW 13.9 11.0 - 14.5 %    Platelets 188 140 - 440 thou/uL    MPV 10.4 8.5 - 12.5 fL    Neutrophils % 71 (H) 50 - 70 %    Lymphocytes % 25 20 - 40 %    Monocytes % 4 2 - 10 %    Eosinophils % 0 0 - 6 %    Basophils % 0 0 - 2 %    Neutrophils Absolute 8.1 (H) 2.0 - 7.7 thou/uL     Lymphocytes Absolute 2.9 0.8 - 4.4 thou/uL    Monocytes Absolute 0.5 0.0 - 0.9 thou/uL    Eosinophils Absolute 0.0 0.0 - 0.4 thou/uL    Basophils Absolute 0.0 0.0 - 0.2 thou/uL       Imaging    No results found.      Signed by: Kalpana Navarro MD

## 2021-06-25 NOTE — TELEPHONE ENCOUNTER
Notified speciality pharmacy that Promacta is on hold and will call if/when she needs a refill. Marie Wang, CMA

## 2021-06-25 NOTE — PROGRESS NOTES
[facility] Hematology and Oncology Progress Note    Patient: Patricia Govea  MRN: [id number]  Date of Service: 05/25/2021        Reason for Visit    Chief Complaint   Patient presents with     Benign Hematology     Idiopathic thrombocytopenic purpura       Assessment and Plan    1. ITP: relapsed again. Had rituxan in March 2019 and then repeated this in Feb 2020 with relapse for 4 weeks and it has not helped. She also had splenectomy in January 2019. We started promacta around 3/2/21 at 25mg. Increased dose to 50mg.  Platelets still did not respond well so we increased to 75mg. Labs then were actually elevated so we have been holding since 4/16. Still in the normal range. Once they drop below 150, we will restart promacta at 50mg daily. Continue weekly labs. If platelets go back above 200, drop to 25mg. If they go above 400 again, hold medication until back below 150. Labs every 3 weeks. See Dr. Haywood in 6 weeks.         ECOG Performance   ECOG Performance Status: 0     Distress Assessment  Distress Assessment Score: No distress    Pain  Currently in Pain: No/denies      Problem List    1. Idiopathic thrombocytopenic purpura (H)  HM1(CBC and Differential)    CANCELED: HM1(CBC and Differential)        ______________________________________________________________________________    History of Present Illness    DIAGNOSIS:  Recurrent and refractory. originially had ITP in 2011. Then again in 2019 in pregnancy. Now recurrent again.     CURRENT TREATMENT: Rituxan for 4 weeks starting 2/4/21 without any response.   Promacta. Started 25mg on 3/2/21. Increased to 50mg on 3/9. Increased to 75mg on 3/23/21. Held starting 4/16/21 due to platelets of 494.     PAST TREATMENT:   Rituxan and prednisone. Rituxan last dose was March 8, 2019  Steroids  IVIG  Splenectomy 1/28/19. Initial response, but then rapid decrease in platelets.     INTERIM HISTORY:   She is here today for a follow-up visit. She is doing well.  No  "bleeding or bruising. Currently still holding promacta. No breathing issues. No falls.       Review of Systems    ROS: As above in the history, otherwise the remainder of the ROS is negative and not contributory to current reason for visit.           Past History  Past Medical History:   Diagnosis Date     Bell's palsy      Immune thrombocytopenia (H)     2012---saw Dr. Haywood [facility] Heme     Obesity        PHYSICAL EXAM  /83   Pulse 74   Ht 5' 1\" (1.549 m)   Wt 203 lb 6.4 oz (92.3 kg)   SpO2 98%   BMI 38.43 kg/m      GENERAL: no acute distress. Cooperative in conversation. Here alone due to visitor restrictions. Mask on  RESP: Regular respiratory rate. No expiratory wheezes   MUSCULOSKELETAL: no bilateral leg swelling  NEURO: non focal. Alert and oriented x3.   PSYCH: within normal limits. No depression or anxiety.  SKIN: exposed skin is dry intact.       Lab Results    Recent Results (from the past 168 hour(s))   HM1 (CBC with Diff)   Result Value Ref Range    WBC 7.1 4.0 - 11.0 thou/uL    RBC 4.29 3.80 - 5.40 mill/uL    Hemoglobin 13.7 12.0 - 16.0 g/dL    Hematocrit 38.9 35.0 - 47.0 %    MCV 91 80 - 100 fL    MCH 31.9 27.0 - 34.0 pg    MCHC 35.2 32.0 - 36.0 g/dL    RDW 13.4 11.0 - 14.5 %    Platelets 369 140 - 440 thou/uL    MPV 8.5 8.5 - 12.5 fL    Neutrophils % 43 (L) 50 - 70 %    Lymphocytes % 45 (H) 20 - 40 %    Monocytes % 7 2 - 10 %    Eosinophils % 4 0 - 6 %    Basophils % 1 0 - 2 %    Immature Granulocyte % 0 <=0 %    Neutrophils Absolute 3.1 2.0 - 7.7 thou/uL    Lymphocytes Absolute 3.2 0.8 - 4.4 thou/uL    Monocytes Absolute 0.5 0.0 - 0.9 thou/uL    Eosinophils Absolute 0.3 0.0 - 0.4 thou/uL    Basophils Absolute 0.1 0.0 - 0.2 thou/uL    Immature Granulocyte Absolute 0.0 <=0.0 thou/uL       Imaging    No results found.  Pt declined  today.       Signed by: Caprice Elena, CNP  "

## 2021-06-25 NOTE — TELEPHONE ENCOUNTER
Patient was in today for a lab only blood check as we are watching her platelet count for her diagnosis of idiopathic thrombocytopenic purpura.  Her platelet count today is actually elevated at 514.  Patient is currently not taking any form of treatment for her low platelets.  Caprice Elena CNP has reviewed her blood counts.  No change to the plan and she should continue to keep her follow-up on 7/14/2021 for her lab, MD appointment.  Call placed to patient in above information given.  She verbalized understanding and was appreciative.    Cecy Crump RN

## 2021-06-25 NOTE — PROGRESS NOTES
"Oncology Rooming Note    05/25/21 1:57 PM    Patricia Govea is a 40 y.o. female who presents for:    Chief Complaint   Patient presents with     Benign Hematology     Idiopathic thrombocytopenic purpura       Initial Vitals: /83   Pulse 74   Ht 5' 1\" (1.549 m)   Wt 203 lb 6.4 oz (92.3 kg)   SpO2 98%   BMI 38.43 kg/m       Estimated body mass index is 38.43 kg/m  as calculated from the following:    Height as of this encounter: 5' 1\" (1.549 m).    Weight as of this encounter: 203 lb 6.4 oz (92.3 kg).     Body surface area is 1.99 meters squared.      Allergies reviewed: Yes  Medications reviewed: Yes    Refills needed: No.  Patient was informed to call the pharmacy.         Clinical concerns: no concerns      Marie Wang CMA      "

## 2021-07-06 VITALS
WEIGHT: 203.4 LBS | HEIGHT: 61 IN | DIASTOLIC BLOOD PRESSURE: 83 MMHG | HEART RATE: 74 BPM | OXYGEN SATURATION: 98 % | BODY MASS INDEX: 38.4 KG/M2 | SYSTOLIC BLOOD PRESSURE: 152 MMHG

## 2021-07-08 DIAGNOSIS — Z30.8 ENCOUNTER FOR OTHER CONTRACEPTIVE MANAGEMENT: Primary | ICD-10-CM

## 2021-07-08 NOTE — PROGRESS NOTES
Patient requesting referral for tubal ligation. Had this scheduled prior to COVID. Has been on depo and is not liking the weight gain. Will have patient see OB for consultation and follow up with me for pre-op when able.     1. Encounter for other contraceptive management  - OB/GYN REFERRAL; Future    Bob Carlos MD

## 2021-07-08 NOTE — PATIENT INSTRUCTIONS
Referral for :     OB/GYN    LOCATION/PLACE/Provider :    [facility]   DATE & TIME :    Faxed to location they will call patient   PHONE :     [phone]  FAX :    [phone]  Appointment made by clinic staff/:    Jenna

## 2021-07-14 ENCOUNTER — LAB (OUTPATIENT)
Dept: INFUSION THERAPY | Facility: HOSPITAL | Age: 41
End: 2021-07-14
Payer: COMMERCIAL

## 2021-07-14 ENCOUNTER — ONCOLOGY VISIT (OUTPATIENT)
Dept: ONCOLOGY | Facility: HOSPITAL | Age: 41
End: 2021-07-14
Payer: COMMERCIAL

## 2021-07-14 VITALS
SYSTOLIC BLOOD PRESSURE: 136 MMHG | HEIGHT: 58 IN | DIASTOLIC BLOOD PRESSURE: 78 MMHG | RESPIRATION RATE: 16 BRPM | OXYGEN SATURATION: 97 % | TEMPERATURE: 98.9 F | WEIGHT: 202.7 LBS | HEART RATE: 74 BPM | BODY MASS INDEX: 42.55 KG/M2

## 2021-07-14 DIAGNOSIS — D69.3 CHRONIC ITP (IDIOPATHIC THROMBOCYTOPENIA) (H): Primary | ICD-10-CM

## 2021-07-14 DIAGNOSIS — D69.3 IDIOPATHIC THROMBOCYTOPENIC PURPURA (H): ICD-10-CM

## 2021-07-14 PROBLEM — O99.119 THROMBOCYTOPENIA DURING PREGNANCY (H): Status: RESOLVED | Noted: 2019-01-17 | Resolved: 2019-06-29

## 2021-07-14 PROBLEM — Z34.90 PREGNANT: Status: RESOLVED | Noted: 2019-06-22 | Resolved: 2019-10-28

## 2021-07-14 PROBLEM — Z34.90 INTRAUTERINE PREGNANCY: Status: RESOLVED | Noted: 2019-01-23 | Resolved: 2019-10-28

## 2021-07-14 PROBLEM — D69.6 THROMBOCYTOPENIA DURING PREGNANCY (H): Status: RESOLVED | Noted: 2019-01-17 | Resolved: 2019-06-29

## 2021-07-14 LAB
BASOPHILS # BLD AUTO: 0.1 10E3/UL (ref 0–0.2)
BASOPHILS NFR BLD AUTO: 1 %
EOSINOPHIL # BLD AUTO: 0.2 10E3/UL (ref 0–0.7)
EOSINOPHIL NFR BLD AUTO: 3 %
ERYTHROCYTE [DISTWIDTH] IN BLOOD BY AUTOMATED COUNT: 13.7 % (ref 10–15)
HCT VFR BLD AUTO: 37.8 % (ref 35–47)
HGB BLD-MCNC: 13.6 G/DL (ref 11.7–15.7)
IMM GRANULOCYTES # BLD: 0 10E3/UL
IMM GRANULOCYTES NFR BLD: 0 %
LYMPHOCYTES # BLD AUTO: 3 10E3/UL (ref 0.8–5.3)
LYMPHOCYTES NFR BLD AUTO: 48 %
MCH RBC QN AUTO: 32.3 PG (ref 26.5–33)
MCHC RBC AUTO-ENTMCNC: 36 G/DL (ref 31.5–36.5)
MCV RBC AUTO: 90 FL (ref 78–100)
MONOCYTES # BLD AUTO: 0.6 10E3/UL (ref 0–1.3)
MONOCYTES NFR BLD AUTO: 9 %
NEUTROPHILS # BLD AUTO: 2.5 10E3/UL (ref 1.6–8.3)
NEUTROPHILS NFR BLD AUTO: 39 %
NRBC # BLD AUTO: 0 10E3/UL
NRBC BLD AUTO-RTO: 0 /100
PLATELET # BLD AUTO: 531 10E3/UL (ref 150–450)
RBC # BLD AUTO: 4.21 10E6/UL (ref 3.8–5.2)
WBC # BLD AUTO: 6.4 10E3/UL (ref 4–11)

## 2021-07-14 PROCEDURE — 99213 OFFICE O/P EST LOW 20 MIN: CPT | Performed by: INTERNAL MEDICINE

## 2021-07-14 PROCEDURE — G0463 HOSPITAL OUTPT CLINIC VISIT: HCPCS

## 2021-07-14 PROCEDURE — 36415 COLL VENOUS BLD VENIPUNCTURE: CPT

## 2021-07-14 PROCEDURE — G0463 HOSPITAL OUTPT CLINIC VISIT: HCPCS | Performed by: INTERNAL MEDICINE

## 2021-07-14 PROCEDURE — 85025 COMPLETE CBC W/AUTO DIFF WBC: CPT

## 2021-07-14 ASSESSMENT — PAIN SCALES - GENERAL: PAINLEVEL: NO PAIN (0)

## 2021-07-14 ASSESSMENT — MIFFLIN-ST. JEOR: SCORE: 1486.57

## 2021-07-14 NOTE — PROGRESS NOTES
"Hematology Rooming Note    July 14, 2021 3:43 PM   Patricia Govea is a 40 year old female who presents for:    Chief Complaint   Patient presents with     Hematology     Idiopathic thrombocytopenic purpura     Initial Vitals: /78 (BP Location: Left arm, Patient Position: Sitting, Cuff Size: Adult Large)   Pulse 74   Temp 98.9  F (37.2  C) (Oral)   Resp 16   Ht 1.485 m (4' 10.47\")   Wt 91.9 kg (202 lb 11.2 oz)   SpO2 97%   BMI 41.69 kg/m   Estimated body mass index is 41.69 kg/m  as calculated from the following:    Height as of this encounter: 1.485 m (4' 10.47\").    Weight as of this encounter: 91.9 kg (202 lb 11.2 oz). Body surface area is 1.95 meters squared.  No Pain (0) Comment: Data Unavailable   No LMP recorded.  Allergies reviewed: Yes  Medications reviewed: Yes    Medications: Medication refills not needed today.  Pharmacy name entered into [facility]:      [id number] [address] [facility] [address]    Clinical concerns: No concerns.      Keisha Santos CMA              "

## 2021-07-14 NOTE — LETTER
"    7/14/2021         RE: Patricia Govea  [address] [facility] [address] [address]        Dear Colleague,    Thank you for referring your patient, Patricia Govea, to the [facility]. Please see a copy of my visit note below.    Hematology Rooming Note    July 14, 2021 3:43 PM   Patricia Govea is a 40 year old female who presents for:    Chief Complaint   Patient presents with     Hematology     Idiopathic thrombocytopenic purpura     Initial Vitals: /78 (BP Location: Left arm, Patient Position: Sitting, Cuff Size: Adult Large)   Pulse 74   Temp 98.9  F (37.2  C) (Oral)   Resp 16   Ht 1.485 m (4' 10.47\")   Wt 91.9 kg (202 lb 11.2 oz)   SpO2 97%   BMI 41.69 kg/m   Estimated body mass index is 41.69 kg/m  as calculated from the following:    Height as of this encounter: 1.485 m (4' 10.47\").    Weight as of this encounter: 91.9 kg (202 lb 11.2 oz). Body surface area is 1.95 meters squared.  No Pain (0) Comment: Data Unavailable   No LMP recorded.  Allergies reviewed: Yes  Medications reviewed: Yes    Medications: Medication refills not needed today.  Pharmacy name entered into [employer]:      [id number] - [facility] [address] AT [facility] [address]    Clinical concerns: No concerns.      Keisha Santos, [facility] Hematology and Oncology Progress Note    Patient: Patricia Govea  MRN: [id number]  Date of Service: Jul 14, 2021        Assessment and Plan:    1.  Immune thrombocytopenia: Promacta has been on hold for 3 months.  Her platelet count continues to be normal.  Actually slightly elevated today.  I am not sure that she will need to go back on a steroid.  We will check her labs monthly for 3 months.  We will see her in 3 months.  She will let us know if she develops any signs or symptoms of bleeding/thrombocytopenia.    ECOG Performance    0    Diagnosis:    1.  Immune " thrombocytopenia: Initial diagnosis was in 2011.  Relapsed during pregnancy in January 2019.  Extensive work-up at the time of first diagnosis in 2011 was negative, this included a bone marrow biopsy.    Treatment:    Most recently (second rrelapse), repeated 4 weekly doses of Rituxan February 24, 2021.  She was then started on eltrombopag March 2, 2021.  Treatment dose was increased to 50 mg.  She responded and actually had elevated platelet count.  Promacta has been on hold since April 16, 2021.    Previous ITP treatment:  She failed steroids initially and achieved remission with subsequent Rituxan at her initial diagnosis.     At first relapse, she failed an initial course of steroids.  She was then treated with splenectomy on January 28, 2019.  She had an initial response but then relapsed.  She was treated with IVIG to which she had a transient response but then relapsed.  She then received rituximab, 4 cycles, given February 15 through March 8, 2019.  She has had a complete response by C4D1.    Interim History:    Patricia returns today for a follow-up visit.  She is doing well.  No bleeding or bruising.  No hemorrhagic bullae.  Energy and appetite are good.  No fevers, chills, night sweats.    Review of Systems:    As above in the history.     Review of Systems otherwise Negative for:  General: chills, fever or night sweats  Psychological: anxiety or depression  Ophthalmic: blurry vision, double vision or loss of vision, vision change  ENT: epistaxis, oral lesions, hearing changes  Hematological and Lymphatic: bleeding, bruising, jaundice, swollen lymph nodes  Endocrine: hot flashes, unexpected weight changes  Respiratory: cough, hemoptysis, orthopnea or shortness of breath/LARA  Cardiovascular: chest pain, edema, palpitations or PND  Gastrointestinal: abdominal pain, blood in stools, change in bowel habits, constipation, diarrhea or nausea/vomiting  Genito-Urinary: change in urinary stream, incontinence,  "frequency/urgency  Musculoskeletal: joint pain, stiffness, swelling, muscle pain  Neurological: dizziness, headaches, numbness/tingling  Dermatological: lumps and rash    Past History:    Past Medical History:   Diagnosis Date     Acute idiopathic thrombocytopenic purpura (H) 2004    bone marrow biopsy negative for malignancy     Bell's palsy      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 pylori infection 2004     Immune thrombocytopenia (H)     2012---saw Dr. Haywood [facility] Heme     Obesity      Physical Exam:    /78 (BP Location: Left arm, Patient Position: Sitting, Cuff Size: Adult Large)   Pulse 74   Temp 98.9  F (37.2  C) (Oral)   Resp 16   Ht 1.485 m (4' 10.47\")   Wt 91.9 kg (202 lb 11.2 oz)   SpO2 97%   BMI 41.69 kg/m      General: patient appears stated age of 40 year old. Nontoxic and in no distress.   HEENT: Head: atraumatic, normocephalic. Sclerae anicteric.  Chest:  Normal respiratory effort  Cardiac:  No edema.   Abdomen: abdomen is non-distended  Extremities: normal tone and muscle bulk.  Skin: no lesions or rash on visible skin. Warm and dry.   CNS: alert and oriented. Grossly non-focal.   Psychiatric: normal mood and affect.     Lab Results:    Recent Results (from the past 168 hour(s))   CBC with platelets and differential   Result Value Ref Range    WBC Count 6.4 4.0 - 11.0 10e3/uL    RBC Count 4.21 3.80 - 5.20 10e6/uL    Hemoglobin 13.6 11.7 - 15.7 g/dL    Hematocrit 37.8 35.0 - 47.0 %    MCV 90 78 - 100 fL    MCH 32.3 26.5 - 33.0 pg    MCHC 36.0 31.5 - 36.5 g/dL    RDW 13.7 10.0 - 15.0 %    Platelet Count 531 (H) 150 - 450 10e3/uL    % Neutrophils 39 %    % Lymphocytes 48 %    % Monocytes 9 %    % Eosinophils 3 %    % Basophils 1 %    % Immature Granulocytes 0 %    NRBCs per 100 WBC 0 <1 /100    Absolute Neutrophils 2.5 1.6 - 8.3 10e3/uL    Absolute Lymphocytes 3.0 0.8 - 5.3 10e3/uL    Absolute Monocytes 0.6 0.0 - 1.3 10e3/uL    Absolute Eosinophils 0.2 0.0 - 0.7 " 10e3/uL    Absolute Basophils 0.1 0.0 - 0.2 10e3/uL    Absolute Immature Granulocytes 0.0 <=0.0 10e3/uL    Absolute NRBCs 0.0 10e3/uL     Imaging:    No results found.      Signed by: Han Haywood MD        Again, thank you for allowing me to participate in the care of your patient.        Sincerely,        Han Haywood MD

## 2021-07-15 NOTE — PROGRESS NOTES
[facility] Hematology and Oncology Progress Note    Patient: Patricia Govea  MRN: [id number]  Date of Service: Jul 14, 2021        Assessment and Plan:    1.  Immune thrombocytopenia: Promacta has been on hold for 3 months.  Her platelet count continues to be normal.  Actually slightly elevated today.  I am not sure that she will need to go back on a steroid.  We will check her labs monthly for 3 months.  We will see her in 3 months.  She will let us know if she develops any signs or symptoms of bleeding/thrombocytopenia.    ECOG Performance    0    Diagnosis:    1.  Immune thrombocytopenia: Initial diagnosis was in 2011.  Relapsed during pregnancy in January 2019.  Extensive work-up at the time of first diagnosis in 2011 was negative, this included a bone marrow biopsy.    Treatment:    Most recently (second rrelapse), repeated 4 weekly doses of Rituxan February 24, 2021.  She was then started on eltrombopag March 2, 2021.  Treatment dose was increased to 50 mg.  She responded and actually had elevated platelet count.  Promacta has been on hold since April 16, 2021.    Previous ITP treatment:  She failed steroids initially and achieved remission with subsequent Rituxan at her initial diagnosis.     At first relapse, she failed an initial course of steroids.  She was then treated with splenectomy on January 28, 2019.  She had an initial response but then relapsed.  She was treated with IVIG to which she had a transient response but then relapsed.  She then received rituximab, 4 cycles, given February 15 through March 8, 2019.  She has had a complete response by C4D1.    Interim History:    Patricia returns today for a follow-up visit.  She is doing well.  No bleeding or bruising.  No hemorrhagic bullae.  Energy and appetite are good.  No fevers, chills, night sweats.    Review of Systems:    As above in the history.     Review of Systems otherwise Negative for:  General: chills, fever or night  "sweats  Psychological: anxiety or depression  Ophthalmic: blurry vision, double vision or loss of vision, vision change  ENT: epistaxis, oral lesions, hearing changes  Hematological and Lymphatic: bleeding, bruising, jaundice, swollen lymph nodes  Endocrine: hot flashes, unexpected weight changes  Respiratory: cough, hemoptysis, orthopnea or shortness of breath/LARA  Cardiovascular: chest pain, edema, palpitations or PND  Gastrointestinal: abdominal pain, blood in stools, change in bowel habits, constipation, diarrhea or nausea/vomiting  Genito-Urinary: change in urinary stream, incontinence, frequency/urgency  Musculoskeletal: joint pain, stiffness, swelling, muscle pain  Neurological: dizziness, headaches, numbness/tingling  Dermatological: lumps and rash    Past History:    Past Medical History:   Diagnosis Date     Acute idiopathic thrombocytopenic purpura (H) 2004    bone marrow biopsy negative for malignancy     Bell's palsy      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 pylori infection 2004     Immune thrombocytopenia (H)     2012---saw Dr. Haywood [facility] Heme     Obesity      Physical Exam:    /78 (BP Location: Left arm, Patient Position: Sitting, Cuff Size: Adult Large)   Pulse 74   Temp 98.9  F (37.2  C) (Oral)   Resp 16   Ht 1.485 m (4' 10.47\")   Wt 91.9 kg (202 lb 11.2 oz)   SpO2 97%   BMI 41.69 kg/m      General: patient appears stated age of 40 year old. Nontoxic and in no distress.   HEENT: Head: atraumatic, normocephalic. Sclerae anicteric.  Chest:  Normal respiratory effort  Cardiac:  No edema.   Abdomen: abdomen is non-distended  Extremities: normal tone and muscle bulk.  Skin: no lesions or rash on visible skin. Warm and dry.   CNS: alert and oriented. Grossly non-focal.   Psychiatric: normal mood and affect.     Lab Results:    Recent Results (from the past 168 hour(s))   CBC with platelets and differential   Result Value Ref Range    WBC Count 6.4 4.0 - 11.0 " 10e3/uL    RBC Count 4.21 3.80 - 5.20 10e6/uL    Hemoglobin 13.6 11.7 - 15.7 g/dL    Hematocrit 37.8 35.0 - 47.0 %    MCV 90 78 - 100 fL    MCH 32.3 26.5 - 33.0 pg    MCHC 36.0 31.5 - 36.5 g/dL    RDW 13.7 10.0 - 15.0 %    Platelet Count 531 (H) 150 - 450 10e3/uL    % Neutrophils 39 %    % Lymphocytes 48 %    % Monocytes 9 %    % Eosinophils 3 %    % Basophils 1 %    % Immature Granulocytes 0 %    NRBCs per 100 WBC 0 <1 /100    Absolute Neutrophils 2.5 1.6 - 8.3 10e3/uL    Absolute Lymphocytes 3.0 0.8 - 5.3 10e3/uL    Absolute Monocytes 0.6 0.0 - 1.3 10e3/uL    Absolute Eosinophils 0.2 0.0 - 0.7 10e3/uL    Absolute Basophils 0.1 0.0 - 0.2 10e3/uL    Absolute Immature Granulocytes 0.0 <=0.0 10e3/uL    Absolute NRBCs 0.0 10e3/uL     Imaging:    No results found.      Signed by: Han Haywood MD

## 2021-07-16 DIAGNOSIS — Z11.59 ENCOUNTER FOR SCREENING FOR OTHER VIRAL DISEASES: ICD-10-CM

## 2021-07-22 ENCOUNTER — LAB (OUTPATIENT)
Dept: INFUSION THERAPY | Facility: HOSPITAL | Age: 41
End: 2021-07-22
Attending: INTERNAL MEDICINE
Payer: COMMERCIAL

## 2021-07-22 ENCOUNTER — TELEPHONE (OUTPATIENT)
Dept: ONCOLOGY | Facility: HOSPITAL | Age: 41
End: 2021-07-22

## 2021-07-22 DIAGNOSIS — R04.0 BLEEDING FROM THE NOSE: Primary | ICD-10-CM

## 2021-07-22 DIAGNOSIS — R04.0 BLEEDING FROM THE NOSE: ICD-10-CM

## 2021-07-22 LAB
ACANTHOCYTES BLD QL SMEAR: SLIGHT
BASOPHILS # BLD MANUAL: 0.1 10E3/UL (ref 0–0.2)
BASOPHILS NFR BLD MANUAL: 1 %
ELLIPTOCYTES BLD QL SMEAR: SLIGHT
EOSINOPHIL # BLD MANUAL: 0 10E3/UL (ref 0–0.7)
EOSINOPHIL NFR BLD MANUAL: 0 %
ERYTHROCYTE [DISTWIDTH] IN BLOOD BY AUTOMATED COUNT: 14.1 % (ref 10–15)
HCT VFR BLD AUTO: 41.4 % (ref 35–47)
HGB BLD-MCNC: 14.6 G/DL (ref 11.7–15.7)
HOWELL-JOLLY BOD BLD QL SMEAR: PRESENT
LYMPHOCYTES # BLD MANUAL: 3.4 10E3/UL (ref 0.8–5.3)
LYMPHOCYTES NFR BLD MANUAL: 42 %
MCH RBC QN AUTO: 31.9 PG (ref 26.5–33)
MCHC RBC AUTO-ENTMCNC: 35.3 G/DL (ref 31.5–36.5)
MCV RBC AUTO: 90 FL (ref 78–100)
MONOCYTES # BLD MANUAL: 0.5 10E3/UL (ref 0–1.3)
MONOCYTES NFR BLD MANUAL: 6 %
NEUTROPHILS # BLD MANUAL: 4.1 10E3/UL (ref 1.6–8.3)
NEUTROPHILS NFR BLD MANUAL: 51 %
PLAT MORPH BLD: ABNORMAL
PLATELET # BLD AUTO: 604 10E3/UL (ref 150–450)
POLYCHROMASIA BLD QL SMEAR: SLIGHT
RBC # BLD AUTO: 4.58 10E6/UL (ref 3.8–5.2)
RBC MORPH BLD: ABNORMAL
WBC # BLD AUTO: 8 10E3/UL (ref 4–11)

## 2021-07-22 PROCEDURE — 36415 COLL VENOUS BLD VENIPUNCTURE: CPT

## 2021-07-22 PROCEDURE — 85027 COMPLETE CBC AUTOMATED: CPT

## 2021-07-22 NOTE — TELEPHONE ENCOUNTER
Patient walked into the clinic today she was a little nervous about having a slight nosebleed yesterday.  She states that she got a little nervous that she has an upcoming surgery next week and wanted to make sure her platelet count was okay.  Platelet count today has been reviewed by Caprice Elena CNP who states that it is 604.  No concern as patient does not have any ongoing bleeding, bruising or any other concerns.  Patient verbalized understanding.    Cecy Crump RN

## 2021-07-23 ENCOUNTER — OFFICE VISIT (OUTPATIENT)
Dept: FAMILY MEDICINE | Facility: CLINIC | Age: 41
End: 2021-07-23
Payer: COMMERCIAL

## 2021-07-23 VITALS
HEIGHT: 57 IN | BODY MASS INDEX: 43.23 KG/M2 | TEMPERATURE: 98.4 F | SYSTOLIC BLOOD PRESSURE: 134 MMHG | HEART RATE: 76 BPM | DIASTOLIC BLOOD PRESSURE: 79 MMHG | RESPIRATION RATE: 20 BRPM | WEIGHT: 200.4 LBS | OXYGEN SATURATION: 98 %

## 2021-07-23 DIAGNOSIS — Z01.818 PREOP GENERAL PHYSICAL EXAM: ICD-10-CM

## 2021-07-23 DIAGNOSIS — Z01.818 PREOPERATIVE EXAMINATION: Primary | ICD-10-CM

## 2021-07-23 PROCEDURE — 99214 OFFICE O/P EST MOD 30 MIN: CPT | Mod: GC | Performed by: STUDENT IN AN ORGANIZED HEALTH CARE EDUCATION/TRAINING PROGRAM

## 2021-07-23 ASSESSMENT — MIFFLIN-ST. JEOR: SCORE: 1460.51

## 2021-07-23 NOTE — PROGRESS NOTES
[facility] [address] [place] [address]  Phone: [phone]  Fax: [phone]  Primary Provider: Mike Caal  Pre-op Performing Provider: MIKE CAAL  :[id number]}  PREOPERATIVE EVALUATION:  Today's date: 7/23/2021    Patricia Govea is a 40 year old female who presents for a preoperative evaluation. She presents today with a .     Surgical Information:  Surgery/Procedure: Laparoscopic Bilateral Salpongectomy  Surgery Location: [facility]  Surgeon: Dr. Yanez  Surgery Date: 7/28/21  Time of Surgery: 10am  Where patient plans to recover: At home with family   Fax number for surgical facility: [phone]    Type of Anesthesia Anticipated: General    Assessment & Plan     The proposed surgical procedure is considered LOW risk.    (Z01.818) Preoperative examination  (primary encounter diagnosis)  Comment: Routine preoperative examination for a 40-year-old female here prior to tubal ligation on 7/28/2021. Past medical history includes immune thrombocytopenia. Patient has elevated platelet count on CBC on 7/23/2021. She has low cardiovascular risk, and was approved today to proceed with the scheduled surgery.  Plan:   -We will contact hematology to verify that her ITP does not cause any surgical complications.  -Patricia will return for a COVID test on 7/26.  -Proceed with surgery as scheduled.     Risks and Recommendations:  The patient has the following additional risks and recommendations for perioperative complications:  -Immune thrombocytopenia   - No identified additional risk factors other than previously addressed    Medication Instructions:  Patient is on no chronic medications    RECOMMENDATION:  APPROVAL GIVEN to proceed with proposed procedure, without further diagnostic evaluation.    Review of external notes as documented above     Subjective     HPI related to upcoming procedure:   -Tubal ligation scheduled for  7/28/2021.  -No prior difficulty with anesthesia.   -Patient takes tylenol PRN.   -No NSAID usage.   -  Preop Questions 7/23/2021   1. Have you ever had a heart attack or stroke? No   2. Have you ever had surgery on your heart or blood vessels, such as a stent placement, a coronary artery bypass, or surgery on an artery in your head, neck, heart, or legs? No   3. Do you have chest pain with activity? No   4. Do you have a history of  heart failure? No   5. Do you currently have a cold, bronchitis or symptoms of other infection? No   6. Do you have a cough, shortness of breath, or wheezing? No   7. Do you or anyone in your family have previous history of blood clots? No   8. Do you or does anyone in your family have a serious bleeding problem such as prolonged bleeding following surgeries or cuts? No   9. Have you ever had problems with anemia or been told to take iron pills? No   10. Have you had any abnormal blood loss such as black, tarry or bloody stools, or abnormal vaginal bleeding? No   11. Have you ever had a blood transfusion? No   12. Are you willing to have a blood transfusion if it is medically needed before, during, or after your surgery? Yes   13. Have you or any of your relatives ever had problems with anesthesia? No   14. Do you have sleep apnea, excessive snoring or daytime drowsiness? No   15. Do you have any artifical heart valves or other implanted medical devices like a pacemaker, defibrillator, or continuous glucose monitor? No   16. Do you have artificial joints? No   17. Are you allergic to latex? No   18. Is there any chance that you may be pregnant? No       Preoperative Review of :   reviewed - no record of controlled substances prescribed.    Status of Chronic Conditions:  Immune thrombocytopenia  Review of Systems  Constitutional, neuro, ENT, endocrine, pulmonary, cardiac, gastrointestinal, genitourinary, musculoskeletal, integument and psychiatric systems are negative, except as  otherwise noted.    Patient Active Problem List    Diagnosis Date Noted     Thrombocytopenia (H) 2021     Priority: Medium     Encounter for initial prescription of injectable contraceptive 2019     Priority: Medium     2021  Plan Documentation  Service ordered Depo Provera injection (150mg IM) may be given every 3 months for one year per protoccol.  Plan and order should be renewed one year from 2021.  Ordered by Kusum Prajapati MD       Elevated blood pressure reading without diagnosis of hypertension 2019     Priority: Medium     Chronic ITP (idiopathic thrombocytopenia) (H) 2019     Priority: Medium     Overview:   Added automatically from request for surgery [id number]       Encounter for screening for cervical cancer  2018     Priority: Medium     Hx of mild dysplasia with MIKHAIL 1 in 2007. Repeat PAP 10/2007 was normal. Repeat pap in 2009 with LSIL. Repeat in  normal     Pap 18:   1. Result is:   Normal, HPV negative -> can go back to routine screening now   2. Date next is due: 5 years with HPV  3. Follow up colposcopy is: Not indicated           Bell's palsy 2018     Priority: Medium     Anisocoria 2017     Priority: Medium     Overview:   Right pupil larger than left, reacts sluggishly - present since childhood       GBS (Guillain [place] syndrome) (H) 2016     Priority: Medium      Past Medical History:   Diagnosis Date     Acute idiopathic thrombocytopenic purpura (H)     bone marrow biopsy negative for malignancy     Bell's palsy      Guillain Barré syndrome (H)     After influenza vaccine     H. pylori infection 2004     Immune thrombocytopenia (H)     ---saw Dr. Haywood [facility] Heme     Obesity      Past Surgical History:   Procedure Laterality Date      SECTION  2000      SECTION       LAPAROSCOPIC CHOLECYSTECTOMY  2019     LAPAROSCOPIC CHOLECYSTECTOMY N/A 2019    Procedure: CHOLECYSTECTOMY,  "LAPAROSCOPIC;  Surgeon: Han Lara MD;  Location: [facility];  Service: General     LAPAROSCOPIC SALPINGECTOMY Left 2006     [place] LAP,SPLENECTOMY N/A 1/28/2019    Procedure: SPLENECTOMY, ROBOT-ASSISTED, LAPAROSCOPIC, USING DA STEPHEN XI;  Surgeon: Bob Nicole MD;  Location: [facility];  Service: General     SPLENECTOMY  01/29/2019     Current Outpatient Medications   Medication Sig Dispense Refill     acetaminophen (TYLENOL) 325 MG tablet Take 325-650 mg by mouth every 6 hours as needed for mild pain       acetaminophen (TYLENOL) 500 MG tablet Take 2 tablets (1,000 mg) by mouth 3 times daily 90 tablet 3     docusate sodium (COLACE) 250 MG capsule Take 250 mg by mouth daily (Patient not taking: Reported on 7/14/2021)       Prenatal Vit-Fe Fumarate-FA (PRENATAL MULTIVITAMIN  PLUS IRON) 27-1 MG TABS Take by mouth daily (Patient not taking: Reported on 7/14/2021)         Allergies   Allergen Reactions     Influenza Vaccines Other (See Comments)     Guillain [place] Syndrome following fall 2016 administration     Blood-Group Specific Substance Other (See Comments)     Warm autoantibodies present.RBC for transfusion may be delayed up to 24 hrs. Draw 3 lavender and 1 red tube for Type and Screen requests.        Social History     Tobacco Use     Smoking status: Never Smoker     Smokeless tobacco: Never Used   Substance Use Topics     Alcohol use: No     History   Drug Use No         Objective     /79 (BP Location: Left arm, Patient Position: Sitting, Cuff Size: Adult Regular)   Pulse 76   Temp 98.4  F (36.9  C) (Oral)   Resp 20   Ht 1.46 m (4' 9.48\")   Wt 90.9 kg (200 lb 6.4 oz)   SpO2 98%   BMI 42.64 kg/m      Physical Exam    GENERAL APPEARANCE: healthy, alert and no distress     EYES: EOMI, PERRL     HENT: ear canals and TM's normal and nose and mouth without ulcers or lesions     NECK: no adenopathy, no asymmetry, masses, or scars.     RESP: lungs clear to auscultation - no rales, " rhonchi or wheezes     CV: regular rates and rhythm, normal S1 S2, no S3 or S4 and no murmur, click or rub     ABDOMEN:  soft, nontender, no HSM or masses.     MS: extremities normal- no gross deformities noted, no evidence of inflammation in joints, FROM in all extremities.     SKIN: no suspicious lesions or rashes     NEURO: Normal strength and tone, sensory exam grossly normal, mentation intact and speech normal     PSYCH: mentation appears normal. and affect normal/bright     LYMPHATICS: No cervical adenopathy    Recent Labs   Lab Test 07/22/21  0852 07/14/21  1501 03/05/21  0931 02/04/21  0837 11/29/19  1854   HGB 14.6 13.6 15.3 15.3  --    * 531* 2* 8*  --    INR  --   --   --   --  0.92   NA  --   --  136 138  --    POTASSIUM  --   --  3.8 4.0  --    CR  --   --  0.70 0.66  --         Revised Cardiac Risk Index (RCRI):  The patient has the following serious cardiovascular risks for perioperative complications:   - No serious cardiac risks = 0 points     Patient is able to walk up a flight of stairs without stopping due to angina or shortness of breath (>4 METS)    RCRI Interpretation: 0 points: Class I (very low risk - 0.4% complication rate)        Results cited below have been reviewed and communicated to patient     Eugene Dumas, Ms3    Precepted with Dr. Burnett    I was present with the medical student who participated in the service and in the documentation of this note. I have verified the history and personally performed the physical exam and medical decision making, and have verified the content of the note, which accurately reflects my assessment of the patient and the plan of care.   Bob Carlos MD    Signed Electronically by: Bob Carlos MD  Copy of this evaluation report is provided to requesting physician.

## 2021-07-23 NOTE — NURSING NOTE
Due to patient being non-English speaking/uses sign language, an  was used for this visit. Only for face-to-face interpretation by an external agency, date and length of interpretation can be found on the scanned worksheet.     name: Fozia Horton  Agency: [employer]  Language: Kiswahili   Telephone number:   Type of interpretation: Face-to-face, spoken

## 2021-07-24 NOTE — PATIENT INSTRUCTIONS

## 2021-07-26 DIAGNOSIS — Z20.822 COVID-19 RULED OUT: Primary | ICD-10-CM

## 2021-07-26 DIAGNOSIS — Z11.59 ENCOUNTER FOR SCREENING FOR OTHER VIRAL DISEASES: ICD-10-CM

## 2021-07-26 DIAGNOSIS — Z20.822 COVID-19 RULED OUT: ICD-10-CM

## 2021-07-26 PROCEDURE — 99207 PR NO BILLABLE SERVICE THIS VISIT: CPT

## 2021-07-26 PROCEDURE — U0003 INFECTIOUS AGENT DETECTION BY NUCLEIC ACID (DNA OR RNA); SEVERE ACUTE RESPIRATORY SYNDROME CORONAVIRUS 2 (SARS-COV-2) (CORONAVIRUS DISEASE [COVID-19]), AMPLIFIED PROBE TECHNIQUE, MAKING USE OF HIGH THROUGHPUT TECHNOLOGIES AS DESCRIBED BY CMS-2020-01-R: HCPCS

## 2021-07-27 ENCOUNTER — ANESTHESIA EVENT (OUTPATIENT)
Dept: SURGERY | Facility: AMBULATORY SURGERY CENTER | Age: 41
End: 2021-07-27
Payer: COMMERCIAL

## 2021-07-28 ENCOUNTER — HOSPITAL ENCOUNTER (OUTPATIENT)
Facility: AMBULATORY SURGERY CENTER | Age: 41
End: 2021-07-28
Attending: OBSTETRICS & GYNECOLOGY
Payer: COMMERCIAL

## 2021-07-28 ENCOUNTER — TRANSFERRED RECORDS (OUTPATIENT)
Dept: HEALTH INFORMATION MANAGEMENT | Facility: CLINIC | Age: 41
End: 2021-07-28

## 2021-07-28 ENCOUNTER — ANESTHESIA (OUTPATIENT)
Dept: SURGERY | Facility: AMBULATORY SURGERY CENTER | Age: 41
End: 2021-07-28
Payer: COMMERCIAL

## 2021-07-28 VITALS
HEIGHT: 57 IN | OXYGEN SATURATION: 96 % | TEMPERATURE: 97.8 F | SYSTOLIC BLOOD PRESSURE: 122 MMHG | BODY MASS INDEX: 43.15 KG/M2 | RESPIRATION RATE: 18 BRPM | WEIGHT: 200 LBS | DIASTOLIC BLOOD PRESSURE: 61 MMHG | HEART RATE: 79 BPM

## 2021-07-28 DIAGNOSIS — Z30.2 ENCOUNTER FOR FEMALE STERILIZATION PROCEDURE: ICD-10-CM

## 2021-07-28 LAB
HCG UR QL: NEGATIVE
INTERNAL QC OK POCT: NORMAL

## 2021-07-28 RX ORDER — FENTANYL CITRATE 50 UG/ML
INJECTION, SOLUTION INTRAMUSCULAR; INTRAVENOUS PRN
Status: DISCONTINUED | OUTPATIENT
Start: 2021-07-28 | End: 2021-07-28

## 2021-07-28 RX ORDER — METOPROLOL TARTRATE 1 MG/ML
1-2 INJECTION, SOLUTION INTRAVENOUS EVERY 5 MIN PRN
Status: DISCONTINUED | OUTPATIENT
Start: 2021-07-28 | End: 2021-07-29 | Stop reason: HOSPADM

## 2021-07-28 RX ORDER — BUPIVACAINE HYDROCHLORIDE 2.5 MG/ML
INJECTION, SOLUTION INFILTRATION; PERINEURAL PRN
Status: DISCONTINUED | OUTPATIENT
Start: 2021-07-28 | End: 2021-07-28 | Stop reason: HOSPADM

## 2021-07-28 RX ORDER — LIDOCAINE 40 MG/G
CREAM TOPICAL
Status: DISCONTINUED | OUTPATIENT
Start: 2021-07-28 | End: 2021-07-29 | Stop reason: HOSPADM

## 2021-07-28 RX ORDER — ACETAMINOPHEN 325 MG/1
975 TABLET ORAL ONCE
Status: COMPLETED | OUTPATIENT
Start: 2021-07-28 | End: 2021-07-28

## 2021-07-28 RX ORDER — PROPOFOL 10 MG/ML
INJECTION, EMULSION INTRAVENOUS PRN
Status: DISCONTINUED | OUTPATIENT
Start: 2021-07-28 | End: 2021-07-28

## 2021-07-28 RX ORDER — KETOROLAC TROMETHAMINE 15 MG/ML
INJECTION, SOLUTION INTRAMUSCULAR; INTRAVENOUS PRN
Status: DISCONTINUED | OUTPATIENT
Start: 2021-07-28 | End: 2021-07-28

## 2021-07-28 RX ORDER — KETAMINE HYDROCHLORIDE 10 MG/ML
INJECTION INTRAMUSCULAR; INTRAVENOUS PRN
Status: DISCONTINUED | OUTPATIENT
Start: 2021-07-28 | End: 2021-07-28

## 2021-07-28 RX ORDER — PROPOFOL 10 MG/ML
INJECTION, EMULSION INTRAVENOUS CONTINUOUS PRN
Status: DISCONTINUED | OUTPATIENT
Start: 2021-07-28 | End: 2021-07-28

## 2021-07-28 RX ORDER — GLYCOPYRROLATE 0.2 MG/ML
INJECTION, SOLUTION INTRAMUSCULAR; INTRAVENOUS PRN
Status: DISCONTINUED | OUTPATIENT
Start: 2021-07-28 | End: 2021-07-28

## 2021-07-28 RX ORDER — KETOROLAC TROMETHAMINE 15 MG/ML
15 INJECTION, SOLUTION INTRAMUSCULAR; INTRAVENOUS EVERY 6 HOURS PRN
Status: DISCONTINUED | OUTPATIENT
Start: 2021-07-28 | End: 2021-07-29 | Stop reason: HOSPADM

## 2021-07-28 RX ORDER — HYDROMORPHONE HCL IN WATER/PF 6 MG/30 ML
0.2 PATIENT CONTROLLED ANALGESIA SYRINGE INTRAVENOUS EVERY 5 MIN PRN
Status: SHIPPED | OUTPATIENT
Start: 2021-07-28 | End: 2021-07-28

## 2021-07-28 RX ORDER — SODIUM CHLORIDE, SODIUM LACTATE, POTASSIUM CHLORIDE, CALCIUM CHLORIDE 600; 310; 30; 20 MG/100ML; MG/100ML; MG/100ML; MG/100ML
INJECTION, SOLUTION INTRAVENOUS CONTINUOUS
Status: DISCONTINUED | OUTPATIENT
Start: 2021-07-28 | End: 2021-07-29 | Stop reason: HOSPADM

## 2021-07-28 RX ORDER — ONDANSETRON 4 MG/1
4 TABLET, ORALLY DISINTEGRATING ORAL EVERY 30 MIN PRN
Status: DISCONTINUED | OUTPATIENT
Start: 2021-07-28 | End: 2021-07-29 | Stop reason: HOSPADM

## 2021-07-28 RX ORDER — MAGNESIUM SULFATE HEPTAHYDRATE 40 MG/ML
4 INJECTION, SOLUTION INTRAVENOUS ONCE
Status: COMPLETED | OUTPATIENT
Start: 2021-07-28 | End: 2021-07-28

## 2021-07-28 RX ORDER — ONDANSETRON 2 MG/ML
4 INJECTION INTRAMUSCULAR; INTRAVENOUS EVERY 30 MIN PRN
Status: DISCONTINUED | OUTPATIENT
Start: 2021-07-28 | End: 2021-07-29 | Stop reason: HOSPADM

## 2021-07-28 RX ORDER — IBUPROFEN 800 MG/1
800 TABLET, FILM COATED ORAL ONCE
Status: CANCELLED | OUTPATIENT
Start: 2021-07-28 | End: 2021-07-28

## 2021-07-28 RX ORDER — FENTANYL CITRATE 50 UG/ML
50 INJECTION, SOLUTION INTRAMUSCULAR; INTRAVENOUS EVERY 5 MIN PRN
Status: CANCELLED | OUTPATIENT
Start: 2021-07-28 | End: 2021-07-28

## 2021-07-28 RX ORDER — MEPERIDINE HYDROCHLORIDE 25 MG/ML
12.5 INJECTION INTRAMUSCULAR; INTRAVENOUS; SUBCUTANEOUS
Status: DISCONTINUED | OUTPATIENT
Start: 2021-07-28 | End: 2021-07-29 | Stop reason: HOSPADM

## 2021-07-28 RX ORDER — OXYCODONE HYDROCHLORIDE 5 MG/1
5 TABLET ORAL EVERY 6 HOURS PRN
Qty: 12 TABLET | Refills: 0 | Status: SHIPPED | OUTPATIENT
Start: 2021-07-28 | End: 2021-07-31

## 2021-07-28 RX ORDER — LIDOCAINE HYDROCHLORIDE 20 MG/ML
INJECTION, SOLUTION INFILTRATION; PERINEURAL PRN
Status: DISCONTINUED | OUTPATIENT
Start: 2021-07-28 | End: 2021-07-28

## 2021-07-28 RX ORDER — FENTANYL CITRATE 50 UG/ML
50 INJECTION, SOLUTION INTRAMUSCULAR; INTRAVENOUS EVERY 5 MIN PRN
Status: SHIPPED | OUTPATIENT
Start: 2021-07-28 | End: 2021-07-28

## 2021-07-28 RX ORDER — DEXAMETHASONE SODIUM PHOSPHATE 4 MG/ML
INJECTION, SOLUTION INTRA-ARTICULAR; INTRALESIONAL; INTRAMUSCULAR; INTRAVENOUS; SOFT TISSUE PRN
Status: DISCONTINUED | OUTPATIENT
Start: 2021-07-28 | End: 2021-07-28

## 2021-07-28 RX ORDER — IBUPROFEN 600 MG/1
600 TABLET, FILM COATED ORAL EVERY 6 HOURS PRN
Qty: 20 TABLET | Refills: 0 | Status: SHIPPED | OUTPATIENT
Start: 2021-07-28 | End: 2023-03-24

## 2021-07-28 RX ORDER — ONDANSETRON 2 MG/ML
INJECTION INTRAMUSCULAR; INTRAVENOUS PRN
Status: DISCONTINUED | OUTPATIENT
Start: 2021-07-28 | End: 2021-07-28

## 2021-07-28 RX ORDER — ACETAMINOPHEN 325 MG/1
975 TABLET ORAL ONCE
Status: CANCELLED | OUTPATIENT
Start: 2021-07-28 | End: 2021-07-28

## 2021-07-28 RX ADMIN — PROPOFOL 200 MG: 10 INJECTION, EMULSION INTRAVENOUS at 11:19

## 2021-07-28 RX ADMIN — PROPOFOL 100 MCG/KG/MIN: 10 INJECTION, EMULSION INTRAVENOUS at 11:21

## 2021-07-28 RX ADMIN — MAGNESIUM SULFATE HEPTAHYDRATE 4 G: 40 INJECTION, SOLUTION INTRAVENOUS at 11:03

## 2021-07-28 RX ADMIN — GLYCOPYRROLATE 0.2 MG: 0.2 INJECTION, SOLUTION INTRAMUSCULAR; INTRAVENOUS at 11:43

## 2021-07-28 RX ADMIN — KETOROLAC TROMETHAMINE 15 MG: 15 INJECTION, SOLUTION INTRAMUSCULAR; INTRAVENOUS at 11:49

## 2021-07-28 RX ADMIN — KETAMINE HYDROCHLORIDE 20 MG: 10 INJECTION INTRAMUSCULAR; INTRAVENOUS at 11:31

## 2021-07-28 RX ADMIN — DEXAMETHASONE SODIUM PHOSPHATE 10 MG: 4 INJECTION, SOLUTION INTRA-ARTICULAR; INTRALESIONAL; INTRAMUSCULAR; INTRAVENOUS; SOFT TISSUE at 11:19

## 2021-07-28 RX ADMIN — Medication 30 MG: at 11:19

## 2021-07-28 RX ADMIN — FENTANYL CITRATE 50 MCG: 50 INJECTION, SOLUTION INTRAMUSCULAR; INTRAVENOUS at 11:30

## 2021-07-28 RX ADMIN — LIDOCAINE HYDROCHLORIDE 60 MG: 20 INJECTION, SOLUTION INFILTRATION; PERINEURAL at 11:19

## 2021-07-28 RX ADMIN — ACETAMINOPHEN 975 MG: 325 TABLET ORAL at 10:52

## 2021-07-28 RX ADMIN — FENTANYL CITRATE 50 MCG: 50 INJECTION, SOLUTION INTRAMUSCULAR; INTRAVENOUS at 11:19

## 2021-07-28 RX ADMIN — ONDANSETRON 4 MG: 2 INJECTION INTRAMUSCULAR; INTRAVENOUS at 11:44

## 2021-07-28 RX ADMIN — SODIUM CHLORIDE, SODIUM LACTATE, POTASSIUM CHLORIDE, CALCIUM CHLORIDE: 600; 310; 30; 20 INJECTION, SOLUTION INTRAVENOUS at 11:02

## 2021-07-28 ASSESSMENT — MIFFLIN-ST. JEOR: SCORE: 1451.07

## 2021-07-28 NOTE — DISCHARGE INSTRUCTIONS
You received an IV medication today called Toradol. You received this medication at 11:49am. This is a NSAID. Therefore, do not take any NSAIDS (Ibuprofen products, Advil, Motrin, etc) until 5:50pm.    Tylenol 975mg at 10:52am, do not exceed 4,000mg of Tylenol in a 24 hour period

## 2021-07-28 NOTE — OP NOTE
PROCEDURE NOTE: TUBAL LIGATION     NAME: Patricia Govea   : 1980   MRN: # [id number]     DATE OF SURGERY: 2021     PREOPERATIVE DIAGNOSIS: undesired future fertility     POSTOPERATIVE DIAGNOSIS: undesired future fertility    SURGERY PERFORMED:  Laparoscopic Bilateral Salpingectomy    SURGEON: Lucian Yanez MD     ASSISTANT: OR STAFF    OPERATIVE FINDINGS:  Normal appearance to the tubes, uterus and ovaries    ESTIMATED BLOOD LOSS: 2cc    SPECIMENS: none    COMPLICATIONS: none    CONSENT: Patient was met preoperatively, where we took time discussing the procedure and the risks associated with this procedure.  We also discussed the failure rate of laparoscopic tubal sterilization specifically and increased risk of ectopic pregnancy. Her questions and concerns were answered.  She signed her consent and was brought to the operating room in stable condition.    PROCEDURE:  After the induction of general anesthetic, she was carefully prepped and draped in sterile fashion for the procedure and a timeout was performed.     Her bladder was drained.  A sterile bivalve speculum was placed in the vagina.  A single-toothed tenaculum was used to grasp the anterior slip of the cervix and a uterine manipulator was placed into the uterus.    Attention was then turned to the abdomen.  An incision was made under the umbilicus.  A Veress needle was introduced with a two-pop technique.  An opening pressure of 3-4 was noted, and saline drop test confirmed adequate placement.  Insufflation was done until 15 mm of pressure was established.  5 mm ports were placed in the right and left lower quadrants under direct visualization.Of note, her left fimbria was absent. The remainder of that tube and the right one were normal.   A Ligasure was used to take down each tube and they were removed through the 5 mm ports.  Good hemostasis was noted. 30 cc of Marcaine was instilled in the abdomen.  The  pneumoperitoneum was relieved s and the ports were removed.  The incisions were closed with 4-0 Vicryl suture.  Steri-Strips were applied.  Patient tolerated this procedure well. Sponge, lap and needle counts were correct times two.        Lucian Yanez MD     CC:  Bob Carlos Ronald Frederick Less, MD

## 2021-07-28 NOTE — ANESTHESIA POSTPROCEDURE EVALUATION
Patient: Patricia Govea    Procedure(s):  LAPAROSCOPIC BILATERAL SALPINGECTOMY    Diagnosis:Encounter for female sterilization procedure [Z30.2]  Diagnosis Additional Information: No value filed.    Anesthesia Type:  General    Note:  Disposition: Inpatient; Outpatient   Postop Pain Control: Uneventful            Sign Out: Well controlled pain   PONV: No   Neuro/Psych: Uneventful            Sign Out: Acceptable/Baseline neuro status   Airway/Respiratory: Uneventful            Sign Out: Acceptable/Baseline resp. status   CV/Hemodynamics: Uneventful            Sign Out: Acceptable CV status; No obvious hypovolemia; No obvious fluid overload   Other NRE: NONE   DID A NON-ROUTINE EVENT OCCUR? No           Last vitals:  Vitals Value Taken Time   /80 07/28/21 1230   Temp 97.8  F (36.6  C) 07/28/21 1203   Pulse 78 07/28/21 1233   Resp 18 07/28/21 1230   SpO2 97 % 07/28/21 1233   Vitals shown include unvalidated device data.    Electronically Signed By: Kiarra Breaux MD  July 28, 2021  1:49 PM

## 2021-07-28 NOTE — ANESTHESIA PROCEDURE NOTES
Airway       Patient location during procedure: OR       Procedure Start/Stop Times: 7/28/2021 11:23 AM  Staff -        CRNA: Afua Fulton APRN CRNA       Performed By: CRNA  Consent for Airway        Urgency: elective  Indications and Patient Condition       Indications for airway management: ricardo-procedural       Induction type:intravenous       Mask difficulty assessment: 2 - vent by mask + OA or adjuvant +/- NMBA    Final Airway Details       Final airway type: endotracheal airway       Successful airway: ETT - single  Endotracheal Airway Details        ETT size (mm): 7.0       Cuffed: yes       Successful intubation technique: direct laryngoscopy       DL Blade Type: Lu 2       Grade View of Cords: 1       Adjucts: stylet and tooth guard       Position: Right       Measured from: lips       Secured at (cm): 20    Post intubation assessment        Placement verified by: capnometry, equal breath sounds and chest rise        Number of attempts at approach: 1       Secured with: silk tape       Ease of procedure: easy       Dentition: Intact and Unchanged

## 2021-07-28 NOTE — ANESTHESIA CARE TRANSFER NOTE
Patient: Patricia Govea    Procedure(s):  LAPAROSCOPIC BILATERAL SALPINGECTOMY    Diagnosis: Encounter for female sterilization procedure [Z30.2]  Diagnosis Additional Information: No value filed.    Anesthesia Type:   General     Note:    Oropharynx: oral airway in place and spontaneously breathing  Level of Consciousness: drowsy  Oxygen Supplementation: face mask  Level of Supplemental Oxygen (L/min / FiO2): 6  Independent Airway: airway patency satisfactory and stable  Dentition: dentition unchanged  Vital Signs Stable: post-procedure vital signs reviewed and stable  Report to RN Given: handoff report given  Patient transferred to: PACU    Handoff Report: Identifed the Patient, Identified the Reponsible Provider, Reviewed the pertinent medical history, Discussed the surgical course, Reviewed Intra-OP anesthesia mangement and issues during anesthesia, Set expectations for post-procedure period and Allowed opportunity for questions and acknowledgement of understanding      Vitals:  Vitals Value Taken Time   /66 07/28/21 1203   Temp 97.8  F (36.6  C) 07/28/21 1203   Pulse 83 07/28/21 1204   Resp 20 07/28/21 1203   SpO2 97 % 07/28/21 1204   Vitals shown include unvalidated device data.    Electronically Signed By: CASTRO Willis CRNA  July 28, 2021  12:08 PM

## 2021-07-28 NOTE — ANESTHESIA PREPROCEDURE EVALUATION
Anesthesia Pre-Procedure Evaluation    Patient: Patricia Govea   MRN: [id number] : 1980        Preoperative Diagnosis: Encounter for female sterilization procedure [Z30.2]   Procedure : Procedure(s):  LAPAROSCOPIC BILATERAL SALPINGECTOMY     Past Medical History:   Diagnosis Date     Acute idiopathic thrombocytopenic purpura (H)     bone marrow biopsy negative for malignancy     Bell's palsy      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 pylori infection      History of blood transfusion      Immune thrombocytopenia (H)     ---saw Dr. Haywood HE Heme     Obesity       Past Surgical History:   Procedure Laterality Date      SECTION  2000      SECTION       LAPAROSCOPIC CHOLECYSTECTOMY  2019     LAPAROSCOPIC CHOLECYSTECTOMY N/A 2019    Procedure: CHOLECYSTECTOMY, LAPAROSCOPIC;  Surgeon: Han Lara MD;  [place] [facility] [place]: General     LAPAROSCOPIC SALPINGECTOMY Left      SC LAP,SPLENECTOMY N/A 2019    Procedure: SPLENECTOMY, ROBOT-ASSISTED, LAPAROSCOPIC, USING DA STEPHEN XI;  Surgeon: Bob Nicole MD;  [place] [facility] [place]  Service: General     SPLENECTOMY  2019      Allergies   Allergen Reactions     Influenza Vaccines Other (See Comments)     Guillain [place] Syndrome following 2016 administration     Blood-Group Specific Substance Other (See Comments)     Warm autoantibodies present.RBC for transfusion may be delayed up to 24 hrs. Draw 3 lavender and 1 red tube for Type and Screen requests.      Social History     Tobacco Use     Smoking status: Never Smoker     Smokeless tobacco: Never Used   Substance Use Topics     Alcohol use: No      Wt Readings from Last 1 Encounters:   21 90.7 kg (200 lb)        Anesthesia Evaluation   Pt has had prior anesthetic.     No history of anesthetic complications       ROS/MED HX  ENT/Pulmonary:  - neg pulmonary ROS     Neurologic:  Comment: History of Guillain [place] after flu vaccination      Cardiovascular:  - neg cardiovascular ROS     METS/Exercise Tolerance: >4 METS    Hematologic: Comments: Chronic ITP,   Plts now >600      Musculoskeletal:  - neg musculoskeletal ROS     GI/Hepatic:  - neg GI/hepatic ROS     Renal/Genitourinary:  - neg Renal ROS     Endo:  - neg endo ROS   (+) Obesity,     Psychiatric/Substance Use:  - neg psychiatric ROS     Infectious Disease:       Malignancy:  - neg malignancy ROS     Other:            Physical Exam    Airway  airway exam normal      Mallampati: II   TM distance: > 3 FB   Neck ROM: full   Mouth opening: > 3 cm    Respiratory Devices and Support         Dental  no notable dental history         Cardiovascular   cardiovascular exam normal       Rhythm and rate: regular and normal     Pulmonary           breath sounds clear to auscultation       Other findings: Obese habitus  COV NEG    OUTSIDE LABS:  CBC:   Lab Results   Component Value Date    WBC 8.0 07/22/2021    WBC 6.4 07/14/2021    HGB 14.6 07/22/2021    HGB 13.6 07/14/2021    HCT 41.4 07/22/2021    HCT 37.8 07/14/2021     (H) 07/22/2021     (H) 07/14/2021     BMP:   Lab Results   Component Value Date     03/05/2021     02/04/2021    POTASSIUM 3.8 03/05/2021    POTASSIUM 4.0 02/04/2021    CHLORIDE 104 03/05/2021    CHLORIDE 105 02/04/2021    CO2 25 03/05/2021    CO2 25 02/04/2021    BUN 11 03/05/2021    BUN 9 02/04/2021    CR 0.70 03/05/2021    CR 0.66 02/04/2021     03/05/2021    GLC 90 02/04/2021     COAGS:   Lab Results   Component Value Date    PTT 27 06/23/2019    INR 0.92 11/29/2019    FIBR 433 (H) 06/23/2019     POC:   Lab Results   Component Value Date    HCG Negative 02/18/2021     HEPATIC:   Lab Results   Component Value Date    ALBUMIN 4.1 03/05/2021    PROTTOTAL 8.0 03/05/2021    ALT 19 03/05/2021    AST 14 03/05/2021    ALKPHOS 106 03/05/2021    BILITOTAL 0.5 03/05/2021     OTHER:   Lab Results    Component Value Date    GLEN 8.9 03/05/2021    MAG 1.8 01/29/2019    LIPASE 20 11/29/2019    TSH 1.93 01/24/2019       Anesthesia Plan    ASA Status:  3   NPO Status:  NPO Appropriate    Anesthesia Type: General.     - Airway: ETT   Induction: Intravenous.   Maintenance: Balanced.        Consents    Anesthesia Plan(s) and associated risks, benefits, and realistic alternatives discussed. Questions answered and patient/representative(s) expressed understanding.     - Discussed with:  Patient,       - Extended Intubation/Ventilatory Support Discussed: No.      - Patient is DNR/DNI Status: No    Use of blood products discussed: No .     Postoperative Care    Pain management: IV analgesics, Multi-modal analgesia.   PONV prophylaxis: Ondansetron (or other 5HT-3), Background Propofol Infusion, Dexamethasone or Solumedrol     Comments:     utilized.  MD Kiarra Shane MD

## 2021-07-29 ENCOUNTER — APPOINTMENT (OUTPATIENT)
Dept: INTERPRETER SERVICES | Facility: CLINIC | Age: 41
End: 2021-07-29
Payer: COMMERCIAL

## 2021-08-02 LAB — SARS-COV-2 RNA RESP QL NAA+PROBE: NEGATIVE

## 2021-08-03 PROBLEM — D69.6 THROMBOCYTOPENIA (H): Status: RESOLVED | Noted: 2019-01-22 | Resolved: 2019-06-29

## 2021-08-11 ENCOUNTER — TELEPHONE (OUTPATIENT)
Dept: ONCOLOGY | Facility: HOSPITAL | Age: 41
End: 2021-08-11

## 2021-08-11 ENCOUNTER — LAB (OUTPATIENT)
Dept: INFUSION THERAPY | Facility: HOSPITAL | Age: 41
End: 2021-08-11
Attending: INTERNAL MEDICINE
Payer: COMMERCIAL

## 2021-08-11 DIAGNOSIS — D69.3 CHRONIC ITP (IDIOPATHIC THROMBOCYTOPENIA) (H): ICD-10-CM

## 2021-08-11 LAB
ERYTHROCYTE [DISTWIDTH] IN BLOOD BY AUTOMATED COUNT: 14 % (ref 10–15)
HCT VFR BLD AUTO: 38.6 % (ref 35–47)
HGB BLD-MCNC: 13.5 G/DL (ref 11.7–15.7)
MCH RBC QN AUTO: 32.3 PG (ref 26.5–33)
MCHC RBC AUTO-ENTMCNC: 35 G/DL (ref 31.5–36.5)
MCV RBC AUTO: 92 FL (ref 78–100)
PLATELET # BLD AUTO: 654 10E3/UL (ref 150–450)
RBC # BLD AUTO: 4.18 10E6/UL (ref 3.8–5.2)
WBC # BLD AUTO: 6.3 10E3/UL (ref 4–11)

## 2021-08-11 PROCEDURE — 36415 COLL VENOUS BLD VENIPUNCTURE: CPT

## 2021-08-11 PROCEDURE — 85027 COMPLETE CBC AUTOMATED: CPT

## 2021-08-11 NOTE — TELEPHONE ENCOUNTER
Patient was in today for a lab only check under the direction of Dr Haywood for her diagnosis of immune thrombocytopenia.  promacta has been on hold for about 4 months now.  She is having labs checked every month and have her seen by Dr Haywood again in November.  Dr Haywood has reviewed lab results from today with a platelet count of 654.  No change in plan.  Patient was updated and verbalized understanding.    Cecy Crump RN

## 2021-08-12 ENCOUNTER — TRANSFERRED RECORDS (OUTPATIENT)
Dept: HEALTH INFORMATION MANAGEMENT | Facility: CLINIC | Age: 41
End: 2021-08-12

## 2021-09-15 ENCOUNTER — TELEPHONE (OUTPATIENT)
Dept: ONCOLOGY | Facility: HOSPITAL | Age: 41
End: 2021-09-15

## 2021-09-15 ENCOUNTER — LAB (OUTPATIENT)
Dept: INFUSION THERAPY | Facility: HOSPITAL | Age: 41
End: 2021-09-15
Attending: INTERNAL MEDICINE
Payer: COMMERCIAL

## 2021-09-15 DIAGNOSIS — D69.3 CHRONIC ITP (IDIOPATHIC THROMBOCYTOPENIA) (H): ICD-10-CM

## 2021-09-15 LAB
ERYTHROCYTE [DISTWIDTH] IN BLOOD BY AUTOMATED COUNT: 13.5 % (ref 10–15)
HCT VFR BLD AUTO: 37.6 % (ref 35–47)
HGB BLD-MCNC: 13.2 G/DL (ref 11.7–15.7)
MCH RBC QN AUTO: 32.4 PG (ref 26.5–33)
MCHC RBC AUTO-ENTMCNC: 35.1 G/DL (ref 31.5–36.5)
MCV RBC AUTO: 92 FL (ref 78–100)
PLATELET # BLD AUTO: 593 10E3/UL (ref 150–450)
RBC # BLD AUTO: 4.07 10E6/UL (ref 3.8–5.2)
WBC # BLD AUTO: 7 10E3/UL (ref 4–11)

## 2021-09-15 PROCEDURE — 85027 COMPLETE CBC AUTOMATED: CPT

## 2021-09-15 PROCEDURE — 36415 COLL VENOUS BLD VENIPUNCTURE: CPT

## 2021-09-15 NOTE — TELEPHONE ENCOUNTER
Patient was in today for a lab only check under the direction of Dr. Haywood for her diagnosis of immune thrombocytopenia.  Promacta has been on hold for about 5 months now.  She is having labs checked every month and seeing Dr. Haywood in November.  Platelet count today is 593.  No change in plan.  Patient was updated on this and verbalized understanding.    Cecy Crump RN

## 2021-10-15 ENCOUNTER — TELEPHONE (OUTPATIENT)
Dept: ONCOLOGY | Facility: HOSPITAL | Age: 41
End: 2021-10-15

## 2021-10-15 ENCOUNTER — LAB (OUTPATIENT)
Dept: INFUSION THERAPY | Facility: HOSPITAL | Age: 41
End: 2021-10-15
Attending: INTERNAL MEDICINE
Payer: COMMERCIAL

## 2021-10-15 DIAGNOSIS — D69.3 CHRONIC ITP (IDIOPATHIC THROMBOCYTOPENIA) (H): ICD-10-CM

## 2021-10-15 LAB
ERYTHROCYTE [DISTWIDTH] IN BLOOD BY AUTOMATED COUNT: 13.3 % (ref 10–15)
HCT VFR BLD AUTO: 39.5 % (ref 35–47)
HGB BLD-MCNC: 13.8 G/DL (ref 11.7–15.7)
MCH RBC QN AUTO: 32.9 PG (ref 26.5–33)
MCHC RBC AUTO-ENTMCNC: 34.9 G/DL (ref 31.5–36.5)
MCV RBC AUTO: 94 FL (ref 78–100)
PLATELET # BLD AUTO: 621 10E3/UL (ref 150–450)
RBC # BLD AUTO: 4.2 10E6/UL (ref 3.8–5.2)
WBC # BLD AUTO: 6.7 10E3/UL (ref 4–11)

## 2021-10-15 PROCEDURE — 85027 COMPLETE CBC AUTOMATED: CPT

## 2021-10-15 PROCEDURE — 36415 COLL VENOUS BLD VENIPUNCTURE: CPT

## 2021-11-24 ENCOUNTER — IMMUNIZATION (OUTPATIENT)
Dept: FAMILY MEDICINE | Facility: CLINIC | Age: 41
End: 2021-11-24
Payer: COMMERCIAL

## 2021-11-24 PROCEDURE — 91300 PR COVID VAC PFIZER DIL RECON 30 MCG/0.3 ML IM: CPT

## 2021-11-24 PROCEDURE — 0004A PR COVID VAC PFIZER DIL RECON 30 MCG/0.3 ML IM: CPT

## 2022-01-04 NOTE — PROGRESS NOTES
Received call from Rose in lab about critical low result on 3hr glucose screen. Per Rose, pt reported to be in the lunchroom eating lunch at the time of placing call. 3hr and CBC results called to Bruna (PCC) at [facility] to follow up.   
no

## 2022-01-05 ENCOUNTER — LAB (OUTPATIENT)
Dept: INFUSION THERAPY | Facility: HOSPITAL | Age: 42
End: 2022-01-05
Attending: INTERNAL MEDICINE
Payer: COMMERCIAL

## 2022-01-05 ENCOUNTER — ONCOLOGY VISIT (OUTPATIENT)
Dept: ONCOLOGY | Facility: HOSPITAL | Age: 42
End: 2022-01-05
Attending: INTERNAL MEDICINE
Payer: COMMERCIAL

## 2022-01-05 VITALS
WEIGHT: 207.9 LBS | BODY MASS INDEX: 44.99 KG/M2 | HEART RATE: 73 BPM | OXYGEN SATURATION: 96 % | TEMPERATURE: 98.6 F | SYSTOLIC BLOOD PRESSURE: 176 MMHG | DIASTOLIC BLOOD PRESSURE: 76 MMHG | RESPIRATION RATE: 18 BRPM

## 2022-01-05 DIAGNOSIS — D69.3 CHRONIC ITP (IDIOPATHIC THROMBOCYTOPENIA) (H): ICD-10-CM

## 2022-01-05 DIAGNOSIS — D69.3 CHRONIC ITP (IDIOPATHIC THROMBOCYTOPENIA) (H): Primary | ICD-10-CM

## 2022-01-05 LAB
ERYTHROCYTE [DISTWIDTH] IN BLOOD BY AUTOMATED COUNT: 13.3 % (ref 10–15)
HCT VFR BLD AUTO: 37.6 % (ref 35–47)
HGB BLD-MCNC: 13.7 G/DL (ref 11.7–15.7)
MCH RBC QN AUTO: 33.7 PG (ref 26.5–33)
MCHC RBC AUTO-ENTMCNC: 36.4 G/DL (ref 31.5–36.5)
MCV RBC AUTO: 92 FL (ref 78–100)
PLATELET # BLD AUTO: 583 10E3/UL (ref 150–450)
RBC # BLD AUTO: 4.07 10E6/UL (ref 3.8–5.2)
WBC # BLD AUTO: 7.1 10E3/UL (ref 4–11)

## 2022-01-05 PROCEDURE — 36415 COLL VENOUS BLD VENIPUNCTURE: CPT

## 2022-01-05 PROCEDURE — 99213 OFFICE O/P EST LOW 20 MIN: CPT | Performed by: NURSE PRACTITIONER

## 2022-01-05 PROCEDURE — 85014 HEMATOCRIT: CPT

## 2022-01-05 PROCEDURE — G0463 HOSPITAL OUTPT CLINIC VISIT: HCPCS

## 2022-01-05 RX ORDER — OXYCODONE HYDROCHLORIDE 5 MG/1
TABLET ORAL
COMMUNITY
End: 2023-03-24

## 2022-01-05 ASSESSMENT — PAIN SCALES - GENERAL: PAINLEVEL: NO PAIN (0)

## 2022-01-05 NOTE — PROGRESS NOTES
"Oncology Rooming Note    January 5, 2022 2:01 PM   Patricia Govea is a 41 year old female who presents for:    Chief Complaint   Patient presents with     Hematology     Initial Vitals: BP (!) 176/76   Pulse 73   Temp 98.6  F (37  C)   Resp 18   Wt 94.3 kg (207 lb 14.4 oz)   SpO2 96%   BMI 44.99 kg/m   Estimated body mass index is 44.99 kg/m  as calculated from the following:    Height as of 7/28/21: 1.448 m (4' 9\").    Weight as of this encounter: 94.3 kg (207 lb 14.4 oz). Body surface area is 1.95 meters squared.  No Pain (0) Comment: Data Unavailable   No LMP recorded.  Allergies reviewed: Yes  Medications reviewed: Yes    Medications: Medication refills not needed today.  Pharmacy name entered into EPIC:    [facility]/PHARMACY #[id number] [place], [place] - [address] [id number] [place], [place] - [address] [facility] [address] [facility] PH[facility]AC[facility] [place], [place] - [address] [facility]    Clinical concerns: None       Marie Baxter LPN            "

## 2022-01-05 NOTE — PATIENT INSTRUCTIONS
Lab on 01/05/2022   Component Date Value Ref Range Status     WBC Count 01/05/2022 7.1  4.0 - 11.0 10e3/uL Final     RBC Count 01/05/2022 4.07  3.80 - 5.20 10e6/uL Final     Hemoglobin 01/05/2022 13.7  11.7 - 15.7 g/dL Final     Hematocrit 01/05/2022 37.6  35.0 - 47.0 % Final     MCV 01/05/2022 92  78 - 100 fL Final     MCH 01/05/2022 33.7* 26.5 - 33.0 pg Final     MCHC 01/05/2022 36.4  31.5 - 36.5 g/dL Final     RDW 01/05/2022 13.3  10.0 - 15.0 % Final     Platelet Count 01/05/2022 583* 150 - 450 10e3/uL Final     ]

## 2022-01-05 NOTE — LETTER
"    1/5/2022         RE: Patricia Govea  [address]  [facility] [facility] [address]        Dear Colleague,    Thank you for referring your patient, Patricia Govea, to the [facility] [place]. Please see a copy of my visit note below.    Oncology Rooming Note    January 5, 2022 2:01 PM   Patricia Govea is a 41 year old female who presents for:    Chief Complaint   Patient presents with     Hematology     Initial Vitals: BP (!) 176/76   Pulse 73   Temp 98.6  F (37  C)   Resp 18   Wt 94.3 kg (207 lb 14.4 oz)   SpO2 96%   BMI 44.99 kg/m   Estimated body mass index is 44.99 kg/m  as calculated from the following:    Height as of 7/28/21: 1.448 m (4' 9\").    Weight as of this encounter: 94.3 kg (207 lb 14.4 oz). Body surface area is 1.95 meters squared.  No Pain (0) Comment: Data Unavailable   No LMP recorded.  Allergies reviewed: Yes  Medications reviewed: Yes    Medications: Medication refills not needed today.  Pharmacy name entered into [employer]:    [id number] [place], [place] - [address] [id number] [address] AT [facility]Y [facility] [address] [facility]    Clinical concerns: None       Marie Baxter LPN              [facility] Hematology and Oncology Progress Note    Patient: Patricia Govea  MRN: [id number]  Date of Service: Jan 5, 2022          Reason for Visit    Chief Complaint   Patient presents with     Hematology       Assessment and Plan    Cancer Staging  No matching staging information was found for the patient.    1.  Relapsed ITP: Her latest relapse was March 2021.  She did not respond to 4 weeks of Rituxan.  we then started Promacta.  Started at low dose and increase to 50 mg.  They initially did not respond so we went to 75 mg very briefly and then her platelets have actually been high since then.  We have " been holding that since April 16, 2021.  Her platelets continue to remain slightly elevated.  No signs of bleeding or bruising.  We will continue to hold everything.  She will return for 1 more check in 3 months.  If she continues to be normal at that time we will not need to continue to see her.  Will call us with any bruising or bleeding complications.      ECOG Performance    0 - Independent    Distress Screening (within last 30 days)    1. How concerned are you about your ability to eat? : 0  2. How concerned are you about unintended weight loss or your current weight? : 0  3. How concerned are you about feeling depressed or very sad? : 0  4. How concerned are you about feeling anxious or very scared? : 0  5. Do you struggle with the loss of meaning and cassandra in your life? : Not at all  6. How concerned are you about work and home life issues that may be affected by your cancer? : 0  7. How concerned are you about knowing what resources are available to help you? : 0  8. Do you currently have what you would describe as Restorationism or spiritual struggles?            : Not at all       Pain  Pain Score: No Pain (0)    Problem List    Patient Active Problem List   Diagnosis     GBS (Guillain [place] syndrome) (H)     Bell's palsy     Anisocoria     Encounter for screening for cervical cancer     Chronic ITP (idiopathic thrombocytopenia) (H)     Encounter for initial prescription of injectable contraceptive     Elevated blood pressure reading without diagnosis of hypertension     Thrombocytopenia (H)        ______________________________________________________________________________    History of Present Illness    DIAGNOSIS:  Recurrent and refractory. originially had ITP in 2011. Then again in 2019 in pregnancy. Now recurrent again.      CURRENT TREATMENT: Rituxan for 4 weeks starting 2/4/21 without any response.   Promacta. Started 25mg on 3/2/21. Increased to 50mg on 3/9. Increased to 75mg on 3/23/21. Held starting  4/16/21 due to platelets of 494.      PAST TREATMENT:   Rituxan and prednisone. Rituxan last dose was March 8, 2019  Steroids  IVIG  Splenectomy 1/28/19. Initial response, but then rapid decrease in platelets.      INTERIM HISTORY:   She is here today for a follow-up visit. She is doing well.  No bleeding or bruising.  No breathing issues. No falls.     Review of Systems    Pertinent items are noted in HPI.    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 pylori infection 2004     History of blood transfusion      Immune thrombocytopenia (H)     2012---saw Dr. Haywood [facility] Heme     Obesity        PHYSICAL EXAM  BP (!) 176/76   Pulse 73   Temp 98.6  F (37  C)   Resp 18   Wt 94.3 kg (207 lb 14.4 oz)   SpO2 96%   BMI 44.99 kg/m      GENERAL: no acute distress. Cooperative in conversation. Here alone. Mask on  RESP: Regular respiratory rate. No expiratory wheezes   MUSCULOSKELETAL: no bilateral leg swelling  NEURO: non focal. Alert and oriented x3.   PSYCH: within normal limits. No depression or anxiety.  SKIN: exposed skin is dry intact. No bruising noted    Lab Results    Recent Results (from the past 168 hour(s))   CBC with platelets   Result Value Ref Range    WBC Count 7.1 4.0 - 11.0 10e3/uL    RBC Count 4.07 3.80 - 5.20 10e6/uL    Hemoglobin 13.7 11.7 - 15.7 g/dL    Hematocrit 37.6 35.0 - 47.0 %    MCV 92 78 - 100 fL    MCH 33.7 (H) 26.5 - 33.0 pg    MCHC 36.4 31.5 - 36.5 g/dL    RDW 13.3 10.0 - 15.0 %    Platelet Count 583 (H) 150 - 450 10e3/uL       Imaging    No results found.      Signed by: CASTRO Cameron CNP      Again, thank you for allowing me to participate in the care of your patient.        Sincerely,        CASTRO Cameron CNP

## 2022-01-05 NOTE — PROGRESS NOTES
[facility] Hematology and Oncology Progress Note    Patient: Patricia Govea  MRN: [id number]  Date of Service: Jan 5, 2022          Reason for Visit    Chief Complaint   Patient presents with     Hematology       Assessment and Plan    Cancer Staging  No matching staging information was found for the patient.    1.  Relapsed ITP: Her latest relapse was March 2021.  She did not respond to 4 weeks of Rituxan.  we then started Promacta.  Started at low dose and increase to 50 mg.  They initially did not respond so we went to 75 mg very briefly and then her platelets have actually been high since then.  We have been holding that since April 16, 2021.  Her platelets continue to remain slightly elevated.  No signs of bleeding or bruising.  We will continue to hold everything.  She will return for 1 more check in 3 months.  If she continues to be normal at that time we will not need to continue to see her.  Will call us with any bruising or bleeding complications.      ECOG Performance    0 - Independent    Distress Screening (within last 30 days)    1. How concerned are you about your ability to eat? : 0  2. How concerned are you about unintended weight loss or your current weight? : 0  3. How concerned are you about feeling depressed or very sad? : 0  4. How concerned are you about feeling anxious or very scared? : 0  5. Do you struggle with the loss of meaning and cassandra in your life? : Not at all  6. How concerned are you about work and home life issues that may be affected by your cancer? : 0  7. How concerned are you about knowing what resources are available to help you? : 0  8. Do you currently have what you would describe as Anabaptism or spiritual struggles?            : Not at all       Pain  Pain Score: No Pain (0)    Problem List    Patient Active Problem List   Diagnosis     GBS (Guillain [place] syndrome) (H)     Bell's palsy     Anisocoria     Encounter for screening for cervical cancer      Chronic ITP (idiopathic thrombocytopenia) (H)     Encounter for initial prescription of injectable contraceptive     Elevated blood pressure reading without diagnosis of hypertension     Thrombocytopenia (H)        ______________________________________________________________________________    History of Present Illness    DIAGNOSIS:  Recurrent and refractory. originially had ITP in 2011. Then again in 2019 in pregnancy. Now recurrent again.      CURRENT TREATMENT: Rituxan for 4 weeks starting 2/4/21 without any response.   Promacta. Started 25mg on 3/2/21. Increased to 50mg on 3/9. Increased to 75mg on 3/23/21. Held starting 4/16/21 due to platelets of 494.      PAST TREATMENT:   Rituxan and prednisone. Rituxan last dose was March 8, 2019  Steroids  IVIG  Splenectomy 1/28/19. Initial response, but then rapid decrease in platelets.      INTERIM HISTORY:   She is here today for a follow-up visit. She is doing well.  No bleeding or bruising.  No breathing issues. No falls.     Review of Systems    Pertinent items are noted in HPI.    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 pylori infection 2004     History of blood transfusion      Immune thrombocytopenia (H)     2012---saw Dr. Haywood [facility] Heme     Obesity        PHYSICAL EXAM  BP (!) 176/76   Pulse 73   Temp 98.6  F (37  C)   Resp 18   Wt 94.3 kg (207 lb 14.4 oz)   SpO2 96%   BMI 44.99 kg/m      GENERAL: no acute distress. Cooperative in conversation. Here alone. Mask on  RESP: Regular respiratory rate. No expiratory wheezes   MUSCULOSKELETAL: no bilateral leg swelling  NEURO: non focal. Alert and oriented x3.   PSYCH: within normal limits. No depression or anxiety.  SKIN: exposed skin is dry intact. No bruising noted    Lab Results    Recent Results (from the  past 168 hour(s))   CBC with platelets   Result Value Ref Range    WBC Count 7.1 4.0 - 11.0 10e3/uL    RBC Count 4.07 3.80 - 5.20 10e6/uL    Hemoglobin 13.7 11.7 - 15.7 g/dL    Hematocrit 37.6 35.0 - 47.0 %    MCV 92 78 - 100 fL    MCH 33.7 (H) 26.5 - 33.0 pg    MCHC 36.4 31.5 - 36.5 g/dL    RDW 13.3 10.0 - 15.0 %    Platelet Count 583 (H) 150 - 450 10e3/uL       Imaging    No results found.      Signed by: CASTRO Cameron CNP

## 2022-04-04 ENCOUNTER — ONCOLOGY VISIT (OUTPATIENT)
Dept: ONCOLOGY | Facility: HOSPITAL | Age: 42
End: 2022-04-04
Attending: INTERNAL MEDICINE
Payer: COMMERCIAL

## 2022-04-04 ENCOUNTER — LAB (OUTPATIENT)
Dept: INFUSION THERAPY | Facility: HOSPITAL | Age: 42
End: 2022-04-04
Attending: INTERNAL MEDICINE
Payer: COMMERCIAL

## 2022-04-04 VITALS
BODY MASS INDEX: 42.13 KG/M2 | OXYGEN SATURATION: 98 % | RESPIRATION RATE: 20 BRPM | HEART RATE: 67 BPM | DIASTOLIC BLOOD PRESSURE: 75 MMHG | HEIGHT: 59 IN | TEMPERATURE: 99.2 F | SYSTOLIC BLOOD PRESSURE: 140 MMHG | WEIGHT: 209 LBS

## 2022-04-04 DIAGNOSIS — D69.3 CHRONIC ITP (IDIOPATHIC THROMBOCYTOPENIA) (H): Primary | ICD-10-CM

## 2022-04-04 DIAGNOSIS — D69.3 CHRONIC ITP (IDIOPATHIC THROMBOCYTOPENIA) (H): ICD-10-CM

## 2022-04-04 LAB
ERYTHROCYTE [DISTWIDTH] IN BLOOD BY AUTOMATED COUNT: 13.7 % (ref 10–15)
HCT VFR BLD AUTO: 37.8 % (ref 35–47)
HGB BLD-MCNC: 13.4 G/DL (ref 11.7–15.7)
MCH RBC QN AUTO: 33.6 PG (ref 26.5–33)
MCHC RBC AUTO-ENTMCNC: 35.4 G/DL (ref 31.5–36.5)
MCV RBC AUTO: 95 FL (ref 78–100)
PLATELET # BLD AUTO: 559 10E3/UL (ref 150–450)
RBC # BLD AUTO: 3.99 10E6/UL (ref 3.8–5.2)
WBC # BLD AUTO: 7.5 10E3/UL (ref 4–11)

## 2022-04-04 PROCEDURE — 99213 OFFICE O/P EST LOW 20 MIN: CPT | Performed by: NURSE PRACTITIONER

## 2022-04-04 PROCEDURE — 85027 COMPLETE CBC AUTOMATED: CPT

## 2022-04-04 PROCEDURE — G0463 HOSPITAL OUTPT CLINIC VISIT: HCPCS

## 2022-04-04 PROCEDURE — 36415 COLL VENOUS BLD VENIPUNCTURE: CPT

## 2022-04-04 ASSESSMENT — PAIN SCALES - GENERAL: PAINLEVEL: NO PAIN (0)

## 2022-04-04 NOTE — LETTER
"    4/4/2022         RE: Patricia Govea  [address] [facility] [address]        Dear Colleague,    Thank you for referring your patient, Patricia Govea, to the [facility] [place]. Please see a copy of my visit note below.    Oncology Rooming Note    April 4, 2022 1:48 PM   Patricia Govea is a 41 year old female who presents for:    Chief Complaint   Patient presents with     Oncology Clinic Visit     3 month return Chronic ITP (idiopathic thrombocytopenia), review labs     Initial Vitals: BP (!) 140/75 (BP Location: Left arm, Patient Position: Sitting, Cuff Size: Adult Large)   Pulse 67   Temp 99.2  F (37.3  C) (Oral)   Resp 20   Ht 1.486 m (4' 10.5\")   Wt 94.8 kg (209 lb)   SpO2 98%   BMI 42.94 kg/m   Estimated body mass index is 42.94 kg/m  as calculated from the following:    Height as of this encounter: 1.486 m (4' 10.5\").    Weight as of this encounter: 94.8 kg (209 lb). Body surface area is 1.98 meters squared.  No Pain (0) Comment: Data Unavailable   No LMP recorded.  Allergies reviewed: Yes  Medications reviewed: Yes    Medications: Medication refills not needed today.  Pharm[facility] entered into [employer]: [id number] - [place], [place] - [address] AT [facility] [address]    Clinical concerns:       Keisha Santos, [facility] Hematology and Oncology Progress Note    Patient: Patricia Govea  MRN: [id number]  Date of Service: Apr 4, 2022          Reason for Visit    Chief Complaint   Patient presents with     Hematology     3 month return Chronic ITP (idiopathic thrombocytopenia), review labs       Assessment and Plan    Cancer Staging  No matching staging information was found for the patient.    1.  Relapsed ITP: Her latest relapse was March 2021.  She did not respond to 4 weeks of Rituxan.  we then started Promacta.  Started at low dose and increased to 50 mg.  She " initially did not respond so we went to 75 mg very briefly and then her platelets have actually been high since then.  We have been holding that since April 16, 2021.  Her platelets continue to remain slightly elevated.  No signs of bleeding or bruising.   SHe is now a year out from any treatment and her platelets continue to look a little high, definitely not low or worrisome.  No longer needs to follow-up with us regularly in clinic.  Will call us with any bruising or bleeding complications.      ECOG Performance    0 - Independent    Distress Screening (within last 30 days)    1. How concerned are you about your ability to eat? : 0  2. How concerned are you about unintended weight loss or your current weight? : 0  3. How concerned are you about feeling depressed or very sad? : 0  4. How concerned are you about feeling anxious or very scared? : 0  5. Do you struggle with the loss of meaning and cassandra in your life? : Not at all  6. How concerned are you about work and home life issues that may be affected by your cancer? : 0  7. How concerned are you about knowing what resources are available to help you? : 0  8. Do you currently have what you would describe as Denominational or spiritual struggles?            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 originially had ITP in 2011. Then again in 2019 in pregnancy. Now recurrent again.      CURRENT TREATMENT: Rituxan for 4 weeks  "starting 2/4/21 without any response.   Promacta. Started 25mg on 3/2/21. Increased to 50mg on 3/9. Increased to 75mg on 3/23/21. Held starting 4/16/21 due to platelets of 494.      PAST TREATMENT:   Rituxan and prednisone. Rituxan last dose was March 8, 2019  Steroids  IVIG  Splenectomy 1/28/19. Initial response, but then rapid decrease in platelets.      INTERIM HISTORY:   She is here today for a follow-up visit. She is doing well.  No bleeding or bruising.  No breathing issues. No falls.     Review of Systems    Pertinent items are noted in HPI.    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 pylori infection 2004     History of blood transfusion      Immune thrombocytopenia (H)     2012---saw Dr. Haywood [facility] Heme     Obesity        PHYSICAL EXAM  BP (!) 140/75 (BP Location: Left arm, Patient Position: Sitting, Cuff Size: Adult Large)   Pulse 67   Temp 99.2  F (37.3  C) (Oral)   Resp 20   Ht 1.486 m (4' 10.5\")   Wt 94.8 kg (209 lb)   SpO2 98%   BMI 42.94 kg/m      GENERAL: no acute distress. Cooperative in conversation. Here alone. Mask on  RESP: Regular respiratory rate. No expiratory wheezes   MUSCULOSKELETAL: no bilateral leg swelling  NEURO: non focal. Alert and oriented x3.   PSYCH: within normal limits. No depression or anxiety.  SKIN: exposed skin is dry intact. No bruising noted    Lab Results    Recent Results (from the past 168 hour(s))   CBC with platelets   Result Value Ref Range    WBC Count 7.5 4.0 - 11.0 10e3/uL    RBC Count 3.99 3.80 - 5.20 10e6/uL    Hemoglobin 13.4 11.7 - 15.7 g/dL    Hematocrit 37.8 35.0 - 47.0 %    MCV 95 78 - 100 fL    MCH 33.6 (H) 26.5 - 33.0 pg    MCHC 35.4 31.5 - 36.5 g/dL    RDW 13.7 10.0 - 15.0 %    Platelet Count 559 (H) 150 - 450 10e3/uL       Imaging    No results " found.      Signed by: CASTRO Cameron CNP      Again, thank you for allowing me to participate in the care of your patient.        Sincerely,        CASTRO Cameron CNP

## 2022-04-04 NOTE — PROGRESS NOTES
"Oncology Rooming Note    April 4, 2022 1:48 PM   Patricia Govea is a 41 year old female who presents for:    Chief Complaint   Patient presents with     Oncology Clinic Visit     3 month return Chronic ITP (idiopathic thrombocytopenia), review labs     Initial Vitals: BP (!) 140/75 (BP Location: Left arm, Patient Position: Sitting, Cuff Size: Adult Large)   Pulse 67   Temp 99.2  F (37.3  C) (Oral)   Resp 20   Ht 1.486 m (4' 10.5\")   Wt 94.8 kg (209 lb)   SpO2 98%   BMI 42.94 kg/m   Estimated body mass index is 42.94 kg/m  as calculated from the following:    Height as of this encounter: 1.486 m (4' 10.5\").    Weight as of this encounter: 94.8 kg (209 lb). Body surface area is 1.98 meters squared.  No Pain (0) Comment: Data Unavailable   No LMP recorded.  Allergies reviewed: Yes  Medications reviewed: Yes    Medications: Medication refills not needed today.  Pharmacy name entered into [facility]: [facility] #[id number] - [place], [place] - [address] AT [facility] [address]    Clinical concerns:       Keisha Santos, CMA          '  "

## 2022-04-04 NOTE — PROGRESS NOTES
[facility] Hematology and Oncology Progress Note    Patient: Patricia Govea  MRN: [id number]  Date of Service: Apr 4, 2022          Reason for Visit    Chief Complaint   Patient presents with     Hematology     3 month return Chronic ITP (idiopathic thrombocytopenia), review labs       Assessment and Plan    Cancer Staging  No matching staging information was found for the patient.    1.  Relapsed ITP: Her latest relapse was March 2021.  She did not respond to 4 weeks of Rituxan.  we then started Promacta.  Started at low dose and increased to 50 mg.  She initially did not respond so we went to 75 mg very briefly and then her platelets have actually been high since then.  We have been holding that since April 16, 2021.  Her platelets continue to remain slightly elevated.  No signs of bleeding or bruising.   SHe is now a year out from any treatment and her platelets continue to look a little high, definitely not low or worrisome.  No longer needs to follow-up with us regularly in clinic.  Will call us with any bruising or bleeding complications.      ECOG Performance    0 - Independent    Distress Screening (within last 30 days)    1. How concerned are you about your ability to eat? : 0  2. How concerned are you about unintended weight loss or your current weight? : 0  3. How concerned are you about feeling depressed or very sad? : 0  4. How concerned are you about feeling anxious or very scared? : 0  5. Do you struggle with the loss of meaning and cassandra in your life? : Not at all  6. How concerned are you about work and home life issues that may be affected by your cancer? : 0  7. How concerned are you about knowing what resources are available to help you? : 0  8. Do you currently have what you would describe as Confucianist or spiritual struggles?            : Not at all       Pain  Pain Score: No Pain (0)    Problem List    Patient Active Problem List   Diagnosis     GBS (Guillain [place] syndrome)  "(H)     Bell's palsy     Anisocoria     Encounter for screening for cervical cancer     Chronic ITP (idiopathic thrombocytopenia) (H)     Encounter for initial prescription of injectable contraceptive     Elevated blood pressure reading without diagnosis of hypertension     Thrombocytopenia (H)        ______________________________________________________________________________    History of Present Illness    DIAGNOSIS:  Recurrent and refractory. originially had ITP in 2011. Then again in 2019 in pregnancy. Now recurrent again.      CURRENT TREATMENT: Rituxan for 4 weeks starting 2/4/21 without any response.   Promacta. Started 25mg on 3/2/21. Increased to 50mg on 3/9. Increased to 75mg on 3/23/21. Held starting 4/16/21 due to platelets of 494.      PAST TREATMENT:   Rituxan and prednisone. Rituxan last dose was March 8, 2019  Steroids  IVIG  Splenectomy 1/28/19. Initial response, but then rapid decrease in platelets.      INTERIM HISTORY:   She is here today for a follow-up visit. She is doing well.  No bleeding or bruising.  No breathing issues. No falls.     Review of Systems    Pertinent items are noted in HPI.    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 pylori infection 2004     History of blood transfusion      Immune thrombocytopenia (H)     2012---saw Dr. Haywood [facility] Heme     Obesity        PHYSICAL EXAM  BP (!) 140/75 (BP Location: Left arm, Patient Position: Sitting, Cuff Size: Adult Large)   Pulse 67   Temp 99.2  F (37.3  C) (Oral)   Resp 20   Ht 1.486 m (4' 10.5\")   Wt 94.8 kg (209 lb)   SpO2 98%   BMI 42.94 kg/m      GENERAL: no acute distress. Cooperative in conversation. Here alone. Mask on  RESP: Regular respiratory rate. No expiratory wheezes   MUSCULOSKELETAL: no bilateral leg " swelling  NEURO: non focal. Alert and oriented x3.   PSYCH: within normal limits. No depression or anxiety.  SKIN: exposed skin is dry intact. No bruising noted    Lab Results    Recent Results (from the past 168 hour(s))   CBC with platelets   Result Value Ref Range    WBC Count 7.5 4.0 - 11.0 10e3/uL    RBC Count 3.99 3.80 - 5.20 10e6/uL    Hemoglobin 13.4 11.7 - 15.7 g/dL    Hematocrit 37.8 35.0 - 47.0 %    MCV 95 78 - 100 fL    MCH 33.6 (H) 26.5 - 33.0 pg    MCHC 35.4 31.5 - 36.5 g/dL    RDW 13.7 10.0 - 15.0 %    Platelet Count 559 (H) 150 - 450 10e3/uL       Imaging    No results found.      Signed by: CASTRO Cameron CNP

## 2022-11-18 NOTE — PROGRESS NOTES
Pt came into infusion clinic for her treatment as ordered. Labs Reviewed. Medications explained to pt who verbalized understanding. IV patent throughout infusion. Pt tolerated infusion with no complications. Per Caprice, pt was instructed to decrease Prednisone to 10mg daily then DC. This was written down for pt and she verbalized understanding. Pt left infusion clinic via ambulatory and will RTC as sched.    
2

## 2023-03-24 ENCOUNTER — OFFICE VISIT (OUTPATIENT)
Dept: FAMILY MEDICINE | Facility: CLINIC | Age: 43
End: 2023-03-24
Payer: COMMERCIAL

## 2023-03-24 VITALS
HEIGHT: 58 IN | WEIGHT: 210 LBS | BODY MASS INDEX: 44.08 KG/M2 | OXYGEN SATURATION: 99 % | DIASTOLIC BLOOD PRESSURE: 83 MMHG | HEART RATE: 77 BPM | SYSTOLIC BLOOD PRESSURE: 148 MMHG

## 2023-03-24 DIAGNOSIS — Z12.4 SCREENING FOR CERVICAL CANCER: ICD-10-CM

## 2023-03-24 DIAGNOSIS — Z13.220 SCREENING FOR LIPID DISORDERS: ICD-10-CM

## 2023-03-24 DIAGNOSIS — E66.01 CLASS 3 SEVERE OBESITY DUE TO EXCESS CALORIES WITHOUT SERIOUS COMORBIDITY WITH BODY MASS INDEX (BMI) OF 40.0 TO 44.9 IN ADULT (H): Primary | ICD-10-CM

## 2023-03-24 DIAGNOSIS — Z00.00 ROUTINE GENERAL MEDICAL EXAMINATION AT A HEALTH CARE FACILITY: ICD-10-CM

## 2023-03-24 DIAGNOSIS — E66.813 CLASS 3 SEVERE OBESITY DUE TO EXCESS CALORIES WITHOUT SERIOUS COMORBIDITY WITH BODY MASS INDEX (BMI) OF 40.0 TO 44.9 IN ADULT (H): Primary | ICD-10-CM

## 2023-03-24 LAB — HBA1C MFR BLD: 5.6 % (ref 0–5.6)

## 2023-03-24 PROCEDURE — 87624 HPV HI-RISK TYP POOLED RSLT: CPT

## 2023-03-24 PROCEDURE — G0145 SCR C/V CYTO,THINLAYER,RESCR: HCPCS

## 2023-03-24 PROCEDURE — 83036 HEMOGLOBIN GLYCOSYLATED A1C: CPT

## 2023-03-24 PROCEDURE — 99213 OFFICE O/P EST LOW 20 MIN: CPT | Mod: 25

## 2023-03-24 PROCEDURE — 99396 PREV VISIT EST AGE 40-64: CPT | Mod: GC

## 2023-03-24 PROCEDURE — 80061 LIPID PANEL: CPT

## 2023-03-24 PROCEDURE — 36415 COLL VENOUS BLD VENIPUNCTURE: CPT

## 2023-03-24 RX ORDER — PHENTERMINE HYDROCHLORIDE 15 MG/1
15 CAPSULE ORAL EVERY MORNING
Qty: 90 CAPSULE | Refills: 0 | Status: SHIPPED | OUTPATIENT
Start: 2023-03-24 | End: 2024-01-02

## 2023-03-24 ASSESSMENT — ENCOUNTER SYMPTOMS
FEVER: 0
CONSTIPATION: 0
HEMATURIA: 0
CHILLS: 0
SHORTNESS OF BREATH: 0
ARTHRALGIAS: 0
ABDOMINAL PAIN: 0
PALPITATIONS: 0
JOINT SWELLING: 0
EYE PAIN: 0
NERVOUS/ANXIOUS: 0
MYALGIAS: 0
HEADACHES: 0
DIZZINESS: 0
HEARTBURN: 0
DYSURIA: 0
COUGH: 0
WEAKNESS: 0
DIARRHEA: 0
HEMATOCHEZIA: 0
FREQUENCY: 0
BREAST MASS: 0
NAUSEA: 0
SORE THROAT: 0
PARESTHESIAS: 0

## 2023-03-24 NOTE — PROGRESS NOTES
Preceptor Attestation:   Patient seen, evaluated and discussed with the resident Dr. Montelongo. I have verified the content of the note, which accurately reflects my assessment of the patient and the plan of care.  Supervising Physician:Benjamin Rosenstein, MD, MA  [facility]

## 2023-03-24 NOTE — LETTER
March 30, 2023      Patricia Govea  [address] [place] [address]        Dear  Jeferson,    We are writing to inform you of your test results.    Your test results fall within the expected range(s) or remain unchanged from previous results.  Please continue with current treatment plan.The result of your recent labwork has returned. Your A1C was normal, indicating no diabetes or prediabetes. Your cholesterol was slightly high, but not high enough to start a medication. Your pap smear was normal - we will repeat that in 5 years.       Resulted Orders   Lipid Profile   Result Value Ref Range    Cholesterol 197 <200 mg/dL    Triglycerides 226 (H) <150 mg/dL    Direct Measure HDL 38 (L) >=50 mg/dL    LDL Cholesterol Calculated 114 (H) <=100 mg/dL    Non HDL Cholesterol 159 (H) <130 mg/dL    Narrative    Cholesterol  Desirable:  <200 mg/dL    Triglycerides  Normal:  Less than 150 mg/dL  Borderline High:  150-199 mg/dL  High:  200-499 mg/dL  Very High:  Greater than or equal to 500 mg/dL    Direct Measure HDL  Female:  Greater than or equal to 50 mg/dL   Male:  Greater than or equal to 40 mg/dL    LDL Cholesterol  Desirable:  <100mg/dL  Above Desirable:  100-129 mg/dL   Borderline High:  130-159 mg/dL   High:  160-189 mg/dL   Very High:  >= 190 mg/dL    Non HDL Cholesterol  Desirable:  130 mg/dL  Above Desirable:  130-159 mg/dL  Borderline High:  160-189 mg/dL  High:  190-219 mg/dL  Very High:  Greater than or equal to 220 mg/dL   Hemoglobin A1c   Result Value Ref Range    Hemoglobin A1C 5.6 0.0 - 5.6 %      Comment:      Normal <5.7%   Prediabetes 5.7-6.4%    Diabetes 6.5% or higher     Note: Adopted from ADA consensus guidelines.   Gynecologic Cytology (PAP)   Result Value Ref Range    Interpretation        Negative for Intraepithelial Lesion or Malignancy (NILM)    Comment         Papanicolaou Test Limitations:  Cervical cytology is a screening test with limited sensitivity, and regular  screening is critical for cancer prevention.  Pap tests are primarily effective for the diagnosis/prevention of squamous cell carcinoma, not adenocarcinoma or other cancers.        Specimen Adequacy       Satisfactory for evaluation, endocervical/transformation zone component absent    Clinical Information       none      LMP/Menopause Date       3/24/2023      Reflex Testing Yes regardless of result     Previous Abnormal?       No      Performing Labs       The technical component of this testing was completed at [facility]     HPV High Risk Types DNA Cervical   Result Value Ref Range    Other HR HPV Negative Negative    HPV16 DNA Negative Negative    HPV18 DNA Negative Negative    FINAL DIAGNOSIS       This patient's sample is negative for HPV DNA.        This test was developed and its performance characteristics determined by the [facility]. It has not been cleared or approved by the FDA. The laboratory is regulated under CLIA as qualified to perform high-complexity testing. This test is used for clinical purposes. It should not be regarded as investigational or for research.    METHODOLOGY: The Roche Sohail 4800 system uses automated extraction, simultaneous amplification of HPV (L1 region) and beta-globin, followed by real time detection of fluorescent labeled HPV and beta globin using specific oligonucleotide probes. The test specifically identifies types HPV 16 DNA and HPV 18 DNA while concurrently detecting the rest of the high risk types (31, 33, 35, 39, 45, 51, 52, 56, 58, 59, 66 or 68).    COMMENTS: This test is not intended for use as a screening device for woman under age 30 with normal cervical cytology. Results should be correlated with cytologic and histologic findings. Close clinical followup is recommended.           If you have any questions or concerns, please call the  clinic at the number listed above.       Sincerely,      Bella Montelongo MD

## 2023-03-24 NOTE — PROGRESS NOTES
SUBJECTIVE:   CC: Patricia is an 42 year old who presents for preventive health visit.   Additional Questions 3/24/2023   Roomed by Liss   Patient has been advised of split billing requirements and indicates understanding: Yes  Healthy Habits:     Getting at least 3 servings of Calcium per day:  Yes    Bi-annual eye exam:  NO    Dental care twice a year:  Yes    Sleep apnea or symptoms of sleep apnea:  None    Diet:  Regular (no restrictions)    Frequency of exercise:  None    Taking medications regularly:  Yes    Medication side effects:  None    PHQ-2 Total Score: 0    Additional concerns today:  No    Worried about her weight - used to weigh 170 or so, now 210  Used to be on phentermine - helped with cravings for food, didn't want to eat as much - took in 2016, stopped due to pregnancy  Interested in being on a medication for weight    Today's PHQ-2 Score:   PHQ-2 ( 1999 [employer]) 3/24/2023   Q1: Little interest or pleasure in doing things 0   Q2: Feeling down, depressed or hopeless 0   PHQ-2 Score 0   PHQ-2 Total Score (12-17 Years)- Positive if 3 or more points; Administer PHQ-A if positive -   Q1: Little interest or pleasure in doing things Not at all   Q2: Feeling down, depressed or hopeless Not at all   PHQ-2 Score 0     Social History     Tobacco Use     Smoking status: Never     Passive exposure: Never     Smokeless tobacco: Never   Substance Use Topics     Alcohol use: No     Alcohol Use 3/24/2023   Prescreen: >3 drinks/day or >7 drinks/week? No     Reviewed orders with patient.  Reviewed health maintenance and updated orders accordingly - Yes    Breast Cancer Screening:  Mammogram Screening - Offered annual screening and updated Health Maintenance for mutual plan based on risk factor consideration    History of abnormal Pap smear: Yes - Hx of mild dysplasia with MIKHAIL 1 in 2/2007. Repeat PAP 10/2007 was normal. Repeat pap in 03/2009 with LSIL. Repeat in 2016 normal. Last PAP was 12/2018, noted she is not  "quite due but she wished to complete today.    Review of Systems   Constitutional: Negative for chills and fever.   HENT: Negative for congestion, ear pain, hearing loss and sore throat.    Eyes: Negative for pain and visual disturbance.   Respiratory: Negative for cough and shortness of breath.    Cardiovascular: Negative for chest pain, palpitations and peripheral edema.   Gastrointestinal: Negative for abdominal pain, constipation, diarrhea, heartburn, hematochezia and nausea.   Breasts:  Negative for tenderness, breast mass and discharge.   Genitourinary: Negative for dysuria, frequency, genital sores, hematuria, pelvic pain, urgency, vaginal bleeding and vaginal discharge.   Musculoskeletal: Negative for arthralgias, joint swelling and myalgias.   Skin: Negative for rash.   Neurological: Negative for dizziness, weakness, headaches and paresthesias.   Psychiatric/Behavioral: Negative for mood changes. The patient is not nervous/anxious.       OBJECTIVE:   BP (!) 148/83 (BP Location: Left arm, Patient Position: Sitting, Cuff Size: Adult Large)   Pulse 77   Ht 1.485 m (4' 10.47\")   Wt 95.3 kg (210 lb)   LMP 03/24/2023 (Exact Date)   SpO2 99%   Breastfeeding No   BMI 43.19 kg/m    Physical Exam  Constitutional:       General: She is not in acute distress.     Appearance: Normal appearance. She is obese. She is not ill-appearing.   HENT:      Head: Normocephalic and atraumatic.      Mouth/Throat:      Mouth: Mucous membranes are moist.   Eyes:      Extraocular Movements: Extraocular movements intact.      Conjunctiva/sclera: Conjunctivae normal.   Cardiovascular:      Rate and Rhythm: Normal rate and regular rhythm.      Heart sounds: Normal heart sounds.   Pulmonary:      Effort: Pulmonary effort is normal.      Breath sounds: Normal breath sounds.   Abdominal:      Palpations: Abdomen is soft.   Genitourinary:     General: Normal vulva.      Labia:         Right: No lesion.         Left: No lesion.       " "Vagina: No vaginal discharge.   Musculoskeletal:         General: No swelling.   Skin:     General: Skin is warm and dry.      Findings: No rash.   Neurological:      General: No focal deficit present.      Mental Status: She is alert.   Psychiatric:         Mood and Affect: Mood normal.         Behavior: Behavior normal.       ASSESSMENT/PLAN:   Patricia was seen today for physical, medication request and referral.    Diagnoses and all orders for this visit:    Class 3 severe obesity due to excess calories without serious comorbidity with body mass index (BMI) of 40.0 to 44.9 in adult (H)  -     Lipid Profile; Future  -     Hemoglobin A1c; Future  -     Lipid Profile  -     Hemoglobin A1c  -     phentermine (ADIPEX-P) 15 MG capsule; Take 1 capsule (15 mg) by mouth every morning    Routine general medical examination at a health care facility  -     MA SCREENING DIGITAL BILAT; Future    Screening for lipid disorders  -     Lipid Profile; Future  -     Lipid Profile    Screening for cervical cancer  -     Gynecologic Cytology (PAP); Future  -     Gynecologic Cytology (PAP)  -     HPV Hold (Lab Only)  -     HPV High Risk Types DNA Cervical      Patient has been advised of split billing requirements and indicates understanding: Yes    COUNSELING:  Reviewed preventive health counseling, as reflected in patient instructions       Regular exercise       Healthy diet/nutrition    BMI:   Estimated body mass index is 43.19 kg/m  as calculated from the following:    Height as of this encounter: 1.485 m (4' 10.47\").    Weight as of this encounter: 95.3 kg (210 lb).     Weight management plan: discussed diet and exercise, and will restart phentermine 15 mg. Open to a GLP-1 agonist in the future but wants to try phentermine first.    She reports that she has never smoked. She has never been exposed to tobacco smoke. She has never used smokeless tobacco.    Bella Montelongo MD PGY-2  [facility]    Precepted with: Dr. Rosenstein

## 2023-03-24 NOTE — PROGRESS NOTES
SUBJECTIVE:   CC: Patricia is an 42 year old who presents for preventive health visit.   Additional Questions 3/24/2023   Roomed by Liss   {Split Bill scripting  The purpose of this visit is to discuss your medical history and prevent health problems before you are sick. You may be responsible for a co-pay, coinsurance, or deductible if your visit today includes services such as checking on a sore throat, having an x-ray or lab test, or treating and evaluating a new or existing condition :305050}  {Patient advised of split billing (Optional):497071}  Healthy Habits:     Getting at least 3 servings of Calcium per day:  Yes    Bi-annual eye exam:  NO    Dental care twice a year:  Yes    Sleep apnea or symptoms of sleep apnea:  None    Diet:  Regular (no restrictions)    Frequency of exercise:  None    Taking medications regularly:  Yes    Medication side effects:  None    PHQ-2 Total Score: 0    Additional concerns today:  No    {Add if <65 person on Medicare  - Required Questions (Optional):936866}  {Outside tests to abstract? :415129}    {additional problems to add (Optional):497663}    Today's PHQ-2 Score:   PHQ-2 ( 1999 Pfizer) 3/24/2023   Q1: Little interest or pleasure in doing things 0   Q2: Feeling down, depressed or hopeless 0   PHQ-2 Score 0   PHQ-2 Total Score (12-17 Years)- Positive if 3 or more points; Administer PHQ-A if positive -   Q1: Little interest or pleasure in doing things Not at all   Q2: Feeling down, depressed or hopeless Not at all   PHQ-2 Score 0       Have you ever done Advance Care Planning? (For example, a Health Directive, POLST, or a discussion with a medical provider or your loved ones about your wishes): { :845900}    Social History     Tobacco Use     Smoking status: Never     Passive exposure: Never     Smokeless tobacco: Never   Substance Use Topics     Alcohol use: No     {Rooming staff  Click this link to complete the Prescreen if response below is not for today's visit   "Alcohol Use Prescreen >3 drinks/day or > 7 drinks/week.  If the prescreen question answer is YES, complete the full AUDIT  :590308}    Alcohol Use 3/24/2023   Prescreen: >3 drinks/day or >7 drinks/week? No   {add AUDIT responses (Optional) (A score of 7 for adult men is an indication of hazardous drinking; a score of 8 or more is an indication of an alcohol use disorder.  A score of 7 or more for adult women is an indication of hazardous drinking or an alchohol use disorder):495053}  Reviewed orders with patient.  Reviewed health maintenance and updated orders accordingly - { :777850::\"Yes\"}  {Chronicprobdata (optional):663202}    Breast Cancer Screening:    FHS-7: No flowsheet data found.  {If any of the questions to the BCRA (FHS-7) are answered yes, consider ordering referral for genetic counseling (Optional) :351584::\"click delete button to remove this line now\"}  {AMB Mammogram Decision Support (Optional) :916261}  Pertinent mammograms are reviewed under the imaging tab.    History of abnormal Pap smear: { :621922}  PAP / HPV Latest Ref Rng & Units 12/20/2018   HPV16 NEG Negative   HPV18 NEG Negative   HRHPV NEG Negative     Reviewed and updated as needed this visit by clinical staff    Allergies  Meds              Reviewed and updated as needed this visit by Provider                 {HISTORY OPTIONS (Optional):757002}    Review of Systems   Constitutional: Negative for chills and fever.   HENT: Negative for congestion, ear pain, hearing loss and sore throat.    Eyes: Negative for pain and visual disturbance.   Respiratory: Negative for cough and shortness of breath.    Cardiovascular: Negative for chest pain, palpitations and peripheral edema.   Gastrointestinal: Negative for abdominal pain, constipation, diarrhea, heartburn, hematochezia and nausea.   Breasts:  Negative for tenderness, breast mass and discharge.   Genitourinary: Negative for dysuria, frequency, genital sores, hematuria, pelvic pain, " "urgency, vaginal bleeding and vaginal discharge.   Musculoskeletal: Negative for arthralgias, joint swelling and myalgias.   Skin: Negative for rash.   Neurological: Negative for dizziness, weakness, headaches and paresthesias.   Psychiatric/Behavioral: Negative for mood changes. The patient is not nervous/anxious.      {FEMALE ROS (Optional):209591}     OBJECTIVE:   BP (!) 148/83 (BP Location: Left arm, Patient Position: Sitting, Cuff Size: Adult Large)   Pulse 77   Ht 1.485 m (4' 10.47\")   Wt 95.3 kg (210 lb)   LMP 03/24/2023 (Exact Date)   SpO2 99%   Breastfeeding No   BMI 43.19 kg/m    Physical Exam  {Exam Choices (Optional):466469}    {Diagnostic Test Results (Optional):612692::\"Diagnostic Test Results:\",\"Labs reviewed in Epic\"}    ASSESSMENT/PLAN:   {Diag Picklist:556437}    {Patient advised of split billing (Optional):491099}      COUNSELING:  {FEMALE COUNSELING MESSAGES:896720::\"Reviewed preventive health counseling, as reflected in patient instructions\"}      BMI:   Estimated body mass index is 43.19 kg/m  as calculated from the following:    Height as of this encounter: 1.485 m (4' 10.47\").    Weight as of this encounter: 95.3 kg (210 lb).   {Weight Management Plan needed for ACO:549598}      She reports that she has never smoked. She has never been exposed to tobacco smoke. She has never used smokeless tobacco.      {Counseling Resources  [place] Preventive Services Task Force  Cholesterol Screening  Health diet/nutrition  Pooled Cohorts Equation Calculator  USDA's MyPlate  ASA Prophylaxis  Lung CA Screening  Osteoporosis prevention/bone health :246362}  {Breast Cancer Risk Calculator  BRCA-Related Cancer Risk Assessment FHS-7 Tool :954009}  Bella Montelongo MD  [facility]  "

## 2023-03-25 LAB
CHOLEST SERPL-MCNC: 197 MG/DL
HDLC SERPL-MCNC: 38 MG/DL
LDLC SERPL CALC-MCNC: 114 MG/DL
NONHDLC SERPL-MCNC: 159 MG/DL
TRIGL SERPL-MCNC: 226 MG/DL

## 2023-03-28 LAB
BKR LAB AP GYN ADEQUACY: NORMAL
BKR LAB AP GYN INTERPRETATION: NORMAL
BKR LAB AP HPV REFLEX: NORMAL
BKR LAB AP LMP: NORMAL
BKR LAB AP PREVIOUS ABNORMAL: NORMAL
PATH REPORT.COMMENTS IMP SPEC: NORMAL
PATH REPORT.COMMENTS IMP SPEC: NORMAL
PATH REPORT.RELEVANT HX SPEC: NORMAL

## 2023-03-30 LAB
HUMAN PAPILLOMA VIRUS 16 DNA: NEGATIVE
HUMAN PAPILLOMA VIRUS 18 DNA: NEGATIVE
HUMAN PAPILLOMA VIRUS FINAL DIAGNOSIS: NORMAL
HUMAN PAPILLOMA VIRUS OTHER HR: NEGATIVE

## 2023-03-30 NOTE — RESULT ENCOUNTER NOTE
Please call with results. If no response, please send letter.    Dear Patricia Govea,    Thank you for visiting our clinic on Mar 24, 2023.    The result of your recent labwork has returned. Your A1C was normal, indicating no diabetes or prediabetes. Your cholesterol was slightly high, but not high enough to start a medication. Your pap smear was normal - we will repeat that in 5 years.    If you have any questions or concerns, please feel free to give us a call at [phone].    Sincerely,    Anika Montelongo MD  [facility]

## 2023-04-03 ENCOUNTER — ANCILLARY ORDERS (OUTPATIENT)
Dept: FAMILY MEDICINE | Facility: CLINIC | Age: 43
End: 2023-04-03

## 2023-04-03 ENCOUNTER — ANCILLARY PROCEDURE (OUTPATIENT)
Dept: MAMMOGRAPHY | Facility: HOSPITAL | Age: 43
End: 2023-04-03
Payer: COMMERCIAL

## 2023-04-03 DIAGNOSIS — Z00.00 ROUTINE GENERAL MEDICAL EXAMINATION AT A HEALTH CARE FACILITY: ICD-10-CM

## 2023-04-03 PROCEDURE — 77067 SCR MAMMO BI INCL CAD: CPT

## 2023-05-07 NOTE — ANESTHESIA PREPROCEDURE EVALUATION
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 Severe thrombocytopenia. No evidence of bleeding. Hgb 10.5, stable.    Warm autoantibody, delay in getting compatible RBCs    GI/Hepatic/Renal - negative ROS      Other findings: Lab Results      Component                Value               Date                       WBC                      12.9 (H)            01/28/2019                 HGB                      10.5 (L)            01/28/2019                 HCT                      28.5 (L)            01/28/2019                 MCV                      96                  01/28/2019                 PLT                      5 (LL)              01/28/2019                  Dental - normal exam                        Anesthesia Plan  Planned anesthetic: general endotracheal  Large bore PIV, at least 16g  Platelet 1 unit started just prior to entering OR. Plan to have a slow continuous infusion of platelets intraoperatively. 2 units PRBC ready, another 2 units ordered.  Propofol infusion ~50 mcg/kg/min    ASA 3   Induction: intravenous   Anesthetic plan and risks discussed with: patient and spouse  Anesthesia plan special considerations: antiemetics, arterial catheterization, IV therapy two IVs,   Post-op plan: routine recovery           Presented to ED c/o chest pain rad to back.

## 2023-06-27 ENCOUNTER — OFFICE VISIT (OUTPATIENT)
Dept: FAMILY MEDICINE | Facility: CLINIC | Age: 43
End: 2023-06-27
Payer: COMMERCIAL

## 2023-06-27 VITALS
BODY MASS INDEX: 41.2 KG/M2 | HEIGHT: 59 IN | SYSTOLIC BLOOD PRESSURE: 129 MMHG | TEMPERATURE: 97.7 F | OXYGEN SATURATION: 98 % | WEIGHT: 204.4 LBS | DIASTOLIC BLOOD PRESSURE: 83 MMHG | HEART RATE: 91 BPM

## 2023-06-27 DIAGNOSIS — E66.01 CLASS 3 SEVERE OBESITY DUE TO EXCESS CALORIES WITHOUT SERIOUS COMORBIDITY WITH BODY MASS INDEX (BMI) OF 40.0 TO 44.9 IN ADULT (H): ICD-10-CM

## 2023-06-27 DIAGNOSIS — E66.813 CLASS 3 SEVERE OBESITY DUE TO EXCESS CALORIES WITHOUT SERIOUS COMORBIDITY WITH BODY MASS INDEX (BMI) OF 40.0 TO 44.9 IN ADULT (H): ICD-10-CM

## 2023-06-27 PROCEDURE — 99214 OFFICE O/P EST MOD 30 MIN: CPT | Mod: GC

## 2023-06-27 RX ORDER — PHENTERMINE HYDROCHLORIDE 37.5 MG/1
37.5 TABLET ORAL
Qty: 90 TABLET | Refills: 3 | Status: SHIPPED | OUTPATIENT
Start: 2023-06-27 | End: 2023-12-27

## 2023-06-27 NOTE — PROGRESS NOTES
Preceptor Attestation:  Patient's case reviewed and discussed with the resident, Bella Montelongo MD, and I personally evaluated the patient. I agree with written assessment and plan of care.    Supervising Physician:  Ava Howard MD   [facility]

## 2023-06-27 NOTE — PROGRESS NOTES
"  Assessment & Plan     Class 3 severe obesity due to excess calories without serious comorbidity with body mass index (BMI) of 40.0 to 44.9 in adult (H)  Increase dose of phentermine today - discussed she can split tablets in half and take half in morning and half in afternoon if she feels like it is wearing off. Cautioned against sleep disturbance if taking later in the day.  - phentermine (ADIPEX-P) 37.5 MG tablet  Dispense: 90 tablet; Refill: 3    Return to clinic in 3-6 months or per patient preference.    Bella Montelongo MD PGY-2  [facility]    Precepted with: Dr. Lee Saenz   Patricia is a 42 year old, presenting for the following health issues:  Recheck Medication (F/u)    Weight - restarted phentermine back in March, had been on it in the past but stopped due to pregnancy    Feels like it worked well initially, not working as well now, thinks she was on higher dosage before    210 pounds in March  204 pounds today        Objective    /83   Pulse 91   Temp 97.7  F (36.5  C) (Oral)   Ht 1.486 m (4' 10.5\")   Wt 92.7 kg (204 lb 6.4 oz)   LMP 06/16/2023 (Exact Date)   SpO2 98%   BMI 41.99 kg/m    Body mass index is 41.99 kg/m .  GEN: Patient sitting comfortably in no acute distress.  HEEN: Head is atraumatic, normocephalic, eyes anicteric, mucous membranes moist.  CV: Extremities well-perfused. No obvious lower extremity edema.  PULM: Breathing comfortably on room air. Speaking in full sentences.  NEURO: Alert and oriented x3.  No focal motor abnormalities.  Face symmetric.  PSYCH: Appropriate affect.  SKIN: No rashes, bruising, or other lesions visible on exposed skin.  "

## 2023-06-29 ENCOUNTER — TELEPHONE (OUTPATIENT)
Dept: FAMILY MEDICINE | Facility: CLINIC | Age: 43
End: 2023-06-29
Payer: COMMERCIAL

## 2023-06-29 NOTE — TELEPHONE ENCOUNTER
Prior Authorization needed on:  6/29/23    Medication:  Phentermine Dose:  37.5mg    Pharmacy confirmed as   [id number] [address]   [place] [address]  Phone: [phone] Fax: [phone]  : Yes    Insurance Name:    Insurance Phone:   Insurance Patient ID:     CMM:   KEY: [id number]  PLN: JANE RAMIREZ  Alternatives Suggested:      Florina Yanes MA June 29, 2023 at 11:34 AM

## 2023-07-05 NOTE — TELEPHONE ENCOUNTER
Central Prior Authorization Team  Phone: [phone]    PA Initiation    Medication: PHENTERMINE HCL 37.5 MG PO TABS  Insurance Company: [employer] - Phone [phone] Fax [phone]  Pharmacy Filling the Rx: [facility] #[id number] [place] [address] AT [place] [address]  Filling Pharmacy Phone: [phone]  Filling Pharmacy Fax:    Start Date: 7/5/2023    Faxed pa to insurance. Fax# [phone]

## 2023-07-05 NOTE — TELEPHONE ENCOUNTER
Medication: Phentermine   Patient no longer has active insurance coverage with [employer].       Pharmacy Notified:  Yes- per rp, pt has already paid cash for medication and picked it up.  Patient Notified:  No    Please close encounter when finished.    Thank you,  Central Prior Authorization Team  [phone]

## 2023-12-27 DIAGNOSIS — E66.01 CLASS 3 SEVERE OBESITY DUE TO EXCESS CALORIES WITHOUT SERIOUS COMORBIDITY WITH BODY MASS INDEX (BMI) OF 40.0 TO 44.9 IN ADULT (H): ICD-10-CM

## 2023-12-27 DIAGNOSIS — E66.813 CLASS 3 SEVERE OBESITY DUE TO EXCESS CALORIES WITHOUT SERIOUS COMORBIDITY WITH BODY MASS INDEX (BMI) OF 40.0 TO 44.9 IN ADULT (H): ICD-10-CM

## 2023-12-27 RX ORDER — PHENTERMINE HYDROCHLORIDE 37.5 MG/1
37.5 TABLET ORAL
Qty: 30 TABLET | Refills: 0 | Status: SHIPPED | OUTPATIENT
Start: 2023-12-27 | End: 2024-01-08

## 2023-12-27 NOTE — TELEPHONE ENCOUNTER
Message to physician: Rx refill    Date of last visit: 6/27/2023    Date of next visit if scheduled: none    Potassium   Date Value Ref Range Status   03/05/2021 3.8 3.5 - 5.0 mmol/L Final     Creatinine   Date Value Ref Range Status   03/05/2021 0.70 0.60 - 1.10 mg/dL Final     GFR Estimate   Date Value Ref Range Status   03/05/2021 >60 >60 mL/min/1.73m2 Final       BP Readings from Last 3 Encounters:   06/27/23 129/83   03/24/23 (!) 148/83   04/04/22 (!) 140/75       Hemoglobin A1C   Date Value Ref Range Status   03/24/2023 5.6 0.0 - 5.6 % Final     Comment:     Normal <5.7%   Prediabetes 5.7-6.4%    Diabetes 6.5% or higher     Note: Adopted from ADA consensus guidelines.       Please complete refill and CLOSE ENCOUNTER.  Closing the encounter signifies the refill is complete.

## 2024-01-02 ENCOUNTER — TELEPHONE (OUTPATIENT)
Dept: FAMILY MEDICINE | Facility: CLINIC | Age: 44
End: 2024-01-02
Payer: COMMERCIAL

## 2024-01-02 DIAGNOSIS — E66.01 CLASS 3 SEVERE OBESITY DUE TO EXCESS CALORIES WITHOUT SERIOUS COMORBIDITY WITH BODY MASS INDEX (BMI) OF 40.0 TO 44.9 IN ADULT (H): ICD-10-CM

## 2024-01-02 DIAGNOSIS — E66.813 CLASS 3 SEVERE OBESITY DUE TO EXCESS CALORIES WITHOUT SERIOUS COMORBIDITY WITH BODY MASS INDEX (BMI) OF 40.0 TO 44.9 IN ADULT (H): ICD-10-CM

## 2024-01-02 RX ORDER — PHENTERMINE HYDROCHLORIDE 37.5 MG/1
37.5 CAPSULE ORAL EVERY MORNING
Qty: 90 CAPSULE | Refills: 1 | Status: SHIPPED | OUTPATIENT
Start: 2024-01-02 | End: 2024-01-08

## 2024-01-02 NOTE — TELEPHONE ENCOUNTER
Prior Authorization needed on:  1/2/24    Medication:  Phentermine Dose:  37.5mg    Pharmacy confirmed as   [facility] [place] [address]    : Yes    Insurance Name:    Insurance Phone:   Insurance Patient ID:     Alternatives Suggested:  Please change to yvonne Yanes MA January 2, 2024 at 9:05 AM

## 2024-01-08 ENCOUNTER — OFFICE VISIT (OUTPATIENT)
Dept: FAMILY MEDICINE | Facility: CLINIC | Age: 44
End: 2024-01-08
Payer: COMMERCIAL

## 2024-01-08 VITALS
HEART RATE: 96 BPM | TEMPERATURE: 97.8 F | WEIGHT: 190.12 LBS | RESPIRATION RATE: 18 BRPM | HEIGHT: 59 IN | BODY MASS INDEX: 38.33 KG/M2 | DIASTOLIC BLOOD PRESSURE: 92 MMHG | OXYGEN SATURATION: 99 % | SYSTOLIC BLOOD PRESSURE: 147 MMHG

## 2024-01-08 DIAGNOSIS — E66.09 CLASS 2 OBESITY DUE TO EXCESS CALORIES WITHOUT SERIOUS COMORBIDITY WITH BODY MASS INDEX (BMI) OF 38.0 TO 38.9 IN ADULT: ICD-10-CM

## 2024-01-08 DIAGNOSIS — E66.812 CLASS 2 OBESITY DUE TO EXCESS CALORIES WITHOUT SERIOUS COMORBIDITY WITH BODY MASS INDEX (BMI) OF 38.0 TO 38.9 IN ADULT: ICD-10-CM

## 2024-01-08 DIAGNOSIS — R03.0 ELEVATED BLOOD PRESSURE READING WITHOUT DIAGNOSIS OF HYPERTENSION: Primary | ICD-10-CM

## 2024-01-08 PROBLEM — N87.0 CERVICAL INTRAEPITHELIAL NEOPLASIA GRADE 1: Status: ACTIVE | Noted: 2023-03-24

## 2024-01-08 PROBLEM — D69.6 THROMBOCYTOPENIA (H): Status: RESOLVED | Noted: 2021-02-18 | Resolved: 2024-01-08

## 2024-01-08 PROBLEM — Z30.013 ENCOUNTER FOR INITIAL PRESCRIPTION OF INJECTABLE CONTRACEPTIVE: Status: RESOLVED | Noted: 2019-07-09 | Resolved: 2024-01-08

## 2024-01-08 PROCEDURE — 99214 OFFICE O/P EST MOD 30 MIN: CPT | Mod: 25

## 2024-01-08 PROCEDURE — 91320 SARSCV2 VAC 30MCG TRS-SUC IM: CPT

## 2024-01-08 PROCEDURE — 90480 ADMN SARSCOV2 VAC 1/ONLY CMP: CPT

## 2024-01-08 RX ORDER — PHENTERMINE HYDROCHLORIDE 37.5 MG/1
37.5 TABLET ORAL
Qty: 90 TABLET | Refills: 1 | Status: SHIPPED | OUTPATIENT
Start: 2024-01-08 | End: 2024-01-08

## 2024-01-08 RX ORDER — PHENTERMINE HYDROCHLORIDE 37.5 MG/1
37.5 CAPSULE ORAL EVERY MORNING
Qty: 90 CAPSULE | Refills: 1 | Status: SHIPPED | OUTPATIENT
Start: 2024-01-08 | End: 2024-02-02 | Stop reason: SINTOL

## 2024-01-08 NOTE — PROGRESS NOTES
Preceptor Attestation:   Patient seen, evaluated and discussed with the resident. I have verified the content of the note, which accurately reflects my assessment of the patient and the plan of care.   Supervising Physician:  Han Sams MD

## 2024-01-08 NOTE — PROGRESS NOTES
"  Assessment & Plan     Class 2 obesity due to excess calories without serious comorbidity with body mass index (BMI) of 38.0 to 38.9 in adult  Started phentermine in March 2023, titrated up dosing to 37.5 mg in June 2023, has lost about 20 pounds since the start of this. Was on this medication before having her son as well and found it helpful. Thinks it is working for appetite. She is wanting to avoid injectable medications (GLP-1). Refills sent in, will follow closely with elevated BP and consider trial of alternative agent at follow-up visit if blood pressure still elevated.   - phentermine (ADIPEX-P) 37.5 MG capsule  Dispense: 90 capsule; Refill: 1    Elevated blood pressure reading without diagnosis of hypertension  147/92 and 158/98 today. Was also elevated a couple times in the past couple of years (even before being on the phentermine, so unlikely related). Family history positive for hypertension. She was given a blood pressure cuff from our clinic, instructed on how to use it, and will follow-up with me in 2-4 weeks to further evaluate.     BMI:   Estimated body mass index is 38.4 kg/m  as calculated from the following:    Height as of this encounter: 1.499 m (4' 11\").    Weight as of this encounter: 86.2 kg (190 lb 1.9 oz).   Weight management plan: Discussed healthy diet and exercise guidelines and taking Phentermine    Bella Montelongo MD PGY-3  [facility]    Precepted with: Dr. Latrell Gomez is a 43 year old, presenting for the following health issues:  RECHECK (Follow up weight management)    Feels like phentermine is working for her  There was an insurance issue, but it got figured out  Last seen by me in June, at that point she had been back on phentermine for 3 months and had lost 6 pounds  Now is down another 15 pounds  Sometimes at nighttime she does some snacking, but the medicine helps  It does not wear off throughout the day  Still wary of doing " "any injectable medications    Does have family history of hypertension      Objective    BP (!) 147/92   Pulse 96   Temp 97.8  F (36.6  C) (Oral)   Resp 18   Ht 1.499 m (4' 11\")   Wt 86.2 kg (190 lb 1.9 oz)   LMP  (LMP Unknown)   SpO2 99%   BMI 38.40 kg/m    Body mass index is 38.4 kg/m .  Physical Exam   GEN: Patient sitting comfortably in no acute distress.  HEEN: Head is atraumatic, normocephalic, eyes anicteric, mucous membranes moist.  CV: Extremities well-perfused. No obvious lower extremity edema.  PULM: Breathing comfortably on room air. Speaking in full sentences.  NEURO: Alert and oriented x3.  No focal motor abnormalities.  Face symmetric.  PSYCH: Appropriate affect.  SKIN: No rashes, bruising, or other lesions visible on exposed skin.  "

## 2024-02-02 ENCOUNTER — OFFICE VISIT (OUTPATIENT)
Dept: FAMILY MEDICINE | Facility: CLINIC | Age: 44
End: 2024-02-02
Payer: COMMERCIAL

## 2024-02-02 VITALS
BODY MASS INDEX: 43.41 KG/M2 | TEMPERATURE: 98.5 F | DIASTOLIC BLOOD PRESSURE: 98 MMHG | RESPIRATION RATE: 19 BRPM | HEIGHT: 56 IN | WEIGHT: 193 LBS | HEART RATE: 86 BPM | SYSTOLIC BLOOD PRESSURE: 163 MMHG | OXYGEN SATURATION: 100 %

## 2024-02-02 DIAGNOSIS — E66.01 CLASS 3 SEVERE OBESITY DUE TO EXCESS CALORIES WITHOUT SERIOUS COMORBIDITY WITH BODY MASS INDEX (BMI) OF 40.0 TO 44.9 IN ADULT (H): Primary | ICD-10-CM

## 2024-02-02 DIAGNOSIS — E66.813 CLASS 3 SEVERE OBESITY DUE TO EXCESS CALORIES WITHOUT SERIOUS COMORBIDITY WITH BODY MASS INDEX (BMI) OF 40.0 TO 44.9 IN ADULT (H): Primary | ICD-10-CM

## 2024-02-02 PROBLEM — Z12.4 ENCOUNTER FOR SCREENING FOR CERVICAL CANCER: Status: RESOLVED | Noted: 2018-12-23 | Resolved: 2024-02-02

## 2024-02-02 PROCEDURE — 99214 OFFICE O/P EST MOD 30 MIN: CPT | Mod: GC

## 2024-02-02 RX ORDER — PHENTERMINE HYDROCHLORIDE 37.5 MG/1
37.5 CAPSULE ORAL EVERY MORNING
Qty: 90 CAPSULE | Refills: 1 | Status: CANCELLED | OUTPATIENT
Start: 2024-02-02

## 2024-02-02 ASSESSMENT — PAIN SCALES - GENERAL: PAINLEVEL: NO PAIN (0)

## 2024-02-02 NOTE — PROGRESS NOTES
"  Assessment & Plan     Class 3 severe obesity due to excess calories without serious comorbidity with body mass index (BMI) of 40.0 to 44.9 in adult (H)  Elevated blood pressures while on phentermine (also beforehand) - would not medicate these while still taking phentermine, so instructed her to stop the phentermine. She is interested in a different oral option - discussed contrave. If insurance does not cover, would send in a GLP1. She will call us if this is the case. Will have her continue to monitor BP and if not going down after stopping phentermine, would need to start likely an ARB for blood pressure control.   - naltrexone-bupropion (CONTRAVE) 8-90 MG per 12 hr tablet  Dispense: 30 tablet; Refill: 0      Bella Montelongo MD PGY-3  [facility]    Precepted with: Dr. Lee Saenz   Patricia is a 43 year old, presenting for the following health issues:  Hypertension (High blood pressure ) and Recheck Medication (Phent)      2/2/2024     1:44 PM   Additional Questions   Accompanied by Emilee     Today - 163/98  Still taking phentermine    Home numbers over past 2 weeks:   143/93  142/99  143/97  148/79  142/95  150/97  158/100  142/84  156/99  137/90  147/91  141/96  141/93  123/85    3/2023 - 148/83 - started phentermine at that time  6/2023 - 129/83  1/8/2024 - 147/92    Previously (when not on phentermine):  4/2022 - 140/75  1/2022 - 176/76  2021 - 140/86, 152/83, 131/82      Objective    BP (!) 163/98 (BP Location: Right arm, Patient Position: Sitting, Cuff Size: Adult Regular)   Pulse 86   Temp 98.5  F (36.9  C) (Oral)   Resp 19   Ht 1.43 m (4' 8.3\")   Wt 87.5 kg (193 lb)   LMP 01/20/2024   SpO2 100%   BMI 42.81 kg/m    Body mass index is 42.81 kg/m .  Physical Exam   GEN: Patient sitting comfortably in no acute distress.  HEEN: Head is atraumatic, normocephalic, eyes anicteric, mucous membranes moist.  CV: Extremities well-perfused. No obvious lower extremity " edema.  PULM: Breathing comfortably on room air. Speaking in full sentences.  NEURO: Alert and oriented x3.  No focal motor abnormalities.  Face symmetric.  PSYCH: Appropriate affect.  SKIN: No rashes, bruising, or other lesions visible on exposed skin.

## 2024-03-04 DIAGNOSIS — E66.01 CLASS 3 SEVERE OBESITY DUE TO EXCESS CALORIES WITHOUT SERIOUS COMORBIDITY WITH BODY MASS INDEX (BMI) OF 40.0 TO 44.9 IN ADULT (H): ICD-10-CM

## 2024-03-04 DIAGNOSIS — E66.813 CLASS 3 SEVERE OBESITY DUE TO EXCESS CALORIES WITHOUT SERIOUS COMORBIDITY WITH BODY MASS INDEX (BMI) OF 40.0 TO 44.9 IN ADULT (H): ICD-10-CM

## 2024-03-06 ENCOUNTER — TELEPHONE (OUTPATIENT)
Dept: FAMILY MEDICINE | Facility: CLINIC | Age: 44
End: 2024-03-06
Payer: COMMERCIAL

## 2024-03-06 NOTE — TELEPHONE ENCOUNTER
[facility] phone call message- medication clarification/question:    Full Medication Name: naltrexone-bupropion (CONTRAVE) 8-90 MG per 12 hr tablet        Question: patient called in stating pharmacy told her this medication needs a PA-- please send when able -- thank you      Pharmacy confirmed as    [facility] #[id number] [place] [address] AT [facility] [address]: Yes    OK to leave a message on voice mail? Yes    Primary language: [place]      needed? Yes    Call taken on March 6, 2024 at 10:31 AM by Victoria Howard

## 2024-03-21 NOTE — TELEPHONE ENCOUNTER
Central Prior Authorization Team  Phone: [phone]    PA Initiation    Medication: CONTRAVE 8-90 MG PO TB12  Insurance Company: [employer] - Phone [phone] Fax [phone]  Pharm[facility]illing the Rx: [id number] [place] [address] AT [place] [address]  Filling Pharmacy Phone: [phone]  Filling Pharmacy Fax:    Start Date: 3/21/2024

## 2024-03-21 NOTE — TELEPHONE ENCOUNTER
Prior Authorization Retail Medication Request    Medication/Dose:   Diagnosis and ICD code (if different than what is on RX):    New/renewal/insurance change PA/secondary ins. PA:  Previously Tried and Failed:    Rationale:      Insurance   Primary:  Medicaid - [id number]  Insurance ID:  [id number]    Secondary (if applicable):  Insurance ID:      Pharmacy Information (if different than what is on RX)  Name:    Phone:    Fax:

## 2024-03-21 NOTE — TELEPHONE ENCOUNTER
PRIOR AUTHORIZATION DENIED    Medication: CONTRAVE 8-90 MG PO TB12  Insurance Company: [employer] - Phone [phone] Fax [phone]  Denial Date: 3/21/2024    Denial Reason(s): Excluded medication under pt's plan.      Appeal Information: If provider would like to appeal please provide a letter of medical necessity.

## 2024-03-26 ENCOUNTER — TELEPHONE (OUTPATIENT)
Dept: FAMILY MEDICINE | Facility: CLINIC | Age: 44
End: 2024-03-26
Payer: COMMERCIAL

## 2024-03-26 NOTE — TELEPHONE ENCOUNTER
Per KMS note called pt to see if she can schedule an appointment with PCP to follow up on high BP. No answer. LVMTCB to schedule follow up.

## 2024-03-26 NOTE — TELEPHONE ENCOUNTER
If patient calls please schedule follow up, on VM CMA did state it is important to check BP to make sure no Cardiac issues arise.

## 2024-04-05 ENCOUNTER — ANCILLARY PROCEDURE (OUTPATIENT)
Dept: MAMMOGRAPHY | Facility: HOSPITAL | Age: 44
End: 2024-04-05
Payer: COMMERCIAL

## 2024-04-05 DIAGNOSIS — Z12.31 VISIT FOR SCREENING MAMMOGRAM: ICD-10-CM

## 2024-04-05 PROCEDURE — 77063 BREAST TOMOSYNTHESIS BI: CPT

## 2024-06-23 ENCOUNTER — HEALTH MAINTENANCE LETTER (OUTPATIENT)
Age: 44
End: 2024-06-23

## 2024-12-27 ENCOUNTER — HOSPITAL ENCOUNTER (INPATIENT)
Facility: HOSPITAL | Age: 44
LOS: 3 days | Discharge: HOME | DRG: 813 | End: 2024-12-30
Attending: FAMILY MEDICINE | Admitting: FAMILY MEDICINE

## 2024-12-27 DIAGNOSIS — D69.3 CHRONIC ITP (IDIOPATHIC THROMBOCYTOPENIA) (H): ICD-10-CM

## 2024-12-27 DIAGNOSIS — D69.3 CHRONIC ITP (IDIOPATHIC THROMBOCYTOPENIA) (H): Primary | ICD-10-CM

## 2024-12-27 DIAGNOSIS — D69.6 THROMBOCYTOPENIA: Primary | ICD-10-CM

## 2024-12-27 LAB
ABO + RH BLD: ABNORMAL
APTT PPP: 25 SECONDS (ref 22–38)
BASOPHILS # BLD AUTO: 0.1 10E3/UL (ref 0–0.2)
BASOPHILS NFR BLD AUTO: 2 %
BLD GP AB SCN SERPL QL: POSITIVE
EOSINOPHIL # BLD AUTO: 0.2 10E3/UL (ref 0–0.7)
EOSINOPHIL NFR BLD AUTO: 4 %
ERYTHROCYTE [DISTWIDTH] IN BLOOD BY AUTOMATED COUNT: 13 % (ref 10–15)
HCT VFR BLD AUTO: 38.7 % (ref 35–47)
HGB BLD-MCNC: 13.8 G/DL (ref 11.7–15.7)
HOLD SPECIMEN: NORMAL
IMM GRANULOCYTES # BLD: 0 10E3/UL
IMM GRANULOCYTES NFR BLD: 1 %
INR PPP: 0.95 (ref 0.85–1.15)
LYMPHOCYTES # BLD AUTO: 2.3 10E3/UL (ref 0.8–5.3)
LYMPHOCYTES NFR BLD AUTO: 43 %
MCH RBC QN AUTO: 32.2 PG (ref 26.5–33)
MCHC RBC AUTO-ENTMCNC: 35.7 G/DL (ref 31.5–36.5)
MCV RBC AUTO: 90 FL (ref 78–100)
MONOCYTES # BLD AUTO: 0.9 10E3/UL (ref 0–1.3)
MONOCYTES NFR BLD AUTO: 17 %
NEUTROPHILS # BLD AUTO: 1.8 10E3/UL (ref 1.6–8.3)
NEUTROPHILS NFR BLD AUTO: 34 %
NRBC # BLD AUTO: 0 10E3/UL
NRBC BLD AUTO-RTO: 0 /100
PLAT MORPH BLD: NORMAL
PLATELET # BLD AUTO: <2 10E3/UL (ref 150–450)
RBC # BLD AUTO: 4.29 10E6/UL (ref 3.8–5.2)
RBC MORPH BLD: NORMAL
SPECIMEN EXP DATE BLD: ABNORMAL
WBC # BLD AUTO: 5.4 10E3/UL (ref 4–11)

## 2024-12-27 PROCEDURE — 250N000013 HC RX MED GY IP 250 OP 250 PS 637: Performed by: INTERNAL MEDICINE

## 2024-12-27 PROCEDURE — 85730 THROMBOPLASTIN TIME PARTIAL: CPT | Performed by: FAMILY MEDICINE

## 2024-12-27 PROCEDURE — 85004 AUTOMATED DIFF WBC COUNT: CPT | Performed by: FAMILY MEDICINE

## 2024-12-27 PROCEDURE — 86900 BLOOD TYPING SEROLOGIC ABO: CPT | Performed by: FAMILY MEDICINE

## 2024-12-27 PROCEDURE — 250N000013 HC RX MED GY IP 250 OP 250 PS 637

## 2024-12-27 PROCEDURE — 36415 COLL VENOUS BLD VENIPUNCTURE: CPT | Performed by: FAMILY MEDICINE

## 2024-12-27 PROCEDURE — 86901 BLOOD TYPING SEROLOGIC RH(D): CPT

## 2024-12-27 PROCEDURE — 86978 RBC PRETREATMENT SERUM: CPT | Performed by: FAMILY MEDICINE

## 2024-12-27 PROCEDURE — 86880 COOMBS TEST DIRECT: CPT | Performed by: FAMILY MEDICINE

## 2024-12-27 PROCEDURE — 30233S1 TRANSFUSION OF NONAUTOLOGOUS GLOBULIN INTO PERIPHERAL VEIN, PERCUTANEOUS APPROACH: ICD-10-PCS | Performed by: FAMILY MEDICINE

## 2024-12-27 PROCEDURE — 86870 RBC ANTIBODY IDENTIFICATION: CPT | Performed by: FAMILY MEDICINE

## 2024-12-27 PROCEDURE — 99285 EMERGENCY DEPT VISIT HI MDM: CPT

## 2024-12-27 PROCEDURE — 85610 PROTHROMBIN TIME: CPT | Performed by: FAMILY MEDICINE

## 2024-12-27 PROCEDURE — 120N000001 HC R&B MED SURG/OB

## 2024-12-27 PROCEDURE — 99222 1ST HOSP IP/OBS MODERATE 55: CPT | Mod: AI

## 2024-12-27 PROCEDURE — 86901 BLOOD TYPING SEROLOGIC RH(D): CPT | Performed by: FAMILY MEDICINE

## 2024-12-27 PROCEDURE — 86860 RBC ANTIBODY ELUTION: CPT | Performed by: FAMILY MEDICINE

## 2024-12-27 PROCEDURE — 250N000011 HC RX IP 250 OP 636: Mod: JZ | Performed by: INTERNAL MEDICINE

## 2024-12-27 RX ORDER — AMOXICILLIN 250 MG
1 CAPSULE ORAL 2 TIMES DAILY PRN
Status: DISCONTINUED | OUTPATIENT
Start: 2024-12-27 | End: 2024-12-30 | Stop reason: HOSPADM

## 2024-12-27 RX ORDER — ONDANSETRON 4 MG/1
4 TABLET, ORALLY DISINTEGRATING ORAL EVERY 6 HOURS PRN
Status: DISCONTINUED | OUTPATIENT
Start: 2024-12-27 | End: 2024-12-30 | Stop reason: HOSPADM

## 2024-12-27 RX ORDER — ONDANSETRON 2 MG/ML
4 INJECTION INTRAMUSCULAR; INTRAVENOUS EVERY 6 HOURS PRN
Status: DISCONTINUED | OUTPATIENT
Start: 2024-12-27 | End: 2024-12-30 | Stop reason: HOSPADM

## 2024-12-27 RX ORDER — ACETAMINOPHEN 325 MG/1
650 TABLET ORAL
Status: DISCONTINUED | OUTPATIENT
Start: 2024-12-27 | End: 2024-12-30

## 2024-12-27 RX ORDER — ACETAMINOPHEN 325 MG/1
650 TABLET ORAL ONCE
Status: COMPLETED | OUTPATIENT
Start: 2024-12-27 | End: 2024-12-27

## 2024-12-27 RX ORDER — DIPHENHYDRAMINE HYDROCHLORIDE 50 MG/ML
50 INJECTION, SOLUTION INTRAMUSCULAR; INTRAVENOUS
Status: DISCONTINUED | OUTPATIENT
Start: 2024-12-27 | End: 2024-12-30

## 2024-12-27 RX ORDER — DIPHENHYDRAMINE HYDROCHLORIDE 50 MG/ML
50 INJECTION, SOLUTION INTRAMUSCULAR; INTRAVENOUS ONCE
Status: COMPLETED | OUTPATIENT
Start: 2024-12-27 | End: 2024-12-27

## 2024-12-27 RX ORDER — CALCIUM CARBONATE 500 MG/1
1000 TABLET, CHEWABLE ORAL 4 TIMES DAILY PRN
Status: DISCONTINUED | OUTPATIENT
Start: 2024-12-27 | End: 2024-12-30 | Stop reason: HOSPADM

## 2024-12-27 RX ORDER — ACETAMINOPHEN 650 MG/1
650 SUPPOSITORY RECTAL EVERY 4 HOURS PRN
Status: DISCONTINUED | OUTPATIENT
Start: 2024-12-27 | End: 2024-12-30 | Stop reason: HOSPADM

## 2024-12-27 RX ORDER — DIPHENHYDRAMINE HCL 50 MG
50 CAPSULE ORAL
Status: DISCONTINUED | OUTPATIENT
Start: 2024-12-27 | End: 2024-12-30

## 2024-12-27 RX ORDER — METHYLPREDNISOLONE SODIUM SUCCINATE 125 MG/2ML
125 INJECTION INTRAMUSCULAR; INTRAVENOUS
Status: DISCONTINUED | OUTPATIENT
Start: 2024-12-27 | End: 2024-12-30

## 2024-12-27 RX ORDER — DIPHENHYDRAMINE HCL 50 MG
50 CAPSULE ORAL ONCE
Status: COMPLETED | OUTPATIENT
Start: 2024-12-27 | End: 2024-12-27

## 2024-12-27 RX ORDER — LIDOCAINE 40 MG/G
CREAM TOPICAL
Status: DISCONTINUED | OUTPATIENT
Start: 2024-12-27 | End: 2024-12-30 | Stop reason: HOSPADM

## 2024-12-27 RX ORDER — ACETAMINOPHEN 325 MG/1
650 TABLET ORAL EVERY 4 HOURS PRN
Status: DISCONTINUED | OUTPATIENT
Start: 2024-12-27 | End: 2024-12-30 | Stop reason: HOSPADM

## 2024-12-27 RX ORDER — PROCHLORPERAZINE MALEATE 10 MG
10 TABLET ORAL EVERY 6 HOURS PRN
Status: DISCONTINUED | OUTPATIENT
Start: 2024-12-27 | End: 2024-12-30 | Stop reason: HOSPADM

## 2024-12-27 RX ORDER — AMOXICILLIN 250 MG
2 CAPSULE ORAL 2 TIMES DAILY PRN
Status: DISCONTINUED | OUTPATIENT
Start: 2024-12-27 | End: 2024-12-30 | Stop reason: HOSPADM

## 2024-12-27 RX ADMIN — HUMAN IMMUNOGLOBULIN G 90 G: 20 LIQUID INTRAVENOUS at 17:20

## 2024-12-27 RX ADMIN — DIPHENHYDRAMINE HYDROCHLORIDE 50 MG: 50 CAPSULE ORAL at 16:23

## 2024-12-27 RX ADMIN — ACETAMINOPHEN 650 MG: 325 TABLET ORAL at 16:22

## 2024-12-27 RX ADMIN — ELTROMBOPAG OLAMINE 75 MG: 25 TABLET, FILM COATED ORAL at 21:33

## 2024-12-27 RX ADMIN — ACETAMINOPHEN 650 MG: 325 TABLET ORAL at 13:12

## 2024-12-27 ASSESSMENT — ACTIVITIES OF DAILY LIVING (ADL)
DIFFICULTY_COMMUNICATING: NO
ADLS_ACUITY_SCORE: 43
HEARING_DIFFICULTY_OR_DEAF: NO
ADLS_ACUITY_SCORE: 17
ADLS_ACUITY_SCORE: 43
TOILETING_ISSUES: NO
ADLS_ACUITY_SCORE: 43
ADLS_ACUITY_SCORE: 43
FALL_HISTORY_WITHIN_LAST_SIX_MONTHS: NO
ADLS_ACUITY_SCORE: 41
ADLS_ACUITY_SCORE: 17
ADLS_ACUITY_SCORE: 41
ADLS_ACUITY_SCORE: 41
CONCENTRATING,_REMEMBERING_OR_MAKING_DECISIONS_DIFFICULTY: NO
DOING_ERRANDS_INDEPENDENTLY_DIFFICULTY: NO
ADLS_ACUITY_SCORE: 17
DRESSING/BATHING_DIFFICULTY: NO
CHANGE_IN_FUNCTIONAL_STATUS_SINCE_ONSET_OF_CURRENT_ILLNESS/INJURY: NO
ADLS_ACUITY_SCORE: 41
DIFFICULTY_EATING/SWALLOWING: NO
ADLS_ACUITY_SCORE: 43
WEAR_GLASSES_OR_BLIND: NO
WALKING_OR_CLIMBING_STAIRS_DIFFICULTY: NO
ADLS_ACUITY_SCORE: 41
ADLS_ACUITY_SCORE: 43

## 2024-12-27 ASSESSMENT — COLUMBIA-SUICIDE SEVERITY RATING SCALE - C-SSRS
6. HAVE YOU EVER DONE ANYTHING, STARTED TO DO ANYTHING, OR PREPARED TO DO ANYTHING TO END YOUR LIFE?: NO
1. IN THE PAST MONTH, HAVE YOU WISHED YOU WERE DEAD OR WISHED YOU COULD GO TO SLEEP AND NOT WAKE UP?: NO
2. HAVE YOU ACTUALLY HAD ANY THOUGHTS OF KILLING YOURSELF IN THE PAST MONTH?: NO

## 2024-12-27 NOTE — CONSULTS
[facility] Hematology and Oncology Inpatient Consult Note    Patient: Patricia Govea  MRN: [id number]  Date of Service: 12/27/2024    Reason for Visit    I was consulted by regarding acute on chronic ITP    Assessment/Plan    ECOG Performance Status: 0    Acute on chronic ITP likely secondary to viral infection  Admitted 12/27/2024 for ITP flare with increased bruising and tongue blood blisters.  Platelet count was 2 on admission.  She was exposed to upper respiratory infection from her child which was the likely trigger for this episode.  Previously followed with hematology but was discharged in the clinic in 2022 due to improved platelet counts.  She had previously responded very well to Promacta.  Unfortunately this is not on the hospital formulary.  However, patient brought in home supply which we will use.  Will start IVIG for 2 days to help temporarily improve platelet count.  Also start Promacta 75 mg dose daily.  Monitor for increased bleeding.  Will continue to follow.  Can discharge as soon as platelet count begins to recover.      ______________________________________________________________________________      Staging History    Cancer Staging   No matching staging information was found for the patient.      History  Ms. Patricia Govea is a 44 year old who presented to the ED for increased bruising and tunnel blood blisters with concern for ITP flare.  She has a history of ITP and previously followed with our hematology clinic until she was discharged in 2022 due to improved platelet count.  Former patient of Dr. Haywood.  She reports that her child was sick with an upper respiratory infection over the holidays.    She has not started any new medications.  No other symptoms of fevers or chills.  No infectious concerns of sore throat or cough.  No dark stools or blood in urine.    She was diagnosed with ITP in 2011 and had a flare in 2019 during her pregnancy.  She had a  splenectomy in  with initial response but then continued decrease in platelets.  She did not respond to 4 doses of Rituxan.  However, she responded well to Promacta with increasing the dose to 75 mg in 2021.  She stopped in 2021 due to platelet count of 494.    Review of systems.  Pertinent items noted in history.      Past History  Past Medical History:   Diagnosis Date    Acute idiopathic thrombocytopenic purpura (H)     bone marrow biopsy negative for malignancy    Bell's palsy     Encounter for screening for cervical cancer 2018    Guillain Barré syndrome (H) 2016    After influenza vaccine    H. pylori infection 2004    History of blood transfusion     Immune thrombocytopenia (H)     ---saw Dr. Haywood [facility]e    Obesity      Past Surgical History:   Procedure Laterality Date     SECTION  2000     SECTION      LAPAROSCOPIC CHOLECYSTECTOMY  2019    LAPAROSCOPIC CHOLECYSTECTOMY N/A 2019    Procedure: CHOLECYSTECTOMY, LAPAROSCOPIC;  Surgeon: Han Lara MD;  Location: [facility];  Service: General    LAPAROSCOPIC SALPINGECTOMY Left 2006    LAPAROSCOPIC TUBAL LIGATION Bilateral 2021    Procedure: LAPAROSCOPIC BILATERAL SALPINGECTOMY;  Surgeon: Lucian Yanez MD;  Location: [facility] LAP,SPLENECTOMY N/A 2019    Procedure: SPLENECTOMY, ROBOT-ASSISTED, LAPAROSCOPIC, USING DA STEPHEN XI;  Surgeon: Bob Nicole MD;  Location: [facility];  Service: General    SPLENECTOMY  2019     Family History   Problem Relation Age of Onset    Cancer No family hx of     Diabetes No family hx of     Coronary Artery Disease No family hx of     Heart Disease No family hx of     Hypertension No family hx of     Thrombosis No family hx of      Social History     Socioeconomic History    Marital status:    Tobacco Use    Smoking status: Never     Passive exposure: Never    Smokeless tobacco: Never   Substance and  "Sexual Activity    Alcohol use: No    Drug use: No    Sexual activity: Never     Social Drivers of Health      Received from [facility]    Social Connections   Interpersonal Safety: Low Risk  (1/8/2024)    Interpersonal Safety     Do you feel physically and emotionally safe where you currently live?: Yes     Within the past 12 months, have you been hit, slapped, kicked or otherwise physically hurt by someone?: No     Within the past 12 months, have you been humiliated or emotionally abused in other ways by your partner or ex-partner?: No       Allergies    Allergies   Allergen Reactions    Influenza Vaccines Other (See Comments)     Guillain [place] Syndrome following fall 2016 administration    Blood-Group Specific Substance           Physical Exam        Latest Ref Rng & Units 3/24/2023     3:49 PM   CHOLESTEROL-MEDICATIONS   Cholesterol <200 mg/dL 197        GENERAL: Alert and oriented to time place and person.  Lying in bed.    HEAD: Atraumatic and normocephalic.    EYES: DARREL, EOMI.  No pallor.  No icterus.    NECK: supple. JVP normal.  No thyroid enlargement.    CHEST:   Symmetrical breath movements bilaterally.    CVS:   No peripheral edema.    ABDOMEN: Soft. Not tender. Not distended.    EXTREMITIES: Warm.    SKIN: Petechiae on bilateral lower extremities    Lab Results    Recent Labs   Lab 12/27/24  0903   WBC 5.4   HGB 13.8   HCT 38.7   PLT <2*     No results for input(s): \"NA\", \"CO2\", \"BUN\", \"CREATININE\", \"ALBUMIN\", \"BILITOT\", \"ALKPHOS\", \"ALT\", \"AST\", \"GLUCOSE\" in the last 168 hours.    Invalid input(s): \"K\", \"CL\", \"CALCIUM\", \"LABALBU\", \"PROT\"    Imaging Results    No results found.      Signed by: Teresa Finch PA-C    "

## 2024-12-27 NOTE — ED PROVIDER NOTES
EMERGENCY DEPARTMENT ENCOUNTER   NAME: Patricia Govea ; AGE: 44 year old female ; YOB: 1980 ; MRN: [id number] ; PCP: Mikal Brown     Evaluation Date & Time: No admission date for patient encounter.    ED Provider: Sasha Mora PA-C    CHIEF COMPLAINT     Abnormal Labs      FINAL ASSESSMENT       ICD-10-CM    1. Thrombocytopenia (H)  D69.6       2. Chronic ITP (idiopathic thrombocytopenia) (H)  D69.3           ED COURSE, MEDICAL DECISION MAKING, PLAN     ED course/notable events     9:12 AM Evaluated patient.   9:59 AM Lab called to report platelets less than 2.  Called to heme-onc for recs.   10:03 AM Staffed with Dr. Calhoun.   10:55 AM Spoke with Dr. Haywood from heme/onc.   10:58 AM Rechecked and updated patient.   11:05 AM Spoke with Dr. Gonzales who accepts patient for admission.   ______________________________________________________________________    Reason for visit: Patricia Govea is a 44 year old female with chronic ITP, hx GBS, s/p splenectomy in 2019 presenting for bruising, petechial spots, and tongue bleeding that she noticed last night and into this morning.  Has a history of chronic ITP and thinks that this is flaring up again.  No other associated symptoms.  Appears that her last flareup was in 2021 and she did not respond well to Rituxan, but did respond to Promacta which actually caused her platelets to go high.  Has been off medications since April 16, 2021.    Exam positives: Patient has scattered faint petechial lesions on her extremities and back.  She does have a small ecchymosis on the right bicep area.  She also has some blood blisters to the left side of her tongue and left inner cheek.  Rest of the exam is benign.    Vitals: Blood pressure elevated 175/93.  Heart rate elevated to 104.  Temp 99.6 and oxygenating at 95% on room air.  He has remained elevated here, but heart rate has normalized into the 90s.    Labs: Platelet count less than  "2.    EKG: /    Imaging: /    Interventions/recheck: /    Consults: Dr. Haywood from Heme/Onc. Recommends admission.  Dr. Gonzales accepts patient for admission.     Assessment: Acute symptomatic thrombocytopenia with history of chronic idiopathic thrombocytopenia.   No evidence of acute bleeding.     Plan: Admit      -------------------------------------------------------------------------------------------------------------  *All pertinent lab & imaging studies independently reviewed. (See chart for details)   *Discussed the results of all the tests and plan with patient and family/guardians.     HISTORY OF PRESENT ILLNESS   Patient information was obtained from: Patient   Use of Intrepreter: [employer], Italian    Pertinent past medical history: chronic ITP, hx GBS, s/p splenectomy in 2019    Patricia Govea is here by means of walk in  with self for evaluation of \" little blood dots\" all over, abnormal bruising, and bleeding in her mouth.  Patient states she has a history of this happening in the past and needing transfusions and medication for low platelets.  She thinks that this is happening again.    States she just noticed symptoms yesterday.    No other associated symptoms.    No other concerns.    Per my chart review  4/4/2022: [facility] cancer clinic for 3-month follow-up for chronic ITP.  It was noted that her last relapse was in March 2021.  She did not respond to 4 weeks of Rituxan.  She was then started on Promacta.  Started at a low dose and then increased to 50 mg.  No initial response so then increased it to 75 mg, but then her platelets were high.  This has been held since April 16, 2021.  Platelets continue to remain slightly elevated.  1 year out from treatment and her platelets continue to look little high.  Not worrisome.  No longer needed to follow-up in clinic regularly.  Call for any bruising or bleeding complications.    MEDICAL HISTORY     Past Medical History:   Diagnosis " Date    Acute idiopathic thrombocytopenic purpura (H) 2004    Bell's palsy     Encounter for screening for cervical cancer 2018    Guillain Barré syndrome (H) 2016    H. pylori infection 2004    History of blood transfusion     Immune thrombocytopenia (H)     Obesity        Past Surgical History:   Procedure Laterality Date     SECTION  2000     SECTION      LAPAROSCOPIC CHOLECYSTECTOMY  2019    LAPAROSCOPIC CHOLECYSTECTOMY N/A 2019    Procedure: CHOLECYSTECTOMY, LAPAROSCOPIC;  Surgeon: Han Lara MD;  Location: [facility];  Service: General    LAPAROSCOPIC SALPINGECTOMY Left 2006    LAPAROSCOPIC TUBAL LIGATION Bilateral 2021    Procedure: LAPAROSCOPIC BILATERAL SALPINGECTOMY;  Surgeon: Lucian Yanez MD;  Location: [facility] LAP,SPLENECTOMY N/A 2019    Procedure: SPLENECTOMY, ROBOT-ASSISTED, LAPAROSCOPIC, USING DA STEPHEN XI;  Surgeon: Bob Nicole MD;  Location: [facility];  Service: General    SPLENECTOMY  2019       Family History   Problem Relation Age of Onset    Cancer No family hx of     Diabetes No family hx of     Coronary Artery Disease No family hx of     Heart Disease No family hx of     Hypertension No family hx of     Thrombosis No family hx of        Social History     Tobacco Use    Smoking status: Never     Passive exposure: Never    Smokeless tobacco: Never   Substance Use Topics    Alcohol use: No    Drug use: No       Medications   naltrexone-bupropion (CONTRAVE) 8-90 MG per 12 hr tablet          PHYSICAL EXAM     First Vitals:  Patient Vitals for the past 24 hrs:   BP Temp Temp src Pulse Resp SpO2 Weight   24 1100 138/66 -- -- 93 -- 97 % --   24 1040 (!) 169/86 -- -- 93 -- 97 % --   24 1020 (!) 153/91 -- -- 91 -- 97 % --   24 0843 (!) 175/93 99.6  F (37.6  C) Oral 104 16 95 % 91.6 kg (202 lb)       PHYSICAL EXAM:   Constitutional: No acute distress.  Neuro: Awake and  alert. No focal deficits.  Psych: Calm and cooperative.  Head: Normocephalic.  Eyes: No subconjunctival hemorrhage.  Nose: Nostrils patent.  Left axis.  Mouth: Patient has some blood blisters present on the left side of her tongue and left inner cheek.      Lymph: No cervical or tonsillar lymphadenopathy.  Cardio: . Adequate perfusion to extremities. Regular rhythm. No murmurs.  Pulmonary: Oxygenating well on RA. No labored breathing. No wheezes. No rales. No rhonchi.   Abdomen: Soft. Non-distended. No rigidity. No palpable pain. No rebound. No guarding.    Upper extremities: Moves freely. No edema.   Lower extremities: Moves freely. No edema.    Skin: Scattered faint petechial type lesions on her extremities and back.  She does have a small bruise noted over the right bicep.      RESULTS     LAB:  All pertinent labs reviewed and interpreted  Labs Ordered and Resulted from Time of ED Arrival to Time of ED Departure   CBC WITH PLATELETS AND DIFFERENTIAL - Abnormal       Result Value    WBC Count 5.4      RBC Count 4.29      Hemoglobin 13.8      Hematocrit 38.7      MCV 90      MCH 32.2      MCHC 35.7      RDW 13.0      Platelet Count <2 (*)     % Neutrophils 34      % Lymphocytes 43      % Monocytes 17      % Eosinophils 4      % Basophils 2      % Immature Granulocytes 1      NRBCs per 100 WBC 0      Absolute Neutrophils 1.8      Absolute Lymphocytes 2.3      Absolute Monocytes 0.9      Absolute Eosinophils 0.2      Absolute Basophils 0.1      Absolute Immature Granulocytes 0.0      Absolute NRBCs 0.0     INR - Normal    INR 0.95     PARTIAL THROMBOPLASTIN TIME - Normal    aPTT 25     RBC AND PLATELET MORPHOLOGY    RBC Morphology Confirmed RBC Indices      Platelet Assessment        Value: Automated Count Confirmed. Platelet morphology is normal.       RADIOLOGY:  No orders to display       PROCEDURES     None         MEDICATIONS GIVEN IN THE EMERGENCY DEPARTMENT:  Medications - No data to display    NEW  PRESCRIPTIONS STARTED AT TODAY'S ED VISIT:  New Prescriptions    No medications on file            MEDICAL DECISION MAKING:  Medical Decision Making  Obtained supplemental history:Supplemental history obtained?: No  Reviewed external records: External records reviewed?: Outpatient Record: See HPI  Care impacted by chronic illness:Documented in Chart  Did you consider but not order tests?: Work up considered but not performed and documented in chart, if applicable  Did you interpret images independently?: Independent interpretation of ECG and images noted in documentation, when applicable.  Consultation discussion with other provider:Did you involve another provider (consultant, , pharmacy, etc.)?: I discussed the care with another health care provider, see documentation for details.  Admit.    MIPS: Not Applicable      CRITICAL CARE:  N/A             Some or all of this documentation has been completed using dictation software and grammatical errors may be present. Please contact me with any concerns regarding this.     Sasha Mora PA-C  Emergency Medicine   [facility] EMERGENCY DEPARTMENT       Sasha Mora PA-C  12/27/24 3990

## 2024-12-27 NOTE — PROGRESS NOTES
IVIG started. Patient vitals stable. Premedicated already. Education provided to patient and family.   No

## 2024-12-27 NOTE — PHARMACY-ADMISSION MEDICATION HISTORY
Pharmacist Admission Medication History    Admission medication history is complete. The information provided in this note is only as accurate as the sources available at the time of the update.    Information Source(s): Patient, Clinic records, and CareEverywhere/SureScripts via in-person    Pertinent Information: Patient confirms that she does not take any medications prior to admission.    Changes made to PTA medication list:  Added: None  Deleted: None  Changed: None    Allergies reviewed with patient and updates made in EHR: yes    Medication History Completed By: Mathew Shoemaker [facility] 12/27/2024 11:57 AM    No outpatient medications have been marked as taking for the 12/27/24 encounter (Hospital Encounter).

## 2024-12-27 NOTE — ED TRIAGE NOTES
The patient has a history of low platelets. She reports that she feels like her platelets are low again. She has bruising to her low back and on her tongue.

## 2024-12-27 NOTE — H&P
[facility]    History and Physical - Hospitalist Service       Date of Admission:  12/27/2024    Assessment & Plan    Patricia Govea is a 44 year old female admitted on 12/27/2024. 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 Followed with hematology up until 2022, after which she was told she no longer needs to follow up with their clinic as her platelets were actually elevated to 559 at the time. Previously responded well to Promacta. Not acutely bleeding, possibly exposed to unknown number of viruses over [place] holiday while young son was hospitalized for URI/pneumonia which may have triggered acute platelet dysfunction. Plt count on admission < 2, PT.INR wnl. Not actively bleeding but high risk. Heme-onc consulted, appreciate  - heme-onc consult, appreciate recs  - per ED provider discussion with heme-onc, defer transfusion decision to heme-onc  - consent form signed and in chart  - monitor closely for bleeding  - type and cross ordered  - AM CBC         Diet: Regular Diet Adult  DVT Prophylaxis: VTE Prophylaxis contraindicated due to easy bleeding, bruisability  Kovacs Catheter: Not present  Fluids: PO  Lines: None     Cardiac Monitoring: None  Code Status: Full Code      Clinically Significant Risk Factors Present on Admission                 # Thrombocytopenia: Lowest platelets = 1.8 in last 2 days, will monitor for bleeding                      Disposition Plan  pending heme onc input, likely > 2 midnights     Expected Discharge Date: 12/29/2024                  Precepted patient with Dr. Han Burnett.      Sulma Gonzales MD  Hospitalist Service  [facility]  Securely message with [employer] (more info)  Text page via [facility] Paging/Directory  "  ______________________________________________________________________    Chief Complaint   Easy bruising, \"ITP flare\"    History is obtained from the patient    History of Present Illness   Patricia Govea is a 44 year old female who has a history of chronic ITP presenting for easy bruising and tongue blood blisters that she thinks is an ITP flare.    Per patient, her young son had been hospitalized at a local children's hospital over the Christmas holiday for a viral URI/pneumonia picture. They came home two days ago. Yesterday, she noticed that she had some easy bruising on her L hip as well as a small bruise over her R bicep where her son had gently grasped her. She also noted some small blood blisters on her tongue and the inside of her cheek. This is consistent with some of her previous ITP flares. She called her hematologist, and they told her to present to the ED for evaluation as she has not been seen by them since 2022. Reports normal bruising and bleeding were normal as recently as mid December when she had a normal episode of menses (3-4 days long with regular amount of flow).     Denies new medications (prescription or OTC) reported. Denies new changes to diet or exercise regimen. Denies signs or symptoms of infection, including fever, chills, headache, rashes, discharge, sore throat, chest pain, SOB, nausea, vomiting, diarrhea, abdominal pain, dark stools, dysuria, blood in urine, or vaginal symptoms. No other known insults.    Past Medical History    Past Medical History:   Diagnosis Date    Acute idiopathic thrombocytopenic purpura (H) 2004    bone marrow biopsy negative for malignancy    Bell's palsy     Encounter for screening for cervical cancer 12/23/2018    Guillain Barré syndrome (H) 2016    After influenza vaccine    H. pylori infection 2004    History of blood transfusion     Immune thrombocytopenia (H)     2012---saw Dr. Haywood [facility] Heme    Obesity        Past Surgical History "   Past Surgical History:   Procedure Laterality Date     SECTION       SECTION      LAPAROSCOPIC CHOLECYSTECTOMY  2019    LAPAROSCOPIC CHOLECYSTECTOMY N/A 2019    Procedure: CHOLECYSTECTOMY, LAPAROSCOPIC;  Surgeon: Han Lara MD;  [place] [facility]  Service: General    LAPAROSCOPIC SALPINGECTOMY Left 2006    LAPAROSCOPIC TUBAL LIGATION Bilateral 2021    Procedure: LAPAROSCOPIC BILATERAL SALPINGECTOMY;  Surgeon: Lucian Yanez MD;  Location: [facility] LAP,SPLENECTOMY N/A 2019    Procedure: SPLENECTOMY, ROBOT-ASSISTED, LAPAROSCOPIC, USING DA STEPHEN XI;  Surgeon: Bob Nicole MD;  [place] [facility]  Service: General    SPLENECTOMY  2019       Prior to Admission Medications   Prior to Admission Medications   Prescriptions Last Dose Informant Patient Reported? Taking?   naltrexone-bupropion (CONTRAVE) 8-90 MG per 12 hr tablet   No No   Sig: Take 1 tablet by mouth daily      Facility-Administered Medications: None        Review of Systems    The 10 point Review of Systems is negative other than noted in the HPI or here.      Physical Exam   Vital Signs: Temp: 99.6  F (37.6  C) Temp src: Oral BP: (!) 181/106 Pulse: 96   Resp: 16 SpO2: 97 % O2 Device: None (Room air)    Weight: 202 lbs 0 oz    General: Alert and oriented x 3, no acute distress  Skin: Small area of ecchymosis nontender to palpation over L hip and R bicep. Trace diffuse petechiae along shins, not much elsewhere.  HEENT: normocephalic, atraumatic, PERRL, EOMI, no conjunctival injection or icterus, ears and nose normal, no LAD or masses.  Mouth: A few raised small blood blisters on tongue and cheek mucosal surfaces, nonbleeding.  Resp: clear to ausculation bilaterally, no rales or wheezes  Cardio: RRR, S1 and S2 present, no S3 or S4, no rubs or murmurs  Abdomen: soft, nontender, nondistended, bowel sounds present x 4  Extremities: no erythema or edema  noted  Psych: calm, rational thought process. Good insight      Medical Decision Making   Please see A&P for additional details of medical decision making.      Data   ------------------------- PAST 24 HR DATA REVIEWED -----------------------------------------------    I have personally reviewed the following data over the past 24 hrs:    5.4  \   13.8   / <2 (LL)     N/A N/A N/A /  N/A   N/A N/A N/A \     INR:  0.95 PTT:  25   D-dimer:  N/A Fibrinogen:  N/A       Imaging results reviewed over the past 24 hrs:   No results found for this or any previous visit (from the past 24 hours).

## 2024-12-27 NOTE — ED PROVIDER NOTES
Emergency Department Midlevel Supervisory Note     I had a face to face encounter with this patient seen by the Advanced Practice Provider (MANA). I personally made/approved the management plan and take responsibility for the patient management. I personally saw patient and performed a substantive portion of the visit including all aspects of the medical decision making.     ED Course:  10:03 AM  Sasha Mora PA-C staffed patient with me. I agree with their assessment and plan of management, and I will see the patient.  10:30 AM I met with the patient to introduce myself, gather additional history, perform my initial exam, and discuss the plan.     Procedures:  I was present for the key portions of procedures documented in MANA/midlevel note, see midlevel note for further details.    MDM:  Patient with history of ITP, presents today noting some red spots on her legs as well as blood blisters on her tongue and easy bruising with bruise on her flank with no known trauma.  Patient found to have platelet count less than 2.  Discussed with Dr. Haywood, hematology/oncology, patient will be admitted       1. Thrombocytopenia (H)    2. Chronic ITP (idiopathic thrombocytopenia) (H)          Brief HPI:     Patricia Govea is a 44 year old female who presents for evaluation of bleeding, has noted some bleeding in the mouth last night as well as easy bruising.  History of ITP, last seen by oncology 2022    Brief Physical Exam: BP (!) 140/79 (BP Location: Left arm)   Pulse 104   Temp 97.4  F (36.3  C) (Oral)   Resp 20   Wt 91.4 kg (201 lb 8 oz)   LMP 12/06/2024 (Approximate)   SpO2 95%   BMI 44.70 kg/m    Physical Exam      Labs and Imaging:  Results for orders placed or performed during the hospital encounter of 12/27/24   Result Value Ref Range    INR 0.95 0.85 - 1.15   Partial thromboplastin time   Result Value Ref Range    aPTT 25 22 - 38 Seconds   CBC with platelets and differential   Result Value Ref  Range    WBC Count 5.4 4.0 - 11.0 10e3/uL    RBC Count 4.29 3.80 - 5.20 10e6/uL    Hemoglobin 13.8 11.7 - 15.7 g/dL    Hematocrit 38.7 35.0 - 47.0 %    MCV 90 78 - 100 fL    MCH 32.2 26.5 - 33.0 pg    MCHC 35.7 31.5 - 36.5 g/dL    RDW 13.0 10.0 - 15.0 %    Platelet Count <2 (LL) 150 - 450 10e3/uL    % Neutrophils 34 %    % Lymphocytes 43 %    % Monocytes 17 %    % Eosinophils 4 %    % Basophils 2 %    % Immature Granulocytes 1 %    NRBCs per 100 WBC 0 <1 /100    Absolute Neutrophils 1.8 1.6 - 8.3 10e3/uL    Absolute Lymphocytes 2.3 0.8 - 5.3 10e3/uL    Absolute Monocytes 0.9 0.0 - 1.3 10e3/uL    Absolute Eosinophils 0.2 0.0 - 0.7 10e3/uL    Absolute Basophils 0.1 0.0 - 0.2 10e3/uL    Absolute Immature Granulocytes 0.0 <=0.4 10e3/uL    Absolute NRBCs 0.0 10e3/uL   Extra Red Top Tube   Result Value Ref Range    Hold Specimen JIC    Extra Green Top (Lithium Heparin) Tube   Result Value Ref Range    Hold Specimen JIC    RBC and Platelet Morphology   Result Value Ref Range    RBC Morphology Confirmed RBC Indices     Platelet Assessment  Automated Count Confirmed. Platelet morphology is normal.     Automated Count Confirmed. Platelet morphology is normal.   Extra Purple Top Tube   Result Value Ref Range    Hold Specimen JIC    Extra Purple Top Tube   Result Value Ref Range    Hold Specimen JIC    CBC with platelets   Result Value Ref Range    WBC Count 4.3 4.0 - 11.0 10e3/uL    RBC Count 3.96 3.80 - 5.20 10e6/uL    Hemoglobin 12.8 11.7 - 15.7 g/dL    Hematocrit 36.2 35.0 - 47.0 %    MCV 91 78 - 100 fL    MCH 32.3 26.5 - 33.0 pg    MCHC 35.4 31.5 - 36.5 g/dL    RDW 13.3 10.0 - 15.0 %    Platelet Count 2 (LL) 150 - 450 10e3/uL   Basic metabolic panel   Result Value Ref Range    Sodium 132 (L) 135 - 145 mmol/L    Potassium 3.9 3.4 - 5.3 mmol/L    Chloride 100 98 - 107 mmol/L    Carbon Dioxide (CO2) 24 22 - 29 mmol/L    Anion Gap 8 7 - 15 mmol/L    Urea Nitrogen 9.8 6.0 - 20.0 mg/dL    Creatinine 0.61 0.51 - 0.95 mg/dL    GFR  Estimate >90 >60 mL/min/1.73m2    Calcium 8.7 (L) 8.8 - 10.4 mg/dL    Glucose 103 (H) 70 - 99 mg/dL   RBC and Platelet Morphology   Result Value Ref Range    RBC Morphology Confirmed RBC Indices     Platelet Assessment  Automated Count Confirmed. Platelet morphology is normal.     Automated Count Confirmed. Platelet morphology is normal.   CBC with platelets   Result Value Ref Range    WBC Count 4.2 4.0 - 11.0 10e3/uL    RBC Count 3.92 3.80 - 5.20 10e6/uL    Hemoglobin 12.7 11.7 - 15.7 g/dL    Hematocrit 36.1 35.0 - 47.0 %    MCV 92 78 - 100 fL    MCH 32.4 26.5 - 33.0 pg    MCHC 35.2 31.5 - 36.5 g/dL    RDW 13.5 10.0 - 15.0 %    Platelet Count 2 (LL) 150 - 450 10e3/uL   Basic metabolic panel   Result Value Ref Range    Sodium 131 (L) 135 - 145 mmol/L    Potassium 4.0 3.4 - 5.3 mmol/L    Chloride 101 98 - 107 mmol/L    Carbon Dioxide (CO2) 25 22 - 29 mmol/L    Anion Gap 5 (L) 7 - 15 mmol/L    Urea Nitrogen 13.0 6.0 - 20.0 mg/dL    Creatinine 0.59 0.51 - 0.95 mg/dL    GFR Estimate >90 >60 mL/min/1.73m2    Calcium 8.8 8.8 - 10.4 mg/dL    Glucose 99 70 - 99 mg/dL   CBC with platelets   Result Value Ref Range    WBC Count 4.6 4.0 - 11.0 10e3/uL    RBC Count 3.99 3.80 - 5.20 10e6/uL    Hemoglobin 12.8 11.7 - 15.7 g/dL    Hematocrit 36.3 35.0 - 47.0 %    MCV 91 78 - 100 fL    MCH 32.1 26.5 - 33.0 pg    MCHC 35.3 31.5 - 36.5 g/dL    RDW 13.3 10.0 - 15.0 %    Platelet Count 2 (LL) 150 - 450 10e3/uL   Basic metabolic panel   Result Value Ref Range    Sodium 134 (L) 135 - 145 mmol/L    Potassium 3.7 3.4 - 5.3 mmol/L    Chloride 100 98 - 107 mmol/L    Carbon Dioxide (CO2) 24 22 - 29 mmol/L    Anion Gap 10 7 - 15 mmol/L    Urea Nitrogen 8.7 6.0 - 20.0 mg/dL    Creatinine 0.53 0.51 - 0.95 mg/dL    GFR Estimate >90 >60 mL/min/1.73m2    Calcium 9.2 8.8 - 10.4 mg/dL    Glucose 106 (H) 70 - 99 mg/dL   CBC with platelets   Result Value Ref Range    WBC Count 4.7 4.0 - 11.0 10e3/uL    RBC Count 4.06 3.80 - 5.20 10e6/uL    Hemoglobin  13.4 11.7 - 15.7 g/dL    Hematocrit 36.6 35.0 - 47.0 %    MCV 90 78 - 100 fL    MCH 33.0 26.5 - 33.0 pg    MCHC 36.6 (H) 31.5 - 36.5 g/dL    RDW 13.4 10.0 - 15.0 %    Platelet Count 2 (LL) 150 - 450 10e3/uL   Basic metabolic panel   Result Value Ref Range    Sodium 128 (L) 135 - 145 mmol/L    Potassium 3.7 3.4 - 5.3 mmol/L    Chloride 97 (L) 98 - 107 mmol/L    Carbon Dioxide (CO2) 22 22 - 29 mmol/L    Anion Gap 9 7 - 15 mmol/L    Urea Nitrogen 6.5 6.0 - 20.0 mg/dL    Creatinine 0.45 (L) 0.51 - 0.95 mg/dL    GFR Estimate >90 >60 mL/min/1.73m2    Calcium 8.8 8.8 - 10.4 mg/dL    Glucose 110 (H) 70 - 99 mg/dL   Sodium random urine   Result Value Ref Range    Sodium Urine mmol/L <20 mmol/L   Osmolality urine   Result Value Ref Range    Osmolality Urine 275 100 - 1,200 mmol/kg   ECG 12-Lead with MUSE - [facility]   Result Value Ref Range    Systolic Blood Pressure  mmHg    Diastolic Blood Pressure  mmHg    Ventricular Rate 85 BPM    Atrial Rate 85 BPM    MS Interval 132 ms    QRS Duration 98 ms     ms    QTc 406 ms    P Axis 23 degrees    R AXIS 93 degrees    T Axis 24 degrees    Interpretation ECG       Sinus rhythm  Rightward axis  Borderline ECG  When compared with ECG of 21-Oct-2016 18:26,  Left anterior fascicular block is no longer Present  QT has shortened  Confirmed by MIKE JAY MD LOC:JN ([id number]) on 12/30/2024 9:53:44 AM     Adult Type and Screen   Result Value Ref Range    ABO/RH(D) O POS     Antibody Screen Positive (A) Negative    SPECIMEN EXPIRATION DATE [id number]          Edwin Calhoun M.D.  [facility] EMERGENCY DEPARTMENT  [address] [place] [id number] [phone]     dEwin Calhoun MD  01/02/25 0011

## 2024-12-28 LAB
ANION GAP SERPL CALCULATED.3IONS-SCNC: 8 MMOL/L (ref 7–15)
BUN SERPL-MCNC: 9.8 MG/DL (ref 6–20)
CALCIUM SERPL-MCNC: 8.7 MG/DL (ref 8.8–10.4)
CHLORIDE SERPL-SCNC: 100 MMOL/L (ref 98–107)
CREAT SERPL-MCNC: 0.61 MG/DL (ref 0.51–0.95)
EGFRCR SERPLBLD CKD-EPI 2021: >90 ML/MIN/1.73M2
ERYTHROCYTE [DISTWIDTH] IN BLOOD BY AUTOMATED COUNT: 13.3 % (ref 10–15)
GLUCOSE SERPL-MCNC: 103 MG/DL (ref 70–99)
HCO3 SERPL-SCNC: 24 MMOL/L (ref 22–29)
HCT VFR BLD AUTO: 36.2 % (ref 35–47)
HGB BLD-MCNC: 12.8 G/DL (ref 11.7–15.7)
MCH RBC QN AUTO: 32.3 PG (ref 26.5–33)
MCHC RBC AUTO-ENTMCNC: 35.4 G/DL (ref 31.5–36.5)
MCV RBC AUTO: 91 FL (ref 78–100)
PLAT MORPH BLD: NORMAL
PLATELET # BLD AUTO: 2 10E3/UL (ref 150–450)
POTASSIUM SERPL-SCNC: 3.9 MMOL/L (ref 3.4–5.3)
RBC # BLD AUTO: 3.96 10E6/UL (ref 3.8–5.2)
RBC MORPH BLD: NORMAL
SODIUM SERPL-SCNC: 132 MMOL/L (ref 135–145)
WBC # BLD AUTO: 4.3 10E3/UL (ref 4–11)

## 2024-12-28 PROCEDURE — 99232 SBSQ HOSP IP/OBS MODERATE 35: CPT | Performed by: INTERNAL MEDICINE

## 2024-12-28 PROCEDURE — 250N000013 HC RX MED GY IP 250 OP 250 PS 637

## 2024-12-28 PROCEDURE — 120N000001 HC R&B MED SURG/OB

## 2024-12-28 PROCEDURE — 99232 SBSQ HOSP IP/OBS MODERATE 35: CPT | Mod: GC

## 2024-12-28 PROCEDURE — 80048 BASIC METABOLIC PNL TOTAL CA: CPT

## 2024-12-28 PROCEDURE — 36415 COLL VENOUS BLD VENIPUNCTURE: CPT

## 2024-12-28 PROCEDURE — 250N000011 HC RX IP 250 OP 636: Mod: JZ | Performed by: INTERNAL MEDICINE

## 2024-12-28 PROCEDURE — 250N000013 HC RX MED GY IP 250 OP 250 PS 637: Performed by: INTERNAL MEDICINE

## 2024-12-28 PROCEDURE — 85027 COMPLETE CBC AUTOMATED: CPT

## 2024-12-28 RX ADMIN — ACETAMINOPHEN 650 MG: 325 TABLET ORAL at 16:36

## 2024-12-28 RX ADMIN — ELTROMBOPAG OLAMINE 75 MG: 25 TABLET, FILM COATED ORAL at 08:06

## 2024-12-28 RX ADMIN — HUMAN IMMUNOGLOBULIN G 90 G: 20 LIQUID INTRAVENOUS at 18:02

## 2024-12-28 RX ADMIN — DIPHENHYDRAMINE HYDROCHLORIDE 50 MG: 50 CAPSULE ORAL at 16:36

## 2024-12-28 ASSESSMENT — ACTIVITIES OF DAILY LIVING (ADL)
ADLS_ACUITY_SCORE: 21
ADLS_ACUITY_SCORE: 17
ADLS_ACUITY_SCORE: 17
ADLS_ACUITY_SCORE: 21
ADLS_ACUITY_SCORE: 17
ADLS_ACUITY_SCORE: 17
ADLS_ACUITY_SCORE: 21
ADLS_ACUITY_SCORE: 20
ADLS_ACUITY_SCORE: 17
ADLS_ACUITY_SCORE: 20
ADLS_ACUITY_SCORE: 17
ADLS_ACUITY_SCORE: 21
ADLS_ACUITY_SCORE: 17
ADLS_ACUITY_SCORE: 20
ADLS_ACUITY_SCORE: 21
ADLS_ACUITY_SCORE: 20
ADLS_ACUITY_SCORE: 20
ADLS_ACUITY_SCORE: 17

## 2024-12-28 NOTE — PROGRESS NOTES
[facility] Hematology and Oncology Inpatient Progress Note    Patient: Patricia Govea  MRN: [id number]  Date of Service:  12/28/2024        Assessment and Plan:    1.  ITP: She is currently on day 2 of IVIG.  She started eltrombopag 75 mg daily yesterday.  Second dose was given today.  Will see what her platelet count looks like tomorrow.  Probably will discharge on Monday.  It is noted that at her last relapse she was managed as an outpatient with severe thrombocytopenia.  Additionally, significant bleeding is a rare ITP.  We are working on getting eltrombopag secured for her as an outpatient.    History of Present Illness    Ms. Patricia Govea is a 44-year-old woman who was admitted with immune thrombocytopenia.  This is her third relapse.  She had a preceding viral syndrome.  No complaints today.  No bleeding.    Review of Systems    As above in the history.     Review of Systems otherwise Negative for:  General: chills, fever or night sweats  Psychological: anxiety or depression  Ophthalmic: blurry vision, double vision or loss of vision, vision change  ENT: epistaxis, oral lesions, hearing changes  Hematological and Lymphatic: jaundice, swollen lymph nodes  Endocrine: hot flashes, unexpected weight changes  Respiratory: cough, hemoptysis, orthopnea or shortness of breath/LARA  Cardiovascular: chest pain, edema, palpitations or PND  Gastrointestinal: abdominal pain, blood in stools, change in bowel habits, constipation, diarrhea or nausea/vomiting  Genito-Urinary: change in urinary stream, incontinence, frequency/urgency  Musculoskeletal: joint pain, stiffness, swelling, muscle pain or weakness  Neurological: dizziness, headaches, numbness/tingling  Dermatological: lumps and rash    Physical Exam    /56 (BP Location: Left arm)   Pulse 90   Temp 98.4  F (36.9  C) (Oral)   Resp 16   Wt 91.4 kg (201 lb 8 oz)   LMP 12/06/2024 (Approximate)   SpO2 96%   BMI 44.70 kg/m       General: patient appears stated age of 44 year old. Nontoxic and in no distress.   HEENT: Head: atraumatic, normocephalic. Sclerae anicteric.  Chest:  Normal respiratory effort  Cardiac:  No edema.   Abdomen: abdomen is non-distended  Extremities: normal tone and muscle bulk.  Skin: no lesions or rash. Warm and dry.  CNS: alert and oriented x3. Grossly non-focal.   Psychiatric: normal mood and affect.     Lab Results    Recent Results (from the past 24 hours)   CBC with platelets    Collection Time: 12/28/24  6:56 AM   Result Value Ref Range    WBC Count 4.3 4.0 - 11.0 10e3/uL    RBC Count 3.96 3.80 - 5.20 10e6/uL    Hemoglobin 12.8 11.7 - 15.7 g/dL    Hematocrit 36.2 35.0 - 47.0 %    MCV 91 78 - 100 fL    MCH 32.3 26.5 - 33.0 pg    MCHC 35.4 31.5 - 36.5 g/dL    RDW 13.3 10.0 - 15.0 %    Platelet Count 2 (LL) 150 - 450 10e3/uL   Basic metabolic panel    Collection Time: 12/28/24  6:56 AM   Result Value Ref Range    Sodium 132 (L) 135 - 145 mmol/L    Potassium 3.9 3.4 - 5.3 mmol/L    Chloride 100 98 - 107 mmol/L    Carbon Dioxide (CO2) 24 22 - 29 mmol/L    Anion Gap 8 7 - 15 mmol/L    Urea Nitrogen 9.8 6.0 - 20.0 mg/dL    Creatinine 0.61 0.51 - 0.95 mg/dL    GFR Estimate >90 >60 mL/min/1.73m2    Calcium 8.7 (L) 8.8 - 10.4 mg/dL    Glucose 103 (H) 70 - 99 mg/dL   RBC and Platelet Morphology    Collection Time: 12/28/24  6:56 AM   Result Value Ref Range    RBC Morphology Confirmed RBC Indices     Platelet Assessment  Automated Count Confirmed. Platelet morphology is normal.     Automated Count Confirmed. Platelet morphology is normal.     Imaging    No results found.    Signed by: Han Haywood MD

## 2024-12-28 NOTE — PLAN OF CARE
Problem: Adult Inpatient Plan of Care  Goal: Absence of Hospital-Acquired Illness or Injury  Intervention: Prevent Skin Injury  Recent Flowsheet Documentation  Taken 12/27/2024 2202 by Vu Scott, RN  Body Position:   position changed independently   supine, legs elevated   supine, head elevated     Problem: Skin Injury Risk Increased  Goal: Skin Health and Integrity  Outcome: Progressing  Intervention: Plan: Nurse Driven Intervention: Moisture Management  Recent Flowsheet Documentation  Taken 12/27/2024 2047 by Vu Scott, RN  Moisture Interventions: Encourage regular toileting  Intervention: Optimize Skin Protection  Recent Flowsheet Documentation  Taken 12/27/2024 2202 by Vu Scott, RN  Activity Management: ambulated to bathroom   Goal Outcome Evaluation:      Pt alert and oriented. Denies pain. Has bruising on tongue, left hip, upper arms and petechiae on upper anterior thighs. Pt denies SOB, Pain or any other bleeding. Moved BP cuff to lower forearm with protective sleeve due to bruising of left upper arm. Gave teaching for pt to call for SBA getting up to bathroom for safety due to increased risk of injury with severe thrombocytopenia. Pt tolerating IVIG without side effects.

## 2024-12-28 NOTE — PLAN OF CARE
"  Problem: Adult Inpatient Plan of Care  Goal: Plan of Care Review  Description: The Plan of Care Review/Shift note should be completed every shift.  The Outcome Evaluation is a brief statement about your assessment that the patient is improving, declining, or no change.  This information will be displayed automatically on your shift  note.  Outcome: Progressing  Goal: Patient-Specific Goal (Individualized)  Description: You can add care plan individualizations to a care plan. Examples of Individualization might be:  \"Parent requests to be called daily at 9am for status\", \"I have a hard time hearing out of my right ear\", or \"Do not touch me to wake me up as it startles  me\".  Outcome: Progressing  Goal: Absence of Hospital-Acquired Illness or Injury  Outcome: Progressing  Intervention: Identify and Manage Fall Risk  Recent Flowsheet Documentation  Taken 12/27/2024 2337 by Buzz Mendes RN  Safety Promotion/Fall Prevention:   activity supervised   clutter free environment maintained   lighting adjusted   nonskid shoes/slippers when out of bed   patient and family education  Intervention: Prevent Skin Injury  Recent Flowsheet Documentation  Taken 12/27/2024 2337 by Buzz Mendes RN  Body Position:   position changed independently   supine, legs elevated   supine, head elevated  Goal: Optimal Comfort and Wellbeing  Outcome: Progressing  Goal: Readiness for Transition of Care  Outcome: Progressing     Problem: Anemia  Goal: Anemia Symptom Improvement  Outcome: Progressing  Intervention: Monitor and Manage Anemia  Recent Flowsheet Documentation  Taken 12/27/2024 2337 by Buzz Mendes RN  Safety Promotion/Fall Prevention:   activity supervised   clutter free environment maintained   lighting adjusted   nonskid shoes/slippers when out of bed   patient and family education     Problem: Skin Injury Risk Increased  Goal: Skin Health and Integrity  Outcome: Progressing  Intervention: Plan: Nurse Driven " Intervention: Moisture Management  Recent Flowsheet Documentation  Taken 12/27/2024 2337 by Buzz Mendes, RN  Moisture Interventions: Encourage regular toileting  Intervention: Optimize Skin Protection  Recent Flowsheet Documentation  Taken 12/27/2024 2337 by Buzz Mendes, RN  Activity Management: ambulated to bathroom  Head of Bed (HOB) Positioning: HOB at 30-45 degrees   Goal Outcome Evaluation:       Pt A&O X4, denied pain, IVIG rate increased from 2.1 to 2.4 completed without side effects in this shift;  SBA, on RA; make need known. Care plan ongoing.    Buzz COSTA, RN

## 2024-12-28 NOTE — PROGRESS NOTES
[facility]    Progress Note - Hospitalist Service       Date of Admission:  12/27/2024    Assessment & Plan   Patricia Govea is a 44 year old female admitted on 12/27/2024. She has a history of chronic ITP s/p splenectomy in 2019, hx Guillan-Brookfield syndrome and is admitted for severe thrombocytopenia 2/2 acute on chronic ITP.     Severe thrombocytopenia  Acute on chronic immune thrombocytopenia  Known h/o ITP, s/p splenectomy in 2019. Followed with hematology up until 2022, after which she was told she no longer needed to follow up with their clinic as her platelets were actually elevated to 559 at the time. Previously responded well to Promacta. Not acutely bleeding, possibly exposed to unknown number of viruses over [place] holiday while young son was hospitalized for URI/pneumonia which may have triggered acute platelet dysfunction. Plt count on admission < 2, PTT/INR wnl. Not actively bleeding but high risk. Plt now 2 this AM. Patient continues to feel well.  - hem/onc following  - IVIG x2 days  - start Promacta 75 mg daily  - monitor closely for bleeding  - daily    Hyponatremia, mild  Na 132 this AM.   - monitor on daily BMP    Lack of insurance  - SW/CM consulted, appreciate        Diet: Regular Diet Adult    DVT Prophylaxis: VTE Prophylaxis contraindicated due to easy bleeding, bruisability  Kovacs Catheter: Not present  Fluids: PO  Lines: None     Cardiac Monitoring: None  Code Status: Full Code      Clinically Significant Risk Factors Present on Admission         # Hyponatremia: Lowest Na = 132 mmol/L in last 2 days, will monitor as appropriate         # Thrombocytopenia: Lowest platelets = 1.8 in last 2 days, will monitor for bleeding                        Social Drivers of Health    Received from [facility]    Social Connections         Disposition Plan     Medically Ready for Discharge: Anticipated Tomorrow         The patient's  care was discussed with the Attending Physician, Dr. Burnett .    Adrian Duffy MD  Hospitalist Service  [facility]  Securely message with [employer] (more info)  Text page via [employer] Paging/Directory   ______________________________________________________________________    Interval History   NAEON. Feels well.  Denies pain, SOB, dizziness.   Eating fine.   Wondering when she can go home.    Physical Exam   Vital Signs: Temp: 97.5  F (36.4  C) Temp src: Oral BP: (!) 140/79 Pulse: 77   Resp: 18 SpO2: 96 % O2 Device: None (Room air)    Weight: 201 lbs 8.01 oz    General: Alert and oriented x 3, no acute distress  Skin: Small area of ecchymosis nontender to palpation over L hip and R bicep. Trace diffuse petechiae along shins, not much elsewhere.  HEENT: normocephalic, atraumatic, PERRL, EOMI, no conjunctival injection or icterus, ears and nose normal, no LAD or masses.  Mouth: A few raised small blood blisters on tongue and cheek mucosal surfaces, nonbleeding.  Resp: clear to ausculation bilaterally, no rales or wheezes  Cardio: RRR, S1 and S2 present, no S3 or S4, no rubs or murmurs  Abdomen: soft, nontender, nondistended, bowel sounds present x 4  Extremities: no erythema or edema noted  Psych: calm, rational thought process. Good insight    Medical Decision Making       Please see A&P for additional details of medical decision making.      Data   ------------------------- PAST 24 HR DATA REVIEWED -----------------------------------------------

## 2024-12-28 NOTE — PLAN OF CARE
Pt denies dizziness. She is up independently and is steady on her feet.  She denies pain.  She is getting IVIG daily.   Pt is alert and oriented X 4.    Problem: Adult Inpatient Plan of Care  Goal: Plan of Care Review  Description: The Plan of Care Review/Shift note should be completed every shift.  The Outcome Evaluation is a brief statement about your assessment that the patient is improving, declining, or no change.  This information will be displayed automatically on your shift  note.  Outcome: Progressing     Problem: Anemia  Goal: Anemia Symptom Improvement  Outcome: Progressing  Intervention: Monitor and Manage Anemia  Recent Flowsheet Documentation  Taken 12/28/2024 1205 by Bethany Munoz, RN  Safety Promotion/Fall Prevention:   activity supervised   clutter free environment maintained   lighting adjusted   nonskid shoes/slippers when out of bed   patient and family education  Taken 12/28/2024 0817 by Bethany Munoz, RN  Safety Promotion/Fall Prevention:   activity supervised   clutter free environment maintained   lighting adjusted   nonskid shoes/slippers when out of bed   patient and family education   Goal Outcome Evaluation:

## 2024-12-29 LAB
ANION GAP SERPL CALCULATED.3IONS-SCNC: 10 MMOL/L (ref 7–15)
ANION GAP SERPL CALCULATED.3IONS-SCNC: 5 MMOL/L (ref 7–15)
BUN SERPL-MCNC: 13 MG/DL (ref 6–20)
BUN SERPL-MCNC: 8.7 MG/DL (ref 6–20)
CALCIUM SERPL-MCNC: 8.8 MG/DL (ref 8.8–10.4)
CALCIUM SERPL-MCNC: 9.2 MG/DL (ref 8.8–10.4)
CHLORIDE SERPL-SCNC: 100 MMOL/L (ref 98–107)
CHLORIDE SERPL-SCNC: 101 MMOL/L (ref 98–107)
CREAT SERPL-MCNC: 0.53 MG/DL (ref 0.51–0.95)
CREAT SERPL-MCNC: 0.59 MG/DL (ref 0.51–0.95)
EGFRCR SERPLBLD CKD-EPI 2021: >90 ML/MIN/1.73M2
EGFRCR SERPLBLD CKD-EPI 2021: >90 ML/MIN/1.73M2
ERYTHROCYTE [DISTWIDTH] IN BLOOD BY AUTOMATED COUNT: 13.3 % (ref 10–15)
ERYTHROCYTE [DISTWIDTH] IN BLOOD BY AUTOMATED COUNT: 13.5 % (ref 10–15)
GLUCOSE SERPL-MCNC: 106 MG/DL (ref 70–99)
GLUCOSE SERPL-MCNC: 99 MG/DL (ref 70–99)
HCO3 SERPL-SCNC: 24 MMOL/L (ref 22–29)
HCO3 SERPL-SCNC: 25 MMOL/L (ref 22–29)
HCT VFR BLD AUTO: 36.1 % (ref 35–47)
HCT VFR BLD AUTO: 36.3 % (ref 35–47)
HGB BLD-MCNC: 12.7 G/DL (ref 11.7–15.7)
HGB BLD-MCNC: 12.8 G/DL (ref 11.7–15.7)
MCH RBC QN AUTO: 32.1 PG (ref 26.5–33)
MCH RBC QN AUTO: 32.4 PG (ref 26.5–33)
MCHC RBC AUTO-ENTMCNC: 35.2 G/DL (ref 31.5–36.5)
MCHC RBC AUTO-ENTMCNC: 35.3 G/DL (ref 31.5–36.5)
MCV RBC AUTO: 91 FL (ref 78–100)
MCV RBC AUTO: 92 FL (ref 78–100)
PLATELET # BLD AUTO: 2 10E3/UL (ref 150–450)
PLATELET # BLD AUTO: 2 10E3/UL (ref 150–450)
POTASSIUM SERPL-SCNC: 3.7 MMOL/L (ref 3.4–5.3)
POTASSIUM SERPL-SCNC: 4 MMOL/L (ref 3.4–5.3)
RBC # BLD AUTO: 3.92 10E6/UL (ref 3.8–5.2)
RBC # BLD AUTO: 3.99 10E6/UL (ref 3.8–5.2)
SODIUM SERPL-SCNC: 131 MMOL/L (ref 135–145)
SODIUM SERPL-SCNC: 134 MMOL/L (ref 135–145)
WBC # BLD AUTO: 4.2 10E3/UL (ref 4–11)
WBC # BLD AUTO: 4.6 10E3/UL (ref 4–11)

## 2024-12-29 PROCEDURE — 250N000013 HC RX MED GY IP 250 OP 250 PS 637: Performed by: INTERNAL MEDICINE

## 2024-12-29 PROCEDURE — 93005 ELECTROCARDIOGRAM TRACING: CPT

## 2024-12-29 PROCEDURE — 93010 ELECTROCARDIOGRAM REPORT: CPT | Performed by: STUDENT IN AN ORGANIZED HEALTH CARE EDUCATION/TRAINING PROGRAM

## 2024-12-29 PROCEDURE — 36415 COLL VENOUS BLD VENIPUNCTURE: CPT

## 2024-12-29 PROCEDURE — 80048 BASIC METABOLIC PNL TOTAL CA: CPT

## 2024-12-29 PROCEDURE — 85027 COMPLETE CBC AUTOMATED: CPT

## 2024-12-29 PROCEDURE — 99232 SBSQ HOSP IP/OBS MODERATE 35: CPT | Performed by: INTERNAL MEDICINE

## 2024-12-29 PROCEDURE — 120N000001 HC R&B MED SURG/OB

## 2024-12-29 PROCEDURE — 250N000011 HC RX IP 250 OP 636

## 2024-12-29 PROCEDURE — 99232 SBSQ HOSP IP/OBS MODERATE 35: CPT | Mod: GC

## 2024-12-29 PROCEDURE — 250N000013 HC RX MED GY IP 250 OP 250 PS 637

## 2024-12-29 RX ORDER — LABETALOL HYDROCHLORIDE 5 MG/ML
10 INJECTION, SOLUTION INTRAVENOUS ONCE
Status: DISCONTINUED | OUTPATIENT
Start: 2024-12-29 | End: 2024-12-29

## 2024-12-29 RX ORDER — LABETALOL HYDROCHLORIDE 5 MG/ML
10 INJECTION, SOLUTION INTRAVENOUS
Status: COMPLETED | OUTPATIENT
Start: 2024-12-29 | End: 2024-12-29

## 2024-12-29 RX ADMIN — ACETAMINOPHEN 650 MG: 325 TABLET ORAL at 17:29

## 2024-12-29 RX ADMIN — ELTROMBOPAG OLAMINE 75 MG: 25 TABLET, FILM COATED ORAL at 08:43

## 2024-12-29 RX ADMIN — ACETAMINOPHEN 650 MG: 325 TABLET ORAL at 08:43

## 2024-12-29 RX ADMIN — LABETALOL HYDROCHLORIDE 10 MG: 5 INJECTION, SOLUTION INTRAVENOUS at 22:37

## 2024-12-29 RX ADMIN — LABETALOL HYDROCHLORIDE 10 MG: 5 INJECTION, SOLUTION INTRAVENOUS at 20:59

## 2024-12-29 ASSESSMENT — ACTIVITIES OF DAILY LIVING (ADL)
ADLS_ACUITY_SCORE: 20

## 2024-12-29 NOTE — PLAN OF CARE
Goal Outcome Evaluation:      Plan of Care Reviewed With: patient    Overall Patient Progress: improvingOverall Patient Progress: improving    Outcome Evaluation: Pt had a good shift.  She is feeling good.  No c/o pain.  Pt tolerated the IVIG well.  She was premedicated and had no side effects from the infusion.  Pt stated that she does not have any new bruises or petechia and the lesion on her tongue is improving.  Pt is independant in her room.

## 2024-12-29 NOTE — CONSULTS
Care Management Follow Up    Length of Stay (days): 2    Expected Discharge Date: 12/30/2024    Anticipated Discharge Plan:       Transportation: Anticipate Family/friend    PT Recommendations:    OT Recommendations:        Barriers to Discharge: medical stability    Prior Living Situation:   with  family     Discussed  Partnership in Safe Discharge Planning  document with patient/family: No     Handoff Completed: No, handoff not indicated or clinically appropriate    Patient/Spokesperson Updated: No    Additional Information: The chart was reviewed, and the SW met with the patient at the bedside to address her concerns regarding health insurance. She stated the need for assistance in obtaining health insurance. She reported that when she arrived at the hospital, the registration scheduled her for a phone call on 12/30/24 at 8:30 AM for follow-up regarding assistance to obtain health insurance. She showed the SW information indicating she has a follow-up call with a Financial Navigator. The SW encouraged her to call them if she misses the call and explained the role of [employer] Financial Navigators. She declined to have other needs and noted that her son will provide discharge transportation.      Next Steps: No, other needs are noted at this time; please notify SW if concerns arise.     VICTORINO Downs

## 2024-12-29 NOTE — PLAN OF CARE
"  Problem: Adult Inpatient Plan of Care  Goal: Plan of Care Review  Description: The Plan of Care Review/Shift note should be completed every shift.  The Outcome Evaluation is a brief statement about your assessment that the patient is improving, declining, or no change.  This information will be displayed automatically on your shift  note.  Outcome: Progressing   Goal Outcome Evaluation:       Pt is alert and oriented x 4.  LS clear  LBM 12/28/2024 per pt  Independent and steady on feet, uses call light appropriately. Up and using bathroom and walking in the hallway.    C/o HA this am and was given 650 mg og po prn Tylenol. Pain was \"2/10\". At 0932 pain was resolved. Later in the evening the pt asked for more Tylenol for a HA. It was per the pt believed to berelated to increased BP    Ate 100% of breakfast and Lunch.    PIV R P/I    Lesion on pt's tongue \"is much better\" per the pt She stated that she knew she was having a problem when that formed on her tongue.  No New Petechiae, no new bruising noted.    Na+-131  WBC-4.2  Hgb- 12.7  Plt- less than 2, which is no change from yesterday.    Sig other and oldest son here and visiting the pt.    BP this afternoon 187/93, 190/100, 185/88 Paged house resident at 1746. Responded at 1815 and stated to recheck and let them know. Rechecked at 1857 was 186/91, paged house resident back at that time.Still awaiting response and will ask oncoming RN to follow up with this.    Pt stated that she is currently going through a divorce and she has a lot on her plate. She stated that her 5 year old was in the hospital last week with pneumonia and now her being in the hospital has really stressed her out as she has no insurance.    Hem/Onc MD here pt see the pt this evening.    POC is ongoing.                   "

## 2024-12-29 NOTE — PROGRESS NOTES
[facility]    Progress Note - Hospitalist Service       Date of Admission:  12/27/2024    Assessment & Plan   Patricia Govea is a 44 year old female admitted on 12/27/2024. 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     Severe thrombocytopenia  Acute on chronic immune thrombocytopenia  Known h/o ITP, s/p splenectomy in 2019. Followed with hematology up until 2022, after which she was told she no longer needed to follow up with their clinic as her platelets were actually elevated to 559 at the time. Previously responded well to Promacta. Not acutely bleeding, possibly exposed to unknown number of viruses over [place] holiday while young son was hospitalized for URI/pneumonia which may have triggered acute platelet dysfunction. Plt count on admission < 2, PTT/INR wnl. Not actively bleeding but high risk. Plt count remains at 2 this AM. Remains clinically well.  - hem/onc following  - s/p IVIG x2 days  - Promacta 75 mg daily  - hopefully discharge tomorrow  - monitor closely for bleeding  - daily    Hyponatremia, mild  Stable in low 130s.  - monitor on daily BMP    Lack of insurance  - SW/CM consulted, appreciate        Diet: Regular Diet Adult    DVT Prophylaxis: VTE Prophylaxis contraindicated due to easy bleeding, bruisability  Kovacs Catheter: Not present  Fluids: PO  Lines: None     Cardiac Monitoring: None  Code Status: Full Code      Clinically Significant Risk Factors         # Hyponatremia: Lowest Na = 131 mmol/L in last 2 days, will monitor as appropriate         # Thrombocytopenia: Lowest platelets = 2 in last 2 days, will monitor for bleeding                         Social Drivers of Health    [email or web address]    [employer]         Disposition Plan     Medically Ready for Discharge: Anticipated  "Tomorrow         The patient's care was discussed with the Attending Physician, Dr. Burnett .    Adrian Duffy MD  Hospitalist Service  [facility]  Securely message with [employer] (more info)  Text page via [employer] Paging/Directory   ______________________________________________________________________    Interval History   NAEON.   Continues to deny pain, SOB, dizziness.  Eating fine.   Hoping to go home tomorrow, \"I miss my son.\"     Physical Exam   Vital Signs: Temp: 98.2  F (36.8  C) Temp src: Oral BP: 137/75 Pulse: 96   Resp: 20 SpO2: 95 % O2 Device: None (Room air)    Weight: 201 lbs 8.01 oz    General: Alert and oriented x 3, no acute distress  Skin: Small area of ecchymosis nontender to palpation over L hip and R bicep. Trace diffuse petechiae along shins, not much elsewhere.  HEENT: normocephalic, atraumatic, PERRL, EOMI, no conjunctival injection or icterus, ears and nose normal, no LAD or masses.  Mouth: A few raised small blood blisters on tongue and cheek mucosal surfaces, nonbleeding.  Resp: clear to ausculation bilaterally, no rales or wheezes  Cardio: RRR, S1 and S2 present, no S3 or S4, no rubs or murmurs  Abdomen: soft, nontender, nondistended, bowel sounds present x 4  Extremities: no erythema or edema noted  Psych: calm, rational thought process. Good insight    Medical Decision Making       Please see A&P for additional details of medical decision making.      Data   ------------------------- PAST 24 HR DATA REVIEWED -----------------------------------------------  "

## 2024-12-29 NOTE — PLAN OF CARE
Problem: Adult Inpatient Plan of Care  Goal: Optimal Comfort and Wellbeing  Outcome: Progressing  Intervention: Monitor Pain and Promote Comfort  Recent Flowsheet Documentation  Taken 12/29/2024 0030 by Omkar Jett RN  Pain Management Interventions: rest     Problem: Anemia  Goal: Anemia Symptom Improvement  Outcome: Progressing  Intervention: Monitor and Manage Anemia  Recent Flowsheet Documentation  Taken 12/29/2024 0030 by Omkar Jett RN  Safety Promotion/Fall Prevention:   clutter free environment maintained   safety round/check completed     Problem: Skin Injury Risk Increased  Goal: Skin Health and Integrity  Outcome: Progressing  Intervention: Plan: Nurse Driven Intervention: Moisture Management  Recent Flowsheet Documentation  Taken 12/29/2024 0030 by Omkar Jett RN  Moisture Interventions: Encourage regular toileting  Intervention: Optimize Skin Protection  Recent Flowsheet Documentation  Taken 12/29/2024 0030 by Omkar Jett RN  Activity Management: activity adjusted per tolerance  Head of Bed (HOB) Positioning: HOB at 20-30 degrees   Goal Outcome Evaluation:       VSS. Denied pain. A&Ox4. Up independent in room. Pt slept most of night.

## 2024-12-29 NOTE — PROGRESS NOTES
[facility] Hematology and Oncology Inpatient Progress Note    Patient: Patricia Govea  MRN: [id number]  Date of Service:  12/29/2024        Assessment and Plan:    1.  ITP: She has received 2 doses of IVIG.  Platelet count remains unchanged today at 2.  No bleeding.  Continue eltrombopag 75 mg daily.  I told her that we can likely discharge her home tomorrow.  We reviewed some of the common signs and symptoms of bleeding I told her that she needs to return to the emergency room immediately should any occur.  We would likely see her back in clinic on Wednesday for labs. It is noted that at her last relapse she was managed as an outpatient with severe thrombocytopenia.  Additionally, significant bleeding is a rare ITP.  We are working on getting eltrombopag secured for her as an outpatient.    History of Present Illness    Ms. Patricia Govea is a 44-year-old woman who was admitted with immune thrombocytopenia.  This is her third relapse.  She had a preceding viral syndrome.  No complaints today.  No bleeding.    Review of Systems    As above in the history.     Review of Systems otherwise Negative for:  General: chills, fever or night sweats  Psychological: anxiety or depression  Ophthalmic: blurry vision, double vision or loss of vision, vision change  ENT: epistaxis, oral lesions, hearing changes  Hematological and Lymphatic: jaundice, swollen lymph nodes  Endocrine: hot flashes, unexpected weight changes  Respiratory: cough, hemoptysis, orthopnea or shortness of breath/LARA  Cardiovascular: chest pain, edema, palpitations or PND  Gastrointestinal: abdominal pain, blood in stools, change in bowel habits, constipation, diarrhea or nausea/vomiting  Genito-Urinary: change in urinary stream, incontinence, frequency/urgency  Musculoskeletal: joint pain, stiffness, swelling, muscle pain or weakness  Neurological: dizziness, headaches, numbness/tingling  Dermatological: lumps and  rash    Physical Exam    BP (!) 190/100   Pulse 80   Temp 97.8  F (36.6  C) (Oral)   Resp 18   Wt 91.4 kg (201 lb 8 oz)   LMP 12/06/2024 (Approximate)   SpO2 98%   BMI 44.70 kg/m      General: patient appears stated age of 44 year old. Nontoxic and in no distress.   HEENT: Head: atraumatic, normocephalic. Sclerae anicteric.  Chest:  Normal respiratory effort  Cardiac:  No edema.   Abdomen: abdomen is non-distended  Extremities: normal tone and muscle bulk.  Skin: no lesions or rash. Warm and dry.  CNS: alert and oriented x3. Grossly non-focal.   Psychiatric: normal mood and affect.     Lab Results    Recent Results (from the past 24 hours)   CBC with platelets    Collection Time: 12/29/24  6:50 AM   Result Value Ref Range    WBC Count 4.2 4.0 - 11.0 10e3/uL    RBC Count 3.92 3.80 - 5.20 10e6/uL    Hemoglobin 12.7 11.7 - 15.7 g/dL    Hematocrit 36.1 35.0 - 47.0 %    MCV 92 78 - 100 fL    MCH 32.4 26.5 - 33.0 pg    MCHC 35.2 31.5 - 36.5 g/dL    RDW 13.5 10.0 - 15.0 %    Platelet Count 2 (LL) 150 - 450 10e3/uL   Basic metabolic panel    Collection Time: 12/29/24  6:50 AM   Result Value Ref Range    Sodium 131 (L) 135 - 145 mmol/L    Potassium 4.0 3.4 - 5.3 mmol/L    Chloride 101 98 - 107 mmol/L    Carbon Dioxide (CO2) 25 22 - 29 mmol/L    Anion Gap 5 (L) 7 - 15 mmol/L    Urea Nitrogen 13.0 6.0 - 20.0 mg/dL    Creatinine 0.59 0.51 - 0.95 mg/dL    GFR Estimate >90 >60 mL/min/1.73m2    Calcium 8.8 8.8 - 10.4 mg/dL    Glucose 99 70 - 99 mg/dL     Imaging    No results found.    Signed by: Han Haywood MD

## 2024-12-30 VITALS
DIASTOLIC BLOOD PRESSURE: 79 MMHG | TEMPERATURE: 97.4 F | BODY MASS INDEX: 44.7 KG/M2 | SYSTOLIC BLOOD PRESSURE: 140 MMHG | OXYGEN SATURATION: 95 % | RESPIRATION RATE: 20 BRPM | HEART RATE: 104 BPM | WEIGHT: 201.5 LBS

## 2024-12-30 LAB
ANION GAP SERPL CALCULATED.3IONS-SCNC: 9 MMOL/L (ref 7–15)
ATRIAL RATE - MUSE: 85 BPM
BUN SERPL-MCNC: 6.5 MG/DL (ref 6–20)
CALCIUM SERPL-MCNC: 8.8 MG/DL (ref 8.8–10.4)
CHLORIDE SERPL-SCNC: 97 MMOL/L (ref 98–107)
CREAT SERPL-MCNC: 0.45 MG/DL (ref 0.51–0.95)
DIASTOLIC BLOOD PRESSURE - MUSE: NORMAL MMHG
EGFRCR SERPLBLD CKD-EPI 2021: >90 ML/MIN/1.73M2
ERYTHROCYTE [DISTWIDTH] IN BLOOD BY AUTOMATED COUNT: 13.4 % (ref 10–15)
GLUCOSE SERPL-MCNC: 110 MG/DL (ref 70–99)
HCO3 SERPL-SCNC: 22 MMOL/L (ref 22–29)
HCT VFR BLD AUTO: 36.6 % (ref 35–47)
HGB BLD-MCNC: 13.4 G/DL (ref 11.7–15.7)
INTERPRETATION ECG - MUSE: NORMAL
MCH RBC QN AUTO: 33 PG (ref 26.5–33)
MCHC RBC AUTO-ENTMCNC: 36.6 G/DL (ref 31.5–36.5)
MCV RBC AUTO: 90 FL (ref 78–100)
OSMOLALITY UR: 275 MMOL/KG (ref 100–1200)
P AXIS - MUSE: 23 DEGREES
PLATELET # BLD AUTO: 2 10E3/UL (ref 150–450)
POTASSIUM SERPL-SCNC: 3.7 MMOL/L (ref 3.4–5.3)
PR INTERVAL - MUSE: 132 MS
QRS DURATION - MUSE: 98 MS
QT - MUSE: 342 MS
QTC - MUSE: 406 MS
R AXIS - MUSE: 93 DEGREES
RBC # BLD AUTO: 4.06 10E6/UL (ref 3.8–5.2)
SODIUM SERPL-SCNC: 128 MMOL/L (ref 135–145)
SODIUM UR-SCNC: <20 MMOL/L
SYSTOLIC BLOOD PRESSURE - MUSE: NORMAL MMHG
T AXIS - MUSE: 24 DEGREES
VENTRICULAR RATE- MUSE: 85 BPM
WBC # BLD AUTO: 4.7 10E3/UL (ref 4–11)

## 2024-12-30 PROCEDURE — 84300 ASSAY OF URINE SODIUM: CPT | Performed by: STUDENT IN AN ORGANIZED HEALTH CARE EDUCATION/TRAINING PROGRAM

## 2024-12-30 PROCEDURE — 36415 COLL VENOUS BLD VENIPUNCTURE: CPT

## 2024-12-30 PROCEDURE — 99232 SBSQ HOSP IP/OBS MODERATE 35: CPT | Performed by: INTERNAL MEDICINE

## 2024-12-30 PROCEDURE — 250N000013 HC RX MED GY IP 250 OP 250 PS 637: Performed by: INTERNAL MEDICINE

## 2024-12-30 PROCEDURE — 85027 COMPLETE CBC AUTOMATED: CPT

## 2024-12-30 PROCEDURE — 80048 BASIC METABOLIC PNL TOTAL CA: CPT

## 2024-12-30 PROCEDURE — 83935 ASSAY OF URINE OSMOLALITY: CPT | Performed by: STUDENT IN AN ORGANIZED HEALTH CARE EDUCATION/TRAINING PROGRAM

## 2024-12-30 PROCEDURE — 99238 HOSP IP/OBS DSCHRG MGMT 30/<: CPT | Mod: GC

## 2024-12-30 RX ADMIN — ELTROMBOPAG OLAMINE 75 MG: 25 TABLET, FILM COATED ORAL at 08:17

## 2024-12-30 ASSESSMENT — ACTIVITIES OF DAILY LIVING (ADL)
ADLS_ACUITY_SCORE: 20

## 2024-12-30 NOTE — PLAN OF CARE
Problem: Adult Inpatient Plan of Care  Goal: Optimal Comfort and Wellbeing  12/30/2024 0746 by Omkar Jett, RN  Outcome: Progressing  12/30/2024 0222 by Omkar Jett, RN  Outcome: Progressing     Problem: Hypertension Acute  Goal: Blood Pressure Within Desired Range  Outcome: Progressing  Intervention: Normalize Blood Pressure  Recent Flowsheet Documentation  Taken 12/30/2024 0020 by Omkar Jett, RN  Medication Review/Management:   medications reviewed   high-risk medications identified   Goal Outcome Evaluation:       BP was in the 170s systolic but within parameters. Pt had little HA pain but no meds needed. Pt slept most of night. A&Ox4. No new bruising, no new petechiae noted. Up independent in room.

## 2024-12-30 NOTE — PLAN OF CARE
Goal Outcome Evaluation:       BP WNL this morning, .  She reported nasal bleeding when she blows her nose but it doesn't continue when she stops blowing.  Good appetite.She is independent in the room.

## 2024-12-30 NOTE — SIGNIFICANT EVENT
Significant Event Note    Time of event: 9:14 PM December 29, 2024    Description of event:  Paged by RN for persistently elevated BP    On review, Patricia Govea is a 44 year old female admitted on 12/27/2024. 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     Went to see the patient. She is still having a throbbing headache that was minimally resolved with tylenol. No vision changes, SOB, or chest pain. On chart review, has had increasing BP this afternoon and evening that is persistent and now with symptoms.    O:  Visit Vitals  BP (!) 186/88 (BP Location: Left arm) Comment: notify nurse   Pulse 94   Temp 98.8  F (37.1  C)   Resp 18     Exam:  General: lying in bed in no acute distress  CV: extremities warm and well perfused  Pulm: No increased WOB  Neuro: A&Ox3. 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 Upper extremity strength 5/5 b/l, lower extremity strength 5/5 b/l. Sensation to light touch intact in all four extremities.     Plan:  Will continue pain management for headache. Concern for higher bleeding risk with severe thrombocytopenia as well, so will trial one time treatment and monitor closely this evening  - Labetalol 10 mg IV once PRN for SBP sustained >180 and symptomatic  - Stat CBC and BMP   - EKG    Discussed with senior resident Dr. Simón Campbell MD PGY-1  House Officer

## 2024-12-30 NOTE — DISCHARGE SUMMARY
[facility]  Discharge Summary - Medicine & Pediatrics       Date of Admission:  12/27/2024  Date of Discharge:  12/30/2024  Discharging Provider: Adrian Duffy MD  Discharge Service: Hospitalist Service    Discharge Diagnoses   Severe thrombocytopenia   Acute on chronic immune thrombocytopenia   Hyponatremia - suspect pseudohyponatremia 2/2 IVIG  Lack of insurance    Clinically Significant Risk Factors          Follow-ups Needed After Discharge   Follow-up Appointments       Hospital Follow-up with Existing Primary Care Provider (PCP)      Please see details below         Schedule Primary Care visit within: 7 Days   Recommended labs and Imaging (to be ordered by Primary Care Provider): BMP               Unresulted Labs Ordered in the Past 30 Days of this Admission       Date and Time Order Name Status Description    12/27/2024 12:27 PM Other Laboratory; [facility]; Reference Lab Workup (Laboratory Miscellaneous Order) In process         These results will be followed up by hematology.    Discharge Disposition   Discharged to home  Condition at discharge: Stable    Hospital Course   Patricia Govea is a 44 year old female admitted on 12/27/2024. 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       Severe thrombocytopenia  Acute on chronic immune thrombocytopenia  Known h/o ITP, s/p splenectomy in 2019. Followed with hematology up until 2022, after which she was told she no longer needed to follow up with their clinic as her platelets were actually elevated to 559 at the time. Previously responded well to Promacta. Not acutely bleeding, possibly exposed to unknown number of viruses over [place] holiday while young son was hospitalized for URI/pneumonia which may have triggered acute platelet dysfunction. Plt count on admission < 2, PTT/INR wnl. Hem/onc followed,  restarted Promacta and treated with IVIG x2 days. Plt stayed at 2 by day of discharge. Hem/onc OK with discharge.  - continue Promacta  - close follow up with hem/onc later this week       Hyponatremia, mild - suspect pseudohyponatremia 2/2 IVIG  Wnl on normal limits, downtrended throughout stay, 128 on day of discharge. Known effect of IVIG. Based on urine sodium and osmolality, do not suspect true hyponatremia.   - monitor Na in outpatient        Lack of insurance  SW met with patient during stay and provided resources.       Consultations This Hospital Stay   HEMATOLOGY IP CONSULT  CARE MANAGEMENT / SOCIAL WORK IP CONSULT  CARE MANAGEMENT / SOCIAL WORK IP CONSULT    Code Status   Full Code       The patient was discussed with the Attending Physician, Dr. Wilks.    Adrian Duffy MD  [place] [facility] [address] [place] [address]  Phone: [phone]  Fax: [phone]  ______________________________________________________________________    Physical Exam   Vital Signs: Temp: 97.4  F (36.3  C) Temp src: Oral BP: (!) 140/79 (RN notified) Pulse: 104   Resp: 20 SpO2: 95 % O2 Device: None (Room air)    Weight: 201 lbs 8.01 oz  General: Alert and oriented x 3, no acute distress  Skin: Small area of ecchymosis nontender to palpation over L hip and R bicep. Trace diffuse petechiae along shins, not much elsewhere.  HEENT: normocephalic, atraumatic, PERRL, EOMI, no conjunctival injection or icterus, ears and nose normal, no LAD or masses.  Mouth: A few raised small blood blisters on tongue and cheek mucosal surfaces, nonbleeding.  Resp: clear to ausculation bilaterally, no rales or wheezes  Cardio: RRR, S1 and S2 present, no S3 or S4, no rubs or murmurs  Abdomen: soft, nontender, nondistended, bowel sounds present x 4  Extremities: no erythema or edema noted  Psych: calm, rational thought process. Good insight      Primary Care Physician   Mikal Brown    Discharge  Orders      Primary Care - Care Coordination Referral      Reason for your hospital stay    Severe thrombocytopenia     Activity    Your activity upon discharge: activity as tolerated     Diet    Follow this diet upon discharge: your regular diet     Hospital Follow-up with Existing Primary Care Provider (PCP)    Please see details below            Significant Results and Procedures   Results for orders placed or performed in visit on 04/05/24   MA Screen Bilateral w/Owen    Narrative    BILATERAL FULL FIELD DIGITAL SCREENING MAMMOGRAM WITH TOMOSYNTHESIS    Performed on: 4/5/24    Compared to: 04/03/2023    Technique:  This study was evaluated with the assistance of Computer-Aided   Detection.  Breast Tomosynthesis was used in interpretation.    Findings: The breasts are heterogeneously dense, which may obscure small   masses.  There is no radiographic evidence of malignancy.     Impression    IMPRESSION: ACR BI-RADS Category 1: Negative    BREAST CANCER SCREENING RECOMMENDATION: Routine yearly mammography   beginning at age 40 or as discussed with your provider.    The results and recommendations of this examination will be communicated   to the patient.        Christen Peter MD         Discharge Medications   Discharge Medication List as of 12/30/2024  6:49 PM        CONTINUE these medications which have CHANGED    Details   eltrombopag (PROMACTA) 75 MG tablet Take 1 tablet (75 mg) by mouth daily Administer on an empty stomach, 1 hour before or 2 hours after a meal., Disp-30 tablet, R-2, E-Prescribe           Allergies   Allergies   Allergen Reactions    Influenza Vaccines Other (See Comments)     Guillain [place] Syndrome following fall 2016 administration    Blood-Group Specific Substance

## 2024-12-31 NOTE — PROGRESS NOTES
Discharge paperwork went over with patient. Wrote Dr. Jodi lees on her paperwork to call and schedule a follow up appointment with [facility] Hem/Onc.     Pt reporting that Promacta has already been picked up by family. They are providing transport.

## 2024-12-31 NOTE — PROGRESS NOTES
[facility] Hematology and Oncology Inpatient Progress Note    Patient: Patricia Govea  MRN: [id number]  Date of Service: 12/30/2024           Reason for Visit    #.  Acute exacerbation of chronic ITP    Assessment and Plan    #.  Acute ITP   Her platelet counts remain very low, 2.  She has received IVIG 2 doses and did not see any response.  She does not have any bleeding.  She started taking Promacta 75 mg daily from her old prescription.  We have secured her Promacta prescription and scheduled to deliver later today or tomorrow.  She will start Promacta 75 mg upon discharge.  I recommended discharge home and to start outpatient therapy and close follow-up with Dr. Haywood.   I advised her to return to hospital if there is any evidence of uncontrolled bleeding or major bleeding.    We will arrange outpatient follow-up.    ______________________________________________________________________________    History of Present Illness    Ms. Patricia Govea reported that she is doing well.  She does not have any bleeding.  She has bruising.  She received a call from pharmacy that Promacta will be delivered tonight.  She wanted to go home tonight to be with her 5-year-old child.    Review of Systems    Apart from describing in HPI, the remainder of comprehensive ROS was negative.    Physical Exam    BP (!) 140/79 (BP Location: Left arm)   Pulse 104   Temp 97.4  F (36.3  C) (Oral)   Resp 20   Wt 91.4 kg (201 lb 8 oz)   LMP 12/06/2024 (Approximate)   SpO2 95%   BMI 44.70 kg/m        General: alert, awake, not in acute distress  HEENT: Head: Normal, normocephalic, atraumatic.  Eye: Normal external eye, conjunctiva, lids cornea, JAKE.  Nose: Normal external nose, mucus membranes and septum.  Pharynx: Normal buccal mucosa. Normal pharynx.  Neck / Thyroid: Supple, no masses, nodes, nodules or enlargement.  Lymphatics: No abnormally enlarged lymph nodes.  Chest: Normal chest wall and  respirations. Clear to auscultation.  Heart: S1 S2 RRR, no murmur.   Abdomen: abdomen is soft without significant tenderness, masses, organomegaly or guarding  Extremities: normal strength, tone, and muscle mass  Skin: normal. no rash or abnormalities  CNS: non focal.     Lab Results    Recent Results (from the past 24 hours)   ECG 12-Lead with MUSE - [facility]    Collection Time: 12/29/24  8:44 PM   Result Value Ref Range    Systolic Blood Pressure  mmHg    Diastolic Blood Pressure  mmHg    Ventricular Rate 85 BPM    Atrial Rate 85 BPM    MN Interval 132 ms    QRS Duration 98 ms     ms    QTc 406 ms    P Axis 23 degrees    R AXIS 93 degrees    T Axis 24 degrees    Interpretation ECG       Sinus rhythm  Rightward axis  Borderline ECG  When compared with ECG of 21-Oct-2016 18:26,  Left anterior fascicular block is no longer Present  QT has shortened  Confirmed by MIKE JAY MD LOC:[facility] ([id number]) on 12/30/2024 9:53:44 AM     CBC with platelets    Collection Time: 12/29/24  8:59 PM   Result Value Ref Range    WBC Count 4.6 4.0 - 11.0 10e3/uL    RBC Count 3.99 3.80 - 5.20 10e6/uL    Hemoglobin 12.8 11.7 - 15.7 g/dL    Hematocrit 36.3 35.0 - 47.0 %    MCV 91 78 - 100 fL    MCH 32.1 26.5 - 33.0 pg    MCHC 35.3 31.5 - 36.5 g/dL    RDW 13.3 10.0 - 15.0 %    Platelet Count 2 (LL) 150 - 450 10e3/uL   Basic metabolic panel    Collection Time: 12/29/24  8:59 PM   Result Value Ref Range    Sodium 134 (L) 135 - 145 mmol/L    Potassium 3.7 3.4 - 5.3 mmol/L    Chloride 100 98 - 107 mmol/L    Carbon Dioxide (CO2) 24 22 - 29 mmol/L    Anion Gap 10 7 - 15 mmol/L    Urea Nitrogen 8.7 6.0 - 20.0 mg/dL    Creatinine 0.53 0.51 - 0.95 mg/dL    GFR Estimate >90 >60 mL/min/1.73m2    Calcium 9.2 8.8 - 10.4 mg/dL    Glucose 106 (H) 70 - 99 mg/dL   CBC with platelets    Collection Time: 12/30/24  7:38 AM   Result Value Ref Range    WBC Count 4.7 4.0 - 11.0 10e3/uL    RBC Count 4.06 3.80 - 5.20 10e6/uL    Hemoglobin 13.4 11.7 - 15.7 g/dL     Hematocrit 36.6 35.0 - 47.0 %    MCV 90 78 - 100 fL    MCH 33.0 26.5 - 33.0 pg    MCHC 36.6 (H) 31.5 - 36.5 g/dL    RDW 13.4 10.0 - 15.0 %    Platelet Count 2 (LL) 150 - 450 10e3/uL   Basic metabolic panel    Collection Time: 12/30/24  7:38 AM   Result Value Ref Range    Sodium 128 (L) 135 - 145 mmol/L    Potassium 3.7 3.4 - 5.3 mmol/L    Chloride 97 (L) 98 - 107 mmol/L    Carbon Dioxide (CO2) 22 22 - 29 mmol/L    Anion Gap 9 7 - 15 mmol/L    Urea Nitrogen 6.5 6.0 - 20.0 mg/dL    Creatinine 0.45 (L) 0.51 - 0.95 mg/dL    GFR Estimate >90 >60 mL/min/1.73m2    Calcium 8.8 8.8 - 10.4 mg/dL    Glucose 110 (H) 70 - 99 mg/dL   Sodium random urine    Collection Time: 12/30/24 10:06 AM   Result Value Ref Range    Sodium Urine mmol/L <20 mmol/L   Osmolality urine    Collection Time: 12/30/24 10:06 AM   Result Value Ref Range    Osmolality Urine 275 100 - 1,200 mmol/kg        Imaging    No results found.     Signed by: Jodi Jackson MD

## 2025-01-02 ENCOUNTER — ONCOLOGY VISIT (OUTPATIENT)
Dept: ONCOLOGY | Facility: HOSPITAL | Age: 45
End: 2025-01-02

## 2025-01-02 ENCOUNTER — LAB (OUTPATIENT)
Dept: INFUSION THERAPY | Facility: HOSPITAL | Age: 45
End: 2025-01-02

## 2025-01-02 ENCOUNTER — OFFICE VISIT (OUTPATIENT)
Dept: FAMILY MEDICINE | Facility: CLINIC | Age: 45
End: 2025-01-02

## 2025-01-02 VITALS
DIASTOLIC BLOOD PRESSURE: 88 MMHG | HEART RATE: 82 BPM | TEMPERATURE: 97.6 F | BODY MASS INDEX: 40.72 KG/M2 | OXYGEN SATURATION: 97 % | HEIGHT: 59 IN | RESPIRATION RATE: 20 BRPM | SYSTOLIC BLOOD PRESSURE: 146 MMHG | WEIGHT: 202 LBS

## 2025-01-02 VITALS
DIASTOLIC BLOOD PRESSURE: 80 MMHG | TEMPERATURE: 99.2 F | SYSTOLIC BLOOD PRESSURE: 160 MMHG | BODY MASS INDEX: 41.04 KG/M2 | RESPIRATION RATE: 18 BRPM | HEART RATE: 77 BPM | WEIGHT: 203.6 LBS | HEIGHT: 59 IN | OXYGEN SATURATION: 98 %

## 2025-01-02 DIAGNOSIS — D69.3 CHRONIC ITP (IDIOPATHIC THROMBOCYTOPENIA) (H): Primary | ICD-10-CM

## 2025-01-02 DIAGNOSIS — D69.3 CHRONIC ITP (IDIOPATHIC THROMBOCYTOPENIA) (H): ICD-10-CM

## 2025-01-02 DIAGNOSIS — R03.0 ELEVATED BLOOD PRESSURE READING WITHOUT DIAGNOSIS OF HYPERTENSION: ICD-10-CM

## 2025-01-02 DIAGNOSIS — D69.6 THROMBOCYTOPENIA (H): ICD-10-CM

## 2025-01-02 DIAGNOSIS — Z30.09 ENCOUNTER FOR OTHER GENERAL COUNSELING OR ADVICE ON CONTRACEPTION: Primary | ICD-10-CM

## 2025-01-02 LAB
BASOPHILS # BLD AUTO: 0.1 10E3/UL (ref 0–0.2)
BASOPHILS NFR BLD AUTO: 2 %
EOSINOPHIL # BLD AUTO: 0.2 10E3/UL (ref 0–0.7)
EOSINOPHIL NFR BLD AUTO: 5 %
ERYTHROCYTE [DISTWIDTH] IN BLOOD BY AUTOMATED COUNT: 13.5 % (ref 10–15)
HCT VFR BLD AUTO: 35.6 % (ref 35–47)
HGB BLD-MCNC: 12.4 G/DL (ref 11.7–15.7)
IMM GRANULOCYTES # BLD: 0.2 10E3/UL
IMM GRANULOCYTES NFR BLD: 4 %
LYMPHOCYTES # BLD AUTO: 3.1 10E3/UL (ref 0.8–5.3)
LYMPHOCYTES NFR BLD AUTO: 64 %
MCH RBC QN AUTO: 32 PG (ref 26.5–33)
MCHC RBC AUTO-ENTMCNC: 34.8 G/DL (ref 31.5–36.5)
MCV RBC AUTO: 92 FL (ref 78–100)
MONOCYTES # BLD AUTO: 0.7 10E3/UL (ref 0–1.3)
MONOCYTES NFR BLD AUTO: 15 %
NEUTROPHILS # BLD AUTO: 0.6 10E3/UL (ref 1.6–8.3)
NEUTROPHILS NFR BLD AUTO: 12 %
NRBC # BLD AUTO: 0 10E3/UL
NRBC BLD AUTO-RTO: 0 /100
PLATELET # BLD AUTO: 2 10E3/UL (ref 150–450)
RBC # BLD AUTO: 3.88 10E6/UL (ref 3.8–5.2)
SCANNED LAB RESULT: NORMAL
WBC # BLD AUTO: 4.9 10E3/UL (ref 4–11)

## 2025-01-02 PROCEDURE — 85025 COMPLETE CBC W/AUTO DIFF WBC: CPT

## 2025-01-02 PROCEDURE — 36415 COLL VENOUS BLD VENIPUNCTURE: CPT

## 2025-01-02 PROCEDURE — 99214 OFFICE O/P EST MOD 30 MIN: CPT | Performed by: NURSE PRACTITIONER

## 2025-01-02 RX ORDER — MEDROXYPROGESTERONE ACETATE 150 MG/ML
150 INJECTION, SUSPENSION INTRAMUSCULAR
Status: ACTIVE | OUTPATIENT
Start: 2025-01-02 | End: 2025-12-28

## 2025-01-02 RX ORDER — NORETHINDRONE 5 MG/1
5 TABLET ORAL DAILY
Qty: 30 TABLET | Refills: 0 | Status: SHIPPED | OUTPATIENT
Start: 2025-01-02

## 2025-01-02 RX ADMIN — MEDROXYPROGESTERONE ACETATE 150 MG: 150 INJECTION, SUSPENSION INTRAMUSCULAR at 17:11

## 2025-01-02 ASSESSMENT — PAIN SCALES - GENERAL: PAINLEVEL_OUTOF10: NO PAIN (0)

## 2025-01-02 NOTE — PROGRESS NOTES
"  Assessment & Plan     (Z30.09) Encounter for other general counseling or advice on contraception  (primary encounter diagnosis)  Thrombocytopenia (H) [D69.6]     Comment: Encounter for contraception in setting of chronic ITP and recent hospitalization for severe thrombocytopenia. Following with heme-onc, was seen earlier today in their clinic and repeat CBC demonstrated platelet count of 2,000 which is consistent with her hospitalization. Continues on promacta 75 mg daily. She desires contraception due to concerns of bleeding with her period, will order depo-provera shot today and counseled on breakthrough bleeding risks. In this setting, also will send PO aygestin to be used daily as needed if breakthrough bleeding occurs.   Plan: medroxyPROGESTERone (DEPO-PROVERA) injection         150 mg, norethindrone (AYGESTIN) 5 MG tablet    Elevated blood pressure reading without diagnosis of hypertension [R03.0]  /88 on recheck in office today, per review of hospitalization multiple readings of 160-170's systolic. Will recheck at follow up in 2 weeks and consider medical management at that time.               BMI  Estimated body mass index is 40.72 kg/m  as calculated from the following:    Height as of this encounter: 1.5 m (4' 11.06\").    Weight as of this encounter: 91.6 kg (202 lb).       No follow-ups on file.    Dany Gomez is a 44 year old, presenting for the following health issues:  discuss Depo        1/2/2025     4:32 PM   Additional Questions   Roomed by kelley   Accompanied by ANTONIO         1/2/2025    Information    services provided? Yes   Language Lao   Type of interpretation provided Telephone    Agency [facility]  Services     Sumit is a 43 y/o F presenting to clinic today to discuss contraception - depo provera.    Patient was recently admitted at [facility] She has a history of chronic ITP s/p splenectomy in 2019, hx Guillan-Polk " "syndrome and was admitted for severe thrombocytopenia 2/2 acute on chronic ITP, Hyponatremia. Has upcoming follow up with heme-onc 1/29/24. BMP was recommended at discharge follow up however our lab was closed at the time of this visit and will need to schedule lab only visit.     Reviewed heme-onc note from 1/2/25 (today), in summary per Gaye Elena CNP:  \"She has now taken promacta for about 5 day. Platelets are still only 2. No bleeding, but some bruising/petechiae.  Worried that her period is going to start any day. No change for now. Likely too soon to know if promacta is working. Will recheck labs on Monday. Encourage her to come to the ER if having any bleeding, worsening of bruising. Minimize being out of house.\"    Looks like she is also scheduled with infusion 1/6 and 1/10.     Today, she is concerned about getting her period in setting of bleeding concerns and her platelet count being 2 on recheck today. Bruising is improving. She was told by her oncology NP she saw today to come here to primary clinic to get depo provera shot to prevent her period. Reports she has had depo provera shot previously in 2021. Denies heavy bleeding or intermenstrual bleeding with it in the past.     BP in office today 146/88 on recheck after initial 169/90. Has not checked pressures at home. Never previously on BP medication.                  Objective    BP (!) 146/88   Pulse 82   Temp 97.6  F (36.4  C) (Oral)   Resp 20   Ht 1.5 m (4' 11.06\")   Wt 91.6 kg (202 lb)   LMP 12/06/2024 (Approximate)   SpO2 97%   BMI 40.72 kg/m    Body mass index is 40.72 kg/m .  Physical Exam   GENERAL: alert and no distress  NECK: no adenopathy, no asymmetry, masses, or scars  RESP: lungs clear to auscultation - no rales, rhonchi or wheezes  CV: regular rate and rhythm, normal S1 S2, no S3 or S4, no murmur, click or rub, no peripheral edema  ABDOMEN: soft, nontender, no hepatosplenomegaly, no masses and bowel sounds normal  MS: no " gross musculoskeletal defects noted, no edema        Signed Electronically by: Chad Yousif MD  {Email feedback regarding this note to [email or web address]   :[id number]}    Chad Yousif MD  PGY-2  [facility]  [facility]  January 2, 2025

## 2025-01-02 NOTE — PROGRESS NOTES
Clinic Administered Medication Documentation        Patient was given Depo Provera. Prior to medication administration, verified patient's identity using patient s name and date of birth. Please see MAR and medication order for additional information. Patient instructed to remain in clinic for 15 minutes.    Vial/Syringe: Single dose vial. Was entire vial of medication used? Yes    NEXT INJECTION DUE: 3/20/25 - 4/17/25    Name of provider who requested the medication administration: Mario  Name of provider on site (faculty or community preceptor) at the time of performing the medication administration[facility]     Date of next administration: 3/20/25-4/2/25  Date of next office visit with provider to renew medication plan (must be seen annually)

## 2025-01-02 NOTE — PROGRESS NOTES
[facility] Hematology and Oncology Progress Note    Patient: Patricia Govea  MRN: [id number]  Date of Service: Jan 2, 2025          Reason for Visit    Chief Complaint   Patient presents with    Oncology Clinic Visit     Return visit hospital follow up. Chronic ITP (idiopathic thrombocytopenia).       Assessment and Plan     Cancer Staging   No matching staging information was found for the patient.      1.  Relapsed ITP: Her latest relapse was March 2021, now again this week.  She did not respond to 4 weeks of Rituxan.  we then started Promacta. Had to go up to 75mg. We have been holding that since April 16, 2021 due to platelets being high. She was given IVIG with latest relapse without any response. She has now taken promacta for about 5 day. Platelets are still only 2. No bleeding, but some bruising/petechiae.  Worried that her period is going to start any day. No change for now. Likely too soon to know if promacta is working. Will recheck labs on Monday. Encourage her to come to the ER if having any bleeding, worsening of bruising. Minimize being out of house.       ECOG Performance    0 - Independent    Distress Screening (within last 30 days)    1. How concerned are you about your ability to eat? : 0  2. How concerned are you about unintended weight loss or your current weight? : 0  3. How concerned are you about feeling depressed or very sad? : 0  4. How concerned are you about feeling anxious or very scared? : 0  5. Do you struggle with the loss of meaning and cassandra in your life? : Not at all  6. How concerned are you about work and home life issues that may be affected by your cancer? : 0  7. How concerned are you about knowing what resources are available to help you? : 0  8. Do you currently have what you would describe as Christian or spiritual struggles?            : Not at all       Pain  Pain Score: No Pain (0)    Problem List    Patient Active Problem List   Diagnosis    GBS  (Guillain Hobgood syndrome) (H)    Bell's palsy    Anisocoria    Chronic ITP (idiopathic thrombocytopenia) (H)    Elevated blood pressure reading without diagnosis of hypertension    Cervical intraepithelial neoplasia grade 1    Class 3 severe obesity due to excess calories without serious comorbidity with body mass index (BMI) of 40.0 to 44.9 in adult (H)    Thrombocytopenia (H)        ______________________________________________________________________________    History of Present Illness    DIAGNOSIS:  Recurrent and refractory. originially had ITP in 2011. Then again in 2019 in pregnancy.  Then again in February 2021.  Now again in December 2024     CURRENT TREATMENT: received 2 days of IVIG in hospital on December 27 and December 28.  Started Promacta 75 mg daily on December 27.     PAST TREATMENT:   Rituxan for 4 weeks starting 2/4/21 without any response.   Promacta. Started 25mg on 3/2/21. Increased to 50mg on 3/9. Increased to 75mg on 3/23/21. Held starting 4/16/21 due to platelets of 494.   Rituxan and prednisone. Rituxan last dose was March 8, 2019  Steroids  IVIG  Splenectomy 1/28/19. Initial response, but then rapid decrease in platelets.      INTERIM HISTORY:   She is here today for a follow-up visit and to review labs.  She was discharged from the hospital a couple of days ago and started on Promacta 75 mg a day.  She is taken that for about 5 to 6 days at this point.  She states that she has not really noticed any worsening of her bruising but continues to have some bruises as well as some petechiae around her ankles.  She is worried because she supposed be getting her period any day now so she is worried about increased bleeding from that.    Review of Systems    Pertinent items are noted in HPI.    Past History    Past Medical History:   Diagnosis Date    Acute idiopathic thrombocytopenic purpura (H) 2004    bone marrow biopsy negative for malignancy    Bell's palsy     Encounter for screening for  "cervical cancer 12/23/2018    Guillain Barré syndrome (H) 2016    After influenza vaccine    H. pylori infection 2004    History of blood transfusion     Immune thrombocytopenia (H)     2012---saw Dr. Haywood [facility]    Obesity        PHYSICAL EXAM  BP (!) 160/80 (BP Location: Right arm, Patient Position: Sitting, Cuff Size: Adult Large)   Pulse 77   Temp 99.2  F (37.3  C) (Oral)   Resp 18   Ht 1.486 m (4' 10.5\")   Wt 92.4 kg (203 lb 9.6 oz)   LMP 12/06/2024 (Approximate)   SpO2 98%   BMI 41.83 kg/m      GENERAL: no acute distress. Cooperative in conversation. Here alone.   RESP: Regular respiratory rate. No expiratory wheezes   MUSCULOSKELETAL: no bilateral leg swelling  NEURO: non focal. Alert and oriented x3.   PSYCH: within normal limits. No depression or anxiety.  SKIN: exposed skin is dry intact. Mild bruising noted. No bleeding from antecubital space where blood was drawn. Mild petechiae on ankles.     Lab Results    Recent Results (from the past week)   CBC with platelets and differential   Result Value Ref Range    WBC Count 5.4 4.0 - 11.0 10e3/uL    RBC Count 4.29 3.80 - 5.20 10e6/uL    Hemoglobin 13.8 11.7 - 15.7 g/dL    Hematocrit 38.7 35.0 - 47.0 %    MCV 90 78 - 100 fL    MCH 32.2 26.5 - 33.0 pg    MCHC 35.7 31.5 - 36.5 g/dL    RDW 13.0 10.0 - 15.0 %    Platelet Count <2 (LL) 150 - 450 10e3/uL    % Neutrophils 34 %    % Lymphocytes 43 %    % Monocytes 17 %    % Eosinophils 4 %    % Basophils 2 %    % Immature Granulocytes 1 %    NRBCs per 100 WBC 0 <1 /100    Absolute Neutrophils 1.8 1.6 - 8.3 10e3/uL    Absolute Lymphocytes 2.3 0.8 - 5.3 10e3/uL    Absolute Monocytes 0.9 0.0 - 1.3 10e3/uL    Absolute Eosinophils 0.2 0.0 - 0.7 10e3/uL    Absolute Basophils 0.1 0.0 - 0.2 10e3/uL    Absolute Immature Granulocytes 0.0 <=0.4 10e3/uL    Absolute NRBCs 0.0 10e3/uL   Extra Red Top Tube   Result Value Ref Range    Hold Specimen JIC    Extra Green Top (Lithium Heparin) Tube   Result Value Ref Range    " Hold Specimen JIC    RBC and Platelet Morphology   Result Value Ref Range    RBC Morphology Confirmed RBC Indices     Platelet Assessment  Automated Count Confirmed. Platelet morphology is normal.     Automated Count Confirmed. Platelet morphology is normal.   Adult Type and Screen   Result Value Ref Range    ABO/RH(D) O POS     Antibody Screen Positive (A) Negative    SPECIMEN EXPIRATION DATE [id number]    INR   Result Value Ref Range    INR 0.95 0.85 - 1.15   Partial thromboplastin time   Result Value Ref Range    aPTT 25 22 - 38 Seconds   Other Laboratory; [facility]; Reference Lab Workup (Laboratory Miscellaneous Order)   Result Value Ref Range    See Scanned Result LABORATORY MISCELLANEOUS ORDER-Scanned    Extra Purple Top Tube   Result Value Ref Range    Hold Specimen JIC    Extra Purple Top Tube   Result Value Ref Range    Hold Specimen JIC    CBC with platelets   Result Value Ref Range    WBC Count 4.3 4.0 - 11.0 10e3/uL    RBC Count 3.96 3.80 - 5.20 10e6/uL    Hemoglobin 12.8 11.7 - 15.7 g/dL    Hematocrit 36.2 35.0 - 47.0 %    MCV 91 78 - 100 fL    MCH 32.3 26.5 - 33.0 pg    MCHC 35.4 31.5 - 36.5 g/dL    RDW 13.3 10.0 - 15.0 %    Platelet Count 2 (LL) 150 - 450 10e3/uL   Basic metabolic panel   Result Value Ref Range    Sodium 132 (L) 135 - 145 mmol/L    Potassium 3.9 3.4 - 5.3 mmol/L    Chloride 100 98 - 107 mmol/L    Carbon Dioxide (CO2) 24 22 - 29 mmol/L    Anion Gap 8 7 - 15 mmol/L    Urea Nitrogen 9.8 6.0 - 20.0 mg/dL    Creatinine 0.61 0.51 - 0.95 mg/dL    GFR Estimate >90 >60 mL/min/1.73m2    Calcium 8.7 (L) 8.8 - 10.4 mg/dL    Glucose 103 (H) 70 - 99 mg/dL   RBC and Platelet Morphology   Result Value Ref Range    RBC Morphology Confirmed RBC Indices     Platelet Assessment  Automated Count Confirmed. Platelet morphology is normal.     Automated Count Confirmed. Platelet morphology is normal.   CBC with platelets   Result Value Ref Range    WBC Count 4.2 4.0 - 11.0 10e3/uL    RBC  Count 3.92 3.80 - 5.20 10e6/uL    Hemoglobin 12.7 11.7 - 15.7 g/dL    Hematocrit 36.1 35.0 - 47.0 %    MCV 92 78 - 100 fL    MCH 32.4 26.5 - 33.0 pg    MCHC 35.2 31.5 - 36.5 g/dL    RDW 13.5 10.0 - 15.0 %    Platelet Count 2 (LL) 150 - 450 10e3/uL   Basic metabolic panel   Result Value Ref Range    Sodium 131 (L) 135 - 145 mmol/L    Potassium 4.0 3.4 - 5.3 mmol/L    Chloride 101 98 - 107 mmol/L    Carbon Dioxide (CO2) 25 22 - 29 mmol/L    Anion Gap 5 (L) 7 - 15 mmol/L    Urea Nitrogen 13.0 6.0 - 20.0 mg/dL    Creatinine 0.59 0.51 - 0.95 mg/dL    GFR Estimate >90 >60 mL/min/1.73m2    Calcium 8.8 8.8 - 10.4 mg/dL    Glucose 99 70 - 99 mg/dL   ECG 12-Lead with MUSE - SJN,SJO,WW   Result Value Ref Range    Systolic Blood Pressure  mmHg    Diastolic Blood Pressure  mmHg    Ventricular Rate 85 BPM    Atrial Rate 85 BPM    WA Interval 132 ms    QRS Duration 98 ms     ms    QTc 406 ms    P Axis 23 degrees    R AXIS 93 degrees    T Axis 24 degrees    Interpretation ECG       Sinus rhythm  Rightward axis  Borderline ECG  When compared with ECG of 21-Oct-2016 18:26,  Left anterior fascicular block is no longer Present  QT has shortened  Confirmed by MIKE JAY MD LOC:JN ([id number]) on 12/30/2024 9:53:44 AM     CBC with platelets   Result Value Ref Range    WBC Count 4.6 4.0 - 11.0 10e3/uL    RBC Count 3.99 3.80 - 5.20 10e6/uL    Hemoglobin 12.8 11.7 - 15.7 g/dL    Hematocrit 36.3 35.0 - 47.0 %    MCV 91 78 - 100 fL    MCH 32.1 26.5 - 33.0 pg    MCHC 35.3 31.5 - 36.5 g/dL    RDW 13.3 10.0 - 15.0 %    Platelet Count 2 (LL) 150 - 450 10e3/uL   Basic metabolic panel   Result Value Ref Range    Sodium 134 (L) 135 - 145 mmol/L    Potassium 3.7 3.4 - 5.3 mmol/L    Chloride 100 98 - 107 mmol/L    Carbon Dioxide (CO2) 24 22 - 29 mmol/L    Anion Gap 10 7 - 15 mmol/L    Urea Nitrogen 8.7 6.0 - 20.0 mg/dL    Creatinine 0.53 0.51 - 0.95 mg/dL    GFR Estimate >90 >60 mL/min/1.73m2    Calcium 9.2 8.8 - 10.4 mg/dL    Glucose 106 (H) 70  - 99 mg/dL   CBC with platelets   Result Value Ref Range    WBC Count 4.7 4.0 - 11.0 10e3/uL    RBC Count 4.06 3.80 - 5.20 10e6/uL    Hemoglobin 13.4 11.7 - 15.7 g/dL    Hematocrit 36.6 35.0 - 47.0 %    MCV 90 78 - 100 fL    MCH 33.0 26.5 - 33.0 pg    MCHC 36.6 (H) 31.5 - 36.5 g/dL    RDW 13.4 10.0 - 15.0 %    Platelet Count 2 (LL) 150 - 450 10e3/uL   Basic metabolic panel   Result Value Ref Range    Sodium 128 (L) 135 - 145 mmol/L    Potassium 3.7 3.4 - 5.3 mmol/L    Chloride 97 (L) 98 - 107 mmol/L    Carbon Dioxide (CO2) 22 22 - 29 mmol/L    Anion Gap 9 7 - 15 mmol/L    Urea Nitrogen 6.5 6.0 - 20.0 mg/dL    Creatinine 0.45 (L) 0.51 - 0.95 mg/dL    GFR Estimate >90 >60 mL/min/1.73m2    Calcium 8.8 8.8 - 10.4 mg/dL    Glucose 110 (H) 70 - 99 mg/dL   Sodium random urine   Result Value Ref Range    Sodium Urine mmol/L <20 mmol/L   Osmolality urine   Result Value Ref Range    Osmolality Urine 275 100 - 1,200 mmol/kg   CBC with platelets and differential   Result Value Ref Range    WBC Count 4.9 4.0 - 11.0 10e3/uL    RBC Count 3.88 3.80 - 5.20 10e6/uL    Hemoglobin 12.4 11.7 - 15.7 g/dL    Hematocrit 35.6 35.0 - 47.0 %    MCV 92 78 - 100 fL    MCH 32.0 26.5 - 33.0 pg    MCHC 34.8 31.5 - 36.5 g/dL    RDW 13.5 10.0 - 15.0 %    Platelet Count 2 (LL) 150 - 450 10e3/uL    % Neutrophils 12 %    % Lymphocytes 64 %    % Monocytes 15 %    % Eosinophils 5 %    % Basophils 2 %    % Immature Granulocytes 4 %    NRBCs per 100 WBC 0 <1 /100    Absolute Neutrophils 0.6 (L) 1.6 - 8.3 10e3/uL    Absolute Lymphocytes 3.1 0.8 - 5.3 10e3/uL    Absolute Monocytes 0.7 0.0 - 1.3 10e3/uL    Absolute Eosinophils 0.2 0.0 - 0.7 10e3/uL    Absolute Basophils 0.1 0.0 - 0.2 10e3/uL    Absolute Immature Granulocytes 0.2 <=0.4 10e3/uL    Absolute NRBCs 0.0 10e3/uL       Imaging    No results found.  The longitudinal plan of care for the diagnosis(es)/condition(s) as documented were addressed during this visit. Due to the added complexity in care, I  will continue to support Patricia in the subsequent management and with ongoing continuity of care.        Signed by: CASTRO Cameron CNP

## 2025-01-02 NOTE — LETTER
"1/2/2025      Patricia Govea  [address] [place] [address]      Dear Colleague,    Thank you for referring your patient, Patricia Govea, to the [facility]. Please see a copy of my visit note below.    Oncology Rooming Note    January 2, 2025 1:21 PM   Patricia Govea is a 44 year old female who presents for:    Chief Complaint   Patient presents with     Oncology Clinic Visit     Return visit hospital follow up. Chronic ITP (idiopathic thrombocytopenia).     Initial Vitals: BP (!) 160/80 (BP Location: Right arm, Patient Position: Sitting, Cuff Size: Adult Large)   Pulse 77   Temp 99.2  F (37.3  C) (Oral)   Resp 18   Ht 1.486 m (4' 10.5\")   Wt 92.4 kg (203 lb 9.6 oz)   LMP 12/06/2024 (Approximate)   SpO2 98%   BMI 41.83 kg/m   Estimated body mass index is 41.83 kg/m  as calculated from the following:    Height as of this encounter: 1.486 m (4' 10.5\").    Weight as of this encounter: 92.4 kg (203 lb 9.6 oz). Body surface area is 1.95 meters squared.  No Pain (0) Comment: Data Unavailable   Patient's last menstrual period was 12/06/2024 (approximate).  Allergies reviewed: Yes  Medications reviewed: Yes    Medications: Medication refills not needed today.  Pharmacy name entered into [employer]:    [facility] #[id number] - [place], [place] - [address] AT [facility] [address]  [id number], [place] - [address] AT [place] [address]    Frailty Screening:   Is the patient here for a new oncology consult visit in cancer care? 2. No      Clinical concerns: none       Delilah Molina, [facility] Progress Note    Patient: Patricia Govea  MRN: [id number]  Date of Service: Jan 2, 2025          Reason for Visit    Chief Complaint   Patient presents with     Oncology Clinic Visit     Return visit hospital follow up. Chronic ITP " (idiopathic thrombocytopenia).       Assessment and Plan     Cancer Staging   No matching staging information was found for the patient.      1.  Relapsed ITP: Her latest relapse was March 2021, now again this week.  She did not respond to 4 weeks of Rituxan.  we then started Promacta. Had to go up to 75mg. We have been holding that since April 16, 2021 due to platelets being high. She was given IVIG with latest relapse without any response. She has now taken promacta for about 5 day. Platelets are still only 2. No bleeding, but some bruising/petechiae.  Worried that her period is going to start any day. No change for now. Likely too soon to know if promacta is working. Will recheck labs on Monday. Encourage her to come to the ER if having any bleeding, worsening of bruising. Minimize being out of house.       ECOG Performance    0 - Independent    Distress Screening (within last 30 days)    1. How concerned are you about your ability to eat? : 0  2. How concerned are you about unintended weight loss or your current weight? : 0  3. How concerned are you about feeling depressed or very sad? : 0  4. How concerned are you about feeling anxious or very scared? : 0  5. Do you struggle with the loss of meaning and cassandra in your life? : Not at all  6. How concerned are you about work and home life issues that may be affected by your cancer? : 0  7. How concerned are you about knowing what resources are available to help you? : 0  8. Do you currently have what you would describe as Rastafarian or spiritual struggles?            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 originially had ITP in 2011. Then again in 2019 in pregnancy.  Then again in February 2021.  Now again in December 2024     CURRENT TREATMENT: received 2 days of IVIG in hospital on December 27 and December 28.  Started Promacta 75 mg daily on December 27.     PAST TREATMENT:   Rituxan for 4 weeks starting 2/4/21 without any response.   Promacta. Started 25mg on 3/2/21. Increased to 50mg on 3/9. Increased to 75mg on 3/23/21. Held starting 4/16/21 due to platelets of 494.   Rituxan and prednisone. Rituxan last dose was March 8, 2019  Steroids  IVIG  Splenectomy 1/28/19. Initial response, but then rapid decrease in platelets.      INTERIM HISTORY:   She is here today for a follow-up visit and to review labs.  She was discharged from the hospital a couple of days ago and started on Promacta 75 mg a day.  She is taken that for about 5 to 6 days at this point.  She states that she has not really noticed any worsening of her bruising but continues to have some bruises as well as some petechiae around her ankles.  She is worried because she supposed be getting her period any day now so she is worried about increased bleeding from that.    Review of Systems    Pertinent items are noted in HPI.    Past History    Past Medical History:   Diagnosis Date     Acute idiopathic thrombocytopenic purpura (H) 2004    bone marrow biopsy negative for malignancy     Bell's palsy      Encounter for screening for cervical cancer 12/23/2018     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 pylori infection 2004     History of blood transfusion      Immune thrombocytopenia (H)     2012---saw Dr. Haywood [facility] Heme     Obesity        PHYSICAL EXAM  BP (!) 160/80 (BP  "Location: Right arm, Patient Position: Sitting, Cuff Size: Adult Large)   Pulse 77   Temp 99.2  F (37.3  C) (Oral)   Resp 18   Ht 1.486 m (4' 10.5\")   Wt 92.4 kg (203 lb 9.6 oz)   LMP 12/06/2024 (Approximate)   SpO2 98%   BMI 41.83 kg/m      GENERAL: no acute distress. Cooperative in conversation. Here alone.   RESP: Regular respiratory rate. No expiratory wheezes   MUSCULOSKELETAL: no bilateral leg swelling  NEURO: non focal. Alert and oriented x3.   PSYCH: within normal limits. No depression or anxiety.  SKIN: exposed skin is dry intact. Mild bruising noted. No bleeding from antecubital space where blood was drawn. Mild petechiae on ankles.     Lab Results    Recent Results (from the past week)   CBC with platelets and differential   Result Value Ref Range    WBC Count 5.4 4.0 - 11.0 10e3/uL    RBC Count 4.29 3.80 - 5.20 10e6/uL    Hemoglobin 13.8 11.7 - 15.7 g/dL    Hematocrit 38.7 35.0 - 47.0 %    MCV 90 78 - 100 fL    MCH 32.2 26.5 - 33.0 pg    MCHC 35.7 31.5 - 36.5 g/dL    RDW 13.0 10.0 - 15.0 %    Platelet Count <2 (LL) 150 - 450 10e3/uL    % Neutrophils 34 %    % Lymphocytes 43 %    % Monocytes 17 %    % Eosinophils 4 %    % Basophils 2 %    % Immature Granulocytes 1 %    NRBCs per 100 WBC 0 <1 /100    Absolute Neutrophils 1.8 1.6 - 8.3 10e3/uL    Absolute Lymphocytes 2.3 0.8 - 5.3 10e3/uL    Absolute Monocytes 0.9 0.0 - 1.3 10e3/uL    Absolute Eosinophils 0.2 0.0 - 0.7 10e3/uL    Absolute Basophils 0.1 0.0 - 0.2 10e3/uL    Absolute Immature Granulocytes 0.0 <=0.4 10e3/uL    Absolute NRBCs 0.0 10e3/uL   Extra Red Top Tube   Result Value Ref Range    Hold Specimen JIC    Extra Green Top (Lithium Heparin) Tube   Result Value Ref Range    Hold Specimen JIC    RBC and Platelet Morphology   Result Value Ref Range    RBC Morphology Confirmed RBC Indices     Platelet Assessment  Automated Count Confirmed. Platelet morphology is normal.     Automated Count Confirmed. Platelet morphology is normal.   Adult " Type and Screen   Result Value Ref Range    ABO/RH(D) O POS     Antibody Screen Positive (A) Negative    SPECIMEN EXPIRATION DATE [id number]    INR   Result Value Ref Range    INR 0.95 0.85 - 1.15   Partial thromboplastin time   Result Value Ref Range    aPTT 25 22 - 38 Seconds   Other Laboratory; [facility]; Reference Lab Workup (Laboratory Miscellaneous Order)   Result Value Ref Range    See Scanned Result LABORATORY MISCELLANEOUS ORDER-Scanned    Extra Purple Top Tube   Result Value Ref Range    Hold Specimen JIC    Extra Purple Top Tube   Result Value Ref Range    Hold Specimen JIC    CBC with platelets   Result Value Ref Range    WBC Count 4.3 4.0 - 11.0 10e3/uL    RBC Count 3.96 3.80 - 5.20 10e6/uL    Hemoglobin 12.8 11.7 - 15.7 g/dL    Hematocrit 36.2 35.0 - 47.0 %    MCV 91 78 - 100 fL    MCH 32.3 26.5 - 33.0 pg    MCHC 35.4 31.5 - 36.5 g/dL    RDW 13.3 10.0 - 15.0 %    Platelet Count 2 (LL) 150 - 450 10e3/uL   Basic metabolic panel   Result Value Ref Range    Sodium 132 (L) 135 - 145 mmol/L    Potassium 3.9 3.4 - 5.3 mmol/L    Chloride 100 98 - 107 mmol/L    Carbon Dioxide (CO2) 24 22 - 29 mmol/L    Anion Gap 8 7 - 15 mmol/L    Urea Nitrogen 9.8 6.0 - 20.0 mg/dL    Creatinine 0.61 0.51 - 0.95 mg/dL    GFR Estimate >90 >60 mL/min/1.73m2    Calcium 8.7 (L) 8.8 - 10.4 mg/dL    Glucose 103 (H) 70 - 99 mg/dL   RBC and Platelet Morphology   Result Value Ref Range    RBC Morphology Confirmed RBC Indices     Platelet Assessment  Automated Count Confirmed. Platelet morphology is normal.     Automated Count Confirmed. Platelet morphology is normal.   CBC with platelets   Result Value Ref Range    WBC Count 4.2 4.0 - 11.0 10e3/uL    RBC Count 3.92 3.80 - 5.20 10e6/uL    Hemoglobin 12.7 11.7 - 15.7 g/dL    Hematocrit 36.1 35.0 - 47.0 %    MCV 92 78 - 100 fL    MCH 32.4 26.5 - 33.0 pg    MCHC 35.2 31.5 - 36.5 g/dL    RDW 13.5 10.0 - 15.0 %    Platelet Count 2 (LL) 150 - 450 10e3/uL   Basic metabolic panel    Result Value Ref Range    Sodium 131 (L) 135 - 145 mmol/L    Potassium 4.0 3.4 - 5.3 mmol/L    Chloride 101 98 - 107 mmol/L    Carbon Dioxide (CO2) 25 22 - 29 mmol/L    Anion Gap 5 (L) 7 - 15 mmol/L    Urea Nitrogen 13.0 6.0 - 20.0 mg/dL    Creatinine 0.59 0.51 - 0.95 mg/dL    GFR Estimate >90 >60 mL/min/1.73m2    Calcium 8.8 8.8 - 10.4 mg/dL    Glucose 99 70 - 99 mg/dL   ECG 12-Lead with MUSE - SJN,SJO,WW   Result Value Ref Range    Systolic Blood Pressure  mmHg    Diastolic Blood Pressure  mmHg    Ventricular Rate 85 BPM    Atrial Rate 85 BPM    OH Interval 132 ms    QRS Duration 98 ms     ms    QTc 406 ms    P Axis 23 degrees    R AXIS 93 degrees    T Axis 24 degrees    Interpretation ECG       Sinus rhythm  Rightward axis  Borderline ECG  When compared with ECG of 21-Oct-2016 18:26,  Left anterior fascicular block is no longer Present  QT has shortened  Confirmed by MIKE JAY MD LOC:JN ([id number]) on 12/30/2024 9:53:44 AM     CBC with platelets   Result Value Ref Range    WBC Count 4.6 4.0 - 11.0 10e3/uL    RBC Count 3.99 3.80 - 5.20 10e6/uL    Hemoglobin 12.8 11.7 - 15.7 g/dL    Hematocrit 36.3 35.0 - 47.0 %    MCV 91 78 - 100 fL    MCH 32.1 26.5 - 33.0 pg    MCHC 35.3 31.5 - 36.5 g/dL    RDW 13.3 10.0 - 15.0 %    Platelet Count 2 (LL) 150 - 450 10e3/uL   Basic metabolic panel   Result Value Ref Range    Sodium 134 (L) 135 - 145 mmol/L    Potassium 3.7 3.4 - 5.3 mmol/L    Chloride 100 98 - 107 mmol/L    Carbon Dioxide (CO2) 24 22 - 29 mmol/L    Anion Gap 10 7 - 15 mmol/L    Urea Nitrogen 8.7 6.0 - 20.0 mg/dL    Creatinine 0.53 0.51 - 0.95 mg/dL    GFR Estimate >90 >60 mL/min/1.73m2    Calcium 9.2 8.8 - 10.4 mg/dL    Glucose 106 (H) 70 - 99 mg/dL   CBC with platelets   Result Value Ref Range    WBC Count 4.7 4.0 - 11.0 10e3/uL    RBC Count 4.06 3.80 - 5.20 10e6/uL    Hemoglobin 13.4 11.7 - 15.7 g/dL    Hematocrit 36.6 35.0 - 47.0 %    MCV 90 78 - 100 fL    MCH 33.0 26.5 - 33.0 pg    MCHC 36.6 (H) 31.5  - 36.5 g/dL    RDW 13.4 10.0 - 15.0 %    Platelet Count 2 (LL) 150 - 450 10e3/uL   Basic metabolic panel   Result Value Ref Range    Sodium 128 (L) 135 - 145 mmol/L    Potassium 3.7 3.4 - 5.3 mmol/L    Chloride 97 (L) 98 - 107 mmol/L    Carbon Dioxide (CO2) 22 22 - 29 mmol/L    Anion Gap 9 7 - 15 mmol/L    Urea Nitrogen 6.5 6.0 - 20.0 mg/dL    Creatinine 0.45 (L) 0.51 - 0.95 mg/dL    GFR Estimate >90 >60 mL/min/1.73m2    Calcium 8.8 8.8 - 10.4 mg/dL    Glucose 110 (H) 70 - 99 mg/dL   Sodium random urine   Result Value Ref Range    Sodium Urine mmol/L <20 mmol/L   Osmolality urine   Result Value Ref Range    Osmolality Urine 275 100 - 1,200 mmol/kg   CBC with platelets and differential   Result Value Ref Range    WBC Count 4.9 4.0 - 11.0 10e3/uL    RBC Count 3.88 3.80 - 5.20 10e6/uL    Hemoglobin 12.4 11.7 - 15.7 g/dL    Hematocrit 35.6 35.0 - 47.0 %    MCV 92 78 - 100 fL    MCH 32.0 26.5 - 33.0 pg    MCHC 34.8 31.5 - 36.5 g/dL    RDW 13.5 10.0 - 15.0 %    Platelet Count 2 (LL) 150 - 450 10e3/uL    % Neutrophils 12 %    % Lymphocytes 64 %    % Monocytes 15 %    % Eosinophils 5 %    % Basophils 2 %    % Immature Granulocytes 4 %    NRBCs per 100 WBC 0 <1 /100    Absolute Neutrophils 0.6 (L) 1.6 - 8.3 10e3/uL    Absolute Lymphocytes 3.1 0.8 - 5.3 10e3/uL    Absolute Monocytes 0.7 0.0 - 1.3 10e3/uL    Absolute Eosinophils 0.2 0.0 - 0.7 10e3/uL    Absolute Basophils 0.1 0.0 - 0.2 10e3/uL    Absolute Immature Granulocytes 0.2 <=0.4 10e3/uL    Absolute NRBCs 0.0 10e3/uL       Imaging    No results found.  The longitudinal plan of care for the diagnosis(es)/condition(s) as documented were addressed during this visit. Due to the added complexity in care, I will continue to support Patricia in the subsequent management and with ongoing continuity of care.        Signed by: CASTRO Cameron CNP      Again, thank you for allowing me to participate in the care of your patient.        Sincerely,        Caprice VEE  CASTRO Elena CNP    Electronically signed

## 2025-01-02 NOTE — PATIENT INSTRUCTIONS
We will give you the depo provera injection today to try to prevent your period and for contraception - be very cautious of breakthrough bleeding and return to clinic if noticing heavy bleeding for repeat labs and management  I will send you a pill with progesterone to help with breakthrough bleeding (aygestin), can take daily if having breakthrough bleeding.

## 2025-01-02 NOTE — PROGRESS NOTES
"Oncology Rooming Note    January 2, 2025 1:21 PM   Patricia Govea is a 44 year old female who presents for:    Chief Complaint   Patient presents with    Oncology Clinic Visit     Return visit hospital follow up. Chronic ITP (idiopathic thrombocytopenia).     Initial Vitals: BP (!) 160/80 (BP Location: Right arm, Patient Position: Sitting, Cuff Size: Adult Large)   Pulse 77   Temp 99.2  F (37.3  C) (Oral)   Resp 18   Ht 1.486 m (4' 10.5\")   Wt 92.4 kg (203 lb 9.6 oz)   LMP 12/06/2024 (Approximate)   SpO2 98%   BMI 41.83 kg/m   Estimated body mass index is 41.83 kg/m  as calculated from the following:    Height as of this encounter: 1.486 m (4' 10.5\").    Weight as of this encounter: 92.4 kg (203 lb 9.6 oz). Body surface area is 1.95 meters squared.  No Pain (0) Comment: Data Unavailable   Patient's last menstrual period was 12/06/2024 (approximate).  Allergies reviewed: Yes  Medications reviewed: Yes    Medications: Medication refills not needed today.  Pharmacy name entered into [employer]:    [facility] #[id number] - [place], [place] - [address] AT [facility] [address] [facility]  [facility] #[id number] [place], [place] - [address] AT [place] [address]    Frailty Screening:   Is the patient here for a new oncology consult visit in cancer care? 2. No      Clinical concerns: none       Delilah Molina CMA            "

## 2025-01-06 ENCOUNTER — ONCOLOGY VISIT (OUTPATIENT)
Dept: ONCOLOGY | Facility: HOSPITAL | Age: 45
End: 2025-01-06
Attending: NURSE PRACTITIONER

## 2025-01-06 ENCOUNTER — HOSPITAL ENCOUNTER (EMERGENCY)
Facility: HOSPITAL | Age: 45
Discharge: HOME | End: 2025-01-06
Attending: EMERGENCY MEDICINE | Admitting: EMERGENCY MEDICINE

## 2025-01-06 ENCOUNTER — TELEPHONE (OUTPATIENT)
Dept: ONCOLOGY | Facility: HOSPITAL | Age: 45
End: 2025-01-06

## 2025-01-06 VITALS
WEIGHT: 201.94 LBS | HEART RATE: 90 BPM | RESPIRATION RATE: 16 BRPM | HEIGHT: 59 IN | DIASTOLIC BLOOD PRESSURE: 60 MMHG | OXYGEN SATURATION: 97 % | TEMPERATURE: 98.3 F | SYSTOLIC BLOOD PRESSURE: 130 MMHG | BODY MASS INDEX: 40.71 KG/M2

## 2025-01-06 VITALS
TEMPERATURE: 98.3 F | OXYGEN SATURATION: 97 % | SYSTOLIC BLOOD PRESSURE: 130 MMHG | DIASTOLIC BLOOD PRESSURE: 60 MMHG | HEIGHT: 61 IN | WEIGHT: 202 LBS | HEART RATE: 90 BPM | BODY MASS INDEX: 38.14 KG/M2 | RESPIRATION RATE: 16 BRPM

## 2025-01-06 DIAGNOSIS — D69.3 IDIOPATHIC THROMBOCYTOPENIC PURPURA (H): ICD-10-CM

## 2025-01-06 DIAGNOSIS — D69.3 CHRONIC ITP (IDIOPATHIC THROMBOCYTOPENIA) (H): ICD-10-CM

## 2025-01-06 DIAGNOSIS — R31.0 GROSS HEMATURIA: ICD-10-CM

## 2025-01-06 DIAGNOSIS — K06.8 GUMS, BLEEDING: ICD-10-CM

## 2025-01-06 DIAGNOSIS — K06.8 BLEEDING GUMS: ICD-10-CM

## 2025-01-06 DIAGNOSIS — D69.3 CHRONIC ITP (IDIOPATHIC THROMBOCYTOPENIA) (H): Primary | ICD-10-CM

## 2025-01-06 DIAGNOSIS — S00.532A: ICD-10-CM

## 2025-01-06 DIAGNOSIS — D69.6 THROMBOCYTOPENIA: ICD-10-CM

## 2025-01-06 LAB
ANION GAP SERPL CALCULATED.3IONS-SCNC: 10 MMOL/L (ref 7–15)
APTT PPP: 22 SECONDS (ref 22–38)
BASOPHILS # BLD MANUAL: 0.1 10E3/UL (ref 0–0.2)
BASOPHILS NFR BLD MANUAL: 2 %
BUN SERPL-MCNC: 8.7 MG/DL (ref 6–20)
CALCIUM SERPL-MCNC: 9.2 MG/DL (ref 8.8–10.4)
CHLORIDE SERPL-SCNC: 102 MMOL/L (ref 98–107)
CREAT SERPL-MCNC: 0.45 MG/DL (ref 0.51–0.95)
EGFRCR SERPLBLD CKD-EPI 2021: >90 ML/MIN/1.73M2
EOSINOPHIL # BLD MANUAL: 0.2 10E3/UL (ref 0–0.7)
EOSINOPHIL NFR BLD MANUAL: 4 %
ERYTHROCYTE [DISTWIDTH] IN BLOOD BY AUTOMATED COUNT: 13.5 % (ref 10–15)
GLUCOSE SERPL-MCNC: 109 MG/DL (ref 70–99)
HBV SURFACE AG SERPL QL IA: NONREACTIVE
HCO3 SERPL-SCNC: 23 MMOL/L (ref 22–29)
HCT VFR BLD AUTO: 39.3 % (ref 35–47)
HGB BLD-MCNC: 13.8 G/DL (ref 11.7–15.7)
INR PPP: 1.02 (ref 0.85–1.15)
LYMPHOCYTES # BLD MANUAL: 2.9 10E3/UL (ref 0.8–5.3)
LYMPHOCYTES NFR BLD MANUAL: 61 %
MCH RBC QN AUTO: 32.3 PG (ref 26.5–33)
MCHC RBC AUTO-ENTMCNC: 35.1 G/DL (ref 31.5–36.5)
MCV RBC AUTO: 92 FL (ref 78–100)
METAMYELOCYTES # BLD MANUAL: 0.1 10E3/UL
METAMYELOCYTES NFR BLD MANUAL: 2 %
MONOCYTES # BLD MANUAL: 0.5 10E3/UL (ref 0–1.3)
MONOCYTES NFR BLD MANUAL: 10 %
NEUTROPHILS # BLD MANUAL: 1 10E3/UL (ref 1.6–8.3)
NEUTROPHILS NFR BLD MANUAL: 21 %
NRBC # BLD AUTO: 0.1 10E3/UL
NRBC BLD MANUAL-RTO: 2 %
PLAT MORPH BLD: ABNORMAL
PLATELET # BLD AUTO: <2 10E3/UL (ref 150–450)
POLYCHROMASIA BLD QL SMEAR: SLIGHT
POTASSIUM SERPL-SCNC: 4.1 MMOL/L (ref 3.4–5.3)
RBC # BLD AUTO: 4.27 10E6/UL (ref 3.8–5.2)
RBC MORPH BLD: ABNORMAL
SODIUM SERPL-SCNC: 135 MMOL/L (ref 135–145)
WBC # BLD AUTO: 4.7 10E3/UL (ref 4–11)

## 2025-01-06 PROCEDURE — 36415 COLL VENOUS BLD VENIPUNCTURE: CPT | Performed by: EMERGENCY MEDICINE

## 2025-01-06 PROCEDURE — 85027 COMPLETE CBC AUTOMATED: CPT | Performed by: EMERGENCY MEDICINE

## 2025-01-06 PROCEDURE — 85610 PROTHROMBIN TIME: CPT | Performed by: EMERGENCY MEDICINE

## 2025-01-06 PROCEDURE — 99215 OFFICE O/P EST HI 40 MIN: CPT | Performed by: NURSE PRACTITIONER

## 2025-01-06 PROCEDURE — 87340 HEPATITIS B SURFACE AG IA: CPT

## 2025-01-06 PROCEDURE — 99283 EMERGENCY DEPT VISIT LOW MDM: CPT

## 2025-01-06 PROCEDURE — 80048 BASIC METABOLIC PNL TOTAL CA: CPT | Performed by: EMERGENCY MEDICINE

## 2025-01-06 PROCEDURE — 82310 ASSAY OF CALCIUM: CPT | Performed by: EMERGENCY MEDICINE

## 2025-01-06 PROCEDURE — 250N000009 HC RX 250: Performed by: EMERGENCY MEDICINE

## 2025-01-06 PROCEDURE — 85007 BL SMEAR W/DIFF WBC COUNT: CPT | Performed by: EMERGENCY MEDICINE

## 2025-01-06 PROCEDURE — 85730 THROMBOPLASTIN TIME PARTIAL: CPT | Performed by: EMERGENCY MEDICINE

## 2025-01-06 PROCEDURE — 99213 OFFICE O/P EST LOW 20 MIN: CPT | Performed by: NURSE PRACTITIONER

## 2025-01-06 PROCEDURE — G2211 COMPLEX E/M VISIT ADD ON: HCPCS | Performed by: NURSE PRACTITIONER

## 2025-01-06 RX ORDER — TRANEXAMIC ACID 100 MG/ML
500 INJECTION, SOLUTION INTRAVENOUS ONCE
Status: COMPLETED | OUTPATIENT
Start: 2025-01-06 | End: 2025-01-06

## 2025-01-06 RX ORDER — TRANEXAMIC ACID 650 MG/1
1300 TABLET ORAL 3 TIMES DAILY
Qty: 60 TABLET | Refills: 0 | Status: SHIPPED | OUTPATIENT
Start: 2025-01-06 | End: 2025-01-16

## 2025-01-06 RX ORDER — AMINOCAPROIC ACID 0.25 G/ML
4 SYRUP ORAL EVERY 6 HOURS
Qty: 600 ML | Refills: 1 | Status: SHIPPED | OUTPATIENT
Start: 2025-01-06 | End: 2025-01-09

## 2025-01-06 RX ADMIN — TRANEXAMIC ACID 500 MG: 1 INJECTION, SOLUTION INTRAVENOUS at 07:07

## 2025-01-06 ASSESSMENT — ACTIVITIES OF DAILY LIVING (ADL)
ADLS_ACUITY_SCORE: 46
ADLS_ACUITY_SCORE: 46

## 2025-01-06 ASSESSMENT — PAIN SCALES - GENERAL: PAINLEVEL_OUTOF10: NO PAIN (0)

## 2025-01-06 NOTE — PROGRESS NOTES
[facility] Hematology and Oncology Progress Note    Patient: Patricia Govea  MRN: [id number]  Date of Service: Jan 6, 2025          Reason for Visit    Chief Complaint   Patient presents with    Oncology Clinic Visit     Same day add on to discuss symptoms       Assessment and Plan     Cancer Staging   No matching staging information was found for the patient.      1.  Relapsed ITP: Her latest relapse was March 2021, now again this week.  She was given 4 weeks of Rituxan but did not respond well unclear if she didn't respond or just took long to respond.  we then started Promacta. Had to go up to 75mg. We have been holding that since April 16, 2021 due to platelets being high. She was given IVIG with latest relapse without any response. She has now taken promacta for about 9 days. (Started on 12/27). Platelets are still only 2. She is having gum bleeding and blood blisters in mouth. Likely too soon to know if promacta is working. Last time she took it, her platelets starting going up after about 4 weeks.  Dr. Haywood he would like to add in Rituxan next week so I will order that to start next week.  She will be scheduled for that.  We will continue to do labs twice a week so she will return on Thursday.  Encourage her to come to the ER if having any bleeding, worsening of bruising. Minimize being out of house.     2.  Gum bleeding with blood blisters: I was going to try to get her Amicar as an oral solution but it was $1000 out-of-pocket for her.  The pharmacist recommended trying tranexamic acid. He said that if we give this 3 times a day in tablet form but have a compounding pharmacy make it into oral rinse it was $87 for 21 tablets.  Then over to the [facility] pharmacy which has a compounding pharmacy to see if they can do that.  I did tell patient to call if the bleeding gets worse and she feels like she needs a platelet transfusion      ECOG Performance    0 - Independent    Distress Screening  (within last 30 days)    1. How concerned are you about your ability to eat? : 0  2. How concerned are you about unintended weight loss or your current weight? : 0  3. How concerned are you about feeling depressed or very sad? : 0  4. How concerned are you about feeling anxious or very scared? : 0  5. Do you struggle with the loss of meaning and cassandra in your life? : Not at all  6. How concerned are you about work and home life issues that may be affected by your cancer? : 0  7. How concerned are you about knowing what resources are available to help you? : 0  8. Do you currently have what you would describe as Jainism or spiritual struggles?            : Not at all       Pain  Pain Score: No Pain (0)    Problem List    Patient Active Problem List   Diagnosis    GBS (Guillain Reddick syndrome) (H)    Bell's palsy    Anisocoria    Chronic ITP (idiopathic thrombocytopenia) (H)    Elevated blood pressure reading without diagnosis of hypertension    Cervical intraepithelial neoplasia grade 1    Class 3 severe obesity due to excess calories without serious comorbidity with body mass index (BMI) of 40.0 to 44.9 in adult (H)    Idiopathic thrombocytopenic purpura (H)        ______________________________________________________________________________    History of Present Illness    DIAGNOSIS:  Recurrent and refractory. originially had ITP in 2011. Then again in 2019 in pregnancy.  Then again in February 2021.  Now again in December 2024     CURRENT TREATMENT: received 2 days of IVIG in hospital on December 27 and December 28.  Started Promacta 75 mg daily on December 27.     PAST TREATMENT:   Rituxan for 4 weeks starting 2/4/21 without any response.   Promacta. Started 25mg on 3/2/21. Increased to 50mg on 3/9. Increased to 75mg on 3/23/21. Held starting 4/16/21 due to platelets of 494.   Rituxan and prednisone. Rituxan last dose was March 8, 2019  Steroids  IVIG  Splenectomy 1/28/19. Initial response, but then rapid  "decrease in platelets.      INTERIM HISTORY:   She is here today add-on to my clinic schedule today.  She came to the ER this morning for some gum bleeding.  She said she gently brushed her teeth last night and her gums started bleeding.  She also has some blood blisters in her mouth so she is very concerned that she has bleeding and came to the ER.  In the ER her platelets were less than 2.  They did give her oral rinse to help with the bleeding and told her to come to the clinic here.  Other than what is going on her mouth no other bleeding or bruising that she has noticed.    Review of Systems    Pertinent items are noted in HPI.    Past History    Past Medical History:   Diagnosis Date    Acute idiopathic thrombocytopenic purpura (H) 2004    bone marrow biopsy negative for malignancy    Bell's palsy     Encounter for screening for cervical cancer 12/23/2018    Guillain Barré syndrome (H) 2016    After influenza vaccine    H. pylori infection 2004    History of blood transfusion     Immune thrombocytopenia (H)     2012---saw Dr. Haywood [facility] Heme    Obesity        PHYSICAL EXAM  /60   Pulse 90   Temp 98.3  F (36.8  C) (Temporal)   Resp 16   Ht 1.486 m (4' 10.5\")   Wt 91.6 kg (201 lb 15.1 oz)   LMP 12/06/2024 (Approximate)   SpO2 97%   BMI 41.49 kg/m      GENERAL: no acute distress. Cooperative in conversation. Here alone.   HEENT: Has blood blisters all over her mouth and there is 1 on her lip.  Very minimal bleeding noted along some of her gumline but it is quite minimal.  Looks Like fresh blood.  RESP: Regular respiratory rate. No expiratory wheezes   MUSCULOSKELETAL: no bilateral leg swelling  NEURO: non focal. Alert and oriented x3.   PSYCH: within normal limits. No depression or anxiety.  SKIN: exposed skin is dry intact. Mild bruising noted. No bleeding from antecubital space where blood was drawn. Mild petechiae on ankles.     Lab Results    Recent Results (from the past week)   CBC with " platelets and differential   Result Value Ref Range    WBC Count 4.9 4.0 - 11.0 10e3/uL    RBC Count 3.88 3.80 - 5.20 10e6/uL    Hemoglobin 12.4 11.7 - 15.7 g/dL    Hematocrit 35.6 35.0 - 47.0 %    MCV 92 78 - 100 fL    MCH 32.0 26.5 - 33.0 pg    MCHC 34.8 31.5 - 36.5 g/dL    RDW 13.5 10.0 - 15.0 %    Platelet Count 2 (LL) 150 - 450 10e3/uL    % Neutrophils 12 %    % Lymphocytes 64 %    % Monocytes 15 %    % Eosinophils 5 %    % Basophils 2 %    % Immature Granulocytes 4 %    NRBCs per 100 WBC 0 <1 /100    Absolute Neutrophils 0.6 (L) 1.6 - 8.3 10e3/uL    Absolute Lymphocytes 3.1 0.8 - 5.3 10e3/uL    Absolute Monocytes 0.7 0.0 - 1.3 10e3/uL    Absolute Eosinophils 0.2 0.0 - 0.7 10e3/uL    Absolute Basophils 0.1 0.0 - 0.2 10e3/uL    Absolute Immature Granulocytes 0.2 <=0.4 10e3/uL    Absolute NRBCs 0.0 10e3/uL   Basic metabolic panel   Result Value Ref Range    Sodium 135 135 - 145 mmol/L    Potassium 4.1 3.4 - 5.3 mmol/L    Chloride 102 98 - 107 mmol/L    Carbon Dioxide (CO2) 23 22 - 29 mmol/L    Anion Gap 10 7 - 15 mmol/L    Urea Nitrogen 8.7 6.0 - 20.0 mg/dL    Creatinine 0.45 (L) 0.51 - 0.95 mg/dL    GFR Estimate >90 >60 mL/min/1.73m2    Calcium 9.2 8.8 - 10.4 mg/dL    Glucose 109 (H) 70 - 99 mg/dL   INR   Result Value Ref Range    INR 1.02 0.85 - 1.15   PTT   Result Value Ref Range    aPTT 22 22 - 38 Seconds   CBC with platelets and differential   Result Value Ref Range    WBC Count 4.7 4.0 - 11.0 10e3/uL    RBC Count 4.27 3.80 - 5.20 10e6/uL    Hemoglobin 13.8 11.7 - 15.7 g/dL    Hematocrit 39.3 35.0 - 47.0 %    MCV 92 78 - 100 fL    MCH 32.3 26.5 - 33.0 pg    MCHC 35.1 31.5 - 36.5 g/dL    RDW 13.5 10.0 - 15.0 %    Platelet Count <2 (LL) 150 - 450 10e3/uL   Manual Differential   Result Value Ref Range    % Neutrophils 21 %    % Lymphocytes 61 %    % Monocytes 10 %    % Eosinophils 4 %    % Basophils 2 %    % Metamyelocytes 2 %    Absolute Neutrophils 1.0 (L) 1.6 - 8.3 10e3/uL    Absolute Lymphocytes 2.9 0.8 -  5.3 10e3/uL    Absolute Monocytes 0.5 0.0 - 1.3 10e3/uL    Absolute Eosinophils 0.2 0.0 - 0.7 10e3/uL    Absolute Basophils 0.1 0.0 - 0.2 10e3/uL    Absolute Metamyelocytes 0.1 (H) <=0.0 10e3/uL    RBC Morphology Confirmed RBC Indices     Platelet Assessment  Automated Count Confirmed. Platelet morphology is normal.     Automated Count Confirmed. Platelet morphology is normal.    Polychromasia Slight (A) None Seen    NRBCs per 100 WBC 2 %    Absolute NRBCs 0.1 10e3/uL       Imaging    No results found.  The longitudinal plan of care for the diagnosis(es)/condition(s) as documented were addressed during this visit. Due to the added complexity in care, I will continue to support Patricia in the subsequent management and with ongoing continuity of care.    Total time spent with patient in face to face time, chart review and documentation was 40 minutes.      Discussed with Dr. Haywood      Signed by: CASTRO Cameron CNP

## 2025-01-06 NOTE — TELEPHONE ENCOUNTER
I received a phone call today from Patricia. She just heard from [facility] and the prescription for Aminocaproic acid will cost her over $2,000.00 for one bottle since she does not currently have insurance. She cannot afford this.     I called the pharmacy and spoke to the pharmacist today. He verified that the cost will be over 2K for one bottle. He recommends tranexamic acid (orally) instead as this will cost her $87.00 for 21 tablets. A compounding pharmacy can make the tablets into an oral rinse but it may take 1-2 business days. If going this rout he would suggest trying [facility] or [facility] to see if they can get it mixed and to the patient same day.     I will send this information to Caprice Elena CNP  and DM Delacruz to review and follow up with patient.     Tiffanie Constantino RN on 1/6/2025 at 1:05 PM

## 2025-01-06 NOTE — DISCHARGE INSTRUCTIONS
Please keep your appointment with hematology later this morning.  We unfortunately do not have the ability to prescribe the TXA liquid you are given here in the emergency department.    You may have some continued oozing of blood or clots around that tooth for the rest of the day.    Please be very gentle brushing your teeth moving forwards, I would avoid that area of your mouth this evening.

## 2025-01-06 NOTE — PROGRESS NOTES
"Oncology Rooming Note    January 6, 2025 11:18 AM   Patricia Govea is a 44 year old female who presents for:    Chief Complaint   Patient presents with    Oncology Clinic Visit     Same day add on to discuss symptoms     Initial Vitals: /60   Pulse 90   Temp 98.3  F (36.8  C) (Temporal)   Resp 16   Ht 1.486 m (4' 10.5\")   Wt 91.6 kg (201 lb 15.1 oz)   LMP 12/06/2024 (Approximate)   SpO2 97%   BMI 41.49 kg/m   Estimated body mass index is 41.49 kg/m  as calculated from the following:    Height as of this encounter: 1.486 m (4' 10.5\").    Weight as of this encounter: 91.6 kg (201 lb 15.1 oz). Body surface area is 1.94 meters squared.  No Pain (0) Comment: Data Unavailable   Patient's last menstrual period was 12/06/2024 (approximate).  Allergies reviewed: Yes  Medications reviewed: Yes    Medications: Medication refills not needed today.  Pharmacy name entered into [employer]:    [facility][id number] - [facility] [address] AT [id number] [address] AT [place] [address]    Frailty Screening:   Is the patient here for a new oncology consult visit in cancer care? 2. No      Clinical concerns: bleeding gums and lump inside mouth. Want to be seen for low platelets from ED visit this morning  Caprice was notified.      MAR VELASCO CMA            "

## 2025-01-06 NOTE — LETTER
"1/6/2025      Patricia Govea  [address] [place] [address]      Dear Colleague,    Thank you for referring your patient, Patricia Govea, to the [facility] CANCER [facility]. Please see a copy of my visit note below.    Oncology Rooming Note    January 6, 2025 11:18 AM   Patricia Govea is a 44 year old female who presents for:    Chief Complaint   Patient presents with     Oncology Clinic Visit     Same day add on to discuss symptoms     Initial Vitals: /60   Pulse 90   Temp 98.3  F (36.8  C) (Temporal)   Resp 16   Ht 1.486 m (4' 10.5\")   Wt 91.6 kg (201 lb 15.1 oz)   LMP 12/06/2024 (Approximate)   SpO2 97%   BMI 41.49 kg/m   Estimated body mass index is 41.49 kg/m  as calculated from the following:    Height as of this encounter: 1.486 m (4' 10.5\").    Weight as of this encounter: 91.6 kg (201 lb 15.1 oz). Body surface area is 1.94 meters squared.  No Pain (0) Comment: Data Unavailable   Patient's last menstrual period was 12/06/2024 (approximate).  Allergies reviewed: Yes  Medications reviewed: Yes    Medications: Medication refills not needed today.  Pharmacy name entered into [employer]:    [email or web address] #[id number] - [place], [place] - [address] AT [facility] [address] [id number] [place], [place] - [address] AT [place] [address]    Frailty Screening:   Is the patient here for a new oncology consult visit in cancer care? 2. No      Clinical concerns: bleeding gums and lump inside mouth. Want to be seen for low platelets from ED visit this morning  Caprice was notified.      MAR VELASCO CMA              [facility] Hematology and Oncology Progress Note    Patient: Patricia Govea  MRN: [id number]  Date of Service: Jan 6, 2025          Reason for Visit    Chief Complaint   Patient presents with     Oncology Clinic Visit     Same day add on to discuss symptoms       Assessment and " Plan     Cancer Staging   No matching staging information was found for the patient.      1.  Relapsed ITP: Her latest relapse was March 2021, now again this week.  She was given 4 weeks of Rituxan but did not respond well unclear if she didn't respond or just took long to respond.  we then started Promacta. Had to go up to 75mg. We have been holding that since April 16, 2021 due to platelets being high. She was given IVIG with latest relapse without any response. She has now taken promacta for about 9 days. (Started on 12/27). Platelets are still only 2. She is having gum bleeding and blood blisters in mouth. Likely too soon to know if promacta is working. Last time she took it, her platelets starting going up after about 4 weeks.  Dr. Haywood he would like to add in Rituxan next week so I will order that to start next week.  She will be scheduled for that.  We will continue to do labs twice a week so she will return on Thursday.  Encourage her to come to the ER if having any bleeding, worsening of bruising. Minimize being out of house.     2.  Gum bleeding with blood blisters: I was going to try to get her Amicar as an oral solution but it was $1000 out-of-pocket for her.  The pharmacist recommended trying tranexamic acid. He said that if we give this 3 times a day in tablet form but have a compounding pharmacy make it into oral rinse it was $87 for 21 tablets.  Then over to the [facility] pharmacy which has a compounding pharmacy to see if they can do that.  I did tell patient to call if the bleeding gets worse and she feels like she needs a platelet transfusion      ECOG Performance    0 - Independent    Distress Screening (within last 30 days)    1. How concerned are you about your ability to eat? : 0  2. How concerned are you about unintended weight loss or your current weight? : 0  3. How concerned are you about feeling depressed or very sad? : 0  4. How concerned are you about feeling anxious or very scared? :  0  5. Do you struggle with the loss of meaning and cassandra in your life? : Not at all  6. How concerned are you about work and home life issues that may be affected by your cancer? : 0  7. How concerned are you about knowing what resources are available to help you? : 0  8. Do you currently have what you would describe as Gnosticist or spiritual struggles?            : Not at all       Pain  Pain Score: No Pain (0)    Problem List    Patient Active Problem List   Diagnosis     GBS (Guillain Gheens syndrome) (H)     Bell's palsy     Anisocoria     Chronic ITP (idiopathic thrombocytopenia) (H)     Elevated blood pressure reading without diagnosis of hypertension     Cervical intraepithelial neoplasia grade 1     Class 3 severe obesity due to excess calories without serious comorbidity with body mass index (BMI) of 40.0 to 44.9 in adult (H)     Idiopathic thrombocytopenic purpura (H)        ______________________________________________________________________________    History of Present Illness    DIAGNOSIS:  Recurrent and refractory. originially had ITP in 2011. Then again in 2019 in pregnancy.  Then again in February 2021.  Now again in December 2024     CURRENT TREATMENT: received 2 days of IVIG in hospital on December 27 and December 28.  Started Promacta 75 mg daily on December 27.     PAST TREATMENT:   Rituxan for 4 weeks starting 2/4/21 without any response.   Promacta. Started 25mg on 3/2/21. Increased to 50mg on 3/9. Increased to 75mg on 3/23/21. Held starting 4/16/21 due to platelets of 494.   Rituxan and prednisone. Rituxan last dose was March 8, 2019  Steroids  IVIG  Splenectomy 1/28/19. Initial response, but then rapid decrease in platelets.      INTERIM HISTORY:   She is here today add-on to my clinic schedule today.  She came to the ER this morning for some gum bleeding.  She said she gently brushed her teeth last night and her gums started bleeding.  She also has some blood blisters in her mouth so she is  "very concerned that she has bleeding and came to the ER.  In the ER her platelets were less than 2.  They did give her oral rinse to help with the bleeding and told her to come to the clinic here.  Other than what is going on her mouth no other bleeding or bruising that she has noticed.    Review of Systems    Pertinent items are noted in HPI.    Past History    Past Medical History:   Diagnosis Date     Acute idiopathic thrombocytopenic purpura (H) 2004    bone marrow biopsy negative for malignancy     Bell's palsy      Encounter for screening for cervical cancer 12/23/2018     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 pylori infection 2004     History of blood transfusion      Immune thrombocytopenia (H)     2012---saw Dr. Haywood [facility] Heme     Obesity        PHYSICAL EXAM  /60   Pulse 90   Temp 98.3  F (36.8  C) (Temporal)   Resp 16   Ht 1.486 m (4' 10.5\")   Wt 91.6 kg (201 lb 15.1 oz)   LMP 12/06/2024 (Approximate)   SpO2 97%   BMI 41.49 kg/m      GENERAL: no acute distress. Cooperative in conversation. Here alone.   HEENT: Has blood blisters all over her mouth and there is 1 on her lip.  Very minimal bleeding noted along some of her gumline but it is quite minimal.  Looks Like fresh blood.  RESP: Regular respiratory rate. No expiratory wheezes   MUSCULOSKELETAL: no bilateral leg swelling  NEURO: non focal. Alert and oriented x3.   PSYCH: within normal limits. No depression or anxiety.  SKIN: exposed skin is dry intact. Mild bruising noted. No bleeding from antecubital space where blood was drawn. Mild petechiae on ankles.     Lab Results    Recent Results (from the past week)   CBC with platelets and differential   Result Value Ref Range    WBC Count 4.9 4.0 - 11.0 10e3/uL    RBC Count 3.88 3.80 - 5.20 10e6/uL    Hemoglobin 12.4 11.7 - 15.7 g/dL    Hematocrit 35.6 35.0 - 47.0 %    MCV 92 78 - 100 fL    MCH 32.0 26.5 - 33.0 pg    MCHC 34.8 31.5 - 36.5 g/dL    RDW 13.5 10.0 - " 15.0 %    Platelet Count 2 (LL) 150 - 450 10e3/uL    % Neutrophils 12 %    % Lymphocytes 64 %    % Monocytes 15 %    % Eosinophils 5 %    % Basophils 2 %    % Immature Granulocytes 4 %    NRBCs per 100 WBC 0 <1 /100    Absolute Neutrophils 0.6 (L) 1.6 - 8.3 10e3/uL    Absolute Lymphocytes 3.1 0.8 - 5.3 10e3/uL    Absolute Monocytes 0.7 0.0 - 1.3 10e3/uL    Absolute Eosinophils 0.2 0.0 - 0.7 10e3/uL    Absolute Basophils 0.1 0.0 - 0.2 10e3/uL    Absolute Immature Granulocytes 0.2 <=0.4 10e3/uL    Absolute NRBCs 0.0 10e3/uL   Basic metabolic panel   Result Value Ref Range    Sodium 135 135 - 145 mmol/L    Potassium 4.1 3.4 - 5.3 mmol/L    Chloride 102 98 - 107 mmol/L    Carbon Dioxide (CO2) 23 22 - 29 mmol/L    Anion Gap 10 7 - 15 mmol/L    Urea Nitrogen 8.7 6.0 - 20.0 mg/dL    Creatinine 0.45 (L) 0.51 - 0.95 mg/dL    GFR Estimate >90 >60 mL/min/1.73m2    Calcium 9.2 8.8 - 10.4 mg/dL    Glucose 109 (H) 70 - 99 mg/dL   INR   Result Value Ref Range    INR 1.02 0.85 - 1.15   PTT   Result Value Ref Range    aPTT 22 22 - 38 Seconds   CBC with platelets and differential   Result Value Ref Range    WBC Count 4.7 4.0 - 11.0 10e3/uL    RBC Count 4.27 3.80 - 5.20 10e6/uL    Hemoglobin 13.8 11.7 - 15.7 g/dL    Hematocrit 39.3 35.0 - 47.0 %    MCV 92 78 - 100 fL    MCH 32.3 26.5 - 33.0 pg    MCHC 35.1 31.5 - 36.5 g/dL    RDW 13.5 10.0 - 15.0 %    Platelet Count <2 (LL) 150 - 450 10e3/uL   Manual Differential   Result Value Ref Range    % Neutrophils 21 %    % Lymphocytes 61 %    % Monocytes 10 %    % Eosinophils 4 %    % Basophils 2 %    % Metamyelocytes 2 %    Absolute Neutrophils 1.0 (L) 1.6 - 8.3 10e3/uL    Absolute Lymphocytes 2.9 0.8 - 5.3 10e3/uL    Absolute Monocytes 0.5 0.0 - 1.3 10e3/uL    Absolute Eosinophils 0.2 0.0 - 0.7 10e3/uL    Absolute Basophils 0.1 0.0 - 0.2 10e3/uL    Absolute Metamyelocytes 0.1 (H) <=0.0 10e3/uL    RBC Morphology Confirmed RBC Indices     Platelet Assessment  Automated Count Confirmed.  Platelet morphology is normal.     Automated Count Confirmed. Platelet morphology is normal.    Polychromasia Slight (A) None Seen    NRBCs per 100 WBC 2 %    Absolute NRBCs 0.1 10e3/uL       Imaging    No results found.  The longitudinal plan of care for the diagnosis(es)/condition(s) as documented were addressed during this visit. Due to the added complexity in care, I will continue to support Patricia in the subsequent management and with ongoing continuity of care.    Total time spent with patient in face to face time, chart review and documentation was 40 minutes.      Discussed with Dr. Haywood      Signed by: CASTRO Cameron CNP      Again, thank you for allowing me to participate in the care of your patient.        Sincerely,        CASTRO Cameron CNP    Electronically signed

## 2025-01-06 NOTE — ED PROVIDER NOTES
EMERGENCY DEPARTMENT ENCOUNTER      NAME: Patricia Govea  AGE: 44 year old female  YOB: 1980  MRN: [id number]  EVALUATION DATE & TIME: 1/6/2025  6:18 AM    PCP: Mikal Brown    ED PROVIDER: Dany Delgado M.D.      Chief Complaint   Patient presents with    Dental Injury         FINAL IMPRESSION:  1. Thrombocytopenia (H)    2. Bleeding gums    3. Hematoma of buccal mucosa          ED COURSE & MEDICAL DECISION MAKING:    Pertinent Labs & Imaging studies reviewed below.  All EKGs below represent my independent interpretation.   ED Course as of 01/06/25 0832   Mon Jan 06, 2025   0643 I reviewed past medical history, she was admitted to the hospital last week for 3 days due to severe thrombocytopenia, hyponatremia.  She has a history of chronic ITP, splenectomy in 2019.  Due to platelet count of less than 2 but normal PTT and INR.  She was given IVIG for 2 days, started on Promacta.   0643 She presents today with persistent oozing and bleeding of the gums that began last night after brushing teeth.  No vaginal bleeding, new petechia or bruising.  She has an appointment in a few hours at her hematologist office but was told come to the ER if she has any abnormal bleeding.   0643 On arrival she is hypertensive at 217/110 with a heart rate of 100.  There is some clot formation surrounding the molars in the right upper mouth.  Plan to give her some TXA to swish and spit to see if this helps, otherwise plan to recheck her CBC, coags   0710 I spoke to lab: critical result of platelet<2   0727 INR: 1.02   0727 PTT: 22   0727 Basic metabolic panel(!)  WNL   0759 I spoke to the on-call hematologist who does not recommend platelet transfusion at this time given the normal hemoglobin, slow oozing of blood.  She recommends 3 times daily TXA swish and spit as needed for the next couple of days.      There is some mild oozing from the gums to be discharged.  I spoke to pharmacy, there did not seem to be  a way to get TXA solution prescribed from the emergency department.  Oral TXA tablets clearly stated do not crush.  Her hematologist appt is in 2 hours and can have her mouth rechecked and discuss options for other treatments if needed.        Additional ED Course timestamps entered by medical scribe:  6:33 AM Met with patient for initial interview and exam.   7:10 AM I spoke to lab about the platelet results.   7:35 AM Rechecked and updated patient.      Medical Decision Making  Obtained supplemental history:Supplemental history obtained?: No  Reviewed external records: External records reviewed?: Documented in chart and Inpatient Record: Hospitalization on 12/27/2024  Care impacted by chronic illness:Documented in Chart  Care significantly affected by social determinants of health:N/A  Did you consider but not order tests?: Work up considered but not performed and documented in chart, if applicable  Did you interpret images independently?: Independent interpretation of ECG and images noted in documentation, when applicable.  Consultation discussion with other provider: Phone conversation with consultants will be documented in the ED Course  Discharge. No recommendations on prescription strength medication(s). I considered admission, but ultimately discharged patient after reassuring lab work and consultation with hematology.    MIPS Criteria Documentation: Not Applicable    MEDICATIONS GIVEN IN THE EMERGENCY:  Medications   tranexamic acid (CYKLOKAPRON) spray 500 mg (500 mg Both Nostrils $Given 1/6/25 0707)         NEW PRESCRIPTIONS STARTED AT TODAY'S ER VISIT  Discharge Medication List as of 1/6/2025  8:03 AM             =================================================================    HPI    Patricia Govea is a 44 year old female with a medical history of chronic immune thrombocytopenia who presents to this ED for evaluation of dental injury and bleed.     Patient arrives to the emergency  "department for a lab draw due to having bleeding of her gums at the right upper molars that began last night when she brushed her teeth. Her gums have continued to bleed this morning in the ED. She was advised by her doctor at the cancer center to come to the ER if she had any bleeding as she has chronically low platelets. The patient has been taking Promacta 75 mg as prescribed since 12/27.     Per Chart Review, the patient was admitted to [facility] from 12/27/24-12/30/24 for severe thrombocytopenia, acute on chronic immune thrombocytopenia. Patient has a history of chronic immune thrombocytopenia, but was told she would no longer need to follow up with hematology in 2022 as her platelets were elevated to 559. She was admitted to the hospital with a platelet count of < 2, PTT/INR wnl. Hematology/oncology followed up with the patient and restarted Promacta and was treated with IVIG x2 days. Platelet count stayed at 2 to the day of discharge.         VITALS:  /60   Pulse 90   Temp 98.3  F (36.8  C) (Temporal)   Resp 16   Ht 1.549 m (5' 1\")   Wt 91.6 kg (202 lb)   LMP 12/06/2024 (Approximate)   SpO2 97%   BMI 38.17 kg/m      PHYSICAL EXAM    Constitutional: Comfortable appearing.  HENT: Atraumatic, mucous membranes moist, nose normal. Neck- Supple, gross ROM intact. Blood clot around the right upper molars, slow oozing of blood from one of the teeth.  Few small buccal mucosal hematomas.   Eyes: Pupils mid-range, conjunctiva without injection, no discharge.   Respiratory: Clear to auscultation bilaterally, no respiratory distress, no wheezing, speaks full sentences easily. No cough.  Cardiovascular: Normal heart rate, regular rhythm, no murmurs.   GI: Soft, no tenderness to deep palpation in all quadrants, no masses.  Musculoskeletal: Moving all 4 extremities intentionally and without pain. No obvious deformity. Ecchymosis on the left gluteus.   Skin: Warm, dry, no rash. " Petechiae on lower extremities.   Neurologic: Alert & oriented x 3, cranial nerves grossly intact.  Psychiatric: Affect normal, cooperative.      I, Remington Montaño am serving as a scribe to document services personally performed by Dr. Dany Delgado based on my observation and the provider's statements to me. I, Dany Delgado MD attest that Remington Montaño is acting in a scribe capacity, has observed my performance of the services and has documented them in accordance with my direction.    Dany Delgado M.D.  Emergency Medicine  [facility] EMERGENCY DEPARTMENT  [address] [place] [id number]  [phone]  Dept: [phone]       Dany Delgado MD  01/06/25 4585

## 2025-01-06 NOTE — ED TRIAGE NOTES
Pt arrives to triage for a lab draw due to having bleeding of her gums that began last night when she brushed her teeth. She reports her gums are still bleeding this morning. She was advised by her doctor at the cancer center to come to the ER if she had any bleeding as she has chronically low platelets.

## 2025-01-07 ENCOUNTER — TELEPHONE (OUTPATIENT)
Dept: ONCOLOGY | Facility: HOSPITAL | Age: 45
End: 2025-01-07

## 2025-01-07 ENCOUNTER — PATIENT OUTREACH (OUTPATIENT)
Dept: ONCOLOGY | Facility: HOSPITAL | Age: 45
End: 2025-01-07

## 2025-01-07 ENCOUNTER — INFUSION THERAPY VISIT (OUTPATIENT)
Dept: INFUSION THERAPY | Facility: HOSPITAL | Age: 45
End: 2025-01-07
Attending: NURSE PRACTITIONER

## 2025-01-07 VITALS
RESPIRATION RATE: 18 BRPM | OXYGEN SATURATION: 99 % | TEMPERATURE: 98 F | HEART RATE: 81 BPM | DIASTOLIC BLOOD PRESSURE: 73 MMHG | SYSTOLIC BLOOD PRESSURE: 149 MMHG

## 2025-01-07 DIAGNOSIS — K06.8 GUMS, BLEEDING: ICD-10-CM

## 2025-01-07 DIAGNOSIS — R31.0 GROSS HEMATURIA: Primary | ICD-10-CM

## 2025-01-07 DIAGNOSIS — D69.3 IDIOPATHIC THROMBOCYTOPENIC PURPURA (H): ICD-10-CM

## 2025-01-07 LAB
BLD PROD TYP BPU: NORMAL
BLOOD COMPONENT TYPE: NORMAL
CODING SYSTEM: NORMAL
ISSUE DATE AND TIME: NORMAL
UNIT ABO/RH: NORMAL
UNIT NUMBER: NORMAL
UNIT STATUS: NORMAL
UNIT TYPE ISBT: 5100

## 2025-01-07 PROCEDURE — P9035 PLATELET PHERES LEUKOREDUCED: HCPCS | Performed by: NURSE PRACTITIONER

## 2025-01-07 PROCEDURE — 36430 TRANSFUSION BLD/BLD COMPNT: CPT

## 2025-01-07 RX ORDER — HEPARIN SODIUM (PORCINE) LOCK FLUSH IV SOLN 100 UNIT/ML 100 UNIT/ML
5 SOLUTION INTRAVENOUS
Status: CANCELLED | OUTPATIENT
Start: 2025-01-07

## 2025-01-07 RX ORDER — HEPARIN SODIUM,PORCINE 10 UNIT/ML
5-20 VIAL (ML) INTRAVENOUS DAILY PRN
Status: CANCELLED | OUTPATIENT
Start: 2025-01-07

## 2025-01-07 RX ORDER — DIPHENHYDRAMINE HYDROCHLORIDE 50 MG/ML
50 INJECTION INTRAMUSCULAR; INTRAVENOUS
Status: CANCELLED
Start: 2025-01-07

## 2025-01-07 RX ORDER — DIPHENHYDRAMINE HYDROCHLORIDE 50 MG/ML
50 INJECTION INTRAMUSCULAR; INTRAVENOUS
Status: DISCONTINUED | OUTPATIENT
Start: 2025-01-07 | End: 2025-01-07 | Stop reason: HOSPADM

## 2025-01-07 RX ORDER — EPINEPHRINE 1 MG/ML
0.3 INJECTION, SOLUTION INTRAMUSCULAR; SUBCUTANEOUS EVERY 5 MIN PRN
Status: CANCELLED | OUTPATIENT
Start: 2025-01-07

## 2025-01-07 RX ORDER — EPINEPHRINE 1 MG/ML
0.3 INJECTION, SOLUTION INTRAMUSCULAR; SUBCUTANEOUS EVERY 5 MIN PRN
Status: DISCONTINUED | OUTPATIENT
Start: 2025-01-07 | End: 2025-01-07 | Stop reason: HOSPADM

## 2025-01-07 NOTE — PROGRESS NOTES
[facility]: Cancer Care                                                                                          Situation: Chart reviewed by RN Care Coordinator.    Background: Patient was seen in clinic yesterday regarding due to relapsed ITP.    Assessment: Patient has restarted on Promacta.  Started on Friday, 12/27.  Platelets are still only 2.  Patient is having gum bleeding and blood blisters in the mouth.    Plan/Recommendations: Patient is to continue on Promacta.  She will possibly restart Rituxan.  Patient is to be seen by provider in a couple weeks.    Special mouth rinse was prescribed and sent to compounding pharmacy.      Signature:  Kary Aragon RN Care Coordinator  [facility]  Cancer Care [facility]

## 2025-01-07 NOTE — PROGRESS NOTES
Infusion Nursing Note:  Patricia Govea presents today for 1 unit of platelets.    Patient seen by provider today: Yes: Caprice CHAPMAN stopped an saw her for a moment when she came down.   present during visit today: Not Applicable.    Note: Patricia comes in today for 1 unit of platelets. Her platelet count yesterday was 2 yesterday and this morning she was having blood in her urine and her gums and mouth was bleeding. She called triage and Caprice CHAPMAN put in orders for her to get 1 unit of platelets today. She has had platelets before and she has tolerated them in the past. I went over the plan of care with Patricia and she verbalized understanding. PIV place with great blood return throughout. Platelets hung per order. Patricia tolerated with no sign or symptom of a reaction. PIV removed and covered with gauze and coban. Patricia left ambulatory in stable condition to the [facility].      Intravenous Access:  Peripheral IV placed.    Treatment Conditions:  Lab Results   Component Value Date    HGB 13.8 01/06/2025    WBC 4.7 01/06/2025    ANEU 1.0 (L) 01/06/2025    ANEUTAUTO 0.6 (L) 01/02/2025    PLT <2 (LL) 01/06/2025        Results reviewed, labs MET treatment parameters, ok to proceed with treatment.  Blood transfusion consent signed 12/27/24.      Post Infusion Assessment:  Patient tolerated infusion without incident.  Blood return noted pre and post infusion.  Site patent and intact, free from redness, edema or discomfort.  No evidence of extravasations.  Access discontinued per protocol.       Discharge Plan:   Discharge instructions reviewed with: Patient.  Copy of AVS reviewed with patient and/or family.    Patient discharged in stable condition accompanied by: self.  Departure Mode: Ambulatory.      JUAN BARRON RN

## 2025-01-07 NOTE — TELEPHONE ENCOUNTER
I received a phone call from Patricia today. She is calling to report that she has blood in her urine today. She has gone to the bathroom 3 times now and each time her urine is bloody. She does not have her period. She also states her gums have continued to bleed throughout the night. She agrees to call [facility] this morning to check on the status of the medication that was sent over yesterday. Will route to Caprice Elena CNP to review and advise on a plan.     Tiffanie Constantino RN on 1/7/2025 at 8:38 AM    
Per Caprice Elena CNP - patient should come in today for platelets. marianne Cuellar RN on first floor can add her today at 12:00. The patient is aware and will be here at that time. Message was sent to scheduling to get her added to the schedule.     Patient did call [facility] and the tranexamic acid will be compounded and ready for her today. She is hoping to pick this up before coming to the infusion room.     Tiffanie Constantino RN on 1/7/2025 at 9:53 AM    
No - the patient is unable to be screened due to medical condition

## 2025-01-09 ENCOUNTER — INFUSION THERAPY VISIT (OUTPATIENT)
Dept: INFUSION THERAPY | Facility: HOSPITAL | Age: 45
End: 2025-01-09
Attending: NURSE PRACTITIONER

## 2025-01-09 ENCOUNTER — LAB (OUTPATIENT)
Dept: INFUSION THERAPY | Facility: HOSPITAL | Age: 45
End: 2025-01-09

## 2025-01-09 ENCOUNTER — ONCOLOGY VISIT (OUTPATIENT)
Dept: ONCOLOGY | Facility: HOSPITAL | Age: 45
End: 2025-01-09
Attending: NURSE PRACTITIONER

## 2025-01-09 ENCOUNTER — PATIENT OUTREACH (OUTPATIENT)
Dept: ONCOLOGY | Facility: HOSPITAL | Age: 45
End: 2025-01-09

## 2025-01-09 ENCOUNTER — DOCUMENTATION ONLY (OUTPATIENT)
Dept: ONCOLOGY | Facility: HOSPITAL | Age: 45
End: 2025-01-09

## 2025-01-09 VITALS
DIASTOLIC BLOOD PRESSURE: 72 MMHG | HEART RATE: 89 BPM | OXYGEN SATURATION: 98 % | SYSTOLIC BLOOD PRESSURE: 131 MMHG | TEMPERATURE: 98.4 F

## 2025-01-09 DIAGNOSIS — R31.0 GROSS HEMATURIA: Primary | ICD-10-CM

## 2025-01-09 DIAGNOSIS — D69.3 IDIOPATHIC THROMBOCYTOPENIC PURPURA (H): ICD-10-CM

## 2025-01-09 DIAGNOSIS — D69.3 CHRONIC ITP (IDIOPATHIC THROMBOCYTOPENIA) (H): ICD-10-CM

## 2025-01-09 DIAGNOSIS — K06.8 GUMS, BLEEDING: ICD-10-CM

## 2025-01-09 DIAGNOSIS — K06.8 GUMS, BLEEDING: Primary | ICD-10-CM

## 2025-01-09 LAB
BASOPHILS # BLD MANUAL: 0 10E3/UL (ref 0–0.2)
BASOPHILS NFR BLD MANUAL: 1 %
BLD PROD TYP BPU: NORMAL
BLOOD COMPONENT TYPE: NORMAL
CODING SYSTEM: NORMAL
EOSINOPHIL # BLD MANUAL: 0.1 10E3/UL (ref 0–0.7)
EOSINOPHIL NFR BLD MANUAL: 3 %
ERYTHROCYTE [DISTWIDTH] IN BLOOD BY AUTOMATED COUNT: 13.4 % (ref 10–15)
HBV CORE AB SERPL QL IA: REACTIVE
HBV SURFACE AB SERPL IA-ACNC: 747 M[IU]/ML
HBV SURFACE AB SERPL IA-ACNC: REACTIVE M[IU]/ML
HCT VFR BLD AUTO: 34.7 % (ref 35–47)
HGB BLD-MCNC: 12.3 G/DL (ref 11.7–15.7)
HOWELL-JOLLY BOD BLD QL SMEAR: PRESENT
ISSUE DATE AND TIME: NORMAL
LYMPHOCYTES # BLD MANUAL: 3.3 10E3/UL (ref 0.8–5.3)
LYMPHOCYTES NFR BLD MANUAL: 75 %
MCH RBC QN AUTO: 32.2 PG (ref 26.5–33)
MCHC RBC AUTO-ENTMCNC: 35.4 G/DL (ref 31.5–36.5)
MCV RBC AUTO: 91 FL (ref 78–100)
METAMYELOCYTES # BLD MANUAL: 0 10E3/UL
METAMYELOCYTES NFR BLD MANUAL: 1 %
MONOCYTES # BLD MANUAL: 0.2 10E3/UL (ref 0–1.3)
MONOCYTES NFR BLD MANUAL: 4 %
NEUTROPHILS # BLD MANUAL: 0.7 10E3/UL (ref 1.6–8.3)
NEUTROPHILS NFR BLD MANUAL: 16 %
PLAT MORPH BLD: ABNORMAL
PLATELET # BLD AUTO: <2 10E3/UL (ref 150–450)
POLYCHROMASIA BLD QL SMEAR: SLIGHT
RBC # BLD AUTO: 3.82 10E6/UL (ref 3.8–5.2)
RBC MORPH BLD: ABNORMAL
UNIT ABO/RH: NORMAL
UNIT NUMBER: NORMAL
UNIT STATUS: NORMAL
UNIT TYPE ISBT: 6200
WBC # BLD AUTO: 4.4 10E3/UL (ref 4–11)

## 2025-01-09 PROCEDURE — 85041 AUTOMATED RBC COUNT: CPT

## 2025-01-09 PROCEDURE — P9035 PLATELET PHERES LEUKOREDUCED: HCPCS | Performed by: NURSE PRACTITIONER

## 2025-01-09 PROCEDURE — 86706 HEP B SURFACE ANTIBODY: CPT

## 2025-01-09 PROCEDURE — 36415 COLL VENOUS BLD VENIPUNCTURE: CPT

## 2025-01-09 PROCEDURE — 85018 HEMOGLOBIN: CPT

## 2025-01-09 PROCEDURE — 86704 HEP B CORE ANTIBODY TOTAL: CPT

## 2025-01-09 PROCEDURE — 85007 BL SMEAR W/DIFF WBC COUNT: CPT

## 2025-01-09 RX ORDER — DEXAMETHASONE 4 MG/1
40 TABLET ORAL
Qty: 40 TABLET | Refills: 0 | Status: SHIPPED | OUTPATIENT
Start: 2025-01-09 | End: 2025-01-13

## 2025-01-09 RX ORDER — HEPARIN SODIUM (PORCINE) LOCK FLUSH IV SOLN 100 UNIT/ML 100 UNIT/ML
5 SOLUTION INTRAVENOUS
OUTPATIENT
Start: 2025-01-09

## 2025-01-09 RX ORDER — EPINEPHRINE 1 MG/ML
0.3 INJECTION, SOLUTION INTRAMUSCULAR; SUBCUTANEOUS EVERY 5 MIN PRN
Status: DISCONTINUED | OUTPATIENT
Start: 2025-01-09 | End: 2025-01-09 | Stop reason: HOSPADM

## 2025-01-09 RX ORDER — TRANEXAMIC ACID 650 MG/1
1300 TABLET ORAL 3 TIMES DAILY
Qty: 84 TABLET | Refills: 1 | Status: SHIPPED | OUTPATIENT
Start: 2025-01-09

## 2025-01-09 RX ORDER — EPINEPHRINE 1 MG/ML
0.3 INJECTION, SOLUTION INTRAMUSCULAR; SUBCUTANEOUS EVERY 5 MIN PRN
OUTPATIENT
Start: 2025-01-09

## 2025-01-09 RX ORDER — DIPHENHYDRAMINE HYDROCHLORIDE 50 MG/ML
50 INJECTION INTRAMUSCULAR; INTRAVENOUS
Status: DISCONTINUED | OUTPATIENT
Start: 2025-01-09 | End: 2025-01-09 | Stop reason: HOSPADM

## 2025-01-09 RX ORDER — AMINOCAPROIC ACID 1000 MG/1
4000 TABLET ORAL 3 TIMES DAILY
Qty: 360 TABLET | Refills: 0 | Status: SHIPPED | OUTPATIENT
Start: 2025-01-09 | End: 2025-01-09

## 2025-01-09 RX ORDER — HEPARIN SODIUM,PORCINE 10 UNIT/ML
5-20 VIAL (ML) INTRAVENOUS DAILY PRN
OUTPATIENT
Start: 2025-01-09

## 2025-01-09 RX ORDER — DIPHENHYDRAMINE HYDROCHLORIDE 50 MG/ML
50 INJECTION INTRAMUSCULAR; INTRAVENOUS
Start: 2025-01-09

## 2025-01-09 NOTE — LETTER
1/9/2025      Patricia Govea  [address] [place] [address]      Dear Colleague,    Thank you for referring your patient, Patricia Govea, to [facility]. Please see a copy of my visit note below.    [facility] Hematology and Oncology Progress Note    Patient: Patricia Govea  MRN: [id number]  Date of Service: Jan 9, 2025          Reason for Visit    No chief complaint on file.      Assessment and Plan     Cancer Staging   No matching staging information was found for the patient.      1.  Relapsed ITP: Her latest relapse was March 2021, now again this week.  She was given 4 weeks of Rituxan but did not respond well unclear if she didn't respond or just took long to respond.  we then started Promacta. Had to go up to 75mg. We have been holding that since April 16, 2021 due to platelets being high. She was given IVIG with latest relapse without any response. She has now taken promacta for about 14 days. (Started on 12/27). Platelets are basically 0. She is having gum bleeding and blood blisters in mouth. Likely too soon to know if promacta is working. Last time she took it, her platelets starting going up after about 4 weeks, but that was also after 4 weeks of IV rituxan. Unclear if she did not response to rituxan, or just took a couple of weeks.  Dr. Haywood he would like to add in Rituxan next week so I will order that to start next week.  She will be scheduled for that.  Still working on getting medical assistance so we will look into whether we can give that next week.  We will continue to do labs twice a week so she will return on Thursday.  Encourage her to come to the ER if having any bleeding, worsening of bruising.     2.  Gum bleeding with blood blisters, hematuria: I tried to get her Amicar earlier this week as an oral solution but it was $1000 out-of-pocket for her.  The pharmacist recommended trying tranexamic acid. He said  that if we give this 3 times a day in tablet form but have a compounding pharmacy make it into oral rinse.  By doing this for a couple of days and it has not helped at all.  She is actually probably having even more bleeding.  We were going to try to do oral Amicar but again it was quite expensive we cannot find any funds for that so we are going to try to give oral tranexamic acid.  We will give 1300 mg 3 times daily.  We were able to get a coupon for that so it will be about $70 per 2-week supply.  Patient will start and take 2 doses of that today.  Due to the ongoing bleeding we will give her a unit of platelets today.  We also talked with one of the hematology specialist that you and they did suggest trying a course of dexamethasone again so we will start 40 mg daily for 4 days tomorrow morning with breakfast.      ECOG Performance    0 - Independent    Distress Screening (within last 30 days)    1. How concerned are you about your ability to eat? : 0  2. How concerned are you about unintended weight loss or your current weight? : 0  3. How concerned are you about feeling depressed or very sad? : 0  4. How concerned are you about feeling anxious or very scared? : 0  5. Do you struggle with the loss of meaning and cassandra in your life? : Not at all  6. How concerned are you about work and home life issues that may be affected by your cancer? : 0  7. How concerned are you about knowing what resources are available to help you? : 0  8. Do you currently have what you would describe as Latter day or spiritual struggles?            : Not at all       Pain       Problem List    Patient Active Problem List   Diagnosis     GBS (Guillain [place] syndrome)     Bell's palsy     Anisocoria     Chronic ITP (idiopathic thrombocytopenia) (H)     Elevated blood pressure reading without diagnosis of hypertension     Cervical intraepithelial neoplasia grade 1     Class 3 severe obesity due to excess calories without serious comorbidity  with body mass index (BMI) of 40.0 to 44.9 in adult (H)     Idiopathic thrombocytopenic purpura (H)     Gums, bleeding     Gross hematuria        ______________________________________________________________________________    History of Present Illness    DIAGNOSIS:  Recurrent and refractory. originially had ITP in 2011. Then again in 2019 in pregnancy.  Then again in February 2021.  Now again in December 2024     CURRENT TREATMENT: received 2 days of IVIG in hospital on December 27 and December 28.  Started Promacta 75 mg daily on December 27.     PAST TREATMENT:   Rituxan for 4 weeks starting 2/4/21  Promacta. Started 25mg on 3/2/21. Increased to 50mg on 3/9. Increased to 75mg on 3/23/21. Held starting 4/16/21 due to platelets of 494.   Rituxan and prednisone. Rituxan last dose was March 8, 2019  Steroids  IVIG  Splenectomy 1/28/19. Initial response, but then rapid decrease in platelets.      INTERIM HISTORY:   She is here today add-on to my clinic schedule today.  Continues to have a lot of bleeding in her mouth.  She says she feels like it is getting worse.  She has blood blisters all over her tongue and the buccal mucosa.  She states that she wakes up with blood in her mouth.  She has some blood clots in her mouth.  Said that she does still have some hematuria but it is maybe a little bit better.  She is very frustrated and in general feels pretty awful with all the bleeding in her mouth.  Taking the TXA swish and spit a couple times a day for the last 2 days but that does not seem like it has helped at all.    Review of Systems    Pertinent items are noted in HPI.    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 pylori infection 2004     History of blood  transfusion      Immune thrombocytopenia (H)     2012---saw Dr. Haywood [facility]     Obesity        PHYSICAL EXAM  LMP 12/06/2024 (Approximate)     GENERAL: no acute distress. Cooperative in conversation. Here alone.   HEENT: Has blood blisters all over her mouth and tongue.  She has clots along her gumline and active bleeding along a couple of her gum lines.  RESP: Regular respiratory rate. No expiratory wheezes   MUSCULOSKELETAL: no bilateral leg swelling  NEURO: non focal. Alert and oriented x3.   PSYCH: within normal limits. No depression or anxiety.  SKIN: exposed skin is dry intact. Mild bruising noted.    Lab Results    Recent Results (from the past week)   Basic metabolic panel   Result Value Ref Range    Sodium 135 135 - 145 mmol/L    Potassium 4.1 3.4 - 5.3 mmol/L    Chloride 102 98 - 107 mmol/L    Carbon Dioxide (CO2) 23 22 - 29 mmol/L    Anion Gap 10 7 - 15 mmol/L    Urea Nitrogen 8.7 6.0 - 20.0 mg/dL    Creatinine 0.45 (L) 0.51 - 0.95 mg/dL    GFR Estimate >90 >60 mL/min/1.73m2    Calcium 9.2 8.8 - 10.4 mg/dL    Glucose 109 (H) 70 - 99 mg/dL   INR   Result Value Ref Range    INR 1.02 0.85 - 1.15   PTT   Result Value Ref Range    aPTT 22 22 - 38 Seconds   CBC with platelets and differential   Result Value Ref Range    WBC Count 4.7 4.0 - 11.0 10e3/uL    RBC Count 4.27 3.80 - 5.20 10e6/uL    Hemoglobin 13.8 11.7 - 15.7 g/dL    Hematocrit 39.3 35.0 - 47.0 %    MCV 92 78 - 100 fL    MCH 32.3 26.5 - 33.0 pg    MCHC 35.1 31.5 - 36.5 g/dL    RDW 13.5 10.0 - 15.0 %    Platelet Count <2 (LL) 150 - 450 10e3/uL   Manual Differential   Result Value Ref Range    % Neutrophils 21 %    % Lymphocytes 61 %    % Monocytes 10 %    % Eosinophils 4 %    % Basophils 2 %    % Metamyelocytes 2 %    Absolute Neutrophils 1.0 (L) 1.6 - 8.3 10e3/uL    Absolute Lymphocytes 2.9 0.8 - 5.3 10e3/uL    Absolute Monocytes 0.5 0.0 - 1.3 10e3/uL    Absolute Eosinophils 0.2 0.0 - 0.7 10e3/uL    Absolute Basophils 0.1 0.0 - 0.2 10e3/uL     Absolute Metamyelocytes 0.1 (H) <=0.0 10e3/uL    RBC Morphology Confirmed RBC Indices     Platelet Assessment  Automated Count Confirmed. Platelet morphology is normal.     Automated Count Confirmed. Platelet morphology is normal.    Polychromasia Slight (A) None Seen    NRBCs per 100 WBC 2 %    Absolute NRBCs 0.1 10e3/uL   Hepatitis B surface antigen   Result Value Ref Range    Hepatitis B Surface Antigen Nonreactive Nonreactive   Prepare pheresed platelets (unit)   Result Value Ref Range    Blood Component Type Platelets     Product Code A9554G38     Unit Status Transfused     Unit Number [id number]     CODING SYSTEM [id number]     ISSUE DATE AND TIME 02155003963298     UNIT ABO/RH O+     UNIT TYPE ISBT 5100    Hepatitis B Surface Antibody   Result Value Ref Range    Hepatitis B Surface Antibody Reactive     Hepatitis B Surface Antibody Instrument Value 747.00 <8.5 m[IU]/mL   Hepatitis B core antibody   Result Value Ref Range    Hepatitis B Core Antibody Total Reactive (A) Nonreactive   CBC with platelets and differential   Result Value Ref Range    WBC Count 4.4 4.0 - 11.0 10e3/uL    RBC Count 3.82 3.80 - 5.20 10e6/uL    Hemoglobin 12.3 11.7 - 15.7 g/dL    Hematocrit 34.7 (L) 35.0 - 47.0 %    MCV 91 78 - 100 fL    MCH 32.2 26.5 - 33.0 pg    MCHC 35.4 31.5 - 36.5 g/dL    RDW 13.4 10.0 - 15.0 %    Platelet Count <2 (LL) 150 - 450 10e3/uL   Manual Differential   Result Value Ref Range    % Neutrophils 16 %    % Lymphocytes 75 %    % Monocytes 4 %    % Eosinophils 3 %    % Basophils 1 %    % Metamyelocytes 1 %    Absolute Neutrophils 0.7 (L) 1.6 - 8.3 10e3/uL    Absolute Lymphocytes 3.3 0.8 - 5.3 10e3/uL    Absolute Monocytes 0.2 0.0 - 1.3 10e3/uL    Absolute Eosinophils 0.1 0.0 - 0.7 10e3/uL    Absolute Basophils 0.0 0.0 - 0.2 10e3/uL    Absolute Metamyelocytes 0.0 <=0.0 10e3/uL    RBC Morphology Confirmed RBC Indices     Platelet Assessment (A) Automated Count Confirmed. Platelet morphology is normal.     Automated  Count Confirmed. Platelet morphology is abnormal.    [place] Bodies Present (A) None Seen    Polychromasia Slight (A) None Seen   Prepare pheresed platelets (unit)   Result Value Ref Range    Blood Component Type Platelets     Product Code F8864O24     Unit Status Transfused     Unit Number [id number]     CODING SYSTEM XJPI976     ISSUE DATE AND TIME 20250109110400     UNIT ABO/RH A+     UNIT TYPE ISBT 6200        Imaging    No results found.  The longitudinal plan of care for the diagnosis(es)/condition(s) as documented were addressed during this visit. Due to the added complexity in care, I will continue to support Patricia in the subsequent management and with ongoing continuity of care.    Total time spent with patient in face to face time, chart review and documentation was 40 minutes.      Discussed with Dr. Haywood,  who also discussed with Dr. Whitehead as above from the [facility]      Signed by: CASTRO Cameron CNP      Again, thank you for allowing me to participate in the care of your patient.        Sincerely,        CASTRO Cameron CNP    Electronically signed

## 2025-01-09 NOTE — PROGRESS NOTES
DATE/TIME OF CALL RECEIVED FROM LAB:  01/09/25 at 9:23 AM   LAB TEST:  CBC/diff  LAB VALUE:  Platelets less than 2   PROVIDER NOTIFIED?: Yes  PROVIDER NAME: Caprice Elena CNP and Nubia CHAIDEZ  DATE/TIME LAB VALUE REPORTED TO PROVIDER: 1/9/25 at 9:224 AM  MECHANISM OF PROVIDER NOTIFICATION:  Secure Chat  PROVIDER RESPONSE: DM Sam (charge RN) spoke to WHITNEY Vasques about a plan to start Rituxan infusions next week. She will discuss with the patient today.  Tiffanie Constantino RN on 1/9/2025 at 9:37 AM

## 2025-01-09 NOTE — PROGRESS NOTES
Infusion Nursing Note:  Patricia Govea presents today for platelet transfusion.    Patient seen by provider today: Yes: Caprice CHAPMAN   present during visit today: Not Applicable.    Note: N/A.      Intravenous Access:  Peripheral IV placed.    Treatment Conditions:  Not Applicable.Seen in clinic and then sent to infusion for 1 unit of platelets due to gums bleeding. Pt received platelets two days ago also.       Post Infusion Assessment:  Patient tolerated infusion without incident.  Site patent and intact, free from redness, edema or discomfort.  No evidence of extravasations.  Access discontinued per protocol.       Discharge Plan:   Discharge instructions reviewed with: Patient.  Patient and/or family verbalized understanding of discharge instructions and all questions answered.  Patient discharged in stable condition accompanied by: self.  Departure Mode: Ambulatory.      Rowan Reed RN

## 2025-01-09 NOTE — PROGRESS NOTES
[facility] Hematology and Oncology Progress Note    Patient: Patricia Govea  MRN: [id number]  Date of Service: Jan 9, 2025          Reason for Visit    No chief complaint on file.      Assessment and Plan     Cancer Staging   No matching staging information was found for the patient.      1.  Relapsed ITP: Her latest relapse was March 2021, now again this week.  She was given 4 weeks of Rituxan but did not respond well unclear if she didn't respond or just took long to respond.  we then started Promacta. Had to go up to 75mg. We have been holding that since April 16, 2021 due to platelets being high. She was given IVIG with latest relapse without any response. She has now taken promacta for about 14 days. (Started on 12/27). Platelets are basically 0. She is having gum bleeding and blood blisters in mouth. Likely too soon to know if promacta is working. Last time she took it, her platelets starting going up after about 4 weeks, but that was also after 4 weeks of IV rituxan. Unclear if she did not response to rituxan, or just took a couple of weeks.  Dr. Haywood he would like to add in Rituxan next week so I will order that to start next week.  She will be scheduled for that.  Still working on getting medical assistance so we will look into whether we can give that next week.  We will continue to do labs twice a week so she will return on Thursday.  Encourage her to come to the ER if having any bleeding, worsening of bruising.     2.  Gum bleeding with blood blisters, hematuria: I tried to get her Amicar earlier this week as an oral solution but it was $1000 out-of-pocket for her.  The pharmacist recommended trying tranexamic acid. He said that if we give this 3 times a day in tablet form but have a compounding pharmacy make it into oral rinse.  By doing this for a couple of days and it has not helped at all.  She is actually probably having even more bleeding.  We were going to try to do oral  Amicar but again it was quite expensive we cannot find any funds for that so we are going to try to give oral tranexamic acid.  We will give 1300 mg 3 times daily.  We were able to get a coupon for that so it will be about $70 per 2-week supply.  Patient will start and take 2 doses of that today.  Due to the ongoing bleeding we will give her a unit of platelets today.  We also talked with one of the hematology specialist that you and they did suggest trying a course of dexamethasone again so we will start 40 mg daily for 4 days tomorrow morning with breakfast.      ECOG Performance    0 - Independent    Distress Screening (within last 30 days)    1. How concerned are you about your ability to eat? : 0  2. How concerned are you about unintended weight loss or your current weight? : 0  3. How concerned are you about feeling depressed or very sad? : 0  4. How concerned are you about feeling anxious or very scared? : 0  5. Do you struggle with the loss of meaning and cassandra in your life? : Not at all  6. How concerned are you about work and home life issues that may be affected by your cancer? : 0  7. How concerned are you about knowing what resources are available to help you? : 0  8. Do you currently have what you would describe as [employer] or spiritual struggles?            : Not at all       Pain       Problem List    Patient Active Problem List   Diagnosis    GBS (Guillain [place] syndrome)    Bell's palsy    Anisocoria    Chronic ITP (idiopathic thrombocytopenia) (H)    Elevated blood pressure reading without diagnosis of hypertension    Cervical intraepithelial neoplasia grade 1    Class 3 severe obesity due to excess calories without serious comorbidity with body mass index (BMI) of 40.0 to 44.9 in adult (H)    Idiopathic thrombocytopenic purpura (H)    Gums, bleeding    Gross hematuria        ______________________________________________________________________________    History of Present Illness    DIAGNOSIS:   Recurrent and refractory. originially had ITP in 2011. Then again in 2019 in pregnancy.  Then again in February 2021.  Now again in December 2024     CURRENT TREATMENT: received 2 days of IVIG in hospital on December 27 and December 28.  Started Promacta 75 mg daily on December 27.     PAST TREATMENT:   Rituxan for 4 weeks starting 2/4/21  Promacta. Started 25mg on 3/2/21. Increased to 50mg on 3/9. Increased to 75mg on 3/23/21. Held starting 4/16/21 due to platelets of 494.   Rituxan and prednisone. Rituxan last dose was March 8, 2019  Steroids  IVIG  Splenectomy 1/28/19. Initial response, but then rapid decrease in platelets.      INTERIM HISTORY:   She is here today add-on to my clinic schedule today.  Continues to have a lot of bleeding in her mouth.  She says she feels like it is getting worse.  She has blood blisters all over her tongue and the buccal mucosa.  She states that she wakes up with blood in her mouth.  She has some blood clots in her mouth.  Said that she does still have some hematuria but it is maybe a little bit better.  She is very frustrated and in general feels pretty awful with all the bleeding in her mouth.  Taking the TXA swish and spit a couple times a day for the last 2 days but that does not seem like it has helped at all.    Review of Systems    Pertinent items are noted in HPI.    Past History    Past Medical History:   Diagnosis Date    Acute idiopathic thrombocytopenic purpura (H) 2004    bone marrow biopsy negative for malignancy    Bell's palsy     Encounter for screening for cervical cancer 12/23/2018    Guillain Barré syndrome 2016    After influenza vaccine    H. pylori infection 2004    History of blood transfusion     Immune thrombocytopenia (H)     2012---saw Dr. Haywood [facility] Heme    Obesity        PHYSICAL EXAM  LMP 12/06/2024 (Approximate)     GENERAL: no acute distress. Cooperative in conversation. Here alone.   HEENT: Has blood blisters all over her mouth and tongue.  She has  clots along her gumline and active bleeding along a couple of her gum lines.  RESP: Regular respiratory rate. No expiratory wheezes   MUSCULOSKELETAL: no bilateral leg swelling  NEURO: non focal. Alert and oriented x3.   PSYCH: within normal limits. No depression or anxiety.  SKIN: exposed skin is dry intact. Mild bruising noted.    Lab Results    Recent Results (from the past week)   Basic metabolic panel   Result Value Ref Range    Sodium 135 135 - 145 mmol/L    Potassium 4.1 3.4 - 5.3 mmol/L    Chloride 102 98 - 107 mmol/L    Carbon Dioxide (CO2) 23 22 - 29 mmol/L    Anion Gap 10 7 - 15 mmol/L    Urea Nitrogen 8.7 6.0 - 20.0 mg/dL    Creatinine 0.45 (L) 0.51 - 0.95 mg/dL    GFR Estimate >90 >60 mL/min/1.73m2    Calcium 9.2 8.8 - 10.4 mg/dL    Glucose 109 (H) 70 - 99 mg/dL   INR   Result Value Ref Range    INR 1.02 0.85 - 1.15   PTT   Result Value Ref Range    aPTT 22 22 - 38 Seconds   CBC with platelets and differential   Result Value Ref Range    WBC Count 4.7 4.0 - 11.0 10e3/uL    RBC Count 4.27 3.80 - 5.20 10e6/uL    Hemoglobin 13.8 11.7 - 15.7 g/dL    Hematocrit 39.3 35.0 - 47.0 %    MCV 92 78 - 100 fL    MCH 32.3 26.5 - 33.0 pg    MCHC 35.1 31.5 - 36.5 g/dL    RDW 13.5 10.0 - 15.0 %    Platelet Count <2 (LL) 150 - 450 10e3/uL   Manual Differential   Result Value Ref Range    % Neutrophils 21 %    % Lymphocytes 61 %    % Monocytes 10 %    % Eosinophils 4 %    % Basophils 2 %    % Metamyelocytes 2 %    Absolute Neutrophils 1.0 (L) 1.6 - 8.3 10e3/uL    Absolute Lymphocytes 2.9 0.8 - 5.3 10e3/uL    Absolute Monocytes 0.5 0.0 - 1.3 10e3/uL    Absolute Eosinophils 0.2 0.0 - 0.7 10e3/uL    Absolute Basophils 0.1 0.0 - 0.2 10e3/uL    Absolute Metamyelocytes 0.1 (H) <=0.0 10e3/uL    RBC Morphology Confirmed RBC Indices     Platelet Assessment  Automated Count Confirmed. Platelet morphology is normal.     Automated Count Confirmed. Platelet morphology is normal.    Polychromasia Slight (A) None Seen    NRBCs per 100  WBC 2 %    Absolute NRBCs 0.1 10e3/uL   Hepatitis B surface antigen   Result Value Ref Range    Hepatitis B Surface Antigen Nonreactive Nonreactive   Prepare pheresed platelets (unit)   Result Value Ref Range    Blood Component Type Platelets     Product Code J0819L87     Unit Status Transfused     Unit Number [id number]     CODING SYSTEM [id number]     ISSUE DATE AND TIME [id number]     UNIT ABO/RH O+     UNIT TYPE ISBT 5100    Hepatitis B Surface Antibody   Result Value Ref Range    Hepatitis B Surface Antibody Reactive     Hepatitis B Surface Antibody Instrument Value 747.00 <8.5 m[IU]/mL   Hepatitis B core antibody   Result Value Ref Range    Hepatitis B Core Antibody Total Reactive (A) Nonreactive   CBC with platelets and differential   Result Value Ref Range    WBC Count 4.4 4.0 - 11.0 10e3/uL    RBC Count 3.82 3.80 - 5.20 10e6/uL    Hemoglobin 12.3 11.7 - 15.7 g/dL    Hematocrit 34.7 (L) 35.0 - 47.0 %    MCV 91 78 - 100 fL    MCH 32.2 26.5 - 33.0 pg    MCHC 35.4 31.5 - 36.5 g/dL    RDW 13.4 10.0 - 15.0 %    Platelet Count <2 (LL) 150 - 450 10e3/uL   Manual Differential   Result Value Ref Range    % Neutrophils 16 %    % Lymphocytes 75 %    % Monocytes 4 %    % Eosinophils 3 %    % Basophils 1 %    % Metamyelocytes 1 %    Absolute Neutrophils 0.7 (L) 1.6 - 8.3 10e3/uL    Absolute Lymphocytes 3.3 0.8 - 5.3 10e3/uL    Absolute Monocytes 0.2 0.0 - 1.3 10e3/uL    Absolute Eosinophils 0.1 0.0 - 0.7 10e3/uL    Absolute Basophils 0.0 0.0 - 0.2 10e3/uL    Absolute Metamyelocytes 0.0 <=0.0 10e3/uL    RBC Morphology Confirmed RBC Indices     Platelet Assessment (A) Automated Count Confirmed. Platelet morphology is normal.     Automated Count Confirmed. Platelet morphology is abnormal.    [place] Bodies Present (A) None Seen    Polychromasia Slight (A) None Seen   Prepare pheresed platelets (unit)   Result Value Ref Range    Blood Component Type Platelets     Product Code C2817S88     Unit Status Transfused      Unit Number [id number]     CODING SYSTEM [id number]     ISSUE DATE AND TIME [id number]     UNIT ABO/RH A+     UNIT TYPE ISBT 6200        Imaging    No results found.  The longitudinal plan of care for the diagnosis(es)/condition(s) as documented were addressed during this visit. Due to the added complexity in care, I will continue to support Patricia in the subsequent management and with ongoing continuity of care.    Total time spent with patient in face to face time, chart review and documentation was 40 minutes.      Discussed with Dr. Haywood,  who also discussed with Dr. Whitehead as above from the [facility]      Signed by: CASTRO Cameron CNP

## 2025-01-09 NOTE — PROGRESS NOTES
[facility]: Cancer Care                                                                                          Writer called patient to let her know that per Dr. Haywood, he consulted with another doctor at the [facility] and they both recommended that the patient try a 4 day course of dexamethasone 40mg daily, and that it might provide some benefit. Writer instructed the patient on the potential side effects of dexamethasone such as jitteriness and insomnia. Patient then asked if she should still continue taking the tranexamic acid and promacta. Per Caprice CHAPMAN, the patient should continue taking tranexamic acid and promacta in addition to the 4 day course of dexamethasone. Patient verbalized understanding and requested that the prescription get sent to [facility] on [address], [place].     Signature:  Nubia Toledo RN

## 2025-01-13 ENCOUNTER — LAB (OUTPATIENT)
Dept: INFUSION THERAPY | Facility: HOSPITAL | Age: 45
End: 2025-01-13

## 2025-01-13 ENCOUNTER — DOCUMENTATION ONLY (OUTPATIENT)
Dept: ONCOLOGY | Facility: HOSPITAL | Age: 45
End: 2025-01-13

## 2025-01-13 DIAGNOSIS — D69.3 CHRONIC ITP (IDIOPATHIC THROMBOCYTOPENIA) (H): ICD-10-CM

## 2025-01-13 LAB
BASOPHILS # BLD MANUAL: 0 10E3/UL (ref 0–0.2)
BASOPHILS NFR BLD MANUAL: 0 %
EOSINOPHIL # BLD MANUAL: 0 10E3/UL (ref 0–0.7)
EOSINOPHIL NFR BLD MANUAL: 0 %
ERYTHROCYTE [DISTWIDTH] IN BLOOD BY AUTOMATED COUNT: 13.9 % (ref 10–15)
HCT VFR BLD AUTO: 31.7 % (ref 35–47)
HGB BLD-MCNC: 10.9 G/DL (ref 11.7–15.7)
LYMPHOCYTES # BLD MANUAL: 5 10E3/UL (ref 0.8–5.3)
LYMPHOCYTES NFR BLD MANUAL: 72 %
MCH RBC QN AUTO: 32.2 PG (ref 26.5–33)
MCHC RBC AUTO-ENTMCNC: 34.4 G/DL (ref 31.5–36.5)
MCV RBC AUTO: 94 FL (ref 78–100)
METAMYELOCYTES # BLD MANUAL: 0.1 10E3/UL
METAMYELOCYTES NFR BLD MANUAL: 1 %
MONOCYTES # BLD MANUAL: 0.5 10E3/UL (ref 0–1.3)
MONOCYTES NFR BLD MANUAL: 7 %
MYELOCYTES # BLD MANUAL: 0.1 10E3/UL
MYELOCYTES NFR BLD MANUAL: 2 %
NEUTROPHILS # BLD MANUAL: 1.3 10E3/UL (ref 1.6–8.3)
NEUTROPHILS NFR BLD MANUAL: 18 %
NRBC # BLD AUTO: 0.5 10E3/UL
NRBC BLD MANUAL-RTO: 7 %
PLAT MORPH BLD: ABNORMAL
PLATELET # BLD AUTO: <2 10E3/UL (ref 150–450)
POLYCHROMASIA BLD QL SMEAR: SLIGHT
RBC # BLD AUTO: 3.39 10E6/UL (ref 3.8–5.2)
RBC MORPH BLD: ABNORMAL
WBC # BLD AUTO: 7 10E3/UL (ref 4–11)

## 2025-01-13 PROCEDURE — 85007 BL SMEAR W/DIFF WBC COUNT: CPT

## 2025-01-13 PROCEDURE — 85027 COMPLETE CBC AUTOMATED: CPT

## 2025-01-13 PROCEDURE — 36415 COLL VENOUS BLD VENIPUNCTURE: CPT

## 2025-01-13 NOTE — PROGRESS NOTES
DATE/TIME OF CALL RECEIVED FROM LAB:  01/13/25 at 9:13 AM   LAB TEST:  CBC/diff  LAB VALUE:  Platelets less than 2  PROVIDER NOTIFIED?: Yes  PROVIDER NAME: Dr. Haywood and DM Delacruz  DATE/TIME LAB VALUE REPORTED TO PROVIDER: 1/13/25 at 9:14  MECHANISM OF PROVIDER NOTIFICATION:  Secure Chat  PROVIDER RESPONSE: Noted. Patient is scheduled to return to the clinic on 1/15/25 to begin Rituxan treatments.   Tiffanie Constantino RN on 1/13/2025 at 9:35 AM

## 2025-01-15 ENCOUNTER — LAB (OUTPATIENT)
Dept: INFUSION THERAPY | Facility: HOSPITAL | Age: 45
End: 2025-01-15

## 2025-01-15 ENCOUNTER — ONCOLOGY VISIT (OUTPATIENT)
Dept: ONCOLOGY | Facility: HOSPITAL | Age: 45
End: 2025-01-15

## 2025-01-15 VITALS
SYSTOLIC BLOOD PRESSURE: 125 MMHG | RESPIRATION RATE: 16 BRPM | DIASTOLIC BLOOD PRESSURE: 68 MMHG | BODY MASS INDEX: 41.05 KG/M2 | HEART RATE: 100 BPM | WEIGHT: 199.8 LBS | OXYGEN SATURATION: 98 % | TEMPERATURE: 99.5 F

## 2025-01-15 DIAGNOSIS — D69.3 IDIOPATHIC THROMBOCYTOPENIC PURPURA (H): Primary | ICD-10-CM

## 2025-01-15 DIAGNOSIS — D69.3 CHRONIC ITP (IDIOPATHIC THROMBOCYTOPENIA) (H): ICD-10-CM

## 2025-01-15 LAB
BASOPHILS # BLD MANUAL: 0 10E3/UL (ref 0–0.2)
BASOPHILS NFR BLD MANUAL: 0 %
EOSINOPHIL # BLD MANUAL: 0.2 10E3/UL (ref 0–0.7)
EOSINOPHIL NFR BLD MANUAL: 3 %
ERYTHROCYTE [DISTWIDTH] IN BLOOD BY AUTOMATED COUNT: 14.2 % (ref 10–15)
HCT VFR BLD AUTO: 34.9 % (ref 35–47)
HGB BLD-MCNC: 11.6 G/DL (ref 11.7–15.7)
HOLD SPECIMEN: NORMAL
LYMPHOCYTES # BLD MANUAL: 4.9 10E3/UL (ref 0.8–5.3)
LYMPHOCYTES NFR BLD MANUAL: 60 %
MCH RBC QN AUTO: 31.8 PG (ref 26.5–33)
MCHC RBC AUTO-ENTMCNC: 33.2 G/DL (ref 31.5–36.5)
MCV RBC AUTO: 96 FL (ref 78–100)
METAMYELOCYTES # BLD MANUAL: 0.3 10E3/UL
METAMYELOCYTES NFR BLD MANUAL: 4 %
MONOCYTES # BLD MANUAL: 0.9 10E3/UL (ref 0–1.3)
MONOCYTES NFR BLD MANUAL: 11 %
NEUTROPHILS # BLD MANUAL: 1.8 10E3/UL (ref 1.6–8.3)
NEUTROPHILS NFR BLD MANUAL: 22 %
NRBC # BLD AUTO: 1.2 10E3/UL
NRBC BLD MANUAL-RTO: 15 %
PLAT MORPH BLD: ABNORMAL
PLATELET # BLD AUTO: 6 10E3/UL (ref 150–450)
POLYCHROMASIA BLD QL SMEAR: SLIGHT
RBC # BLD AUTO: 3.65 10E6/UL (ref 3.8–5.2)
RBC MORPH BLD: ABNORMAL
WBC # BLD AUTO: 8.1 10E3/UL (ref 4–11)

## 2025-01-15 PROCEDURE — 85007 BL SMEAR W/DIFF WBC COUNT: CPT

## 2025-01-15 PROCEDURE — 85014 HEMATOCRIT: CPT

## 2025-01-15 PROCEDURE — 36415 COLL VENOUS BLD VENIPUNCTURE: CPT

## 2025-01-15 PROCEDURE — 99213 OFFICE O/P EST LOW 20 MIN: CPT | Performed by: NURSE PRACTITIONER

## 2025-01-15 PROCEDURE — 99214 OFFICE O/P EST MOD 30 MIN: CPT | Performed by: NURSE PRACTITIONER

## 2025-01-15 PROCEDURE — G2211 COMPLEX E/M VISIT ADD ON: HCPCS | Performed by: NURSE PRACTITIONER

## 2025-01-15 ASSESSMENT — PAIN SCALES - GENERAL: PAINLEVEL_OUTOF10: NO PAIN (0)

## 2025-01-15 NOTE — PROGRESS NOTES
DATE/TIME OF CALL RECEIVED FROM LAB:  01/15/25 at 8:54 AM   LAB TEST:  Platelets  LAB VALUE:  6  PROVIDER NOTIFIED?: Yes  PROVIDER NAME: Caprice REYES CNP  DATE/TIME LAB VALUE REPORTED TO PROVIDER: 1/15/2025 at 8:55 AM   MECHANISM OF PROVIDER NOTIFICATION:  Provider seeing patient in clinic.  PROVIDER RESPONSE: Provider seeing patient in clinic.  Deyanira Campos RN

## 2025-01-15 NOTE — PROGRESS NOTES
"Oncology Rooming Note    January 15, 2025 8:36 AM   Patricia Govea is a 44 year old female who presents for:    Chief Complaint   Patient presents with    Oncology Clinic Visit     Return visit with lab and infusion. Chronic ITP (idiopathic thrombocytopenia).     Initial Vitals: /68 (BP Location: Left arm, Patient Position: Sitting, Cuff Size: Adult Large)   Pulse 100   Temp 99.5  F (37.5  C) (Oral)   Resp 16   Wt 90.6 kg (199 lb 12.8 oz)   LMP 12/06/2024 (Approximate)   SpO2 98%   BMI 41.05 kg/m   Estimated body mass index is 41.05 kg/m  as calculated from the following:    Height as of 1/6/25: 1.486 m (4' 10.5\").    Weight as of this encounter: 90.6 kg (199 lb 12.8 oz). Body surface area is 1.93 meters squared.  No Pain (0) Comment: Data Unavailable   Patient's last menstrual period was 12/06/2024 (approximate).  Allergies reviewed: Yes  Medications reviewed: Yes    Medications: Medication refills not needed today.  Pharmacy name entered into [facility]:    [id number] [place], [place] - [address] AT [facility] & [id number] [place], [place] - [address] AT [facility] & [address] [facility] - [place], [place] - [address] [id number] - [place], [place] - [address] [facility]    Frailty Screening:   Is the patient here for a new oncology consult visit in cancer care? 2. No      Clinical concerns: gums no longer bleeding, but pinkish bleeding from nose when blowing.   Caprice Elena CNP was notified.      Delliah Molina CMA            "

## 2025-01-15 NOTE — PROGRESS NOTES
[facility] Hematology and Oncology Progress Note    Patient: Patricia Govea  MRN: [id number]  Date of Service: Jeferson 15, 2025          Reason for Visit    Chief Complaint   Patient presents with    Oncology Clinic Visit     Return visit with lab and infusion. Chronic ITP (idiopathic thrombocytopenia).       Assessment and Plan     Cancer Staging   No matching staging information was found for the patient.      1.  Relapsed ITP: Her latest relapse was March 2021, now again 12/27/24.  Last time, she was given 4 weeks of Rituxan but did not respond well.  unclear if she didn't respond or just took long to respond.  we then started Promacta. Had to go up to 75mg. She did start responding to that after about 4 weeks of being on it. We had been holding that since April 16, 2021 due to platelets being high. In December presented with low platelets again. She was given IVIG with latest relapse without any response. She has now taken promacta for about 19 days. (Started on 12/27).  Since our 6 today which is up from basically 0 the last couple times it was checked.  Bleeding has now stopped.  We did add in dexamethasone 40 mg for 4 days last week on January 9.  She completed that.  We were sana start Rituxan today but because her platelets are starting to come up and her bleeding has now stopped I think we can hold off and monitor her platelets and if her platelets continue to improve we will continue just the Promacta.  We will have her return on Friday and if her platelets are not any better or lower or she is having any bleeding we will then start Rituxan for her weekly for 4 weeks.  Encourage her to come to the ER if having any bleeding, worsening of bruising.     2.  Gum bleeding with blood blisters, hematuria: Has now stopped.  She has been taking Amicar since Friday.  Stopped on Saturday.  She also was given dexamethasone.  At this time we will continue the Amicar until platelets improved to above  20-30.  Encouraged her to call us if the bleeding return      ECOG Performance    0 - Independent    Distress Screening (within last 30 days)    1. How concerned are you about your ability to eat? : 0  2. How concerned are you about unintended weight loss or your current weight? : 0  3. How concerned are you about feeling depressed or very sad? : 0  4. How concerned are you about feeling anxious or very scared? : 0  5. Do you struggle with the loss of meaning and cassandra in your life? : Not at all  6. How concerned are you about work and home life issues that may be affected by your cancer? : 0  7. How concerned are you about knowing what resources are available to help you? : 0  8. Do you currently have what you would describe as Worship or spiritual struggles?            : Not at all       Pain  Pain Score: No Pain (0)    Problem List    Patient Active Problem List   Diagnosis    GBS (Guillain [place] syndrome)    Bell's palsy    Anisocoria    Chronic ITP (idiopathic thrombocytopenia) (H)    Elevated blood pressure reading without diagnosis of hypertension    Cervical intraepithelial neoplasia grade 1    Class 3 severe obesity due to excess calories without serious comorbidity with body mass index (BMI) of 40.0 to 44.9 in adult (H)    Idiopathic thrombocytopenic purpura (H)    Gums, bleeding    Gross hematuria        ______________________________________________________________________________    History of Present Illness    DIAGNOSIS:  Recurrent and refractory. originially had ITP in 2011. Then again in 2019 in pregnancy.  Then again in February 2021.  Now again in December 2024     CURRENT TREATMENT: received 2 days of IVIG in hospital on December 27 and December 28.  Started Promacta 75 mg daily on December 27.     PAST TREATMENT:   Rituxan for 4 weeks starting 2/4/21  Promacta. Started 25mg on 3/2/21. Increased to 50mg on 3/9. Increased to 75mg on 3/23/21. Held starting 4/16/21 due to platelets of 494.   Rituxan  and prednisone. Rituxan last dose was March 8, 2019  Steroids  IVIG  Splenectomy 1/28/19. Initial response, but then rapid decrease in platelets.      INTERIM HISTORY:   Is here today to repeat labs and to potentially start Rituxan therapy.  Overall patient says she is feeling much better than last week.  She started noticing that her gums stop bleeding around Saturday.  She really has not had much at all this week.  Has no blood in her urine anymore.  Really has not noticed any worsening of bruising.  She took the 4 days of dexamethasone from Thursday Friday Saturday Sunday.  Continues on Promacta every day.  She also started taking Amicar to help with the bleeding last Thursday.    Review of Systems    Pertinent items are noted in HPI.    Past History    Past Medical History:   Diagnosis Date    Acute idiopathic thrombocytopenic purpura (H) 2004    bone marrow biopsy negative for malignancy    Bell's palsy     Encounter for screening for cervical cancer 12/23/2018    Guillain Barré syndrome 2016    After influenza vaccine    H. pylori infection 2004    History of blood transfusion     Immune thrombocytopenia (H)     2012---saw Dr. Haywood [facility] Heme    Obesity        PHYSICAL EXAM  /68 (BP Location: Left arm, Patient Position: Sitting, Cuff Size: Adult Large)   Pulse 100   Temp 99.5  F (37.5  C) (Oral)   Resp 16   Wt 90.6 kg (199 lb 12.8 oz)   LMP 12/06/2024 (Approximate)   SpO2 98%   BMI 41.05 kg/m      GENERAL: no acute distress. Cooperative in conversation. Here alone.   HEENT: blood blisters in mouth are basically gone. No bleeding noted.   RESP: Regular respiratory rate. No expiratory wheezes   MUSCULOSKELETAL: no bilateral leg swelling  NEURO: non focal. Alert and oriented x3.   PSYCH: within normal limits. No depression or anxiety.  SKIN: exposed skin is dry intact.     Lab Results    Recent Results (from the past week)   Hepatitis B Surface Antibody   Result Value Ref Range    Hepatitis B Surface  Antibody Reactive     Hepatitis B Surface Antibody Instrument Value 747.00 <8.5 m[IU]/mL   Hepatitis B core antibody   Result Value Ref Range    Hepatitis B Core Antibody Total Reactive (A) Nonreactive   CBC with platelets and differential   Result Value Ref Range    WBC Count 4.4 4.0 - 11.0 10e3/uL    RBC Count 3.82 3.80 - 5.20 10e6/uL    Hemoglobin 12.3 11.7 - 15.7 g/dL    Hematocrit 34.7 (L) 35.0 - 47.0 %    MCV 91 78 - 100 fL    MCH 32.2 26.5 - 33.0 pg    MCHC 35.4 31.5 - 36.5 g/dL    RDW 13.4 10.0 - 15.0 %    Platelet Count <2 (LL) 150 - 450 10e3/uL   Manual Differential   Result Value Ref Range    % Neutrophils 16 %    % Lymphocytes 75 %    % Monocytes 4 %    % Eosinophils 3 %    % Basophils 1 %    % Metamyelocytes 1 %    Absolute Neutrophils 0.7 (L) 1.6 - 8.3 10e3/uL    Absolute Lymphocytes 3.3 0.8 - 5.3 10e3/uL    Absolute Monocytes 0.2 0.0 - 1.3 10e3/uL    Absolute Eosinophils 0.1 0.0 - 0.7 10e3/uL    Absolute Basophils 0.0 0.0 - 0.2 10e3/uL    Absolute Metamyelocytes 0.0 <=0.0 10e3/uL    RBC Morphology Confirmed RBC Indices     Platelet Assessment (A) Automated Count Confirmed. Platelet morphology is normal.     Automated Count Confirmed. Platelet morphology is abnormal.    [place] Bodies Present (A) None Seen    Polychromasia Slight (A) None Seen   Prepare pheresed platelets (unit)   Result Value Ref Range    Blood Component Type Platelets     Product Code K9640Q18     Unit Status Transfused     Unit Number [id number]     CODING SYSTEM [id number]     ISSUE DATE AND TIME [id number]     UNIT ABO/RH A+     UNIT TYPE ISBT 6200    CBC with platelets and differential   Result Value Ref Range    WBC Count 7.0 4.0 - 11.0 10e3/uL    RBC Count 3.39 (L) 3.80 - 5.20 10e6/uL    Hemoglobin 10.9 (L) 11.7 - 15.7 g/dL    Hematocrit 31.7 (L) 35.0 - 47.0 %    MCV 94 78 - 100 fL    MCH 32.2 26.5 - 33.0 pg    MCHC 34.4 31.5 - 36.5 g/dL    RDW 13.9 10.0 - 15.0 %    Platelet Count <2 (LL) 150 - 450 10e3/uL   Manual  Differential   Result Value Ref Range    % Neutrophils 18 %    % Lymphocytes 72 %    % Monocytes 7 %    % Eosinophils 0 %    % Basophils 0 %    % Metamyelocytes 1 %    % Myelocytes 2 %    Absolute Neutrophils 1.3 (L) 1.6 - 8.3 10e3/uL    Absolute Lymphocytes 5.0 0.8 - 5.3 10e3/uL    Absolute Monocytes 0.5 0.0 - 1.3 10e3/uL    Absolute Eosinophils 0.0 0.0 - 0.7 10e3/uL    Absolute Basophils 0.0 0.0 - 0.2 10e3/uL    Absolute Metamyelocytes 0.1 (H) <=0.0 10e3/uL    Absolute Myelocytes 0.1 (H) <=0.0 10e3/uL    RBC Morphology Confirmed RBC Indices     Platelet Assessment  Automated Count Confirmed. Platelet morphology is normal.     Automated Count Confirmed. Platelet morphology is normal.    Polychromasia Slight (A) None Seen    NRBCs per 100 WBC 7 %    Absolute NRBCs 0.5 10e3/uL   CBC with platelets and differential   Result Value Ref Range    WBC Count 8.1 4.0 - 11.0 10e3/uL    RBC Count 3.65 (L) 3.80 - 5.20 10e6/uL    Hemoglobin 11.6 (L) 11.7 - 15.7 g/dL    Hematocrit 34.9 (L) 35.0 - 47.0 %    MCV 96 78 - 100 fL    MCH 31.8 26.5 - 33.0 pg    MCHC 33.2 31.5 - 36.5 g/dL    RDW 14.2 10.0 - 15.0 %    Platelet Count 6 (LL) 150 - 450 10e3/uL   Extra Green Top (Lithium Heparin) Tube   Result Value Ref Range    Hold Specimen [facility]    Manual Differential   Result Value Ref Range    % Neutrophils 22 %    % Lymphocytes 60 %    % Monocytes 11 %    % Eosinophils 3 %    % Basophils 0 %    % Metamyelocytes 4 %    Absolute Neutrophils 1.8 1.6 - 8.3 10e3/uL    Absolute Lymphocytes 4.9 0.8 - 5.3 10e3/uL    Absolute Monocytes 0.9 0.0 - 1.3 10e3/uL    Absolute Eosinophils 0.2 0.0 - 0.7 10e3/uL    Absolute Basophils 0.0 0.0 - 0.2 10e3/uL    Absolute Metamyelocytes 0.3 (H) <=0.0 10e3/uL    NRBCs per 100 WBC 15 %    Absolute NRBCs 1.2 10e3/uL    RBC Morphology Confirmed RBC Indices     Platelet Assessment  Automated Count Confirmed. Platelet morphology is normal.     Automated Count Confirmed. Platelet morphology is normal.    Polychromasia  Slight (A) None Seen       Imaging    No results found.    The longitudinal plan of care for the diagnosis(es)/condition(s) as documented were addressed during this visit. Due to the added complexity in care, I will continue to support Patricia in the subsequent management and with ongoing continuity of care.    Discussed with Dr. Haywood      Signed by: CASTRO Cameron CNP

## 2025-01-15 NOTE — LETTER
"1/15/2025      Patricia Govea  [address] [place] [address]      Dear Colleague,    Thank you for referring your patient, Patricia Govea, to the [facility]. Please see a copy of my visit note below.    Oncology Rooming Note    January 15, 2025 8:36 AM   Patricia Govea is a 44 year old female who presents for:    Chief Complaint   Patient presents with     Oncology Clinic Visit     Return visit with lab and infusion. Chronic ITP (idiopathic thrombocytopenia).     Initial Vitals: /68 (BP Location: Left arm, Patient Position: Sitting, Cuff Size: Adult Large)   Pulse 100   Temp 99.5  F (37.5  C) (Oral)   Resp 16   Wt 90.6 kg (199 lb 12.8 oz)   LMP 12/06/2024 (Approximate)   SpO2 98%   BMI 41.05 kg/m   Estimated body mass index is 41.05 kg/m  as calculated from the following:    Height as of 1/6/25: 1.486 m (4' 10.5\").    Weight as of this encounter: 90.6 kg (199 lb 12.8 oz). Body surface area is 1.93 meters squared.  No Pain (0) Comment: Data Unavailable   Patient's last menstrual period was 12/06/2024 (approximate).  Allergies reviewed: Yes  Medications reviewed: Yes    Medications: Medication refills not needed today.  Pharmacy name entered into [facility]:    [id number] - [place], [place] - [address] AT [facility] [id number] [facility] [address] AT [address] [facility] - [place], [place] - [address] [id number] - [place], [place] - [address] [facility]    Frailty Screening:   Is the patient here for a new oncology consult visit in cancer care? 2. No      Clinical concerns: gums no longer bleeding, but pinkish bleeding from nose when blowing.   Caprice Elena CNP was notified.      Delilah Molina CMA              DATE/TIME OF CALL RECEIVED FROM LAB:  01/15/25 at 8:54 AM   LAB TEST:  Platelets  LAB VALUE:  " 6  PROVIDER NOTIFIED?: Yes  PROVIDER NAME: Caprice DIANAClaudia WHITNEY  DATE/TIME LAB VALUE REPORTED TO PROVIDER: 1/15/2025 at 8:55 AM   MECHANISM OF PROVIDER NOTIFICATION:  Provider seeing patient in clinic.  PROVIDER RESPONSE: Provider seeing patient in clinic.  Deyanira Campos RN      [facility] Hematology and Oncology Progress Note    Patient: Patricia Govea  MRN: [id number]  Date of Service: Jeferson 15, 2025          Reason for Visit    Chief Complaint   Patient presents with     Oncology Clinic Visit     Return visit with lab and infusion. Chronic ITP (idiopathic thrombocytopenia).       Assessment and Plan     Cancer Staging   No matching staging information was found for the patient.      1.  Relapsed ITP: Her latest relapse was March 2021, now again 12/27/24.  Last time, she was given 4 weeks of Rituxan but did not respond well.  unclear if she didn't respond or just took long to respond.  we then started Promacta. Had to go up to 75mg. She did start responding to that after about 4 weeks of being on it. We had been holding that since April 16, 2021 due to platelets being high. In December presented with low platelets again. She was given IVIG with latest relapse without any response. She has now taken promacta for about 19 days. (Started on 12/27).  Since our 6 today which is up from basically 0 the last couple times it was checked.  Bleeding has now stopped.  We did add in dexamethasone 40 mg for 4 days last week on January 9.  She completed that.  We were sana start Rituxan today but because her platelets are starting to come up and her bleeding has now stopped I think we can hold off and monitor her platelets and if her platelets continue to improve we will continue just the Promacta.  We will have her return on Friday and if her platelets are not any better or lower or she is having any bleeding we will then start Rituxan for her weekly for 4 weeks.  Encourage her to come to the ER if having  any bleeding, worsening of bruising.     2.  Gum bleeding with blood blisters, hematuria: Has now stopped.  She has been taking Amicar since Friday.  Stopped on Saturday.  She also was given dexamethasone.  At this time we will continue the Amicar until platelets improved to above 20-30.  Encouraged her to call us if the bleeding return      ECOG Performance    0 - Independent    Distress Screening (within last 30 days)    1. How concerned are you about your ability to eat? : 0  2. How concerned are you about unintended weight loss or your current weight? : 0  3. How concerned are you about feeling depressed or very sad? : 0  4. How concerned are you about feeling anxious or very scared? : 0  5. Do you struggle with the loss of meaning and cassandra in your life? : Not at all  6. How concerned are you about work and home life issues that may be affected by your cancer? : 0  7. How concerned are you about knowing what resources are available to help you? : 0  8. Do you currently have what you would describe as Catholic or spiritual struggles?            : Not at all       Pain  Pain Score: No Pain (0)    Problem List    Patient Active Problem List   Diagnosis     GBS (Guillain [place] syndrome)     Bell's palsy     Anisocoria     Chronic ITP (idiopathic thrombocytopenia) (H)     Elevated blood pressure reading without diagnosis of hypertension     Cervical intraepithelial neoplasia grade 1     Class 3 severe obesity due to excess calories without serious comorbidity with body mass index (BMI) of 40.0 to 44.9 in adult (H)     Idiopathic thrombocytopenic purpura (H)     Gums, bleeding     Gross hematuria        ______________________________________________________________________________    History of Present Illness    DIAGNOSIS:  Recurrent and refractory. originially had ITP in 2011. Then again in 2019 in pregnancy.  Then again in February 2021.  Now again in December 2024     CURRENT TREATMENT: received 2 days of IVIG in  hospital on December 27 and December 28.  Started Promacta 75 mg daily on December 27.     PAST TREATMENT:   Rituxan for 4 weeks starting 2/4/21  Promacta. Started 25mg on 3/2/21. Increased to 50mg on 3/9. Increased to 75mg on 3/23/21. Held starting 4/16/21 due to platelets of 494.   Rituxan and prednisone. Rituxan last dose was March 8, 2019  Steroids  IVIG  Splenectomy 1/28/19. Initial response, but then rapid decrease in platelets.      INTERIM HISTORY:   Is here today to repeat labs and to potentially start Rituxan therapy.  Overall patient says she is feeling much better than last week.  She started noticing that her gums stop bleeding around Saturday.  She really has not had much at all this week.  Has no blood in her urine anymore.  Really has not noticed any worsening of bruising.  She took the 4 days of dexamethasone from Thursday Friday Saturday Sunday.  Continues on Promacta every day.  She also started taking Amicar to help with the bleeding last Thursday.    Review of Systems    Pertinent items are noted in HPI.    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 pylori infection 2004     History of blood transfusion      Immune thrombocytopenia (H)     2012---saw Dr. Haywood [facility] Heme     Obesity        PHYSICAL EXAM  /68 (BP Location: Left arm, Patient Position: Sitting, Cuff Size: Adult Large)   Pulse 100   Temp 99.5  F (37.5  C) (Oral)   Resp 16   Wt 90.6 kg (199 lb 12.8 oz)   LMP 12/06/2024 (Approximate)   SpO2 98%   BMI 41.05 kg/m      GENERAL: no acute distress. Cooperative in conversation. Here alone.   HEENT: blood blisters in mouth are basically gone. No bleeding noted.   RESP: Regular respiratory rate. No expiratory wheezes    MUSCULOSKELETAL: no bilateral leg swelling  NEURO: non focal. Alert and oriented x3.   PSYCH: within normal limits. No depression or anxiety.  SKIN: exposed skin is dry intact.     Lab Results    Recent Results (from the past week)   Hepatitis B Surface Antibody   Result Value Ref Range    Hepatitis B Surface Antibody Reactive     Hepatitis B Surface Antibody Instrument Value 747.00 <8.5 m[IU]/mL   Hepatitis B core antibody   Result Value Ref Range    Hepatitis B Core Antibody Total Reactive (A) Nonreactive   CBC with platelets and differential   Result Value Ref Range    WBC Count 4.4 4.0 - 11.0 10e3/uL    RBC Count 3.82 3.80 - 5.20 10e6/uL    Hemoglobin 12.3 11.7 - 15.7 g/dL    Hematocrit 34.7 (L) 35.0 - 47.0 %    MCV 91 78 - 100 fL    MCH 32.2 26.5 - 33.0 pg    MCHC 35.4 31.5 - 36.5 g/dL    RDW 13.4 10.0 - 15.0 %    Platelet Count <2 (LL) 150 - 450 10e3/uL   Manual Differential   Result Value Ref Range    % Neutrophils 16 %    % Lymphocytes 75 %    % Monocytes 4 %    % Eosinophils 3 %    % Basophils 1 %    % Metamyelocytes 1 %    Absolute Neutrophils 0.7 (L) 1.6 - 8.3 10e3/uL    Absolute Lymphocytes 3.3 0.8 - 5.3 10e3/uL    Absolute Monocytes 0.2 0.0 - 1.3 10e3/uL    Absolute Eosinophils 0.1 0.0 - 0.7 10e3/uL    Absolute Basophils 0.0 0.0 - 0.2 10e3/uL    Absolute Metamyelocytes 0.0 <=0.0 10e3/uL    RBC Morphology Confirmed RBC Indices     Platelet Assessment (A) Automated Count Confirmed. Platelet morphology is normal.     Automated Count Confirmed. Platelet morphology is abnormal.    [place] Bodies Present (A) None Seen    Polychromasia Slight (A) None Seen   Prepare pheresed platelets (unit)   Result Value Ref Range    Blood Component Type Platelets     Product Code B6472T76     Unit Status Transfused     Unit Number [id number]     CODING SYSTEM [id number]     ISSUE DATE AND TIME [id number]     UNIT ABO/RH A+     UNIT TYPE ISBT 6200    CBC with platelets and differential   Result Value Ref Range     WBC Count 7.0 4.0 - 11.0 10e3/uL    RBC Count 3.39 (L) 3.80 - 5.20 10e6/uL    Hemoglobin 10.9 (L) 11.7 - 15.7 g/dL    Hematocrit 31.7 (L) 35.0 - 47.0 %    MCV 94 78 - 100 fL    MCH 32.2 26.5 - 33.0 pg    MCHC 34.4 31.5 - 36.5 g/dL    RDW 13.9 10.0 - 15.0 %    Platelet Count <2 (LL) 150 - 450 10e3/uL   Manual Differential   Result Value Ref Range    % Neutrophils 18 %    % Lymphocytes 72 %    % Monocytes 7 %    % Eosinophils 0 %    % Basophils 0 %    % Metamyelocytes 1 %    % Myelocytes 2 %    Absolute Neutrophils 1.3 (L) 1.6 - 8.3 10e3/uL    Absolute Lymphocytes 5.0 0.8 - 5.3 10e3/uL    Absolute Monocytes 0.5 0.0 - 1.3 10e3/uL    Absolute Eosinophils 0.0 0.0 - 0.7 10e3/uL    Absolute Basophils 0.0 0.0 - 0.2 10e3/uL    Absolute Metamyelocytes 0.1 (H) <=0.0 10e3/uL    Absolute Myelocytes 0.1 (H) <=0.0 10e3/uL    RBC Morphology Confirmed RBC Indices     Platelet Assessment  Automated Count Confirmed. Platelet morphology is normal.     Automated Count Confirmed. Platelet morphology is normal.    Polychromasia Slight (A) None Seen    NRBCs per 100 WBC 7 %    Absolute NRBCs 0.5 10e3/uL   CBC with platelets and differential   Result Value Ref Range    WBC Count 8.1 4.0 - 11.0 10e3/uL    RBC Count 3.65 (L) 3.80 - 5.20 10e6/uL    Hemoglobin 11.6 (L) 11.7 - 15.7 g/dL    Hematocrit 34.9 (L) 35.0 - 47.0 %    MCV 96 78 - 100 fL    MCH 31.8 26.5 - 33.0 pg    MCHC 33.2 31.5 - 36.5 g/dL    RDW 14.2 10.0 - 15.0 %    Platelet Count 6 (LL) 150 - 450 10e3/uL   Extra Green Top (Lithium Heparin) Tube   Result Value Ref Range    Hold Specimen [facility]    Manual Differential   Result Value Ref Range    % Neutrophils 22 %    % Lymphocytes 60 %    % Monocytes 11 %    % Eosinophils 3 %    % Basophils 0 %    % Metamyelocytes 4 %    Absolute Neutrophils 1.8 1.6 - 8.3 10e3/uL    Absolute Lymphocytes 4.9 0.8 - 5.3 10e3/uL    Absolute Monocytes 0.9 0.0 - 1.3 10e3/uL    Absolute Eosinophils 0.2 0.0 - 0.7 10e3/uL    Absolute Basophils 0.0 0.0 - 0.2  10e3/uL    Absolute Metamyelocytes 0.3 (H) <=0.0 10e3/uL    NRBCs per 100 WBC 15 %    Absolute NRBCs 1.2 10e3/uL    RBC Morphology Confirmed RBC Indices     Platelet Assessment  Automated Count Confirmed. Platelet morphology is normal.     Automated Count Confirmed. Platelet morphology is normal.    Polychromasia Slight (A) None Seen       Imaging    No results found.    The longitudinal plan of care for the diagnosis(es)/condition(s) as documented were addressed during this visit. Due to the added complexity in care, I will continue to support Patricia in the subsequent management and with ongoing continuity of care.    Discussed with Dr. Haywood      Signed by: CASTRO Cameron CNP      Again, thank you for allowing me to participate in the care of your patient.        Sincerely,        CASTRO Cameron CNP    Electronically signed

## 2025-01-22 ENCOUNTER — LAB (OUTPATIENT)
Dept: INFUSION THERAPY | Facility: HOSPITAL | Age: 45
End: 2025-01-22

## 2025-01-22 DIAGNOSIS — R31.0 GROSS HEMATURIA: Primary | ICD-10-CM

## 2025-01-22 DIAGNOSIS — K06.8 GUMS, BLEEDING: ICD-10-CM

## 2025-01-22 DIAGNOSIS — D69.3 IDIOPATHIC THROMBOCYTOPENIC PURPURA (H): ICD-10-CM

## 2025-01-22 LAB
BASOPHILS # BLD AUTO: 0.1 10E3/UL (ref 0–0.2)
BASOPHILS NFR BLD AUTO: 1 %
EOSINOPHIL # BLD AUTO: 0.3 10E3/UL (ref 0–0.7)
EOSINOPHIL NFR BLD AUTO: 6 %
ERYTHROCYTE [DISTWIDTH] IN BLOOD BY AUTOMATED COUNT: 14.3 % (ref 10–15)
HCT VFR BLD AUTO: 30 % (ref 35–47)
HGB BLD-MCNC: 10.5 G/DL (ref 11.7–15.7)
IMM GRANULOCYTES # BLD: 0.1 10E3/UL
IMM GRANULOCYTES NFR BLD: 1 %
LYMPHOCYTES # BLD AUTO: 3 10E3/UL (ref 0.8–5.3)
LYMPHOCYTES NFR BLD AUTO: 58 %
MCH RBC QN AUTO: 32.6 PG (ref 26.5–33)
MCHC RBC AUTO-ENTMCNC: 35 G/DL (ref 31.5–36.5)
MCV RBC AUTO: 93 FL (ref 78–100)
MONOCYTES # BLD AUTO: 0.7 10E3/UL (ref 0–1.3)
MONOCYTES NFR BLD AUTO: 13 %
NEUTROPHILS # BLD AUTO: 1.2 10E3/UL (ref 1.6–8.3)
NEUTROPHILS NFR BLD AUTO: 22 %
NRBC # BLD AUTO: 0.2 10E3/UL
NRBC BLD AUTO-RTO: 3 /100
PLATELET # BLD AUTO: 306 10E3/UL (ref 150–450)
RBC # BLD AUTO: 3.22 10E6/UL (ref 3.8–5.2)
WBC # BLD AUTO: 5.2 10E3/UL (ref 4–11)

## 2025-01-22 PROCEDURE — 36415 COLL VENOUS BLD VENIPUNCTURE: CPT | Performed by: NURSE PRACTITIONER

## 2025-01-22 PROCEDURE — 85025 COMPLETE CBC W/AUTO DIFF WBC: CPT | Performed by: NURSE PRACTITIONER

## 2025-01-22 RX ORDER — EPINEPHRINE 1 MG/ML
0.3 INJECTION, SOLUTION INTRAMUSCULAR; SUBCUTANEOUS EVERY 5 MIN PRN
OUTPATIENT
Start: 2025-01-22

## 2025-01-22 RX ORDER — HEPARIN SODIUM (PORCINE) LOCK FLUSH IV SOLN 100 UNIT/ML 100 UNIT/ML
5 SOLUTION INTRAVENOUS
OUTPATIENT
Start: 2025-01-22

## 2025-01-22 RX ORDER — DIPHENHYDRAMINE HYDROCHLORIDE 50 MG/ML
50 INJECTION INTRAMUSCULAR; INTRAVENOUS
Start: 2025-01-22

## 2025-01-22 RX ORDER — HEPARIN SODIUM,PORCINE 10 UNIT/ML
5-20 VIAL (ML) INTRAVENOUS DAILY PRN
OUTPATIENT
Start: 2025-01-22

## 2025-01-23 ENCOUNTER — OFFICE VISIT (OUTPATIENT)
Dept: FAMILY MEDICINE | Facility: CLINIC | Age: 45
End: 2025-01-23

## 2025-01-23 VITALS
TEMPERATURE: 98.2 F | HEART RATE: 89 BPM | OXYGEN SATURATION: 97 % | DIASTOLIC BLOOD PRESSURE: 84 MMHG | WEIGHT: 200 LBS | SYSTOLIC BLOOD PRESSURE: 137 MMHG | BODY MASS INDEX: 40.32 KG/M2 | HEIGHT: 59 IN | RESPIRATION RATE: 20 BRPM

## 2025-01-23 DIAGNOSIS — E66.813 CLASS 3 SEVERE OBESITY DUE TO EXCESS CALORIES WITHOUT SERIOUS COMORBIDITY WITH BODY MASS INDEX (BMI) OF 40.0 TO 44.9 IN ADULT (H): ICD-10-CM

## 2025-01-23 DIAGNOSIS — E66.01 CLASS 3 SEVERE OBESITY DUE TO EXCESS CALORIES WITHOUT SERIOUS COMORBIDITY WITH BODY MASS INDEX (BMI) OF 40.0 TO 44.9 IN ADULT (H): ICD-10-CM

## 2025-01-23 DIAGNOSIS — R03.0 ELEVATED BLOOD PRESSURE READING WITHOUT DIAGNOSIS OF HYPERTENSION: Primary | ICD-10-CM

## 2025-01-23 DIAGNOSIS — D69.3 CHRONIC ITP (IDIOPATHIC THROMBOCYTOPENIA) (H): ICD-10-CM

## 2025-01-23 PROCEDURE — 90471 IMMUNIZATION ADMIN: CPT

## 2025-01-23 PROCEDURE — 90619 MENACWY-TT VACCINE IM: CPT

## 2025-01-23 PROCEDURE — 90677 PCV20 VACCINE IM: CPT

## 2025-01-23 PROCEDURE — 99214 OFFICE O/P EST MOD 30 MIN: CPT | Mod: 25

## 2025-01-23 PROCEDURE — 90472 IMMUNIZATION ADMIN EACH ADD: CPT

## 2025-01-23 NOTE — PROGRESS NOTES
Prior to immunization administration, verified patients identity using patient s name and date of birth. Please see Immunization Activity for additional information.     Screening Questionnaire for Adult Immunization    Are you sick today?   No   Do you have allergies to medications, food, a vaccine component or latex?   No   Have you ever had a serious reaction after receiving a vaccination?   No   Do you have a long-term health problem with heart, lung, kidney, or metabolic disease (e.g., diabetes), asthma, a blood disorder, no spleen, complement component deficiency, a cochlear implant, or a spinal fluid leak?  Are you on long-term aspirin therapy?   No   Do you have cancer, leukemia, HIV/AIDS, or any other immune system problem?   No   Do you have a parent, brother, or sister with an immune system problem?   No   In the past 3 months, have you taken medications that affect  your immune system, such as prednisone, other steroids, or anticancer drugs; drugs for the treatment of rheumatoid arthritis, Crohn s disease, or psoriasis; or have you had radiation treatments?   No   Have you had a seizure, or a brain or other nervous system problem?   No   During the past year, have you received a transfusion of blood or blood    products, or been given immune (gamma) globulin or antiviral drug?   No   For women: Are you pregnant or is there a chance you could become       pregnant during the next month?   No   Have you received any vaccinations in the past 4 weeks?   No     Immunization questionnaire answers were all negative.      Patient instructed to remain in clinic for 15 minutes afterwards, and to report any adverse reactions.     Screening performed by Wolfgang Velarde CMA on 1/23/2025 at 9:58 AM.

## 2025-01-23 NOTE — PROGRESS NOTES
"  Assessment & Plan     I was present with the medical student who participated in the service and in the documentation of this note. I have verified the history and personally performed the physical exam and medical decision making, and have verified the content of the note, which accurately reflects my assessment of the patient and the plan of care.     Mikal Brown MD  [facility]      Elevated blood pressure reading without diagnosis of hypertension  Blood pressure was 137/84 in office today. Past BPs at home have been 130s/80s. No dizziness, sleeping issues, or vision issues. Have been normal at visits over  6 months ago. Recent highs likely related to stress from her recurrent bleeding   - Discussed lifestyle modifications focusing on reducing/eliminating soda intake   - Continue monitoring BP at home   - Follow up if seeing 140/90s at home    Chronic ITP (idiopathic thrombocytopenia) (H)  Chronic ITP and recent ED visit for severe thrombocytopenia. Follows with hematology-oncology. No bleeding or bruising today. Last seen 1/22/25 and platelets were 306. Continues on promacta 75mg daily.   - Following with hematology-oncology  - Continue with promactra       Class 3 severe obesity due to excess calories without serious comorbidity with body mass index (BMI) of 40.0 to 44.9 in adult (H)  BMI  Estimated body mass index is 40.4 kg/m  as calculated from the following:    Height as of this encounter: 1.499 m (4' 11\").    Weight as of this encounter: 90.7 kg (200 lb).   Weight management plan: Discussed healthy diet and exercise guidelines  Discussed general healthy diet and lifestyle changes today but This brief and mostly focused on cutting down sugary beverages at this time.  Also discussed her past experience with pharmacologic treatment and she has received phentermine which was helpful for a time in the past and was going to be started on Contrave due to heart concerns however " "insurance did not cover that.  We did discuss some of the pharmacologic options but I think it would be great to have her come in for a 40-minute dedicated weight management visit.  - Discussed risk/benefits of phentermine, contrave and GLP-1    Return in about 3 months (around 4/23/2025), or Follow up weight, 40 minutes visit.    Subjective   Patricia is a 44 year old, presenting for the following health issues:  Hypertension      1/23/2025     9:05 AM   Additional Questions   Roomed by kelley   Accompanied by ANTONIO         1/23/2025    Information    services provided? No     HPI     Bleeding in gums has stopped, saw hematologist yesterday and platelets went up from 28 to 306. Stopped taking tranexamic acid pills yesterday and started taking 1 tablet a day of promacta.    Never taken blood pressure medications, used to take BP at home and it has been in the upper 130s/80s. Gets headaches regularly, no dizziness, no sleeping issues, and no vision issues. Does not exercise regularly. Diet consist of rice, meat, spinach, lentils, and a good amount of vegetables. Drinks 2-3 sodas a day and water. Has had significant stress in life with being in the hospital recently for ITP and caring for 5 year old son.         Objective    /84   Pulse 89   Temp 98.2  F (36.8  C) (Oral)   Resp 20   Ht 1.499 m (4' 11\")   Wt 90.7 kg (200 lb)   LMP 01/23/2025 (Exact Date)   SpO2 97%   BMI 40.40 kg/m    Body mass index is 40.4 kg/m .    Physical Exam   GENERAL: alert and no distress  RESP: lungs clear to auscultation - no rales, rhonchi or wheezes  CV: regular rate and rhythm, normal S1 S2, no S3 or S4, no murmur, click or rub, no peripheral edema  MS: no gross musculoskeletal defects noted, no edema        Heather Ramirez, MS3  [facility]    I was present with the medical student who participated in the service and in the documentation of this note. I have verified the history and personally " performed the physical exam and medical decision making, and have verified the content of the note, which accurately reflects my assessment of the patient and the plan of care.     Mikal Brown MD  [facility]      Signed Electronically by: Mikal Brown MD

## 2025-01-27 ENCOUNTER — LAB (OUTPATIENT)
Dept: INFUSION THERAPY | Facility: HOSPITAL | Age: 45
End: 2025-01-27

## 2025-01-27 ENCOUNTER — PATIENT OUTREACH (OUTPATIENT)
Dept: ONCOLOGY | Facility: HOSPITAL | Age: 45
End: 2025-01-27

## 2025-01-27 ENCOUNTER — TELEPHONE (OUTPATIENT)
Dept: ONCOLOGY | Facility: HOSPITAL | Age: 45
End: 2025-01-27

## 2025-01-27 ENCOUNTER — DOCUMENTATION ONLY (OUTPATIENT)
Dept: ONCOLOGY | Facility: HOSPITAL | Age: 45
End: 2025-01-27

## 2025-01-27 DIAGNOSIS — D69.3 IDIOPATHIC THROMBOCYTOPENIC PURPURA (H): Primary | ICD-10-CM

## 2025-01-27 DIAGNOSIS — D69.3 CHRONIC ITP (IDIOPATHIC THROMBOCYTOPENIA) (H): Primary | ICD-10-CM

## 2025-01-27 DIAGNOSIS — D69.3 IDIOPATHIC THROMBOCYTOPENIC PURPURA (H): ICD-10-CM

## 2025-01-27 LAB
BASOPHILS # BLD AUTO: 0.1 10E3/UL (ref 0–0.2)
BASOPHILS NFR BLD AUTO: 2 %
EOSINOPHIL # BLD AUTO: 0.4 10E3/UL (ref 0–0.7)
EOSINOPHIL NFR BLD AUTO: 8 %
ERYTHROCYTE [DISTWIDTH] IN BLOOD BY AUTOMATED COUNT: 13.8 % (ref 10–15)
HCT VFR BLD AUTO: 31.5 % (ref 35–47)
HGB BLD-MCNC: 10.6 G/DL (ref 11.7–15.7)
IMM GRANULOCYTES # BLD: 0.1 10E3/UL
IMM GRANULOCYTES NFR BLD: 1 %
LYMPHOCYTES # BLD AUTO: 2.5 10E3/UL (ref 0.8–5.3)
LYMPHOCYTES NFR BLD AUTO: 54 %
MCH RBC QN AUTO: 31.5 PG (ref 26.5–33)
MCHC RBC AUTO-ENTMCNC: 33.7 G/DL (ref 31.5–36.5)
MCV RBC AUTO: 94 FL (ref 78–100)
MONOCYTES # BLD AUTO: 0.6 10E3/UL (ref 0–1.3)
MONOCYTES NFR BLD AUTO: 13 %
NEUTROPHILS # BLD AUTO: 1.1 10E3/UL (ref 1.6–8.3)
NEUTROPHILS NFR BLD AUTO: 23 %
NRBC # BLD AUTO: 0 10E3/UL
NRBC BLD AUTO-RTO: 0 /100
PLAT MORPH BLD: NORMAL
PLATELET # BLD AUTO: <2 10E3/UL (ref 150–450)
RBC # BLD AUTO: 3.37 10E6/UL (ref 3.8–5.2)
RBC MORPH BLD: NORMAL
WBC # BLD AUTO: 4.6 10E3/UL (ref 4–11)

## 2025-01-27 PROCEDURE — 85018 HEMOGLOBIN: CPT

## 2025-01-27 PROCEDURE — 36415 COLL VENOUS BLD VENIPUNCTURE: CPT

## 2025-01-27 PROCEDURE — 85004 AUTOMATED DIFF WBC COUNT: CPT

## 2025-01-27 RX ORDER — METHYLPREDNISOLONE SODIUM SUCCINATE 125 MG/2ML
125 INJECTION INTRAMUSCULAR; INTRAVENOUS
Status: CANCELLED | OUTPATIENT
Start: 2025-02-19

## 2025-01-27 RX ORDER — ALBUTEROL SULFATE 0.83 MG/ML
2.5 SOLUTION RESPIRATORY (INHALATION)
Status: CANCELLED | OUTPATIENT
Start: 2025-02-05

## 2025-01-27 RX ORDER — ACETAMINOPHEN 325 MG/1
650 TABLET ORAL ONCE
Status: CANCELLED | OUTPATIENT
Start: 2025-02-19

## 2025-01-27 RX ORDER — DEXAMETHASONE 4 MG/1
40 TABLET ORAL
Qty: 40 TABLET | Refills: 0 | Status: SHIPPED | OUTPATIENT
Start: 2025-01-27 | End: 2025-01-31

## 2025-01-27 RX ORDER — ALBUTEROL SULFATE 90 UG/1
1-2 INHALANT RESPIRATORY (INHALATION)
Status: CANCELLED
Start: 2025-01-29

## 2025-01-27 RX ORDER — HEPARIN SODIUM,PORCINE 10 UNIT/ML
5-20 VIAL (ML) INTRAVENOUS DAILY PRN
Status: CANCELLED | OUTPATIENT
Start: 2025-01-29

## 2025-01-27 RX ORDER — LORAZEPAM 2 MG/ML
0.5 INJECTION INTRAMUSCULAR EVERY 4 HOURS PRN
Status: CANCELLED | OUTPATIENT
Start: 2025-02-05

## 2025-01-27 RX ORDER — MEPERIDINE HYDROCHLORIDE 25 MG/ML
25 INJECTION INTRAMUSCULAR; INTRAVENOUS; SUBCUTANEOUS
Status: CANCELLED | OUTPATIENT
Start: 2025-02-19

## 2025-01-27 RX ORDER — DIPHENHYDRAMINE HYDROCHLORIDE 50 MG/ML
50 INJECTION INTRAMUSCULAR; INTRAVENOUS
Status: CANCELLED
Start: 2025-02-19

## 2025-01-27 RX ORDER — ALBUTEROL SULFATE 0.83 MG/ML
2.5 SOLUTION RESPIRATORY (INHALATION)
Status: CANCELLED | OUTPATIENT
Start: 2025-01-29

## 2025-01-27 RX ORDER — DIPHENHYDRAMINE HCL 50 MG
50 CAPSULE ORAL ONCE
Status: CANCELLED | OUTPATIENT
Start: 2025-02-05

## 2025-01-27 RX ORDER — HEPARIN SODIUM,PORCINE 10 UNIT/ML
5-20 VIAL (ML) INTRAVENOUS DAILY PRN
Status: CANCELLED | OUTPATIENT
Start: 2025-02-19

## 2025-01-27 RX ORDER — DIPHENHYDRAMINE HYDROCHLORIDE 50 MG/ML
25 INJECTION INTRAMUSCULAR; INTRAVENOUS
Status: CANCELLED
Start: 2025-02-05

## 2025-01-27 RX ORDER — DIPHENHYDRAMINE HCL 50 MG
50 CAPSULE ORAL ONCE
Status: CANCELLED | OUTPATIENT
Start: 2025-01-29

## 2025-01-27 RX ORDER — EPINEPHRINE 1 MG/ML
0.3 INJECTION, SOLUTION INTRAMUSCULAR; SUBCUTANEOUS EVERY 5 MIN PRN
Status: CANCELLED | OUTPATIENT
Start: 2025-02-12

## 2025-01-27 RX ORDER — MEPERIDINE HYDROCHLORIDE 25 MG/ML
25 INJECTION INTRAMUSCULAR; INTRAVENOUS; SUBCUTANEOUS
Status: CANCELLED | OUTPATIENT
Start: 2025-02-05

## 2025-01-27 RX ORDER — ALBUTEROL SULFATE 0.83 MG/ML
2.5 SOLUTION RESPIRATORY (INHALATION)
Status: CANCELLED | OUTPATIENT
Start: 2025-02-12

## 2025-01-27 RX ORDER — METHYLPREDNISOLONE SODIUM SUCCINATE 125 MG/2ML
125 INJECTION INTRAMUSCULAR; INTRAVENOUS
Status: CANCELLED | OUTPATIENT
Start: 2025-02-05

## 2025-01-27 RX ORDER — ALBUTEROL SULFATE 90 UG/1
1-2 INHALANT RESPIRATORY (INHALATION)
Status: CANCELLED
Start: 2025-02-19

## 2025-01-27 RX ORDER — DIPHENHYDRAMINE HYDROCHLORIDE 50 MG/ML
25 INJECTION INTRAMUSCULAR; INTRAVENOUS
Status: CANCELLED
Start: 2025-02-19

## 2025-01-27 RX ORDER — HEPARIN SODIUM,PORCINE 10 UNIT/ML
5-20 VIAL (ML) INTRAVENOUS DAILY PRN
Status: CANCELLED | OUTPATIENT
Start: 2025-02-05

## 2025-01-27 RX ORDER — HEPARIN SODIUM (PORCINE) LOCK FLUSH IV SOLN 100 UNIT/ML 100 UNIT/ML
5 SOLUTION INTRAVENOUS
Status: CANCELLED | OUTPATIENT
Start: 2025-02-19

## 2025-01-27 RX ORDER — DIPHENHYDRAMINE HYDROCHLORIDE 50 MG/ML
50 INJECTION INTRAMUSCULAR; INTRAVENOUS
Status: CANCELLED
Start: 2025-02-05

## 2025-01-27 RX ORDER — MEPERIDINE HYDROCHLORIDE 25 MG/ML
25 INJECTION INTRAMUSCULAR; INTRAVENOUS; SUBCUTANEOUS
Status: CANCELLED | OUTPATIENT
Start: 2025-01-29

## 2025-01-27 RX ORDER — METHYLPREDNISOLONE SODIUM SUCCINATE 40 MG/ML
40 INJECTION INTRAMUSCULAR; INTRAVENOUS
Status: CANCELLED
Start: 2025-02-12

## 2025-01-27 RX ORDER — METHYLPREDNISOLONE SODIUM SUCCINATE 40 MG/ML
40 INJECTION INTRAMUSCULAR; INTRAVENOUS
Status: CANCELLED
Start: 2025-02-05

## 2025-01-27 RX ORDER — DIPHENHYDRAMINE HYDROCHLORIDE 50 MG/ML
50 INJECTION INTRAMUSCULAR; INTRAVENOUS
Status: CANCELLED
Start: 2025-01-29

## 2025-01-27 RX ORDER — LORAZEPAM 2 MG/ML
0.5 INJECTION INTRAMUSCULAR EVERY 4 HOURS PRN
Status: CANCELLED | OUTPATIENT
Start: 2025-02-12

## 2025-01-27 RX ORDER — EPINEPHRINE 1 MG/ML
0.3 INJECTION, SOLUTION INTRAMUSCULAR; SUBCUTANEOUS EVERY 5 MIN PRN
Status: CANCELLED | OUTPATIENT
Start: 2025-02-05

## 2025-01-27 RX ORDER — DIPHENHYDRAMINE HYDROCHLORIDE 50 MG/ML
50 INJECTION INTRAMUSCULAR; INTRAVENOUS
Status: CANCELLED
Start: 2025-02-12

## 2025-01-27 RX ORDER — DIPHENHYDRAMINE HCL 50 MG
50 CAPSULE ORAL ONCE
Status: CANCELLED | OUTPATIENT
Start: 2025-02-12

## 2025-01-27 RX ORDER — ACETAMINOPHEN 325 MG/1
650 TABLET ORAL ONCE
Status: CANCELLED | OUTPATIENT
Start: 2025-02-05

## 2025-01-27 RX ORDER — DIPHENHYDRAMINE HYDROCHLORIDE 50 MG/ML
25 INJECTION INTRAMUSCULAR; INTRAVENOUS
Status: CANCELLED
Start: 2025-01-29

## 2025-01-27 RX ORDER — HEPARIN SODIUM,PORCINE 10 UNIT/ML
5-20 VIAL (ML) INTRAVENOUS DAILY PRN
Status: CANCELLED | OUTPATIENT
Start: 2025-02-12

## 2025-01-27 RX ORDER — MEPERIDINE HYDROCHLORIDE 25 MG/ML
25 INJECTION INTRAMUSCULAR; INTRAVENOUS; SUBCUTANEOUS
Status: CANCELLED | OUTPATIENT
Start: 2025-02-12

## 2025-01-27 RX ORDER — METHYLPREDNISOLONE SODIUM SUCCINATE 40 MG/ML
40 INJECTION INTRAMUSCULAR; INTRAVENOUS
Status: CANCELLED
Start: 2025-02-19

## 2025-01-27 RX ORDER — HEPARIN SODIUM (PORCINE) LOCK FLUSH IV SOLN 100 UNIT/ML 100 UNIT/ML
5 SOLUTION INTRAVENOUS
Status: CANCELLED | OUTPATIENT
Start: 2025-01-29

## 2025-01-27 RX ORDER — ALBUTEROL SULFATE 90 UG/1
1-2 INHALANT RESPIRATORY (INHALATION)
Status: CANCELLED
Start: 2025-02-12

## 2025-01-27 RX ORDER — ALBUTEROL SULFATE 90 UG/1
1-2 INHALANT RESPIRATORY (INHALATION)
Status: CANCELLED
Start: 2025-02-05

## 2025-01-27 RX ORDER — ACETAMINOPHEN 325 MG/1
650 TABLET ORAL ONCE
Status: CANCELLED | OUTPATIENT
Start: 2025-01-29

## 2025-01-27 RX ORDER — DIPHENHYDRAMINE HCL 50 MG
50 CAPSULE ORAL ONCE
Status: CANCELLED | OUTPATIENT
Start: 2025-02-19

## 2025-01-27 RX ORDER — METHYLPREDNISOLONE SODIUM SUCCINATE 125 MG/2ML
125 INJECTION INTRAMUSCULAR; INTRAVENOUS
Status: CANCELLED | OUTPATIENT
Start: 2025-02-12

## 2025-01-27 RX ORDER — LORAZEPAM 2 MG/ML
0.5 INJECTION INTRAMUSCULAR EVERY 4 HOURS PRN
Status: CANCELLED | OUTPATIENT
Start: 2025-02-19

## 2025-01-27 RX ORDER — ACETAMINOPHEN 325 MG/1
650 TABLET ORAL ONCE
Status: CANCELLED | OUTPATIENT
Start: 2025-02-12

## 2025-01-27 RX ORDER — EPINEPHRINE 1 MG/ML
0.3 INJECTION, SOLUTION INTRAMUSCULAR; SUBCUTANEOUS EVERY 5 MIN PRN
Status: CANCELLED | OUTPATIENT
Start: 2025-02-19

## 2025-01-27 RX ORDER — EPINEPHRINE 1 MG/ML
0.3 INJECTION, SOLUTION INTRAMUSCULAR; SUBCUTANEOUS EVERY 5 MIN PRN
Status: CANCELLED | OUTPATIENT
Start: 2025-01-29

## 2025-01-27 RX ORDER — ALBUTEROL SULFATE 0.83 MG/ML
2.5 SOLUTION RESPIRATORY (INHALATION)
Status: CANCELLED | OUTPATIENT
Start: 2025-02-19

## 2025-01-27 RX ORDER — LORAZEPAM 2 MG/ML
0.5 INJECTION INTRAMUSCULAR EVERY 4 HOURS PRN
Status: CANCELLED | OUTPATIENT
Start: 2025-01-29

## 2025-01-27 RX ORDER — METHYLPREDNISOLONE SODIUM SUCCINATE 40 MG/ML
40 INJECTION INTRAMUSCULAR; INTRAVENOUS
Status: CANCELLED
Start: 2025-01-29

## 2025-01-27 RX ORDER — METHYLPREDNISOLONE SODIUM SUCCINATE 125 MG/2ML
125 INJECTION INTRAMUSCULAR; INTRAVENOUS
Status: CANCELLED | OUTPATIENT
Start: 2025-01-29

## 2025-01-27 RX ORDER — HEPARIN SODIUM (PORCINE) LOCK FLUSH IV SOLN 100 UNIT/ML 100 UNIT/ML
5 SOLUTION INTRAVENOUS
Status: CANCELLED | OUTPATIENT
Start: 2025-02-12

## 2025-01-27 RX ORDER — DIPHENHYDRAMINE HYDROCHLORIDE 50 MG/ML
25 INJECTION INTRAMUSCULAR; INTRAVENOUS
Status: CANCELLED
Start: 2025-02-12

## 2025-01-27 RX ORDER — HEPARIN SODIUM (PORCINE) LOCK FLUSH IV SOLN 100 UNIT/ML 100 UNIT/ML
5 SOLUTION INTRAVENOUS
Status: CANCELLED | OUTPATIENT
Start: 2025-02-05

## 2025-01-27 NOTE — TELEPHONE ENCOUNTER
"I received a phone call today from Patricia. She called to say that on Thursday she got her period and it was very heavy. She also noticed some blood when she blew her nose. She has more bruising and \"red dots\" on her hands. She is requesting to come in today for lab work to check her platelets. This plan is okay with Caprice Elena CNP. Patient is scheduled to come at 10:00am today. Will route this to DM Delacruz as FYI and to watch for results.     Tiffanie Constantino RN on 1/27/2025 at 8:11 AM    " normal...

## 2025-01-27 NOTE — PROGRESS NOTES
[facility]: Cancer Care                                                                                          Dr. Haywood got back to Caprice Elena CNP who put in orders for obinutuzumab on days 1 and day 15 (total of 2 doses) as well as Nplate injections.  RN care coordinator has been updated and she will update the scheduling team.    Signature:  Cecy Crump RN

## 2025-01-27 NOTE — PROGRESS NOTES
[facility]: Cancer Care                                                                                          Called patient to let her know that per Dr. Haywood, the patient will be starting obinutuzumab and nplate injections instead of rituxan. Provided education on both medications and answered the patient's questions to the best of my ability. Writer also instructed the patient that a dexamethasone prescription was sent to the pharmacy and to take 40mg daily for 4 days. Writer reviewed the patient's upcoming appt with Dr colon, labs and infusion for Wed 1/29/25. Patient verbalized understanding.     Signature:  Nubia Toledo RN

## 2025-01-27 NOTE — PROGRESS NOTES
[facility]: Cancer Care                                                                                            Situation: Patient chart reviewed by care coordinator.    Background: Patient with a diagnosis of relapsed ITP.    Assessment: Patient comes into the clinic today as an add-on lab only as she is having a very heavy.,  Has petechiae and is bruising easily.    Platelets came back today at less than 2.  Per Caprice Elena CNP, patient should continue Amicar.  Patient tells me she started this on Saturday.  She should also stay on Promacta.  Treatment plan is being entered for Rituxan infusions.  She should get this weekly x 4.    Plan/Recommendations: Of note, patient is seeing Dr. Haywood on 1/29.  We will have her infusions get started on that day.    Signature:  Cecy Crump RN

## 2025-01-27 NOTE — PROGRESS NOTES
DATE/TIME OF CALL RECEIVED FROM LAB:  01/27/25 at 10:08 AM   LAB TEST:  CBC/diff  LAB VALUE:  Platelets less than 2  PROVIDER NOTIFIED?: Yes  PROVIDER NAME: Caprice Elena CNP, DM Sam and DM Delacruz  DATE/TIME LAB VALUE REPORTED TO PROVIDER: 1/27/25 at 10:09 AM  MECHANISM OF PROVIDER NOTIFICATION:  Secure Chat  PROVIDER RESPONSE: Patient is waiting in the lobby for results. DM Sam will discuss a plan with WHITNEY Vasques and notify the patient.   Tiffanie Constantino RN on 1/27/2025 at 10:55 AM

## 2025-01-29 ENCOUNTER — INFUSION THERAPY VISIT (OUTPATIENT)
Dept: INFUSION THERAPY | Facility: HOSPITAL | Age: 45
End: 2025-01-29

## 2025-01-29 ENCOUNTER — LAB (OUTPATIENT)
Dept: INFUSION THERAPY | Facility: HOSPITAL | Age: 45
End: 2025-01-29

## 2025-01-29 ENCOUNTER — ONCOLOGY VISIT (OUTPATIENT)
Dept: ONCOLOGY | Facility: HOSPITAL | Age: 45
End: 2025-01-29

## 2025-01-29 ENCOUNTER — DOCUMENTATION ONLY (OUTPATIENT)
Dept: ONCOLOGY | Facility: HOSPITAL | Age: 45
End: 2025-01-29

## 2025-01-29 VITALS
TEMPERATURE: 98.6 F | SYSTOLIC BLOOD PRESSURE: 138 MMHG | HEART RATE: 86 BPM | RESPIRATION RATE: 16 BRPM | OXYGEN SATURATION: 97 % | DIASTOLIC BLOOD PRESSURE: 74 MMHG

## 2025-01-29 VITALS
SYSTOLIC BLOOD PRESSURE: 141 MMHG | OXYGEN SATURATION: 98 % | TEMPERATURE: 98.4 F | HEART RATE: 78 BPM | RESPIRATION RATE: 16 BRPM | DIASTOLIC BLOOD PRESSURE: 76 MMHG

## 2025-01-29 DIAGNOSIS — N92.0 MENORRHAGIA WITH REGULAR CYCLE: ICD-10-CM

## 2025-01-29 DIAGNOSIS — D69.3 CHRONIC ITP (IDIOPATHIC THROMBOCYTOPENIA) (H): ICD-10-CM

## 2025-01-29 DIAGNOSIS — D69.3 IDIOPATHIC THROMBOCYTOPENIC PURPURA (H): Primary | ICD-10-CM

## 2025-01-29 DIAGNOSIS — D69.3 ACUTE IDIOPATHIC THROMBOCYTOPENIC PURPURA (H): Primary | ICD-10-CM

## 2025-01-29 DIAGNOSIS — Z30.09 ENCOUNTER FOR OTHER GENERAL COUNSELING OR ADVICE ON CONTRACEPTION: ICD-10-CM

## 2025-01-29 LAB
ALBUMIN SERPL BCG-MCNC: 3.9 G/DL (ref 3.5–5.2)
ALP SERPL-CCNC: 80 U/L (ref 40–150)
ALT SERPL W P-5'-P-CCNC: 18 U/L (ref 0–50)
ANION GAP SERPL CALCULATED.3IONS-SCNC: 9 MMOL/L (ref 7–15)
AST SERPL W P-5'-P-CCNC: 18 U/L (ref 0–45)
BASOPHILS # BLD AUTO: 0 10E3/UL (ref 0–0.2)
BASOPHILS NFR BLD AUTO: 1 %
BILIRUB SERPL-MCNC: 0.2 MG/DL
BUN SERPL-MCNC: 13.4 MG/DL (ref 6–20)
CALCIUM SERPL-MCNC: 9 MG/DL (ref 8.8–10.4)
CHLORIDE SERPL-SCNC: 105 MMOL/L (ref 98–107)
CREAT SERPL-MCNC: 0.57 MG/DL (ref 0.51–0.95)
EGFRCR SERPLBLD CKD-EPI 2021: >90 ML/MIN/1.73M2
EOSINOPHIL # BLD AUTO: 0.1 10E3/UL (ref 0–0.7)
EOSINOPHIL NFR BLD AUTO: 1 %
ERYTHROCYTE [DISTWIDTH] IN BLOOD BY AUTOMATED COUNT: 14.2 % (ref 10–15)
GLUCOSE SERPL-MCNC: 104 MG/DL (ref 70–99)
HBV CORE AB SERPL QL IA: NONREACTIVE
HBV SURFACE AB SERPL IA-ACNC: 245 M[IU]/ML
HBV SURFACE AB SERPL IA-ACNC: REACTIVE M[IU]/ML
HBV SURFACE AG SERPL QL IA: NONREACTIVE
HCO3 SERPL-SCNC: 27 MMOL/L (ref 22–29)
HCT VFR BLD AUTO: 30.1 % (ref 35–47)
HGB BLD-MCNC: 10.2 G/DL (ref 11.7–15.7)
IMM GRANULOCYTES # BLD: 0.1 10E3/UL
IMM GRANULOCYTES NFR BLD: 1 %
LYMPHOCYTES # BLD AUTO: 3 10E3/UL (ref 0.8–5.3)
LYMPHOCYTES NFR BLD AUTO: 49 %
MCH RBC QN AUTO: 31.4 PG (ref 26.5–33)
MCHC RBC AUTO-ENTMCNC: 33.9 G/DL (ref 31.5–36.5)
MCV RBC AUTO: 93 FL (ref 78–100)
MONOCYTES # BLD AUTO: 0.7 10E3/UL (ref 0–1.3)
MONOCYTES NFR BLD AUTO: 12 %
NEUTROPHILS # BLD AUTO: 2.2 10E3/UL (ref 1.6–8.3)
NEUTROPHILS NFR BLD AUTO: 37 %
NRBC # BLD AUTO: 0 10E3/UL
NRBC BLD AUTO-RTO: 0 /100
PLATELET # BLD AUTO: <2 10E3/UL (ref 150–450)
POTASSIUM SERPL-SCNC: 3.6 MMOL/L (ref 3.4–5.3)
PROT SERPL-MCNC: 7.7 G/DL (ref 6.4–8.3)
RBC # BLD AUTO: 3.25 10E6/UL (ref 3.8–5.2)
SODIUM SERPL-SCNC: 141 MMOL/L (ref 135–145)
WBC # BLD AUTO: 6 10E3/UL (ref 4–11)

## 2025-01-29 PROCEDURE — 85004 AUTOMATED DIFF WBC COUNT: CPT

## 2025-01-29 PROCEDURE — 82310 ASSAY OF CALCIUM: CPT | Performed by: INTERNAL MEDICINE

## 2025-01-29 PROCEDURE — 36415 COLL VENOUS BLD VENIPUNCTURE: CPT | Performed by: INTERNAL MEDICINE

## 2025-01-29 PROCEDURE — 86706 HEP B SURFACE ANTIBODY: CPT | Performed by: INTERNAL MEDICINE

## 2025-01-29 PROCEDURE — 250N000013 HC RX MED GY IP 250 OP 250 PS 637: Performed by: INTERNAL MEDICINE

## 2025-01-29 PROCEDURE — 99213 OFFICE O/P EST LOW 20 MIN: CPT | Performed by: INTERNAL MEDICINE

## 2025-01-29 PROCEDURE — 96372 THER/PROPH/DIAG INJ SC/IM: CPT | Performed by: INTERNAL MEDICINE

## 2025-01-29 PROCEDURE — 250N000011 HC RX IP 250 OP 636: Performed by: INTERNAL MEDICINE

## 2025-01-29 PROCEDURE — 99215 OFFICE O/P EST HI 40 MIN: CPT | Performed by: INTERNAL MEDICINE

## 2025-01-29 PROCEDURE — 87340 HEPATITIS B SURFACE AG IA: CPT | Performed by: INTERNAL MEDICINE

## 2025-01-29 PROCEDURE — 82247 BILIRUBIN TOTAL: CPT | Performed by: INTERNAL MEDICINE

## 2025-01-29 PROCEDURE — 86704 HEP B CORE ANTIBODY TOTAL: CPT | Performed by: INTERNAL MEDICINE

## 2025-01-29 PROCEDURE — 36415 COLL VENOUS BLD VENIPUNCTURE: CPT

## 2025-01-29 PROCEDURE — 258N000003 HC RX IP 258 OP 636: Performed by: INTERNAL MEDICINE

## 2025-01-29 PROCEDURE — 84155 ASSAY OF PROTEIN SERUM: CPT | Performed by: INTERNAL MEDICINE

## 2025-01-29 RX ORDER — PROCHLORPERAZINE MALEATE 10 MG
10 TABLET ORAL EVERY 6 HOURS PRN
Qty: 30 TABLET | Refills: 2 | Status: SHIPPED | OUTPATIENT
Start: 2025-01-29

## 2025-01-29 RX ORDER — METHYLPREDNISOLONE SODIUM SUCCINATE 40 MG/ML
40 INJECTION INTRAMUSCULAR; INTRAVENOUS
Status: DISCONTINUED | OUTPATIENT
Start: 2025-01-29 | End: 2025-01-29 | Stop reason: HOSPADM

## 2025-01-29 RX ORDER — NORETHINDRONE 5 MG/1
5 TABLET ORAL DAILY
Qty: 30 TABLET | Refills: 3 | Status: SHIPPED | OUTPATIENT
Start: 2025-01-29

## 2025-01-29 RX ORDER — EPINEPHRINE 1 MG/ML
0.3 INJECTION, SOLUTION INTRAMUSCULAR; SUBCUTANEOUS EVERY 5 MIN PRN
Status: DISCONTINUED | OUTPATIENT
Start: 2025-01-29 | End: 2025-01-29 | Stop reason: HOSPADM

## 2025-01-29 RX ORDER — HEPARIN SODIUM (PORCINE) LOCK FLUSH IV SOLN 100 UNIT/ML 100 UNIT/ML
5 SOLUTION INTRAVENOUS
OUTPATIENT
Start: 2025-02-14

## 2025-01-29 RX ORDER — DIPHENHYDRAMINE HYDROCHLORIDE 50 MG/ML
50 INJECTION INTRAMUSCULAR; INTRAVENOUS
Start: 2025-02-14

## 2025-01-29 RX ORDER — TRANEXAMIC ACID 650 MG/1
1300 TABLET ORAL 3 TIMES DAILY
COMMUNITY
End: 2025-01-29

## 2025-01-29 RX ORDER — DIPHENHYDRAMINE HYDROCHLORIDE 50 MG/ML
25 INJECTION INTRAMUSCULAR; INTRAVENOUS
Status: DISCONTINUED | OUTPATIENT
Start: 2025-01-29 | End: 2025-01-29 | Stop reason: HOSPADM

## 2025-01-29 RX ORDER — DIPHENHYDRAMINE HYDROCHLORIDE 50 MG/ML
50 INJECTION INTRAMUSCULAR; INTRAVENOUS
Status: DISCONTINUED | OUTPATIENT
Start: 2025-01-29 | End: 2025-01-29 | Stop reason: HOSPADM

## 2025-01-29 RX ORDER — HEPARIN SODIUM,PORCINE 10 UNIT/ML
5-20 VIAL (ML) INTRAVENOUS DAILY PRN
OUTPATIENT
Start: 2025-02-14

## 2025-01-29 RX ORDER — LORAZEPAM 2 MG/ML
0.5 INJECTION INTRAMUSCULAR EVERY 4 HOURS PRN
Status: CANCELLED | OUTPATIENT
Start: 2025-01-31

## 2025-01-29 RX ORDER — ACETAMINOPHEN 325 MG/1
650 TABLET ORAL ONCE
Status: CANCELLED
Start: 2025-01-31

## 2025-01-29 RX ORDER — EPINEPHRINE 1 MG/ML
0.3 INJECTION, SOLUTION INTRAMUSCULAR; SUBCUTANEOUS EVERY 5 MIN PRN
OUTPATIENT
Start: 2025-02-14

## 2025-01-29 RX ORDER — ALBUTEROL SULFATE 90 UG/1
1-2 INHALANT RESPIRATORY (INHALATION)
Status: CANCELLED
Start: 2025-01-31

## 2025-01-29 RX ORDER — DIPHENHYDRAMINE HYDROCHLORIDE 50 MG/ML
50 INJECTION INTRAMUSCULAR; INTRAVENOUS
Status: CANCELLED
Start: 2025-01-31

## 2025-01-29 RX ORDER — ALBUTEROL SULFATE 0.83 MG/ML
2.5 SOLUTION RESPIRATORY (INHALATION)
Status: DISCONTINUED | OUTPATIENT
Start: 2025-01-29 | End: 2025-01-29 | Stop reason: HOSPADM

## 2025-01-29 RX ORDER — ACETAMINOPHEN 325 MG/1
650 TABLET ORAL ONCE
Status: COMPLETED | OUTPATIENT
Start: 2025-01-29 | End: 2025-01-29

## 2025-01-29 RX ORDER — METHYLPREDNISOLONE SODIUM SUCCINATE 40 MG/ML
40 INJECTION INTRAMUSCULAR; INTRAVENOUS
Start: 2025-02-14

## 2025-01-29 RX ORDER — LORAZEPAM 2 MG/ML
0.5 INJECTION INTRAMUSCULAR EVERY 4 HOURS PRN
OUTPATIENT
Start: 2025-02-14

## 2025-01-29 RX ORDER — MEPERIDINE HYDROCHLORIDE 25 MG/ML
25 INJECTION INTRAMUSCULAR; INTRAVENOUS; SUBCUTANEOUS
Status: CANCELLED | OUTPATIENT
Start: 2025-01-31

## 2025-01-29 RX ORDER — EPINEPHRINE 1 MG/ML
0.3 INJECTION, SOLUTION INTRAMUSCULAR; SUBCUTANEOUS EVERY 5 MIN PRN
Status: CANCELLED | OUTPATIENT
Start: 2025-01-31

## 2025-01-29 RX ORDER — MEPERIDINE HYDROCHLORIDE 25 MG/ML
25 INJECTION INTRAMUSCULAR; INTRAVENOUS; SUBCUTANEOUS
Status: DISCONTINUED | OUTPATIENT
Start: 2025-01-29 | End: 2025-01-29 | Stop reason: HOSPADM

## 2025-01-29 RX ORDER — MEPERIDINE HYDROCHLORIDE 25 MG/ML
25 INJECTION INTRAMUSCULAR; INTRAVENOUS; SUBCUTANEOUS
OUTPATIENT
Start: 2025-02-14

## 2025-01-29 RX ORDER — DIPHENHYDRAMINE HYDROCHLORIDE 50 MG/ML
25 INJECTION INTRAMUSCULAR; INTRAVENOUS
Status: CANCELLED
Start: 2025-01-31

## 2025-01-29 RX ORDER — ACETAMINOPHEN 325 MG/1
650 TABLET ORAL ONCE
OUTPATIENT
Start: 2025-02-14

## 2025-01-29 RX ORDER — ALBUTEROL SULFATE 0.83 MG/ML
2.5 SOLUTION RESPIRATORY (INHALATION)
Status: CANCELLED | OUTPATIENT
Start: 2025-01-31

## 2025-01-29 RX ORDER — METHYLPREDNISOLONE SODIUM SUCCINATE 40 MG/ML
40 INJECTION INTRAMUSCULAR; INTRAVENOUS
Status: CANCELLED
Start: 2025-01-31

## 2025-01-29 RX ORDER — HEPARIN SODIUM,PORCINE 10 UNIT/ML
5-20 VIAL (ML) INTRAVENOUS DAILY PRN
Status: CANCELLED | OUTPATIENT
Start: 2025-01-31

## 2025-01-29 RX ORDER — DIPHENHYDRAMINE HYDROCHLORIDE 50 MG/ML
25 INJECTION INTRAMUSCULAR; INTRAVENOUS
Start: 2025-02-14

## 2025-01-29 RX ORDER — ALBUTEROL SULFATE 90 UG/1
1-2 INHALANT RESPIRATORY (INHALATION)
Status: DISCONTINUED | OUTPATIENT
Start: 2025-01-29 | End: 2025-01-29 | Stop reason: HOSPADM

## 2025-01-29 RX ORDER — ALBUTEROL SULFATE 90 UG/1
1-2 INHALANT RESPIRATORY (INHALATION)
Start: 2025-02-14

## 2025-01-29 RX ORDER — HEPARIN SODIUM (PORCINE) LOCK FLUSH IV SOLN 100 UNIT/ML 100 UNIT/ML
5 SOLUTION INTRAVENOUS
Status: CANCELLED | OUTPATIENT
Start: 2025-01-31

## 2025-01-29 RX ORDER — ALBUTEROL SULFATE 0.83 MG/ML
2.5 SOLUTION RESPIRATORY (INHALATION)
OUTPATIENT
Start: 2025-02-14

## 2025-01-29 RX ADMIN — OBINUTUZUMAB 1000 MG: 1000 INJECTION, SOLUTION, CONCENTRATE INTRAVENOUS at 10:36

## 2025-01-29 RX ADMIN — ROMIPLOSTIM 180 MCG: 250 INJECTION, POWDER, LYOPHILIZED, FOR SOLUTION SUBCUTANEOUS at 11:36

## 2025-01-29 RX ADMIN — DEXAMETHASONE SODIUM PHOSPHATE 20 MG: 10 INJECTION, SOLUTION INTRAMUSCULAR; INTRAVENOUS at 10:10

## 2025-01-29 RX ADMIN — ACETAMINOPHEN 650 MG: 325 TABLET ORAL at 09:53

## 2025-01-29 RX ADMIN — DIPHENHYDRAMINE HYDROCHLORIDE 50 MG: 50 INJECTION INTRAMUSCULAR; INTRAVENOUS at 09:56

## 2025-01-29 RX ADMIN — SODIUM CHLORIDE 250 ML: 9 INJECTION, SOLUTION INTRAVENOUS at 09:53

## 2025-01-29 NOTE — PROGRESS NOTES
"Oncology Rooming Note    January 29, 2025 11:04 AM   Patricia Govea is a 44 year old female who presents for:    Chief Complaint   Patient presents with    Oncology Clinic Visit     Initial Vitals: /74   Pulse 86   Temp 98.6  F (37  C)   Resp 16   LMP 01/23/2025 (Exact Date)   SpO2 97%  Estimated body mass index is 40.4 kg/m  as calculated from the following:    Height as of 1/23/25: 1.499 m (4' 11\").    Weight as of 1/23/25: 90.7 kg (200 lb). There is no height or weight on file to calculate BSA.  Data Unavailable Comment: Data Unavailable   Patient's last menstrual period was 01/23/2025 (exact date).  Allergies reviewed: Yes  Medications reviewed: Yes    Medications: MEDICATION REFILLS NEEDED TODAY. Provider was notified.  Pharmacy name entered into [email or web address]:    [id number] - [place], [place] - [address] AT [id number] [place] [address] [facility] - [place], [place] - [address] [id number] - [place], [place] - [address] [facility]    Frailty Screening:   Is the patient here for a new oncology consult visit in cancer care? 2. No      Clinical concerns: Low PLT, bruising, petechiae-Dr. Haywood aware.       Bruna Verma RN             "

## 2025-01-29 NOTE — PROGRESS NOTES
Infusion Nursing Note:  Patricia Govea presents today for D1C1/NPLATE.    Patient seen by provider today: Yes:    present during visit today: Not Applicable.    Note: Hep B studies re-drawn today. Dr. Haywood aware of reactive studies from 2 weeks ago.  Medication given today explained to pt and written information given.  Pt pre-medicated prior to Obinutuzumab-medicated increased as ordered and pt monitored for s/s of reaction.   Pt tolerated infusion well with no issues.  Per Dr. Haywood, pt should RTC 1/31 for labs but will not get PLT transfusion.   Medications reviewed with pt. She will  Rx's as ordered. New Rx sent for Avatrombopag Maleate-awaiting insurance approval. Pt aware to continue Promacta for now.      Intravenous Access:  Peripheral IV placed.    Treatment Conditions:  Lab Results   Component Value Date    HGB 10.2 (L) 01/29/2025    WBC 6.0 01/29/2025    ANEU 1.0 (L) 01/17/2025    ANEUTAUTO 2.2 01/29/2025    PLT <2 (LL) 01/29/2025        Lab Results   Component Value Date     01/29/2025    POTASSIUM 3.6 01/29/2025    MAG 1.8 01/29/2019    CR 0.57 01/29/2025    GLEN 9.0 01/29/2025    BILITOTAL 0.2 01/29/2025    ALBUMIN 3.9 01/29/2025    ALT 18 01/29/2025    AST 18 01/29/2025       Results reviewed, labs MET treatment parameters, ok to proceed with treatment.  Proceed with treatment regardless of labs per Dr. Haywood.       Post Infusion Assessment:  Patient tolerated infusion without incident.  Site patent and intact, free from redness, edema or discomfort.  Access discontinued per protocol.  Biologic Infusion Post Education: Call the triage nurse at your clinic or seek medical attention if you have chills and/or temperature greater than or equal to 100.5, uncontrolled nausea/vomiting, diarrhea, constipation, dizziness, shortness of breath, chest pain, heart palpitations, weakness or any other new or concerning symptoms, questions or concerns.  You cannot have any live  virus vaccines prior to or during treatment or up to 6 months post infusion.  If you have an upcoming surgery, medical procedure or dental procedure during treatment, this should be discussed with your ordering physician and your surgeon/dentist.  If you are having any concerning symptom, if you are unsure if you should get your next infusion or wish to speak to a provider before your next infusion, please call your care coordinator or triage nurse at your clinic to notify them so we can adequately serve you.       Discharge Plan:   Patient and/or family verbalized understanding of discharge instructions and all questions answered.  Patient discharged in stable condition accompanied by: self.  Departure Mode: Ambulatory.      Bruna Verma RN

## 2025-01-29 NOTE — PROGRESS NOTES
[facility] Hematology and Oncology Progress Note    Patient: Patricia Govea  MRN: [id number]  Date of Service: Jan 29, 2025        Assessment and Plan:    1.  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  Platelet count still remains quite low today with no evidence of response to treatment to date.  At her most recent relapse, she had responded by now to her Promacta and Rituxan.    She will be starting obinutuzumab today.  Days 1, 8 and 15.  She will be starting Nplate today.  This will be given weekly.  For now we will also continue Promacta.  We are working on getting avatrombopag approved.  If we can do this, we will stop the Nplate.  If she has no response in 2 weeks and I will add cyclosporine.     We also started a second pulse dexamethasone, 40 mg x 4 days, which she will complete tomorrow.    2.  Bleeding secondary to thrombocytopenia: Her mucosal bleeding is mostly resolved.  However, she is now on her period and having heavy flow.  She restarted Amicar 1300 mg 3 times daily 4 days ago.  She will continue this until her period stops.    Medical decision Making:  I spent 43 minutes in the care of this patient today, which included time necessary for preparation for the visit, face to face time with the patient, communication of recommendations to the care team, and documentation time.    ECOG Performance  0    Diagnosis:    1.  Immune thrombocytopenia: Initially diagnosed in 2011.  Relapse during pregnancy in January 2019.  Relapsed December 2024 after viral illness.    Treatment:    Current relapse:  Started on Promacta 75 mg daily December 27, 2024.  IVIG x 2 December 28 and 29, 2024.  Dexamethasone 40 mg daily x 4 days started January 15, 2025.    Previous ITP treatment:  She failed steroids initially and achieved remission with subsequent Rituxan at her initial diagnosis.     At first relapse, she failed an initial course  of steroids.  She was then treated with splenectomy on January 28, 2019.  She had an initial response but then relapsed.  She was treated with IVIG to which she had a transient response but then relapsed.  She then received rituximab, 4 cycles, given February 15 through March 8, 2019.  She has had a complete response by C4D1.    Second relapse: repeated 4 weekly doses of Rituxan February 24, 2021.  She was then started on eltrombopag March 2, 2021.  Treatment dose was increased to 75 mg.  She responded after about 30 days of starting the Promacta and completing the rituximab. Promacta has been on hold since April 16, 2021.     Interim History:    Patricia returns for a clinic visit.  She will be starting obinutuzumab today.  She started her menstrual period about 5 days ago which has been quite heavy given her thrombocytopenia.  She restarted her tranexamic acid 4 days ago.  She is not having any mucosal bleeding.  No headaches.  No other complaints today.    Review of Systems:    As above in the history.     Review of Systems otherwise Negative for:  General: chills, fever or night sweats  Psychological: anxiety or depression  Ophthalmic: blurry vision, double vision or loss of vision, vision change  ENT: epistaxis, oral lesions, hearing changes  Hematological and Lymphatic: jaundice, swollen lymph nodes  Endocrine: hot flashes, unexpected weight changes  Respiratory: cough, hemoptysis, orthopnea or shortness of breath/LARA  Cardiovascular: chest pain, edema, palpitations or PND  Gastrointestinal: abdominal pain, blood in stools, change in bowel habits, constipation, diarrhea or nausea/vomiting  Genito-Urinary: change in urinary stream, incontinence, frequency/urgency  Musculoskeletal: joint pain, stiffness, swelling, muscle pain  Neurological: dizziness, headaches, numbness/tingling  Dermatological: lumps and rash    Past History:    Past Medical History:   Diagnosis Date    Acute idiopathic thrombocytopenic  purpura (H) 2004    bone marrow biopsy negative for malignancy    Bell's palsy     Encounter for screening for cervical cancer 12/23/2018    Guillain Barré syndrome 2016    After influenza vaccine    H. pylori infection 2004    History of blood transfusion     Immune thrombocytopenia (H)     2012---saw Dr. Haywood [facility] Heme    Obesity      Physical Exam:    /74   Pulse 86   Temp 98.6  F (37  C)   Resp 16   LMP 01/23/2025 (Exact Date)   SpO2 97%     General: patient appears stated age of 44 year old. Nontoxic and in no distress.   HEENT: Head: atraumatic, normocephalic. Sclerae anicteric.  Chest:  Normal respiratory effort  Cardiac:  No edema.   Abdomen: abdomen is soft, non-distended  Extremities: normal tone and muscle bulk.  Skin: no lesions or rash on visible skin. Warm and dry.   CNS: alert and oriented. Grossly non-focal.   Psychiatric: normal mood and affect.     Lab Results:    Recent Results (from the past week)   CBC with platelets and differential   Result Value Ref Range    WBC Count 4.6 4.0 - 11.0 10e3/uL    RBC Count 3.37 (L) 3.80 - 5.20 10e6/uL    Hemoglobin 10.6 (L) 11.7 - 15.7 g/dL    Hematocrit 31.5 (L) 35.0 - 47.0 %    MCV 94 78 - 100 fL    MCH 31.5 26.5 - 33.0 pg    MCHC 33.7 31.5 - 36.5 g/dL    RDW 13.8 10.0 - 15.0 %    Platelet Count <2 (LL) 150 - 450 10e3/uL    % Neutrophils 23 %    % Lymphocytes 54 %    % Monocytes 13 %    % Eosinophils 8 %    % Basophils 2 %    % Immature Granulocytes 1 %    NRBCs per 100 WBC 0 <1 /100    Absolute Neutrophils 1.1 (L) 1.6 - 8.3 10e3/uL    Absolute Lymphocytes 2.5 0.8 - 5.3 10e3/uL    Absolute Monocytes 0.6 0.0 - 1.3 10e3/uL    Absolute Eosinophils 0.4 0.0 - 0.7 10e3/uL    Absolute Basophils 0.1 0.0 - 0.2 10e3/uL    Absolute Immature Granulocytes 0.1 <=0.4 10e3/uL    Absolute NRBCs 0.0 10e3/uL   RBC and Platelet Morphology   Result Value Ref Range    RBC Morphology Confirmed RBC Indices     Platelet Assessment  Automated Count Confirmed. Platelet  morphology is normal.     Automated Count Confirmed. Platelet morphology is normal.   CBC with platelets and differential   Result Value Ref Range    WBC Count 6.0 4.0 - 11.0 10e3/uL    RBC Count 3.25 (L) 3.80 - 5.20 10e6/uL    Hemoglobin 10.2 (L) 11.7 - 15.7 g/dL    Hematocrit 30.1 (L) 35.0 - 47.0 %    MCV 93 78 - 100 fL    MCH 31.4 26.5 - 33.0 pg    MCHC 33.9 31.5 - 36.5 g/dL    RDW 14.2 10.0 - 15.0 %    Platelet Count <2 (LL) 150 - 450 10e3/uL    % Neutrophils 37 %    % Lymphocytes 49 %    % Monocytes 12 %    % Eosinophils 1 %    % Basophils 1 %    % Immature Granulocytes 1 %    NRBCs per 100 WBC 0 <1 /100    Absolute Neutrophils 2.2 1.6 - 8.3 10e3/uL    Absolute Lymphocytes 3.0 0.8 - 5.3 10e3/uL    Absolute Monocytes 0.7 0.0 - 1.3 10e3/uL    Absolute Eosinophils 0.1 0.0 - 0.7 10e3/uL    Absolute Basophils 0.0 0.0 - 0.2 10e3/uL    Absolute Immature Granulocytes 0.1 <=0.4 10e3/uL    Absolute NRBCs 0.0 10e3/uL   Comprehensive metabolic panel   Result Value Ref Range    Sodium 141 135 - 145 mmol/L    Potassium 3.6 3.4 - 5.3 mmol/L    Carbon Dioxide (CO2) 27 22 - 29 mmol/L    Anion Gap 9 7 - 15 mmol/L    Urea Nitrogen 13.4 6.0 - 20.0 mg/dL    Creatinine 0.57 0.51 - 0.95 mg/dL    GFR Estimate >90 >60 mL/min/1.73m2    Calcium 9.0 8.8 - 10.4 mg/dL    Chloride 105 98 - 107 mmol/L    Glucose 104 (H) 70 - 99 mg/dL    Alkaline Phosphatase 80 40 - 150 U/L    AST 18 0 - 45 U/L    ALT 18 0 - 50 U/L    Protein Total 7.7 6.4 - 8.3 g/dL    Albumin 3.9 3.5 - 5.2 g/dL    Bilirubin Total 0.2 <=1.2 mg/dL   Hepatitis B core antibody   Result Value Ref Range    Hepatitis B Core Antibody Total Nonreactive Nonreactive   Hepatitis B surface antigen   Result Value Ref Range    Hepatitis B Surface Antigen Nonreactive Nonreactive   Hepatitis B Surface Antibody   Result Value Ref Range    Hepatitis B Surface Antibody Reactive     Hepatitis B Surface Antibody Instrument Value 245.00 <8.5 m[IU]/mL     Imaging:    No results found.    Signed by:  Han Haywood MD

## 2025-01-29 NOTE — LETTER
1/29/2025      Patricia Goeva  [address] [place] [address]      Dear Colleague,    Thank you for referring your patient, Patricia Govea, to the [facility]. Please see a copy of my visit note below.    [facility] Hematology and Oncology Progress Note    Patient: Patricia Govea  MRN: [id number]  Date of Service: Jan 29, 2025        Assessment and Plan:    1.  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  Platelet count still remains quite low today with no evidence of response to treatment to date.  At her most recent relapse, she had responded by now to her Promacta and Rituxan.    She will be starting obinutuzumab today.  Days 1, 8 and 15.  She will be starting Nplate today.  This will be given weekly.  For now we will also continue Promacta.  We are working on getting avatrombopag approved.  If we can do this, we will stop the Nplate.  If she has no response in 2 weeks and I will add cyclosporine.     We also started a second pulse dexamethasone, 40 mg x 4 days, which she will complete tomorrow.    2.  Bleeding secondary to thrombocytopenia: Her mucosal bleeding is mostly resolved.  However, she is now on her period and having heavy flow.  She restarted Amicar 1300 mg 3 times daily 4 days ago.  She will continue this until her period stops.    Medical decision Making:  I spent 43 minutes in the care of this patient today, which included time necessary for preparation for the visit, face to face time with the patient, communication of recommendations to the care team, and documentation time.    ECOG Performance  0    Diagnosis:    1.  Immune thrombocytopenia: Initially diagnosed in 2011.  Relapse during pregnancy in January 2019.  Relapsed December 2024 after viral illness.    Treatment:    Current relapse:  Started on Promacta 75 mg daily December 27, 2024.  IVIG x 2  December 28 and 29, 2024.  Dexamethasone 40 mg daily x 4 days started January 15, 2025.    Previous ITP treatment:  She failed steroids initially and achieved remission with subsequent Rituxan at her initial diagnosis.     At first relapse, she failed an initial course of steroids.  She was then treated with splenectomy on January 28, 2019.  She had an initial response but then relapsed.  She was treated with IVIG to which she had a transient response but then relapsed.  She then received rituximab, 4 cycles, given February 15 through March 8, 2019.  She has had a complete response by C4D1.    Second relapse: repeated 4 weekly doses of Rituxan February 24, 2021.  She was then started on eltrombopag March 2, 2021.  Treatment dose was increased to 75 mg.  She responded after about 30 days of starting the Promacta and completing the rituximab. Promacta has been on hold since April 16, 2021.     Interim History:    Patricia returns for a clinic visit.  She will be starting obinutuzumab today.  She started her menstrual period about 5 days ago which has been quite heavy given her thrombocytopenia.  She restarted her tranexamic acid 4 days ago.  She is not having any mucosal bleeding.  No headaches.  No other complaints today.    Review of Systems:    As above in the history.     Review of Systems otherwise Negative for:  General: chills, fever or night sweats  Psychological: anxiety or depression  Ophthalmic: blurry vision, double vision or loss of vision, vision change  ENT: epistaxis, oral lesions, hearing changes  Hematological and Lymphatic: jaundice, swollen lymph nodes  Endocrine: hot flashes, unexpected weight changes  Respiratory: cough, hemoptysis, orthopnea or shortness of breath/LARA  Cardiovascular: chest pain, edema, palpitations or PND  Gastrointestinal: abdominal pain, blood in stools, change in bowel habits, constipation, diarrhea or nausea/vomiting  Genito-Urinary: change in urinary stream, incontinence,  frequency/urgency  Musculoskeletal: joint pain, stiffness, swelling, muscle pain  Neurological: dizziness, headaches, numbness/tingling  Dermatological: lumps and rash    Past History:    Past Medical History:   Diagnosis Date     Acute idiopathic thrombocytopenic purpura (H) 2004    bone marrow biopsy negative for malignancy     Bell's palsy      Encounter for screening for cervical cancer 12/23/2018     Guillain Barré syndrome 2016    After influenza vaccine     H. pylori infection 2004     History of blood transfusion      Immune thrombocytopenia (H)     2012---saw Dr. Haywood HE Heme     Obesity      Physical Exam:    /74   Pulse 86   Temp 98.6  F (37  C)   Resp 16   LMP 01/23/2025 (Exact Date)   SpO2 97%     General: patient appears stated age of 44 year old. Nontoxic and in no distress.   HEENT: Head: atraumatic, normocephalic. Sclerae anicteric.  Chest:  Normal respiratory effort  Cardiac:  No edema.   Abdomen: abdomen is soft, non-distended  Extremities: normal tone and muscle bulk.  Skin: no lesions or rash on visible skin. Warm and dry.   CNS: alert and oriented. Grossly non-focal.   Psychiatric: normal mood and affect.     Lab Results:    Recent Results (from the past week)   CBC with platelets and differential   Result Value Ref Range    WBC Count 4.6 4.0 - 11.0 10e3/uL    RBC Count 3.37 (L) 3.80 - 5.20 10e6/uL    Hemoglobin 10.6 (L) 11.7 - 15.7 g/dL    Hematocrit 31.5 (L) 35.0 - 47.0 %    MCV 94 78 - 100 fL    MCH 31.5 26.5 - 33.0 pg    MCHC 33.7 31.5 - 36.5 g/dL    RDW 13.8 10.0 - 15.0 %    Platelet Count <2 (LL) 150 - 450 10e3/uL    % Neutrophils 23 %    % Lymphocytes 54 %    % Monocytes 13 %    % Eosinophils 8 %    % Basophils 2 %    % Immature Granulocytes 1 %    NRBCs per 100 WBC 0 <1 /100    Absolute Neutrophils 1.1 (L) 1.6 - 8.3 10e3/uL    Absolute Lymphocytes 2.5 0.8 - 5.3 10e3/uL    Absolute Monocytes 0.6 0.0 - 1.3 10e3/uL    Absolute Eosinophils 0.4 0.0 - 0.7 10e3/uL    Absolute  Basophils 0.1 0.0 - 0.2 10e3/uL    Absolute Immature Granulocytes 0.1 <=0.4 10e3/uL    Absolute NRBCs 0.0 10e3/uL   RBC and Platelet Morphology   Result Value Ref Range    RBC Morphology Confirmed RBC Indices     Platelet Assessment  Automated Count Confirmed. Platelet morphology is normal.     Automated Count Confirmed. Platelet morphology is normal.   CBC with platelets and differential   Result Value Ref Range    WBC Count 6.0 4.0 - 11.0 10e3/uL    RBC Count 3.25 (L) 3.80 - 5.20 10e6/uL    Hemoglobin 10.2 (L) 11.7 - 15.7 g/dL    Hematocrit 30.1 (L) 35.0 - 47.0 %    MCV 93 78 - 100 fL    MCH 31.4 26.5 - 33.0 pg    MCHC 33.9 31.5 - 36.5 g/dL    RDW 14.2 10.0 - 15.0 %    Platelet Count <2 (LL) 150 - 450 10e3/uL    % Neutrophils 37 %    % Lymphocytes 49 %    % Monocytes 12 %    % Eosinophils 1 %    % Basophils 1 %    % Immature Granulocytes 1 %    NRBCs per 100 WBC 0 <1 /100    Absolute Neutrophils 2.2 1.6 - 8.3 10e3/uL    Absolute Lymphocytes 3.0 0.8 - 5.3 10e3/uL    Absolute Monocytes 0.7 0.0 - 1.3 10e3/uL    Absolute Eosinophils 0.1 0.0 - 0.7 10e3/uL    Absolute Basophils 0.0 0.0 - 0.2 10e3/uL    Absolute Immature Granulocytes 0.1 <=0.4 10e3/uL    Absolute NRBCs 0.0 10e3/uL   Comprehensive metabolic panel   Result Value Ref Range    Sodium 141 135 - 145 mmol/L    Potassium 3.6 3.4 - 5.3 mmol/L    Carbon Dioxide (CO2) 27 22 - 29 mmol/L    Anion Gap 9 7 - 15 mmol/L    Urea Nitrogen 13.4 6.0 - 20.0 mg/dL    Creatinine 0.57 0.51 - 0.95 mg/dL    GFR Estimate >90 >60 mL/min/1.73m2    Calcium 9.0 8.8 - 10.4 mg/dL    Chloride 105 98 - 107 mmol/L    Glucose 104 (H) 70 - 99 mg/dL    Alkaline Phosphatase 80 40 - 150 U/L    AST 18 0 - 45 U/L    ALT 18 0 - 50 U/L    Protein Total 7.7 6.4 - 8.3 g/dL    Albumin 3.9 3.5 - 5.2 g/dL    Bilirubin Total 0.2 <=1.2 mg/dL   Hepatitis B core antibody   Result Value Ref Range    Hepatitis B Core Antibody Total Nonreactive Nonreactive   Hepatitis B surface antigen   Result Value Ref Range  "   Hepatitis B Surface Antigen Nonreactive Nonreactive   Hepatitis B Surface Antibody   Result Value Ref Range    Hepatitis B Surface Antibody Reactive     Hepatitis B Surface Antibody Instrument Value 245.00 <8.5 m[IU]/mL     Imaging:    No results found.    Signed by: Han Haywood MD      Oncology Rooming Note    January 29, 2025 11:04 AM   Patricia Govea is a 44 year old female who presents for:    Chief Complaint   Patient presents with     Oncology Clinic Visit     Initial Vitals: /74   Pulse 86   Temp 98.6  F (37  C)   Resp 16   LMP 01/23/2025 (Exact Date)   SpO2 97%  Estimated body mass index is 40.4 kg/m  as calculated from the following:    Height as of 1/23/25: 1.499 m (4' 11\").    Weight as of 1/23/25: 90.7 kg (200 lb). There is no height or weight on file to calculate BSA.  Data Unavailable Comment: Data Unavailable   Patient's last menstrual period was 01/23/2025 (exact date).  Allergies reviewed: Yes  Medications reviewed: Yes    Medications: MEDICATION REFILLS NEEDED TODAY. Provider was notified.  Pharmacy name entered into Tap[employer]:    [id number] - [place], [place] - [address] AT [facility] & [id number] [place], [place] - [address] [facility] - [place], [place] - [address] [id number] - [place], [place] - [address] [facility]    Frailty Screening:   Is the patient here for a new oncology consult visit in cancer care? 2. No      Clinical concerns: Low PLT, bruising, petechiae-Dr. Haywood aware.       Bruna Verma, SCOTOER                 Again, thank you for allowing me to participate in the care of your patient.        Sincerely,        Han Haywood MD    Electronically signed"

## 2025-01-29 NOTE — PROGRESS NOTES
DATE/TIME OF CALL RECEIVED FROM LAB:  01/29/25 at 9:30 AM   LAB TEST:  CBC/diff  LAB VALUE:  Platelets less than 2  PROVIDER NOTIFIED?: Yes  PROVIDER NAME: Dr. Haywood and DM Delacruz  DATE/TIME LAB VALUE REPORTED TO PROVIDER: 1/29/25 at 9:30 AM  MECHANISM OF PROVIDER NOTIFICATION:  Secure Chat  PROVIDER RESPONSE: Dr. Haywood will see the patient in the clinic today as scheduled to review results and discuss a plan of care.  Tiffanie Constantino RN on 1/29/2025 at 9:40 AM

## 2025-01-30 RX ORDER — TRANEXAMIC ACID 650 MG/1
1300 TABLET ORAL 3 TIMES DAILY
Qty: 120 TABLET | Refills: 0 | Status: SHIPPED | OUTPATIENT
Start: 2025-01-30

## 2025-02-03 ENCOUNTER — TELEPHONE (OUTPATIENT)
Dept: ONCOLOGY | Facility: HOSPITAL | Age: 45
End: 2025-02-03

## 2025-02-03 DIAGNOSIS — D69.3 IDIOPATHIC THROMBOCYTOPENIC PURPURA (H): Primary | ICD-10-CM

## 2025-02-03 DIAGNOSIS — D69.3 CHRONIC ITP (IDIOPATHIC THROMBOCYTOPENIA) (H): ICD-10-CM

## 2025-02-03 RX ORDER — ACETAMINOPHEN 325 MG/1
650 TABLET ORAL ONCE
Status: CANCELLED | OUTPATIENT
Start: 2025-02-05

## 2025-02-03 RX ORDER — HEPARIN SODIUM,PORCINE 10 UNIT/ML
5-20 VIAL (ML) INTRAVENOUS DAILY PRN
Status: CANCELLED | OUTPATIENT
Start: 2025-02-05

## 2025-02-03 RX ORDER — DIPHENHYDRAMINE HYDROCHLORIDE 50 MG/ML
50 INJECTION INTRAMUSCULAR; INTRAVENOUS
Status: CANCELLED | OUTPATIENT
Start: 2025-02-05

## 2025-02-03 RX ORDER — METHYLPREDNISOLONE SODIUM SUCCINATE 40 MG/ML
40 INJECTION INTRAMUSCULAR; INTRAVENOUS
Status: CANCELLED | OUTPATIENT
Start: 2025-02-05

## 2025-02-03 RX ORDER — MEPERIDINE HYDROCHLORIDE 25 MG/ML
25 INJECTION INTRAMUSCULAR; INTRAVENOUS; SUBCUTANEOUS
Status: CANCELLED | OUTPATIENT
Start: 2025-02-05

## 2025-02-03 RX ORDER — DIPHENHYDRAMINE HYDROCHLORIDE 50 MG/ML
25 INJECTION INTRAMUSCULAR; INTRAVENOUS
Status: CANCELLED | OUTPATIENT
Start: 2025-02-05

## 2025-02-03 RX ORDER — LORAZEPAM 2 MG/ML
0.5 INJECTION INTRAMUSCULAR EVERY 4 HOURS PRN
Status: CANCELLED | OUTPATIENT
Start: 2025-02-05

## 2025-02-03 RX ORDER — EPINEPHRINE 1 MG/ML
0.3 INJECTION, SOLUTION INTRAMUSCULAR; SUBCUTANEOUS EVERY 5 MIN PRN
Status: CANCELLED | OUTPATIENT
Start: 2025-02-05

## 2025-02-03 RX ORDER — ALBUTEROL SULFATE 90 UG/1
1-2 INHALANT RESPIRATORY (INHALATION)
Status: CANCELLED | OUTPATIENT
Start: 2025-02-05

## 2025-02-03 RX ORDER — ALBUTEROL SULFATE 0.83 MG/ML
2.5 SOLUTION RESPIRATORY (INHALATION)
Status: CANCELLED | OUTPATIENT
Start: 2025-02-05

## 2025-02-03 RX ORDER — HEPARIN SODIUM (PORCINE) LOCK FLUSH IV SOLN 100 UNIT/ML 100 UNIT/ML
5 SOLUTION INTRAVENOUS
Status: CANCELLED | OUTPATIENT
Start: 2025-02-05

## 2025-02-05 ENCOUNTER — INFUSION THERAPY VISIT (OUTPATIENT)
Dept: INFUSION THERAPY | Facility: HOSPITAL | Age: 45
End: 2025-02-05
Attending: NURSE PRACTITIONER

## 2025-02-05 ENCOUNTER — LAB (OUTPATIENT)
Dept: INFUSION THERAPY | Facility: HOSPITAL | Age: 45
End: 2025-02-05

## 2025-02-05 VITALS
TEMPERATURE: 99.2 F | RESPIRATION RATE: 16 BRPM | DIASTOLIC BLOOD PRESSURE: 63 MMHG | OXYGEN SATURATION: 98 % | SYSTOLIC BLOOD PRESSURE: 130 MMHG | HEART RATE: 93 BPM

## 2025-02-05 DIAGNOSIS — D69.3 IDIOPATHIC THROMBOCYTOPENIC PURPURA (H): Primary | ICD-10-CM

## 2025-02-05 DIAGNOSIS — D69.3 CHRONIC ITP (IDIOPATHIC THROMBOCYTOPENIA) (H): ICD-10-CM

## 2025-02-05 LAB
ALBUMIN SERPL BCG-MCNC: 4 G/DL (ref 3.5–5.2)
ALP SERPL-CCNC: 94 U/L (ref 40–150)
ALT SERPL W P-5'-P-CCNC: 19 U/L (ref 0–50)
ANION GAP SERPL CALCULATED.3IONS-SCNC: 8 MMOL/L (ref 7–15)
AST SERPL W P-5'-P-CCNC: 19 U/L (ref 0–45)
BASOPHILS # BLD MANUAL: 0.1 10E3/UL (ref 0–0.2)
BASOPHILS NFR BLD MANUAL: 1 %
BILIRUB SERPL-MCNC: 0.3 MG/DL
BUN SERPL-MCNC: 7.5 MG/DL (ref 6–20)
CALCIUM SERPL-MCNC: 9.2 MG/DL (ref 8.8–10.4)
CHLORIDE SERPL-SCNC: 104 MMOL/L (ref 98–107)
CREAT SERPL-MCNC: 0.57 MG/DL (ref 0.51–0.95)
EGFRCR SERPLBLD CKD-EPI 2021: >90 ML/MIN/1.73M2
EOSINOPHIL # BLD MANUAL: 0.7 10E3/UL (ref 0–0.7)
EOSINOPHIL NFR BLD MANUAL: 10 %
ERYTHROCYTE [DISTWIDTH] IN BLOOD BY AUTOMATED COUNT: 14 % (ref 10–15)
GLUCOSE SERPL-MCNC: 99 MG/DL (ref 70–99)
HCO3 SERPL-SCNC: 27 MMOL/L (ref 22–29)
HCT VFR BLD AUTO: 31.1 % (ref 35–47)
HGB BLD-MCNC: 10.3 G/DL (ref 11.7–15.7)
LYMPHOCYTES # BLD MANUAL: 2.8 10E3/UL (ref 0.8–5.3)
LYMPHOCYTES NFR BLD MANUAL: 39 %
MCH RBC QN AUTO: 31.1 PG (ref 26.5–33)
MCHC RBC AUTO-ENTMCNC: 33.1 G/DL (ref 31.5–36.5)
MCV RBC AUTO: 94 FL (ref 78–100)
METAMYELOCYTES # BLD MANUAL: 0.1 10E3/UL
METAMYELOCYTES NFR BLD MANUAL: 2 %
MONOCYTES # BLD MANUAL: 0.6 10E3/UL (ref 0–1.3)
MONOCYTES NFR BLD MANUAL: 9 %
MYELOCYTES # BLD MANUAL: 0.2 10E3/UL
MYELOCYTES NFR BLD MANUAL: 3 %
NEUTROPHILS # BLD MANUAL: 2.6 10E3/UL (ref 1.6–8.3)
NEUTROPHILS NFR BLD MANUAL: 36 %
NRBC # BLD AUTO: 0.1 10E3/UL
NRBC BLD MANUAL-RTO: 2 %
PLAT MORPH BLD: ABNORMAL
PLATELET # BLD AUTO: 5 10E3/UL (ref 150–450)
POLYCHROMASIA BLD QL SMEAR: SLIGHT
POTASSIUM SERPL-SCNC: 3.6 MMOL/L (ref 3.4–5.3)
PROT SERPL-MCNC: 7.7 G/DL (ref 6.4–8.3)
RBC # BLD AUTO: 3.31 10E6/UL (ref 3.8–5.2)
RBC MORPH BLD: ABNORMAL
SODIUM SERPL-SCNC: 139 MMOL/L (ref 135–145)
WBC # BLD AUTO: 7.2 10E3/UL (ref 4–11)

## 2025-02-05 PROCEDURE — 96367 TX/PROPH/DG ADDL SEQ IV INF: CPT

## 2025-02-05 PROCEDURE — 85007 BL SMEAR W/DIFF WBC COUNT: CPT | Performed by: NURSE PRACTITIONER

## 2025-02-05 PROCEDURE — 96413 CHEMO IV INFUSION 1 HR: CPT

## 2025-02-05 PROCEDURE — 250N000011 HC RX IP 250 OP 636: Performed by: NURSE PRACTITIONER

## 2025-02-05 PROCEDURE — 36415 COLL VENOUS BLD VENIPUNCTURE: CPT | Performed by: NURSE PRACTITIONER

## 2025-02-05 PROCEDURE — 96415 CHEMO IV INFUSION ADDL HR: CPT

## 2025-02-05 PROCEDURE — 85027 COMPLETE CBC AUTOMATED: CPT | Performed by: NURSE PRACTITIONER

## 2025-02-05 PROCEDURE — 250N000013 HC RX MED GY IP 250 OP 250 PS 637: Performed by: NURSE PRACTITIONER

## 2025-02-05 PROCEDURE — 258N000003 HC RX IP 258 OP 636: Performed by: NURSE PRACTITIONER

## 2025-02-05 PROCEDURE — 82947 ASSAY GLUCOSE BLOOD QUANT: CPT | Performed by: NURSE PRACTITIONER

## 2025-02-05 PROCEDURE — 82435 ASSAY OF BLOOD CHLORIDE: CPT | Performed by: NURSE PRACTITIONER

## 2025-02-05 PROCEDURE — 82565 ASSAY OF CREATININE: CPT | Performed by: NURSE PRACTITIONER

## 2025-02-05 PROCEDURE — 250N000011 HC RX IP 250 OP 636: Performed by: INTERNAL MEDICINE

## 2025-02-05 PROCEDURE — 258N000003 HC RX IP 258 OP 636: Performed by: INTERNAL MEDICINE

## 2025-02-05 PROCEDURE — 96372 THER/PROPH/DIAG INJ SC/IM: CPT | Performed by: NURSE PRACTITIONER

## 2025-02-05 RX ORDER — ALBUTEROL SULFATE 0.83 MG/ML
2.5 SOLUTION RESPIRATORY (INHALATION)
Status: DISCONTINUED | OUTPATIENT
Start: 2025-02-05 | End: 2025-02-05 | Stop reason: HOSPADM

## 2025-02-05 RX ORDER — LORAZEPAM 2 MG/ML
0.5 INJECTION INTRAMUSCULAR EVERY 4 HOURS PRN
Status: DISCONTINUED | OUTPATIENT
Start: 2025-02-05 | End: 2025-02-05 | Stop reason: HOSPADM

## 2025-02-05 RX ORDER — DIPHENHYDRAMINE HYDROCHLORIDE 50 MG/ML
25 INJECTION INTRAMUSCULAR; INTRAVENOUS
Status: DISCONTINUED | OUTPATIENT
Start: 2025-02-05 | End: 2025-02-05 | Stop reason: HOSPADM

## 2025-02-05 RX ORDER — ACETAMINOPHEN 325 MG/1
650 TABLET ORAL ONCE
Status: COMPLETED | OUTPATIENT
Start: 2025-02-05 | End: 2025-02-05

## 2025-02-05 RX ORDER — DIPHENHYDRAMINE HYDROCHLORIDE 50 MG/ML
50 INJECTION INTRAMUSCULAR; INTRAVENOUS
Status: DISCONTINUED | OUTPATIENT
Start: 2025-02-05 | End: 2025-02-05 | Stop reason: HOSPADM

## 2025-02-05 RX ORDER — MEPERIDINE HYDROCHLORIDE 25 MG/ML
25 INJECTION INTRAMUSCULAR; INTRAVENOUS; SUBCUTANEOUS
Status: DISCONTINUED | OUTPATIENT
Start: 2025-02-05 | End: 2025-02-05 | Stop reason: HOSPADM

## 2025-02-05 RX ORDER — ALBUTEROL SULFATE 90 UG/1
1-2 INHALANT RESPIRATORY (INHALATION)
Status: DISCONTINUED | OUTPATIENT
Start: 2025-02-05 | End: 2025-02-05 | Stop reason: HOSPADM

## 2025-02-05 RX ORDER — METHYLPREDNISOLONE SODIUM SUCCINATE 40 MG/ML
40 INJECTION INTRAMUSCULAR; INTRAVENOUS
Status: DISCONTINUED | OUTPATIENT
Start: 2025-02-05 | End: 2025-02-05 | Stop reason: HOSPADM

## 2025-02-05 RX ORDER — EPINEPHRINE 1 MG/ML
0.3 INJECTION, SOLUTION INTRAMUSCULAR; SUBCUTANEOUS EVERY 5 MIN PRN
Status: DISCONTINUED | OUTPATIENT
Start: 2025-02-05 | End: 2025-02-05 | Stop reason: HOSPADM

## 2025-02-05 RX ADMIN — OBINUTUZUMAB 1000 MG: 1000 INJECTION, SOLUTION, CONCENTRATE INTRAVENOUS at 11:35

## 2025-02-05 RX ADMIN — DIPHENHYDRAMINE HYDROCHLORIDE 50 MG: 50 INJECTION, SOLUTION INTRAMUSCULAR; INTRAVENOUS at 10:53

## 2025-02-05 RX ADMIN — ACETAMINOPHEN 650 MG: 325 TABLET ORAL at 10:22

## 2025-02-05 RX ADMIN — DEXAMETHASONE SODIUM PHOSPHATE 20 MG: 10 INJECTION, SOLUTION INTRAMUSCULAR; INTRAVENOUS at 11:12

## 2025-02-05 RX ADMIN — ROMIPLOSTIM 180 MCG: 250 INJECTION, POWDER, LYOPHILIZED, FOR SOLUTION SUBCUTANEOUS at 11:15

## 2025-02-05 RX ADMIN — SODIUM CHLORIDE 250 ML: 9 INJECTION, SOLUTION INTRAVENOUS at 10:53

## 2025-02-05 ASSESSMENT — PAIN SCALES - GENERAL: PAINLEVEL_OUTOF10: NO PAIN (0)

## 2025-02-05 NOTE — PROGRESS NOTES
Infusion Nursing Note:  Patricia Govea presents today for C1D8.    Patient seen by provider today: No   present during visit today: Not Applicable.    Note: vs and assessment completed,  critical lab platelets 5,  Dr. Haywood notified, no transfusion today,  pt will be re-checked on Friday. Medication administered as ordered without incident.       Intravenous Access:  Peripheral IV placed.    Treatment Conditions:  Lab Results   Component Value Date    HGB 10.3 (L) 02/05/2025    WBC 7.2 02/05/2025    ANEU 2.6 02/05/2025    ANEUTAUTO 2.2 01/29/2025    PLT 5 (LL) 02/05/2025        Lab Results   Component Value Date     02/05/2025    POTASSIUM 3.6 02/05/2025    MAG 1.8 01/29/2019    CR 0.57 02/05/2025    GLEN 9.2 02/05/2025    BILITOTAL 0.3 02/05/2025    ALBUMIN 4.0 02/05/2025    ALT 19 02/05/2025    AST 19 02/05/2025       Results reviewed, labs MET treatment parameters, ok to proceed with treatment.      Post Infusion Assessment:  Patient tolerated infusion without incident.  Blood return noted pre and post infusion.  No evidence of extravasations.  Access discontinued per protocol.       Discharge Plan:   Discharge instructions reviewed with: Patient.  Patient and/or family verbalized understanding of discharge instructions and all questions answered.  Patient discharged in stable condition accompanied by: self.  Departure Mode: Ambulatory.      Jeannie Chen RN

## 2025-02-06 ENCOUNTER — PATIENT OUTREACH (OUTPATIENT)
Dept: ONCOLOGY | Facility: HOSPITAL | Age: 45
End: 2025-02-06

## 2025-02-06 NOTE — PROGRESS NOTES
[facility]: Cancer Care                                                                                          Writer called patient to let her know that per Dr. Haywood, the patient should stop taking promacta and continue with just the n-plate injections for now. Writer told the patient that the free drug application for Avatrombopag is still in process. Patient verbalized understanding.     Signature:  Nubia Toledo RN

## 2025-02-11 NOTE — PROGRESS NOTES
[facility] Hematology and Oncology Progress Note    Patient: Patricia Govea  MRN: [id number]  Date of Service: Feb 12, 2025          Reason for Visit    Chief Complaint   Patient presents with    Oncology Clinic Visit     Cervical intraepithelial neoplasia grade 1       Assessment and Plan     Cancer Staging   No matching staging information was found for the patient.      1.  Relapsed ITP: Her latest relapse was March 2021, now again 12/27/24.  Last time, she was given 4 weeks of Rituxan but did not respond well.  unclear if she didn't respond or just took long to respond.  we then started Promacta. Had to go up to 75mg. She did start responding to that after about 4 weeks of being on it. We had been holding that since April 16, 2021 due to platelets being high. In December 2024 presented with low platelets again. She was given IVIG with latest relapse without any response. She was given promacta, but did not respond to that. We did have some temporary increase with high dose dexamethasone. We have now started obinituzumab. She has had 2 weeks and will get her final week today. We have also started romiplastin injections. She has had them for 2 weeks.  She stopped promacta when we started romiplastin. We will continue to hold that and avatrombopag. The plan will be to continue romiplastin injections weekly for now. Once her platelets have significantly improved, we will taper off romiplastin.     2.  Gum bleeding with blood blisters, hematuria,vaginal bleeding: this has stopped. No current bleeding/bruising issues.  Courage patient to call us if she has any bleeding complications.      ECOG Performance    0 - Independent    Distress Screening (within last 30 days)    1. How concerned are you about your ability to eat? : 0  2. How concerned are you about unintended weight loss or your current weight? : 0  3. How concerned are you about feeling depressed or very sad? : 0  4. How concerned are you  about feeling anxious or very scared? : 0  5. Do you struggle with the loss of meaning and cassandra in your life? : Not at all  6. How concerned are you about work and home life issues that may be affected by your cancer? : 0  7. How concerned are you about knowing what resources are available to help you? : 0  8. Do you currently have what you would describe as Anabaptism or spiritual struggles?            : Not at all       Pain  Pain Score: No Pain (0)    Problem List    Patient Active Problem List   Diagnosis    GBS (Guillain [place] syndrome)    Bell's palsy    Anisocoria    Chronic ITP (idiopathic thrombocytopenia) (H)    Elevated blood pressure reading without diagnosis of hypertension    Cervical intraepithelial neoplasia grade 1    Class 3 severe obesity due to excess calories without serious comorbidity with body mass index (BMI) of 40.0 to 44.9 in adult (H)    Idiopathic thrombocytopenic purpura (H)    Gums, bleeding    Gross hematuria        ______________________________________________________________________________    History of Present Illness    DIAGNOSIS:  Recurrent and refractory. originially had ITP in 2011. Then again in 2019 in pregnancy.  Then again in February 2021.  Now again in December 2024     CURRENT TREATMENT: received 2 days of IVIG in hospital on December 27 and December 28.  Started Promacta 75 mg daily on December 27.  -1/10-1/13: dexamethasone 40mg daily for 4 days. Repeated 1/27-1/30.   -1/29/25: started 3 weekly doses of obinutuzumab. Today is week 3.   -1/29/25: started weekly Nplate.     ~using amicar when having active bleeding complications.     PAST TREATMENT:   Rituxan for 4 weeks starting 2/4/21  Promacta. Started 25mg on 3/2/21. Increased to 50mg on 3/9. Increased to 75mg on 3/23/21. Held starting 4/16/21 due to platelets of 494.   Rituxan and prednisone. Rituxan last dose was March 8, 2019  Steroids  IVIG  Splenectomy 1/28/19. Initial response, but then rapid decrease in  "platelets.      INTERIM HISTORY:   Is here today to repeat labs and to continue treatment.  Is here today to continue on her treatment.  She states that overall she feels like she is doing better.  She is not having any bleeding complications.  She is not currently taking any oral medications at home for her ITP.  Tolerating her IV and injection medications in the clinic.  She is very anxious about her platelets continuing to be in problem going forward.  She states that she has a little bit of bruising from IVs that were started the last couple weeks but overall she is feeling pretty good.      Review of Systems    Pertinent items are noted in HPI.    Past History    Past Medical History:   Diagnosis Date    Acute idiopathic thrombocytopenic purpura (H) 2004    bone marrow biopsy negative for malignancy    Bell's palsy     Encounter for screening for cervical cancer 12/23/2018    Guillain Barré syndrome 2016    After influenza vaccine    H. pylori infection 2004    History of blood transfusion     Immune thrombocytopenia (H)     2012---saw Dr. Haywood HE Heme    Obesity        PHYSICAL EXAM  BP (!) 140/67   Pulse 91   Temp 98.7  F (37.1  C)   Resp 18   Ht 1.486 m (4' 10.5\")   Wt 91.1 kg (200 lb 12.8 oz)   LMP 01/23/2025 (Exact Date)   SpO2 97%   BMI 41.25 kg/m      GENERAL: no acute distress. Cooperative in conversation. Here alone.   RESP: Regular respiratory rate. No expiratory wheezes   MUSCULOSKELETAL: no bilateral leg swelling  NEURO: non focal. Alert and oriented x3.   PSYCH: within normal limits. No depression or anxiety.  SKIN: exposed skin is dry intact.     Lab Results    Recent Results (from the past week)   CBC with platelets and differential   Result Value Ref Range    WBC Count 11.7 (H) 4.0 - 11.0 10e3/uL    RBC Count 3.10 (L) 3.80 - 5.20 10e6/uL    Hemoglobin 9.5 (L) 11.7 - 15.7 g/dL    Hematocrit 29.0 (L) 35.0 - 47.0 %    MCV 94 78 - 100 fL    MCH 30.6 26.5 - 33.0 pg    MCHC 32.8 31.5 - 36.5 " g/dL    RDW 14.4 10.0 - 15.0 %    Platelet Count 17 (LL) 150 - 450 10e3/uL   Manual Differential   Result Value Ref Range    % Neutrophils 56 %    % Lymphocytes 30 %    % Monocytes 3 %    % Eosinophils 3 %    % Basophils 2 %    % Metamyelocytes 2 %    % Myelocytes 2 %    % Other Cells 2 %    Absolute Neutrophils 6.6 1.6 - 8.3 10e3/uL    Absolute Lymphocytes 3.5 0.8 - 5.3 10e3/uL    Absolute Monocytes 0.4 0.0 - 1.3 10e3/uL    Absolute Eosinophils 0.4 0.0 - 0.7 10e3/uL    Absolute Basophils 0.2 0.0 - 0.2 10e3/uL    Absolute Metamyelocytes 0.2 (H) <=0.0 10e3/uL    Absolute Myelocytes 0.2 (H) <=0.0 10e3/uL    Absolute Other Cells 0.2 (H) <=0.0 10e3/uL    RBC Morphology Confirmed RBC Indices     Platelet Assessment  Automated Count Confirmed. Platelet morphology is normal.     Automated Count Confirmed. Platelet morphology is normal.    Pathologist Review Comments (Blood)       Slide reviewed. History of immune thromboctyopenia. Diandra Amado MD on 2/10/2025 at 10:56 AM     CBC with platelets and differential   Result Value Ref Range    WBC Count 10.7 4.0 - 11.0 10e3/uL    RBC Count 3.36 (L) 3.80 - 5.20 10e6/uL    Hemoglobin 10.0 (L) 11.7 - 15.7 g/dL    Hematocrit 31.5 (L) 35.0 - 47.0 %    MCV 94 78 - 100 fL    MCH 29.8 26.5 - 33.0 pg    MCHC 31.7 31.5 - 36.5 g/dL    RDW 14.0 10.0 - 15.0 %    Platelet Count 66 (L) 150 - 450 10e3/uL    % Neutrophils 62 %    % Lymphocytes 25 %    % Monocytes 5 %    % Eosinophils 3 %    % Basophils 1 %    % Immature Granulocytes 3 %    NRBCs per 100 WBC 1 (H) <1 /100    Absolute Neutrophils 6.6 1.6 - 8.3 10e3/uL    Absolute Lymphocytes 2.7 0.8 - 5.3 10e3/uL    Absolute Monocytes 0.6 0.0 - 1.3 10e3/uL    Absolute Eosinophils 0.4 0.0 - 0.7 10e3/uL    Absolute Basophils 0.1 0.0 - 0.2 10e3/uL    Absolute Immature Granulocytes 0.3 <=0.4 10e3/uL    Absolute NRBCs 0.1 10e3/uL       Imaging    No results found.    The longitudinal plan of care for the diagnosis(es)/condition(s) as documented  were addressed during this visit. Due to the added complexity in care, I will continue to support Patricia in the subsequent management and with ongoing continuity of care.    Discussed with Dr. Haywood      Signed by: CASTRO Cameron CNP

## 2025-02-12 ENCOUNTER — INFUSION THERAPY VISIT (OUTPATIENT)
Dept: INFUSION THERAPY | Facility: HOSPITAL | Age: 45
End: 2025-02-12
Attending: NURSE PRACTITIONER

## 2025-02-12 ENCOUNTER — LAB (OUTPATIENT)
Dept: INFUSION THERAPY | Facility: HOSPITAL | Age: 45
End: 2025-02-12

## 2025-02-12 ENCOUNTER — ONCOLOGY VISIT (OUTPATIENT)
Dept: ONCOLOGY | Facility: HOSPITAL | Age: 45
End: 2025-02-12

## 2025-02-12 VITALS
WEIGHT: 200.8 LBS | DIASTOLIC BLOOD PRESSURE: 67 MMHG | OXYGEN SATURATION: 97 % | SYSTOLIC BLOOD PRESSURE: 140 MMHG | TEMPERATURE: 98.7 F | RESPIRATION RATE: 18 BRPM | BODY MASS INDEX: 40.48 KG/M2 | HEART RATE: 91 BPM | HEIGHT: 59 IN

## 2025-02-12 DIAGNOSIS — D69.3 IDIOPATHIC THROMBOCYTOPENIC PURPURA (H): Primary | ICD-10-CM

## 2025-02-12 DIAGNOSIS — D69.3 CHRONIC ITP (IDIOPATHIC THROMBOCYTOPENIA) (H): Primary | ICD-10-CM

## 2025-02-12 DIAGNOSIS — D69.3 IDIOPATHIC THROMBOCYTOPENIC PURPURA (H): ICD-10-CM

## 2025-02-12 DIAGNOSIS — D69.3 CHRONIC ITP (IDIOPATHIC THROMBOCYTOPENIA) (H): ICD-10-CM

## 2025-02-12 LAB
BASOPHILS # BLD AUTO: 0.1 10E3/UL (ref 0–0.2)
BASOPHILS NFR BLD AUTO: 1 %
EOSINOPHIL # BLD AUTO: 0.4 10E3/UL (ref 0–0.7)
EOSINOPHIL NFR BLD AUTO: 3 %
ERYTHROCYTE [DISTWIDTH] IN BLOOD BY AUTOMATED COUNT: 14 % (ref 10–15)
HCT VFR BLD AUTO: 31.5 % (ref 35–47)
HGB BLD-MCNC: 10 G/DL (ref 11.7–15.7)
IMM GRANULOCYTES # BLD: 0.3 10E3/UL
IMM GRANULOCYTES NFR BLD: 3 %
LYMPHOCYTES # BLD AUTO: 2.7 10E3/UL (ref 0.8–5.3)
LYMPHOCYTES NFR BLD AUTO: 25 %
MCH RBC QN AUTO: 29.8 PG (ref 26.5–33)
MCHC RBC AUTO-ENTMCNC: 31.7 G/DL (ref 31.5–36.5)
MCV RBC AUTO: 94 FL (ref 78–100)
MONOCYTES # BLD AUTO: 0.6 10E3/UL (ref 0–1.3)
MONOCYTES NFR BLD AUTO: 5 %
NEUTROPHILS # BLD AUTO: 6.6 10E3/UL (ref 1.6–8.3)
NEUTROPHILS NFR BLD AUTO: 62 %
NRBC # BLD AUTO: 0.1 10E3/UL
NRBC BLD AUTO-RTO: 1 /100
PLATELET # BLD AUTO: 66 10E3/UL (ref 150–450)
RBC # BLD AUTO: 3.36 10E6/UL (ref 3.8–5.2)
WBC # BLD AUTO: 10.7 10E3/UL (ref 4–11)

## 2025-02-12 PROCEDURE — 99214 OFFICE O/P EST MOD 30 MIN: CPT | Performed by: NURSE PRACTITIONER

## 2025-02-12 PROCEDURE — 96413 CHEMO IV INFUSION 1 HR: CPT

## 2025-02-12 PROCEDURE — 96372 THER/PROPH/DIAG INJ SC/IM: CPT | Performed by: NURSE PRACTITIONER

## 2025-02-12 PROCEDURE — 85018 HEMOGLOBIN: CPT | Performed by: INTERNAL MEDICINE

## 2025-02-12 PROCEDURE — G2211 COMPLEX E/M VISIT ADD ON: HCPCS | Performed by: NURSE PRACTITIONER

## 2025-02-12 PROCEDURE — 258N000003 HC RX IP 258 OP 636: Performed by: INTERNAL MEDICINE

## 2025-02-12 PROCEDURE — 250N000011 HC RX IP 250 OP 636: Mod: JZ | Performed by: NURSE PRACTITIONER

## 2025-02-12 PROCEDURE — 250N000013 HC RX MED GY IP 250 OP 250 PS 637: Performed by: INTERNAL MEDICINE

## 2025-02-12 PROCEDURE — 250N000011 HC RX IP 250 OP 636: Mod: JZ | Performed by: INTERNAL MEDICINE

## 2025-02-12 PROCEDURE — 96415 CHEMO IV INFUSION ADDL HR: CPT

## 2025-02-12 PROCEDURE — 85004 AUTOMATED DIFF WBC COUNT: CPT | Performed by: INTERNAL MEDICINE

## 2025-02-12 RX ORDER — HEPARIN SODIUM (PORCINE) LOCK FLUSH IV SOLN 100 UNIT/ML 100 UNIT/ML
5 SOLUTION INTRAVENOUS
Status: DISCONTINUED | OUTPATIENT
Start: 2025-02-12 | End: 2025-02-12 | Stop reason: HOSPADM

## 2025-02-12 RX ORDER — ACETAMINOPHEN 325 MG/1
650 TABLET ORAL ONCE
Status: COMPLETED | OUTPATIENT
Start: 2025-02-12 | End: 2025-02-12

## 2025-02-12 RX ADMIN — ACETAMINOPHEN 650 MG: 325 TABLET ORAL at 09:39

## 2025-02-12 RX ADMIN — ROMIPLOSTIM 180 MCG: 250 INJECTION, POWDER, LYOPHILIZED, FOR SOLUTION SUBCUTANEOUS at 10:12

## 2025-02-12 RX ADMIN — OBINUTUZUMAB 1000 MG: 1000 INJECTION, SOLUTION, CONCENTRATE INTRAVENOUS at 10:21

## 2025-02-12 RX ADMIN — SODIUM CHLORIDE 250 ML: 0.9 INJECTION, SOLUTION INTRAVENOUS at 09:36

## 2025-02-12 ASSESSMENT — PAIN SCALES - GENERAL: PAINLEVEL_OUTOF10: NO PAIN (0)

## 2025-02-12 NOTE — LETTER
2/12/2025      Patricia Govea  [address] [place] [address]      Dear Colleague,    Thank you for referring your patient, Patricia Govea, to the [facility]. Please see a copy of my visit note below.    [facility] Hematology and Oncology Progress Note    Patient: Patricia Govea  MRN: [id number]  Date of Service: Feb 12, 2025          Reason for Visit    Chief Complaint   Patient presents with     Oncology Clinic Visit     Cervical intraepithelial neoplasia grade 1       Assessment and Plan     Cancer Staging   No matching staging information was found for the patient.      1.  Relapsed ITP: Her latest relapse was March 2021, now again 12/27/24.  Last time, she was given 4 weeks of Rituxan but did not respond well.  unclear if she didn't respond or just took long to respond.  we then started Promacta. Had to go up to 75mg. She did start responding to that after about 4 weeks of being on it. We had been holding that since April 16, 2021 due to platelets being high. In December 2024 presented with low platelets again. She was given IVIG with latest relapse without any response. She was given promacta, but did not respond to that. We did have some temporary increase with high dose dexamethasone. We have now started obinituzumab. She has had 2 weeks and will get her final week today. We have also started romiplastin injections. She has had them for 2 weeks.  She stopped promacta when we started romiplastin. We will continue to hold that and avatrombopag. The plan will be to continue romiplastin injections weekly for now. Once her platelets have significantly improved, we will taper off romiplastin.     2.  Gum bleeding with blood blisters, hematuria,vaginal bleeding: this has stopped. No current bleeding/bruising issues.  Courage patient to call us if she has any bleeding complications.      ECOG Performance    0 - Independent    Distress  Screening (within last 30 days)    1. How concerned are you about your ability to eat? : 0  2. How concerned are you about unintended weight loss or your current weight? : 0  3. How concerned are you about feeling depressed or very sad? : 0  4. How concerned are you about feeling anxious or very scared? : 0  5. Do you struggle with the loss of meaning and cassandra in your life? : Not at all  6. How concerned are you about work and home life issues that may be affected by your cancer? : 0  7. How concerned are you about knowing what resources are available to help you? : 0  8. Do you currently have what you would describe as Congregational or spiritual struggles?            : Not at all       Pain  Pain Score: No Pain (0)    Problem List    Patient Active Problem List   Diagnosis     GBS (Guillain [place] syndrome)     Bell's palsy     Anisocoria     Chronic ITP (idiopathic thrombocytopenia) (H)     Elevated blood pressure reading without diagnosis of hypertension     Cervical intraepithelial neoplasia grade 1     Class 3 severe obesity due to excess calories without serious comorbidity with body mass index (BMI) of 40.0 to 44.9 in adult (H)     Idiopathic thrombocytopenic purpura (H)     Gums, bleeding     Gross hematuria        ______________________________________________________________________________    History of Present Illness    DIAGNOSIS:  Recurrent and refractory. originially had ITP in 2011. Then again in 2019 in pregnancy.  Then again in February 2021.  Now again in December 2024     CURRENT TREATMENT: received 2 days of IVIG in hospital on December 27 and December 28.  Started Promacta 75 mg daily on December 27.  -1/10-1/13: dexamethasone 40mg daily for 4 days. Repeated 1/27-1/30.   -1/29/25: started 3 weekly doses of obinutuzumab. Today is week 3.   -1/29/25: started weekly Nplate.     ~using amicar when having active bleeding complications.     PAST TREATMENT:   Rituxan for 4 weeks starting 2/4/21  Promacta.  "Started 25mg on 3/2/21. Increased to 50mg on 3/9. Increased to 75mg on 3/23/21. Held starting 4/16/21 due to platelets of 494.   Rituxan and prednisone. Rituxan last dose was March 8, 2019  Steroids  IVIG  Splenectomy 1/28/19. Initial response, but then rapid decrease in platelets.      INTERIM HISTORY:   Is here today to repeat labs and to continue treatment.  Is here today to continue on her treatment.  She states that overall she feels like she is doing better.  She is not having any bleeding complications.  She is not currently taking any oral medications at home for her ITP.  Tolerating her IV and injection medications in the clinic.  She is very anxious about her platelets continuing to be in problem going forward.  She states that she has a little bit of bruising from IVs that were started the last couple weeks but overall she is feeling pretty good.      Review of Systems    Pertinent items are noted in HPI.    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 pylori infection 2004     History of blood transfusion      Immune thrombocytopenia (H)     2012---saw Dr. Haywood [facility] Heme     Obesity        PHYSICAL EXAM  BP (!) 140/67   Pulse 91   Temp 98.7  F (37.1  C)   Resp 18   Ht 1.486 m (4' 10.5\")   Wt 91.1 kg (200 lb 12.8 oz)   LMP 01/23/2025 (Exact Date)   SpO2 97%   BMI 41.25 kg/m      GENERAL: no acute distress. Cooperative in conversation. Here alone.   RESP: Regular respiratory rate. No expiratory wheezes   MUSCULOSKELETAL: no bilateral leg swelling  NEURO: non focal. Alert and oriented x3.   PSYCH: within normal limits. No depression or anxiety.  SKIN: exposed skin is dry intact.     Lab Results    Recent Results (from the past week)   CBC with " platelets and differential   Result Value Ref Range    WBC Count 11.7 (H) 4.0 - 11.0 10e3/uL    RBC Count 3.10 (L) 3.80 - 5.20 10e6/uL    Hemoglobin 9.5 (L) 11.7 - 15.7 g/dL    Hematocrit 29.0 (L) 35.0 - 47.0 %    MCV 94 78 - 100 fL    MCH 30.6 26.5 - 33.0 pg    MCHC 32.8 31.5 - 36.5 g/dL    RDW 14.4 10.0 - 15.0 %    Platelet Count 17 (LL) 150 - 450 10e3/uL   Manual Differential   Result Value Ref Range    % Neutrophils 56 %    % Lymphocytes 30 %    % Monocytes 3 %    % Eosinophils 3 %    % Basophils 2 %    % Metamyelocytes 2 %    % Myelocytes 2 %    % Other Cells 2 %    Absolute Neutrophils 6.6 1.6 - 8.3 10e3/uL    Absolute Lymphocytes 3.5 0.8 - 5.3 10e3/uL    Absolute Monocytes 0.4 0.0 - 1.3 10e3/uL    Absolute Eosinophils 0.4 0.0 - 0.7 10e3/uL    Absolute Basophils 0.2 0.0 - 0.2 10e3/uL    Absolute Metamyelocytes 0.2 (H) <=0.0 10e3/uL    Absolute Myelocytes 0.2 (H) <=0.0 10e3/uL    Absolute Other Cells 0.2 (H) <=0.0 10e3/uL    RBC Morphology Confirmed RBC Indices     Platelet Assessment  Automated Count Confirmed. Platelet morphology is normal.     Automated Count Confirmed. Platelet morphology is normal.    Pathologist Review Comments (Blood)       Slide reviewed. History of immune thromboctyopenia. Diandra Amado MD on 2/10/2025 at 10:56 AM     CBC with platelets and differential   Result Value Ref Range    WBC Count 10.7 4.0 - 11.0 10e3/uL    RBC Count 3.36 (L) 3.80 - 5.20 10e6/uL    Hemoglobin 10.0 (L) 11.7 - 15.7 g/dL    Hematocrit 31.5 (L) 35.0 - 47.0 %    MCV 94 78 - 100 fL    MCH 29.8 26.5 - 33.0 pg    MCHC 31.7 31.5 - 36.5 g/dL    RDW 14.0 10.0 - 15.0 %    Platelet Count 66 (L) 150 - 450 10e3/uL    % Neutrophils 62 %    % Lymphocytes 25 %    % Monocytes 5 %    % Eosinophils 3 %    % Basophils 1 %    % Immature Granulocytes 3 %    NRBCs per 100 WBC 1 (H) <1 /100    Absolute Neutrophils 6.6 1.6 - 8.3 10e3/uL    Absolute Lymphocytes 2.7 0.8 - 5.3 10e3/uL    Absolute Monocytes 0.6 0.0 - 1.3 10e3/uL     "Absolute Eosinophils 0.4 0.0 - 0.7 10e3/uL    Absolute Basophils 0.1 0.0 - 0.2 10e3/uL    Absolute Immature Granulocytes 0.3 <=0.4 10e3/uL    Absolute NRBCs 0.1 10e3/uL       Imaging    No results found.    The longitudinal plan of care for the diagnosis(es)/condition(s) as documented were addressed during this visit. Due to the added complexity in care, I will continue to support Patricia in the subsequent management and with ongoing continuity of care.    Discussed with Dr. Haywood      Signed by: CASTRO Cameron CNP    Oncology Rooming Note    February 12, 2025 9:14 AM   Patricia Govea is a 44 year old female who presents for:    Chief Complaint   Patient presents with     Oncology Clinic Visit     Cervical intraepithelial neoplasia grade 1     Initial Vitals: BP (!) 140/67   Pulse 91   Temp 98.7  F (37.1  C)   Resp 18   Ht 1.486 m (4' 10.5\")   Wt 91.1 kg (200 lb 12.8 oz)   LMP 01/23/2025 (Exact Date)   SpO2 97%   BMI 41.25 kg/m   Estimated body mass index is 41.25 kg/m  as calculated from the following:    Height as of this encounter: 1.486 m (4' 10.5\").    Weight as of this encounter: 91.1 kg (200 lb 12.8 oz). Body surface area is 1.94 meters squared.  No Pain (0) Comment: Data Unavailable   Patient's last menstrual period was 01/23/2025 (exact date).  Allergies reviewed: Yes  Medications reviewed: Yes    Medications: Medication refills not needed today.  Pharmacy name entered into [facility]:    [id number] - [place], [place] - [address] AT [id number] [place], [place] - [address] [facility] - [place], [place] - [address] [place] [address]/[facility]Y #[id number] - [place], [place] - [address] [facility]    Frailty Screening:   Is the patient here for a new oncology consult visit in cancer care? 2. No    PHQ9:  Did this patient require a PHQ9?: No      Clinical concerns: None      Marie " DANNY Baxter                 Again, thank you for allowing me to participate in the care of your patient.        Sincerely,        CASTRO Cameron CNP    Electronically signed

## 2025-02-12 NOTE — PROGRESS NOTES
"Oncology Rooming Note    February 12, 2025 9:14 AM   Patricia Govea is a 44 year old female who presents for:    Chief Complaint   Patient presents with    Oncology Clinic Visit     Cervical intraepithelial neoplasia grade 1     Initial Vitals: BP (!) 140/67   Pulse 91   Temp 98.7  F (37.1  C)   Resp 18   Ht 1.486 m (4' 10.5\")   Wt 91.1 kg (200 lb 12.8 oz)   LMP 01/23/2025 (Exact Date)   SpO2 97%   BMI 41.25 kg/m   Estimated body mass index is 41.25 kg/m  as calculated from the following:    Height as of this encounter: 1.486 m (4' 10.5\").    Weight as of this encounter: 91.1 kg (200 lb 12.8 oz). Body surface area is 1.94 meters squared.  No Pain (0) Comment: Data Unavailable   Patient's last menstrual period was 01/23/2025 (exact date).  Allergies reviewed: Yes  Medications reviewed: Yes    Medications: Medication refills not needed today.  Pharmacy name entered into [facility]:    [facility][id number] [place], [place] - [address] AT [id number] [address] [facility] - [place], [place] - [address] [id number] - [place], [place] - [address] [facility]    Frailty Screening:   Is the patient here for a new oncology consult visit in cancer care? 2. No    PHQ9:  Did this patient require a PHQ9?: No      Clinical concerns: None      Marie Baxter LPN             "

## 2025-02-12 NOTE — PROGRESS NOTES
Infusion Nursing Note:  Patricia Govea presents today for Obinutuzumab and Nplate.    Patient seen by provider today: Yes: Caprice Elena CNP   present during visit today: Not Applicable.    Note: Patricia was educated on her plan of care and each medication given was reviewed prior to administration.  She received Nplate into her left upper arm.  Obinutuzumab infused as ordered.      Intravenous Access:  Peripheral IV placed.    Treatment Conditions:  Lab Results   Component Value Date    HGB 10.0 (L) 02/12/2025    WBC 10.7 02/12/2025    ANEU 6.6 02/07/2025    ANEUTAUTO 6.6 02/12/2025    PLT 66 (L) 02/12/2025        Results reviewed, labs MET treatment parameters, ok to proceed with treatment.      Post Infusion Assessment:  Patient tolerated infusion without incident.  Blood return noted pre and post infusion.  Site patent and intact, free from redness, edema or discomfort.  No evidence of extravasations.  Access discontinued per protocol.       Discharge Plan:   Patient discharged in stable condition accompanied by: self.  Departure Mode: Ambulatory.      Xenia Temple RN

## 2025-02-19 ENCOUNTER — LAB (OUTPATIENT)
Dept: INFUSION THERAPY | Facility: HOSPITAL | Age: 45
End: 2025-02-19
Attending: INTERNAL MEDICINE

## 2025-02-19 ENCOUNTER — INFUSION THERAPY VISIT (OUTPATIENT)
Dept: INFUSION THERAPY | Facility: HOSPITAL | Age: 45
End: 2025-02-19
Attending: INTERNAL MEDICINE

## 2025-02-19 DIAGNOSIS — D69.3 IDIOPATHIC THROMBOCYTOPENIC PURPURA (H): ICD-10-CM

## 2025-02-19 DIAGNOSIS — D69.3 CHRONIC ITP (IDIOPATHIC THROMBOCYTOPENIA) (H): ICD-10-CM

## 2025-02-19 DIAGNOSIS — D69.3 CHRONIC ITP (IDIOPATHIC THROMBOCYTOPENIA) (H): Primary | ICD-10-CM

## 2025-02-19 LAB
BASOPHILS # BLD AUTO: 0.1 10E3/UL (ref 0–0.2)
BASOPHILS NFR BLD AUTO: 1 %
EOSINOPHIL # BLD AUTO: 0.5 10E3/UL (ref 0–0.7)
EOSINOPHIL NFR BLD AUTO: 5 %
ERYTHROCYTE [DISTWIDTH] IN BLOOD BY AUTOMATED COUNT: 13.9 % (ref 10–15)
HCT VFR BLD AUTO: 29.5 % (ref 35–47)
HGB BLD-MCNC: 9.3 G/DL (ref 11.7–15.7)
IMM GRANULOCYTES # BLD: 0.1 10E3/UL
IMM GRANULOCYTES NFR BLD: 1 %
LYMPHOCYTES # BLD AUTO: 3.4 10E3/UL (ref 0.8–5.3)
LYMPHOCYTES NFR BLD AUTO: 36 %
MCH RBC QN AUTO: 28.8 PG (ref 26.5–33)
MCHC RBC AUTO-ENTMCNC: 31.5 G/DL (ref 31.5–36.5)
MCV RBC AUTO: 91 FL (ref 78–100)
MONOCYTES # BLD AUTO: 0.7 10E3/UL (ref 0–1.3)
MONOCYTES NFR BLD AUTO: 8 %
NEUTROPHILS # BLD AUTO: 4.7 10E3/UL (ref 1.6–8.3)
NEUTROPHILS NFR BLD AUTO: 49 %
NRBC # BLD AUTO: 0 10E3/UL
NRBC BLD AUTO-RTO: 0 /100
PLATELET # BLD AUTO: 424 10E3/UL (ref 150–450)
RBC # BLD AUTO: 3.23 10E6/UL (ref 3.8–5.2)
WBC # BLD AUTO: 9.5 10E3/UL (ref 4–11)

## 2025-02-19 PROCEDURE — 36415 COLL VENOUS BLD VENIPUNCTURE: CPT | Performed by: NURSE PRACTITIONER

## 2025-02-19 PROCEDURE — 85025 COMPLETE CBC W/AUTO DIFF WBC: CPT | Performed by: NURSE PRACTITIONER

## 2025-02-19 NOTE — PROGRESS NOTES
Infusion Nursing Note:  Patricia Govea presents today for romiplostim injection.    Patient seen by provider today: No   present during visit today: Not Applicable.    Note: N/A.      Intravenous Access:  No Intravenous access/labs at this visit.    Treatment Conditions:  Lab Results   Component Value Date    HGB 9.3 (L) 02/19/2025    WBC 9.5 02/19/2025    ANEU 6.6 02/07/2025    ANEUTAUTO 4.7 02/19/2025     02/19/2025        Results reviewed, labs did NOT meet treatment parameters: to give if platelets are above 50 or less than 400.      Post Infusion Assessment:  N/a.       Discharge Plan:   Discharge instructions reviewed with: Patient.  Patient and/or family verbalized understanding of discharge instructions and all questions answered.  Patient discharged in stable condition accompanied by: self.  Departure Mode: Ambulatory.      Rowan Reed RN

## 2025-02-20 ENCOUNTER — TELEPHONE (OUTPATIENT)
Dept: ONCOLOGY | Facility: HOSPITAL | Age: 45
End: 2025-02-20

## 2025-02-20 NOTE — TELEPHONE ENCOUNTER
FREE DRUG APPLICATION APPROVED    Medication: PROMACTA 75 MG PO TABS  Program Name:  [employer]  Effective Date: 1/14/2025  Expiration Date: 1/14/2026  Pharmacy Filling the Rx: [facility] ([facility]) - [address] [facility] [address]  Patient Notified: Yes  Additional Information: RX on application dated  1/13/25 for 30 days with 11 refills

## 2025-02-26 ENCOUNTER — INFUSION THERAPY VISIT (OUTPATIENT)
Dept: INFUSION THERAPY | Facility: HOSPITAL | Age: 45
End: 2025-02-26

## 2025-02-26 ENCOUNTER — LAB (OUTPATIENT)
Dept: INFUSION THERAPY | Facility: HOSPITAL | Age: 45
End: 2025-02-26

## 2025-02-26 VITALS
OXYGEN SATURATION: 98 % | HEART RATE: 79 BPM | DIASTOLIC BLOOD PRESSURE: 73 MMHG | SYSTOLIC BLOOD PRESSURE: 149 MMHG | RESPIRATION RATE: 16 BRPM

## 2025-02-26 DIAGNOSIS — D69.3 IDIOPATHIC THROMBOCYTOPENIC PURPURA (H): ICD-10-CM

## 2025-02-26 DIAGNOSIS — D69.3 IDIOPATHIC THROMBOCYTOPENIC PURPURA (H): Primary | ICD-10-CM

## 2025-02-26 DIAGNOSIS — D69.3 CHRONIC ITP (IDIOPATHIC THROMBOCYTOPENIA) (H): ICD-10-CM

## 2025-02-26 DIAGNOSIS — D69.3 CHRONIC ITP (IDIOPATHIC THROMBOCYTOPENIA) (H): Primary | ICD-10-CM

## 2025-02-26 LAB
BASOPHILS # BLD AUTO: 0.1 10E3/UL (ref 0–0.2)
BASOPHILS NFR BLD AUTO: 1 %
EOSINOPHIL # BLD AUTO: 0.3 10E3/UL (ref 0–0.7)
EOSINOPHIL NFR BLD AUTO: 4 %
ERYTHROCYTE [DISTWIDTH] IN BLOOD BY AUTOMATED COUNT: 14 % (ref 10–15)
HCT VFR BLD AUTO: 29.3 % (ref 35–47)
HGB BLD-MCNC: 9.5 G/DL (ref 11.7–15.7)
IMM GRANULOCYTES # BLD: 0.1 10E3/UL
IMM GRANULOCYTES NFR BLD: 1 %
LYMPHOCYTES # BLD AUTO: 3.1 10E3/UL (ref 0.8–5.3)
LYMPHOCYTES NFR BLD AUTO: 38 %
MCH RBC QN AUTO: 29 PG (ref 26.5–33)
MCHC RBC AUTO-ENTMCNC: 32.4 G/DL (ref 31.5–36.5)
MCV RBC AUTO: 89 FL (ref 78–100)
MONOCYTES # BLD AUTO: 0.7 10E3/UL (ref 0–1.3)
MONOCYTES NFR BLD AUTO: 9 %
NEUTROPHILS # BLD AUTO: 3.8 10E3/UL (ref 1.6–8.3)
NEUTROPHILS NFR BLD AUTO: 47 %
NRBC # BLD AUTO: 0 10E3/UL
NRBC BLD AUTO-RTO: 0 /100
PLATELET # BLD AUTO: 16 10E3/UL (ref 150–450)
RBC # BLD AUTO: 3.28 10E6/UL (ref 3.8–5.2)
WBC # BLD AUTO: 8.3 10E3/UL (ref 4–11)

## 2025-02-26 PROCEDURE — 96372 THER/PROPH/DIAG INJ SC/IM: CPT | Performed by: NURSE PRACTITIONER

## 2025-02-26 PROCEDURE — 250N000011 HC RX IP 250 OP 636: Mod: JZ | Performed by: NURSE PRACTITIONER

## 2025-02-26 PROCEDURE — 85018 HEMOGLOBIN: CPT | Performed by: NURSE PRACTITIONER

## 2025-02-26 PROCEDURE — 85004 AUTOMATED DIFF WBC COUNT: CPT | Performed by: NURSE PRACTITIONER

## 2025-02-26 PROCEDURE — 36415 COLL VENOUS BLD VENIPUNCTURE: CPT

## 2025-02-26 RX ADMIN — ROMIPLOSTIM 180 MCG: 250 INJECTION, POWDER, LYOPHILIZED, FOR SOLUTION SUBCUTANEOUS at 13:51

## 2025-02-26 NOTE — PROGRESS NOTES
Infusion Nursing Note:  Patricia Govea presents today for Nplate.    Patient seen by provider today: No   present during visit today: Not Applicable.    Note: Patricia was educated on her plan of care and each medication given was reviewed prior to administration.  She received Nplate into her left upper arm.  Obinutuzumab infused as ordered.      Intravenous Access:  Peripheral IV placed.    Treatment Conditions:  Lab Results   Component Value Date    HGB 9.5 (L) 02/26/2025    WBC 8.3 02/26/2025    ANEU 6.6 02/07/2025    ANEUTAUTO 3.8 02/26/2025    PLT 16 (LL) 02/26/2025        Results reviewed, labs MET treatment parameters, ok to proceed with treatment.      Post Infusion Assessment:  Patient tolerated infusion without incident.  Blood return noted pre and post infusion.  Site patent and intact, free from redness, edema or discomfort.  No evidence of extravasations.  Access discontinued per protocol.       Discharge Plan:   Patient discharged in stable condition accompanied by: self.  Departure Mode: Ambulatory.      Mayuri Valenzuela RN

## 2025-03-05 ENCOUNTER — LAB (OUTPATIENT)
Dept: INFUSION THERAPY | Facility: HOSPITAL | Age: 45
End: 2025-03-05

## 2025-03-05 ENCOUNTER — INFUSION THERAPY VISIT (OUTPATIENT)
Dept: INFUSION THERAPY | Facility: HOSPITAL | Age: 45
End: 2025-03-05

## 2025-03-05 DIAGNOSIS — D69.3 IDIOPATHIC THROMBOCYTOPENIC PURPURA (H): ICD-10-CM

## 2025-03-05 DIAGNOSIS — D69.3 CHRONIC ITP (IDIOPATHIC THROMBOCYTOPENIA) (H): Primary | ICD-10-CM

## 2025-03-05 LAB
BASOPHILS # BLD AUTO: 0.1 10E3/UL (ref 0–0.2)
BASOPHILS NFR BLD AUTO: 1 %
EOSINOPHIL # BLD AUTO: 0.3 10E3/UL (ref 0–0.7)
EOSINOPHIL NFR BLD AUTO: 3 %
ERYTHROCYTE [DISTWIDTH] IN BLOOD BY AUTOMATED COUNT: 13.6 % (ref 10–15)
HCT VFR BLD AUTO: 31.5 % (ref 35–47)
HGB BLD-MCNC: 10.1 G/DL (ref 11.7–15.7)
IMM GRANULOCYTES # BLD: 0.1 10E3/UL
IMM GRANULOCYTES NFR BLD: 1 %
LYMPHOCYTES # BLD AUTO: 3 10E3/UL (ref 0.8–5.3)
LYMPHOCYTES NFR BLD AUTO: 31 %
MCH RBC QN AUTO: 28.1 PG (ref 26.5–33)
MCHC RBC AUTO-ENTMCNC: 32.1 G/DL (ref 31.5–36.5)
MCV RBC AUTO: 88 FL (ref 78–100)
MONOCYTES # BLD AUTO: 0.7 10E3/UL (ref 0–1.3)
MONOCYTES NFR BLD AUTO: 7 %
NEUTROPHILS # BLD AUTO: 5.5 10E3/UL (ref 1.6–8.3)
NEUTROPHILS NFR BLD AUTO: 57 %
NRBC # BLD AUTO: 0 10E3/UL
NRBC BLD AUTO-RTO: 0 /100
PLATELET # BLD AUTO: 508 10E3/UL (ref 150–450)
RBC # BLD AUTO: 3.6 10E6/UL (ref 3.8–5.2)
WBC # BLD AUTO: 9.6 10E3/UL (ref 4–11)

## 2025-03-05 PROCEDURE — 36415 COLL VENOUS BLD VENIPUNCTURE: CPT

## 2025-03-05 PROCEDURE — 85004 AUTOMATED DIFF WBC COUNT: CPT | Performed by: NURSE PRACTITIONER

## 2025-03-11 ENCOUNTER — LAB (OUTPATIENT)
Dept: INFUSION THERAPY | Facility: HOSPITAL | Age: 45
End: 2025-03-11
Payer: MEDICAID

## 2025-03-11 ENCOUNTER — INFUSION THERAPY VISIT (OUTPATIENT)
Dept: INFUSION THERAPY | Facility: HOSPITAL | Age: 45
End: 2025-03-11
Payer: MEDICAID

## 2025-03-11 DIAGNOSIS — D69.3 CHRONIC ITP (IDIOPATHIC THROMBOCYTOPENIA) (H): ICD-10-CM

## 2025-03-11 DIAGNOSIS — D69.3 IDIOPATHIC THROMBOCYTOPENIC PURPURA (H): Primary | ICD-10-CM

## 2025-03-11 LAB
BASOPHILS # BLD AUTO: 0.1 10E3/UL (ref 0–0.2)
BASOPHILS NFR BLD AUTO: 1 %
EOSINOPHIL # BLD AUTO: 0.4 10E3/UL (ref 0–0.7)
EOSINOPHIL NFR BLD AUTO: 5 %
ERYTHROCYTE [DISTWIDTH] IN BLOOD BY AUTOMATED COUNT: 13.3 % (ref 10–15)
HCT VFR BLD AUTO: 31.2 % (ref 35–47)
HGB BLD-MCNC: 9.9 G/DL (ref 11.7–15.7)
IMM GRANULOCYTES # BLD: 0.1 10E3/UL
IMM GRANULOCYTES NFR BLD: 1 %
LYMPHOCYTES # BLD AUTO: 2.9 10E3/UL (ref 0.8–5.3)
LYMPHOCYTES NFR BLD AUTO: 34 %
MCH RBC QN AUTO: 27.7 PG (ref 26.5–33)
MCHC RBC AUTO-ENTMCNC: 31.7 G/DL (ref 31.5–36.5)
MCV RBC AUTO: 87 FL (ref 78–100)
MONOCYTES # BLD AUTO: 0.6 10E3/UL (ref 0–1.3)
MONOCYTES NFR BLD AUTO: 7 %
NEUTROPHILS # BLD AUTO: 4.6 10E3/UL (ref 1.6–8.3)
NEUTROPHILS NFR BLD AUTO: 53 %
NRBC # BLD AUTO: 0 10E3/UL
NRBC BLD AUTO-RTO: 0 /100
PLATELET # BLD AUTO: 242 10E3/UL (ref 150–450)
RBC # BLD AUTO: 3.57 10E6/UL (ref 3.8–5.2)
WBC # BLD AUTO: 8.7 10E3/UL (ref 4–11)

## 2025-03-11 PROCEDURE — 85004 AUTOMATED DIFF WBC COUNT: CPT | Performed by: NURSE PRACTITIONER

## 2025-03-11 PROCEDURE — 85014 HEMATOCRIT: CPT | Performed by: NURSE PRACTITIONER

## 2025-03-11 PROCEDURE — 36415 COLL VENOUS BLD VENIPUNCTURE: CPT | Performed by: NURSE PRACTITIONER

## 2025-03-18 ENCOUNTER — LAB (OUTPATIENT)
Dept: INFUSION THERAPY | Facility: HOSPITAL | Age: 45
End: 2025-03-18
Attending: INTERNAL MEDICINE
Payer: MEDICAID

## 2025-03-18 ENCOUNTER — ONCOLOGY VISIT (OUTPATIENT)
Dept: ONCOLOGY | Facility: HOSPITAL | Age: 45
End: 2025-03-18
Attending: INTERNAL MEDICINE
Payer: MEDICAID

## 2025-03-18 ENCOUNTER — PATIENT OUTREACH (OUTPATIENT)
Dept: ONCOLOGY | Facility: HOSPITAL | Age: 45
End: 2025-03-18

## 2025-03-18 ENCOUNTER — TELEPHONE (OUTPATIENT)
Dept: ONCOLOGY | Facility: HOSPITAL | Age: 45
End: 2025-03-18

## 2025-03-18 VITALS
OXYGEN SATURATION: 100 % | HEART RATE: 72 BPM | SYSTOLIC BLOOD PRESSURE: 142 MMHG | RESPIRATION RATE: 18 BRPM | DIASTOLIC BLOOD PRESSURE: 73 MMHG | WEIGHT: 201.2 LBS | TEMPERATURE: 98.9 F | BODY MASS INDEX: 40.56 KG/M2 | HEIGHT: 59 IN

## 2025-03-18 DIAGNOSIS — D69.3 CHRONIC ITP (IDIOPATHIC THROMBOCYTOPENIA) (H): ICD-10-CM

## 2025-03-18 DIAGNOSIS — D69.3 IDIOPATHIC THROMBOCYTOPENIC PURPURA (H): ICD-10-CM

## 2025-03-18 DIAGNOSIS — D69.3 IDIOPATHIC THROMBOCYTOPENIC PURPURA (H): Primary | ICD-10-CM

## 2025-03-18 DIAGNOSIS — D69.3 CHRONIC ITP (IDIOPATHIC THROMBOCYTOPENIA) (H): Primary | ICD-10-CM

## 2025-03-18 LAB
BASOPHILS # BLD AUTO: 0.1 10E3/UL (ref 0–0.2)
BASOPHILS NFR BLD AUTO: 2 %
EOSINOPHIL # BLD AUTO: 0.3 10E3/UL (ref 0–0.7)
EOSINOPHIL NFR BLD AUTO: 5 %
ERYTHROCYTE [DISTWIDTH] IN BLOOD BY AUTOMATED COUNT: 13.5 % (ref 10–15)
HCT VFR BLD AUTO: 32.8 % (ref 35–47)
HGB BLD-MCNC: 10.5 G/DL (ref 11.7–15.7)
IMM GRANULOCYTES # BLD: 0 10E3/UL
IMM GRANULOCYTES NFR BLD: 1 %
LYMPHOCYTES # BLD AUTO: 2.3 10E3/UL (ref 0.8–5.3)
LYMPHOCYTES NFR BLD AUTO: 36 %
MCH RBC QN AUTO: 27.4 PG (ref 26.5–33)
MCHC RBC AUTO-ENTMCNC: 32 G/DL (ref 31.5–36.5)
MCV RBC AUTO: 86 FL (ref 78–100)
MONOCYTES # BLD AUTO: 0.6 10E3/UL (ref 0–1.3)
MONOCYTES NFR BLD AUTO: 9 %
NEUTROPHILS # BLD AUTO: 3.2 10E3/UL (ref 1.6–8.3)
NEUTROPHILS NFR BLD AUTO: 49 %
NRBC # BLD AUTO: 0 10E3/UL
NRBC BLD AUTO-RTO: 0 /100
PLATELET # BLD AUTO: 98 10E3/UL (ref 150–450)
RBC # BLD AUTO: 3.83 10E6/UL (ref 3.8–5.2)
WBC # BLD AUTO: 6.5 10E3/UL (ref 4–11)

## 2025-03-18 PROCEDURE — 85004 AUTOMATED DIFF WBC COUNT: CPT

## 2025-03-18 PROCEDURE — 96372 THER/PROPH/DIAG INJ SC/IM: CPT | Performed by: NURSE PRACTITIONER

## 2025-03-18 PROCEDURE — 85041 AUTOMATED RBC COUNT: CPT

## 2025-03-18 PROCEDURE — 85014 HEMATOCRIT: CPT

## 2025-03-18 PROCEDURE — 99215 OFFICE O/P EST HI 40 MIN: CPT | Performed by: INTERNAL MEDICINE

## 2025-03-18 PROCEDURE — 250N000011 HC RX IP 250 OP 636: Mod: JZ | Performed by: NURSE PRACTITIONER

## 2025-03-18 PROCEDURE — 36415 COLL VENOUS BLD VENIPUNCTURE: CPT

## 2025-03-18 PROCEDURE — G2211 COMPLEX E/M VISIT ADD ON: HCPCS | Performed by: INTERNAL MEDICINE

## 2025-03-18 PROCEDURE — G0463 HOSPITAL OUTPT CLINIC VISIT: HCPCS | Performed by: INTERNAL MEDICINE

## 2025-03-18 RX ADMIN — ROMIPLOSTIM 180 MCG: 250 INJECTION, POWDER, LYOPHILIZED, FOR SOLUTION SUBCUTANEOUS at 11:12

## 2025-03-18 ASSESSMENT — PAIN SCALES - GENERAL: PAINLEVEL_OUTOF10: NO PAIN (0)

## 2025-03-18 NOTE — PROGRESS NOTES
Infusion Nursing Note:  Patricia Govea presents today for NPLATE.    Patient seen by provider today: Yes:    present during visit today: Not Applicable.    Note: N/A.      Intravenous Access:  No Intravenous access/labs at this visit.    Treatment Conditions:  Lab Results   Component Value Date    HGB 10.5 (L) 03/18/2025    WBC 6.5 03/18/2025    ANEU 6.6 02/07/2025    ANEUTAUTO 3.2 03/18/2025    PLT 98 (L) 03/18/2025        Results reviewed, labs MET treatment parameters, ok to proceed with treatment.      Post Infusion Assessment:  Patient tolerated injection without incident. Given into left arm.      Discharge Plan:   Patient discharged in stable condition accompanied by: self.  Departure Mode: Ambulatory.      Bruna Verma RN

## 2025-03-18 NOTE — LETTER
"3/18/2025      Patricia Govea  [address] [place] [address]      Dear Colleague,    Thank you for referring your patient, Patricia Govea, to the [facility]. Please see a copy of my visit note below.    Oncology Rooming Note    March 18, 2025 10:22 AM   Patricia Govea is a 44 year old female who presents for:    Chief Complaint   Patient presents with     Hematology     Chronic ITP (idiopathic thrombocytopenia), Follow Up     Initial Vitals: BP (!) 142/73 (BP Location: Right arm, Patient Position: Sitting, Cuff Size: Adult Large)   Pulse 72   Temp 98.9  F (37.2  C) (Oral)   Resp 18   Ht 1.486 m (4' 10.5\")   Wt 91.3 kg (201 lb 3.2 oz)   LMP 01/23/2025 (Exact Date)   SpO2 100%   BMI 41.34 kg/m   Estimated body mass index is 41.34 kg/m  as calculated from the following:    Height as of this encounter: 1.486 m (4' 10.5\").    Weight as of this encounter: 91.3 kg (201 lb 3.2 oz). Body surface area is 1.94 meters squared.  No Pain (0) Comment: Data Unavailable   Patient's last menstrual period was 01/23/2025 (exact date).  Allergies reviewed: Yes  Medications reviewed: Yes    Medications: Medication refills not needed today.  Pharmacy name entered into [facility]:    [id number] - [facility] [address] AT [id number] [place], [place] - [address] [facility] [place], [place] - [address] [id number] [address] [facility]    Frailty Screening:   Is the patient here for a new oncology consult visit in cancer care? 2. No        Clinical concerns: None.      Taylor Delgado MA              [facility] Hematology and Oncology Progress Note    Patient: Patricia Govea  MRN: [id number]  Date of Service: Mar 18, 2025        Assessment and Plan:    1.  Immune thrombocytopenia: Obinutuzumab " and romiplostim dexamethasone started January 29.  Platelet count was 424 in February 19.  Romiplostim palpitate but platelet count was down to 16 1 weeks later on February 26.  Romiplostim reinitiated on that day and platelet count went up to 508 a week later, March 5.  Has now been slowly trending down.  CBC from today shows a platelet count of 98.  No romiplostim since February 26, 3 weeks ago.  Was still dosed at 2 mcg/kg.  Dosing will be changed to 1 mcg/kg starting today  She will start avatrombopag today.  20 mg daily titrating up to 40mg daiy.  Continue weekly CBCs for now.  Clinic follow-up in 4 weeks.    Medical decision Making:  I spent 43 minutes in the care of this patient today, which included time necessary for preparation for the visit, face to face time with the patient, communication of recommendations to the care team, and documentation time.    ECOG Performance  0    Diagnosis:    1.  Immune thrombocytopenia: Initially diagnosed in 2011.  Relapse during pregnancy in January 2019.  Relapsed December 2024 after viral illness.    Treatment:    Current relapse, 2025:  Started on Promacta 75 mg daily December 27, 2024.  IVIG x 2 December 28 and 29, 2024.  Dexamethasone 40 mg daily x 4 days started January 15, 2025.  Transient response to above...    Dexamethasone 40 mg daily x 4, course 2, started January 27, 2025.  Obinutuzumab started January 29, 2025.  Received 3 weekly doses.  Completed February 12, 2025.  Romiplostim started January 29, 2025.  Held after February 26, 2025 dose for normal platelet count.  Promacta stopped when romiiplostim started.  Platelet count March 1, 2012 morning.  188 by March 15.      Previous ITP treatment:  She failed steroids initially and achieved remission with subsequent Rituxan at her initial diagnosis.     At first relapse, she failed an initial course of steroids.  She was then treated with splenectomy on January 28, 2019.  She had an initial response but then  relapsed.  She was treated with IVIG to which she had a transient response but then relapsed.  She then received rituximab, 4 cycles, given February 15 through March 8, 2019.  She has had a complete response by C4D1.    Second relapse: repeated 4 weekly doses of Rituxan February 24, 2021.  She was then started on eltrombopag March 2, 2021.  Treatment dose was increased to 75 mg.  She responded after about 30 days of starting the Promacta and completing the rituximab. Promacta has been on hold since April 16, 2021.     Interim History:    Patricia returns for a clinic visit.  Clinically she is feeling well.  She is not having any bleeding complications now.  She received a Depo-Provera shot in January.  She seems to be tolerating her romiplostim okay when she gets the shots.    Review of Systems:    As above in the history.     Review of Systems otherwise Negative for:  General: chills, fever or night sweats  Psychological: anxiety or depression  Ophthalmic: blurry vision, double vision or loss of vision, vision change  ENT: epistaxis, oral lesions, hearing changes  Hematological and Lymphatic: jaundice, swollen lymph nodes  Endocrine: hot flashes, unexpected weight changes  Respiratory: cough, hemoptysis, orthopnea or shortness of breath/LARA  Cardiovascular: chest pain, edema, palpitations or PND  Gastrointestinal: abdominal pain, blood in stools, change in bowel habits, constipation, diarrhea or nausea/vomiting  Genito-Urinary: change in urinary stream, incontinence, frequency/urgency  Musculoskeletal: joint pain, stiffness, swelling, muscle pain  Neurological: dizziness, headaches, numbness/tingling  Dermatological: lumps and rash    Past History:    Past Medical History:   Diagnosis Date     Acute idiopathic thrombocytopenic purpura (H) 2004    bone marrow biopsy negative for malignancy     Bell's palsy      Encounter for screening for cervical cancer 12/23/2018     Guillain Barré syndrome 2016    After  "influenza vaccine     H. pylori infection 2004     History of blood transfusion      Immune thrombocytopenia (H)     2012---saw Dr. Haywood HE Heme     Obesity      Physical Exam:    BP (!) 142/73 (BP Location: Right arm, Patient Position: Sitting, Cuff Size: Adult Large)   Pulse 72   Temp 98.9  F (37.2  C) (Oral)   Resp 18   Ht 1.486 m (4' 10.5\")   Wt 91.3 kg (201 lb 3.2 oz)   LMP 01/23/2025 (Exact Date)   SpO2 100%   BMI 41.34 kg/m      General: patient appears stated age of 44 year old. Nontoxic and in no distress.   HEENT: Head: atraumatic, normocephalic. Sclerae anicteric.  Chest:  Normal respiratory effort  Cardiac:  No edema.   Abdomen: abdomen is non-distended  Extremities: normal tone and muscle bulk.  Skin: no lesions or rash on visible skin. Warm and dry.   CNS: alert and oriented. Grossly non-focal.   Psychiatric: normal mood and affect.     Lab Results:    Recent Results (from the past week)   CBC with platelets and differential   Result Value Ref Range    WBC Count 8.7 4.0 - 11.0 10e3/uL    RBC Count 3.57 (L) 3.80 - 5.20 10e6/uL    Hemoglobin 9.9 (L) 11.7 - 15.7 g/dL    Hematocrit 31.2 (L) 35.0 - 47.0 %    MCV 87 78 - 100 fL    MCH 27.7 26.5 - 33.0 pg    MCHC 31.7 31.5 - 36.5 g/dL    RDW 13.3 10.0 - 15.0 %    Platelet Count 242 150 - 450 10e3/uL    % Neutrophils 53 %    % Lymphocytes 34 %    % Monocytes 7 %    % Eosinophils 5 %    % Basophils 1 %    % Immature Granulocytes 1 %    NRBCs per 100 WBC 0 <1 /100    Absolute Neutrophils 4.6 1.6 - 8.3 10e3/uL    Absolute Lymphocytes 2.9 0.8 - 5.3 10e3/uL    Absolute Monocytes 0.6 0.0 - 1.3 10e3/uL    Absolute Eosinophils 0.4 0.0 - 0.7 10e3/uL    Absolute Basophils 0.1 0.0 - 0.2 10e3/uL    Absolute Immature Granulocytes 0.1 <=0.4 10e3/uL    Absolute NRBCs 0.0 10e3/uL   CBC with platelets and differential   Result Value Ref Range    WBC Count 6.5 4.0 - 11.0 10e3/uL    RBC Count 3.83 3.80 - 5.20 10e6/uL    Hemoglobin 10.5 (L) 11.7 - 15.7 g/dL    " Hematocrit 32.8 (L) 35.0 - 47.0 %    MCV 86 78 - 100 fL    MCH 27.4 26.5 - 33.0 pg    MCHC 32.0 31.5 - 36.5 g/dL    RDW 13.5 10.0 - 15.0 %    Platelet Count 98 (L) 150 - 450 10e3/uL    % Neutrophils 49 %    % Lymphocytes 36 %    % Monocytes 9 %    % Eosinophils 5 %    % Basophils 2 %    % Immature Granulocytes 1 %    NRBCs per 100 WBC 0 <1 /100    Absolute Neutrophils 3.2 1.6 - 8.3 10e3/uL    Absolute Lymphocytes 2.3 0.8 - 5.3 10e3/uL    Absolute Monocytes 0.6 0.0 - 1.3 10e3/uL    Absolute Eosinophils 0.3 0.0 - 0.7 10e3/uL    Absolute Basophils 0.1 0.0 - 0.2 10e3/uL    Absolute Immature Granulocytes 0.0 <=0.4 10e3/uL    Absolute NRBCs 0.0 10e3/uL     Imaging:    No results found.      Signed by: Han Haywood MD      Again, thank you for allowing me to participate in the care of your patient.        Sincerely,        Han Haywood MD    Electronically signed

## 2025-03-18 NOTE — PROGRESS NOTES
[facility]: Cancer Care                                                                                            Situation: Patient chart reviewed by care coordinator.    Background: Patient was seen in clinic for a routine follow up visit with Dr. Haywood on 3/18/2025 for the management of idiopathic thrombocytopenic purpura. Patient is currently being treated with romiplostim.     Assessment: Patient is to proceed with her romiplostim injection today 3/18/2025. Patient will be starting a new medication called avatrombopag.    Plan/Recommendations: Patient is to return to the clinic for a follow a 4 week follow up visit with Dr. Haywood, labs and romiplostim injection on 4/15/2025.     Signature:  Nubia Toledo RN

## 2025-03-18 NOTE — TELEPHONE ENCOUNTER
[facility]: Cancer Care                                                                                          Dr. Haywood asked that I reach out to the patient to get clarification on her daily dosage.  Patient indicates that she is instructed to take 2 tablets on Monday, Wednesday, Friday and 1 tablet on Tuesday ,Thursday, Saturday.  She has received no instruction on what to do on Sundays.    I reviewed this with Dr. Haywood.  He indicates that the instructions are good but for Sundays she is to take 1 tablet.  I report back to the patient she verbalized understanding and appreciation of our conversation I encouraged her to call back should she have any further questions or concerns.    Each tablet is 20 mg.      Signature:  Kary Aragon RN Care Coordinator  [facility]  Cancer [facility]

## 2025-03-18 NOTE — PROGRESS NOTES
"Oncology Rooming Note    March 18, 2025 10:22 AM   Patricia Govea is a 44 year old female who presents for:    Chief Complaint   Patient presents with    Hematology     Chronic ITP (idiopathic thrombocytopenia), Follow Up     Initial Vitals: BP (!) 142/73 (BP Location: Right arm, Patient Position: Sitting, Cuff Size: Adult Large)   Pulse 72   Temp 98.9  F (37.2  C) (Oral)   Resp 18   Ht 1.486 m (4' 10.5\")   Wt 91.3 kg (201 lb 3.2 oz)   LMP 01/23/2025 (Exact Date)   SpO2 100%   BMI 41.34 kg/m   Estimated body mass index is 41.34 kg/m  as calculated from the following:    Height as of this encounter: 1.486 m (4' 10.5\").    Weight as of this encounter: 91.3 kg (201 lb 3.2 oz). Body surface area is 1.94 meters squared.  No Pain (0) Comment: Data Unavailable   Patient's last menstrual period was 01/23/2025 (exact date).  Allergies reviewed: Yes  Medications reviewed: Yes    Medications: Medication refills not needed today.  Pharmacy name entered into [facility]:    [facility] #[id number] - [place], [place] - [address] AT [id number] [address] [facility] & [address] [facility] - [place], [place] - [address] [id number] [place], [place] - [address] [facility]    Frailty Screening:   Is the patient here for a new oncology consult visit in cancer care? 2. No        Clinical concerns: None.      Taylor Delgado MA            "

## 2025-03-18 NOTE — PROGRESS NOTES
[facility] Hematology and Oncology Progress Note    Patient: Patricia Govea  MRN: [id number]  Date of Service: Mar 18, 2025        Assessment and Plan:    1.  Immune thrombocytopenia: Obinutuzumab and romiplostim dexamethasone started January 29.  Platelet count was 424 in February 19.  Romiplostim palpitate but platelet count was down to 16 1 weeks later on February 26.  Romiplostim reinitiated on that day and platelet count went up to 508 a week later, March 5.  Has now been slowly trending down.  CBC from today shows a platelet count of 98.  No romiplostim since February 26, 3 weeks ago.  Was still dosed at 2 mcg/kg.  Dosing will be changed to 1 mcg/kg starting today  She will start avatrombopag today.  20 mg daily titrating up to 40mg daiy.  Continue weekly CBCs for now.  Clinic follow-up in 4 weeks.    Medical decision Making:  I spent 43 minutes in the care of this patient today, which included time necessary for preparation for the visit, face to face time with the patient, communication of recommendations to the care team, and documentation time.    ECOG Performance  0    Diagnosis:    1.  Immune thrombocytopenia: Initially diagnosed in 2011.  Relapse during pregnancy in January 2019.  Relapsed December 2024 after viral illness.    Treatment:    Current relapse, 2025:  Started on Promacta 75 mg daily December 27, 2024.  IVIG x 2 December 28 and 29, 2024.  Dexamethasone 40 mg daily x 4 days started January 15, 2025.  Transient response to above...    Dexamethasone 40 mg daily x 4, course 2, started January 27, 2025.  Obinutuzumab started January 29, 2025.  Received 3 weekly doses.  Completed February 12, 2025.  Romiplostim started January 29, 2025.  Held after February 26, 2025 dose for normal platelet count.  Promacta stopped when romiiplostim started.  Platelet count March 1, 2012 morning.  188 by March 15.      Previous ITP treatment:  She failed steroids initially and achieved remission  with subsequent Rituxan at her initial diagnosis.     At first relapse, she failed an initial course of steroids.  She was then treated with splenectomy on January 28, 2019.  She had an initial response but then relapsed.  She was treated with IVIG to which she had a transient response but then relapsed.  She then received rituximab, 4 cycles, given February 15 through March 8, 2019.  She has had a complete response by C4D1.    Second relapse: repeated 4 weekly doses of Rituxan February 24, 2021.  She was then started on eltrombopag March 2, 2021.  Treatment dose was increased to 75 mg.  She responded after about 30 days of starting the Promacta and completing the rituximab. Promacta has been on hold since April 16, 2021.     Interim History:    Patricia returns for a clinic visit.  Clinically she is feeling well.  She is not having any bleeding complications now.  She received a Depo-Provera shot in January.  She seems to be tolerating her romiplostim okay when she gets the shots.    Review of Systems:    As above in the history.     Review of Systems otherwise Negative for:  General: chills, fever or night sweats  Psychological: anxiety or depression  Ophthalmic: blurry vision, double vision or loss of vision, vision change  ENT: epistaxis, oral lesions, hearing changes  Hematological and Lymphatic: jaundice, swollen lymph nodes  Endocrine: hot flashes, unexpected weight changes  Respiratory: cough, hemoptysis, orthopnea or shortness of breath/LARA  Cardiovascular: chest pain, edema, palpitations or PND  Gastrointestinal: abdominal pain, blood in stools, change in bowel habits, constipation, diarrhea or nausea/vomiting  Genito-Urinary: change in urinary stream, incontinence, frequency/urgency  Musculoskeletal: joint pain, stiffness, swelling, muscle pain  Neurological: dizziness, headaches, numbness/tingling  Dermatological: lumps and rash    Past History:    Past Medical History:   Diagnosis Date    Acute  "idiopathic thrombocytopenic purpura (H) 2004    bone marrow biopsy negative for malignancy    Bell's palsy     Encounter for screening for cervical cancer 12/23/2018    Guillain Barré syndrome 2016    After influenza vaccine    H. pylori infection 2004    History of blood transfusion     Immune thrombocytopenia (H)     2012---saw Dr. Haywood HE Heme    Obesity      Physical Exam:    BP (!) 142/73 (BP Location: Right arm, Patient Position: Sitting, Cuff Size: Adult Large)   Pulse 72   Temp 98.9  F (37.2  C) (Oral)   Resp 18   Ht 1.486 m (4' 10.5\")   Wt 91.3 kg (201 lb 3.2 oz)   LMP 01/23/2025 (Exact Date)   SpO2 100%   BMI 41.34 kg/m      General: patient appears stated age of 44 year old. Nontoxic and in no distress.   HEENT: Head: atraumatic, normocephalic. Sclerae anicteric.  Chest:  Normal respiratory effort  Cardiac:  No edema.   Abdomen: abdomen is non-distended  Extremities: normal tone and muscle bulk.  Skin: no lesions or rash on visible skin. Warm and dry.   CNS: alert and oriented. Grossly non-focal.   Psychiatric: normal mood and affect.     Lab Results:    Recent Results (from the past week)   CBC with platelets and differential   Result Value Ref Range    WBC Count 8.7 4.0 - 11.0 10e3/uL    RBC Count 3.57 (L) 3.80 - 5.20 10e6/uL    Hemoglobin 9.9 (L) 11.7 - 15.7 g/dL    Hematocrit 31.2 (L) 35.0 - 47.0 %    MCV 87 78 - 100 fL    MCH 27.7 26.5 - 33.0 pg    MCHC 31.7 31.5 - 36.5 g/dL    RDW 13.3 10.0 - 15.0 %    Platelet Count 242 150 - 450 10e3/uL    % Neutrophils 53 %    % Lymphocytes 34 %    % Monocytes 7 %    % Eosinophils 5 %    % Basophils 1 %    % Immature Granulocytes 1 %    NRBCs per 100 WBC 0 <1 /100    Absolute Neutrophils 4.6 1.6 - 8.3 10e3/uL    Absolute Lymphocytes 2.9 0.8 - 5.3 10e3/uL    Absolute Monocytes 0.6 0.0 - 1.3 10e3/uL    Absolute Eosinophils 0.4 0.0 - 0.7 10e3/uL    Absolute Basophils 0.1 0.0 - 0.2 10e3/uL    Absolute Immature Granulocytes 0.1 <=0.4 10e3/uL    Absolute " NRBCs 0.0 10e3/uL   CBC with platelets and differential   Result Value Ref Range    WBC Count 6.5 4.0 - 11.0 10e3/uL    RBC Count 3.83 3.80 - 5.20 10e6/uL    Hemoglobin 10.5 (L) 11.7 - 15.7 g/dL    Hematocrit 32.8 (L) 35.0 - 47.0 %    MCV 86 78 - 100 fL    MCH 27.4 26.5 - 33.0 pg    MCHC 32.0 31.5 - 36.5 g/dL    RDW 13.5 10.0 - 15.0 %    Platelet Count 98 (L) 150 - 450 10e3/uL    % Neutrophils 49 %    % Lymphocytes 36 %    % Monocytes 9 %    % Eosinophils 5 %    % Basophils 2 %    % Immature Granulocytes 1 %    NRBCs per 100 WBC 0 <1 /100    Absolute Neutrophils 3.2 1.6 - 8.3 10e3/uL    Absolute Lymphocytes 2.3 0.8 - 5.3 10e3/uL    Absolute Monocytes 0.6 0.0 - 1.3 10e3/uL    Absolute Eosinophils 0.3 0.0 - 0.7 10e3/uL    Absolute Basophils 0.1 0.0 - 0.2 10e3/uL    Absolute Immature Granulocytes 0.0 <=0.4 10e3/uL    Absolute NRBCs 0.0 10e3/uL     Imaging:    No results found.      Signed by: Han Haywood MD

## 2025-03-25 ENCOUNTER — INFUSION THERAPY VISIT (OUTPATIENT)
Dept: INFUSION THERAPY | Facility: HOSPITAL | Age: 45
End: 2025-03-25
Payer: MEDICAID

## 2025-03-25 ENCOUNTER — LAB (OUTPATIENT)
Dept: INFUSION THERAPY | Facility: HOSPITAL | Age: 45
End: 2025-03-25
Payer: MEDICAID

## 2025-03-25 VITALS
DIASTOLIC BLOOD PRESSURE: 63 MMHG | HEART RATE: 78 BPM | TEMPERATURE: 98.1 F | RESPIRATION RATE: 18 BRPM | OXYGEN SATURATION: 98 % | SYSTOLIC BLOOD PRESSURE: 139 MMHG

## 2025-03-25 DIAGNOSIS — D69.3 CHRONIC ITP (IDIOPATHIC THROMBOCYTOPENIA) (H): ICD-10-CM

## 2025-03-25 DIAGNOSIS — D69.3 CHRONIC ITP (IDIOPATHIC THROMBOCYTOPENIA) (H): Primary | ICD-10-CM

## 2025-03-25 DIAGNOSIS — D69.3 IDIOPATHIC THROMBOCYTOPENIC PURPURA (H): ICD-10-CM

## 2025-03-25 LAB
BASOPHILS # BLD AUTO: 0.1 10E3/UL (ref 0–0.2)
BASOPHILS NFR BLD AUTO: 1 %
EOSINOPHIL # BLD AUTO: 0.5 10E3/UL (ref 0–0.7)
EOSINOPHIL NFR BLD AUTO: 4 %
ERYTHROCYTE [DISTWIDTH] IN BLOOD BY AUTOMATED COUNT: 14.2 % (ref 10–15)
HCT VFR BLD AUTO: 29.3 % (ref 35–47)
HGB BLD-MCNC: 9.6 G/DL (ref 11.7–15.7)
IMM GRANULOCYTES # BLD: 0.2 10E3/UL
IMM GRANULOCYTES NFR BLD: 1 %
LYMPHOCYTES # BLD AUTO: 3.4 10E3/UL (ref 0.8–5.3)
LYMPHOCYTES NFR BLD AUTO: 27 %
MCH RBC QN AUTO: 27.9 PG (ref 26.5–33)
MCHC RBC AUTO-ENTMCNC: 32.8 G/DL (ref 31.5–36.5)
MCV RBC AUTO: 85 FL (ref 78–100)
MONOCYTES # BLD AUTO: 0.7 10E3/UL (ref 0–1.3)
MONOCYTES NFR BLD AUTO: 6 %
NEUTROPHILS # BLD AUTO: 7.9 10E3/UL (ref 1.6–8.3)
NEUTROPHILS NFR BLD AUTO: 62 %
NRBC # BLD AUTO: 0 10E3/UL
NRBC BLD AUTO-RTO: 0 /100
PLAT MORPH BLD: NORMAL
PLATELET # BLD AUTO: 1034 10E3/UL (ref 150–450)
RBC # BLD AUTO: 3.44 10E6/UL (ref 3.8–5.2)
RBC MORPH BLD: NORMAL
WBC # BLD AUTO: 12.8 10E3/UL (ref 4–11)

## 2025-03-25 PROCEDURE — 85004 AUTOMATED DIFF WBC COUNT: CPT | Performed by: NURSE PRACTITIONER

## 2025-03-25 PROCEDURE — 85048 AUTOMATED LEUKOCYTE COUNT: CPT | Performed by: NURSE PRACTITIONER

## 2025-03-25 PROCEDURE — 85041 AUTOMATED RBC COUNT: CPT | Performed by: NURSE PRACTITIONER

## 2025-03-25 NOTE — PROGRESS NOTES
Infusion Nursing Note:  Patricia Govea presents today for nplate.    Patient seen by provider today: No   present during visit today: Not Applicable.    Note: Patricia arrived ambulatory and in stable condition. Reports no signs of bleeding except some spotting, and she thinks she will be getting her period soon. PLT 1034 today. Caprice CHAPMAN notified. Nplate held today. She will also hold her avatrombopag pills for now. Per Caprice, she should have her labs checked 2x/week until her PLT level drops to 150. Then she will restart the pills. She will have her labs checked again on Friday.      Intravenous Access:  Labs drawn without difficulty.    Treatment Conditions:  Lab Results   Component Value Date    HGB 10.5 (L) 03/18/2025    WBC 6.5 03/18/2025    ANEU 6.6 02/07/2025    ANEUTAUTO 3.2 03/18/2025    PLT 98 (L) 03/18/2025            Post Infusion Assessment:  N/A.       Discharge Plan:   Patient and/or family verbalized understanding of discharge instructions and all questions answered.  AVS to patient via [employer].  Patient will return 3/28 for next appointment.   Patient discharged in stable condition accompanied by: self.  Departure Mode: Ambulatory.      Estrella Geiger RN

## 2025-03-27 DIAGNOSIS — D69.3 IDIOPATHIC THROMBOCYTOPENIC PURPURA (H): Primary | ICD-10-CM

## 2025-03-31 NOTE — TELEPHONE ENCOUNTER
FREE DRUG APPLICATION APPROVED    Medication: DOPTELET 20 MG PO TABS  Program Name:  Doptelet Connect  Effective Date: 2/10/2025  Expiration Date: 2/9/2026  Pharmacy Filling the Rx: [facility] [place], [place] - [address]  Patient Notified: Yes  Additional Information: RX on application 1 month with 11 refills

## 2025-04-01 ENCOUNTER — LAB (OUTPATIENT)
Dept: INFUSION THERAPY | Facility: HOSPITAL | Age: 45
End: 2025-04-01
Payer: COMMERCIAL

## 2025-04-01 ENCOUNTER — INFUSION THERAPY VISIT (OUTPATIENT)
Dept: INFUSION THERAPY | Facility: HOSPITAL | Age: 45
End: 2025-04-01
Payer: COMMERCIAL

## 2025-04-01 DIAGNOSIS — D69.3 IDIOPATHIC THROMBOCYTOPENIC PURPURA (H): ICD-10-CM

## 2025-04-01 DIAGNOSIS — D69.3 CHRONIC ITP (IDIOPATHIC THROMBOCYTOPENIA) (H): Primary | ICD-10-CM

## 2025-04-01 DIAGNOSIS — D69.3 CHRONIC ITP (IDIOPATHIC THROMBOCYTOPENIA) (H): ICD-10-CM

## 2025-04-01 LAB
BASOPHILS # BLD AUTO: 0.1 10E3/UL (ref 0–0.2)
BASOPHILS NFR BLD AUTO: 2 %
EOSINOPHIL # BLD AUTO: 0.4 10E3/UL (ref 0–0.7)
EOSINOPHIL NFR BLD AUTO: 4 %
ERYTHROCYTE [DISTWIDTH] IN BLOOD BY AUTOMATED COUNT: 14.3 % (ref 10–15)
HCT VFR BLD AUTO: 31.5 % (ref 35–47)
HGB BLD-MCNC: 10 G/DL (ref 11.7–15.7)
IMM GRANULOCYTES # BLD: 0.1 10E3/UL
IMM GRANULOCYTES NFR BLD: 1 %
LYMPHOCYTES # BLD AUTO: 3.3 10E3/UL (ref 0.8–5.3)
LYMPHOCYTES NFR BLD AUTO: 35 %
MCH RBC QN AUTO: 26.7 PG (ref 26.5–33)
MCHC RBC AUTO-ENTMCNC: 31.7 G/DL (ref 31.5–36.5)
MCV RBC AUTO: 84 FL (ref 78–100)
MONOCYTES # BLD AUTO: 0.6 10E3/UL (ref 0–1.3)
MONOCYTES NFR BLD AUTO: 7 %
NEUTROPHILS # BLD AUTO: 4.9 10E3/UL (ref 1.6–8.3)
NEUTROPHILS NFR BLD AUTO: 52 %
NRBC # BLD AUTO: 0 10E3/UL
NRBC BLD AUTO-RTO: 0 /100
PLAT MORPH BLD: ABNORMAL
PLATELET # BLD AUTO: 2292 10E3/UL (ref 150–450)
RBC # BLD AUTO: 3.75 10E6/UL (ref 3.8–5.2)
RBC MORPH BLD: ABNORMAL
WBC # BLD AUTO: 9.4 10E3/UL (ref 4–11)

## 2025-04-01 PROCEDURE — 85018 HEMOGLOBIN: CPT | Performed by: NURSE PRACTITIONER

## 2025-04-01 PROCEDURE — 36415 COLL VENOUS BLD VENIPUNCTURE: CPT | Performed by: NURSE PRACTITIONER

## 2025-04-01 NOTE — PROGRESS NOTES
Infusion Nursing Note:  Patricia Govea presents today for nplate injection.    Patient seen by provider today: No   present during visit today: Not Applicable.    Note: N/A.      Intravenous Access:  No Intravenous access/labs at this visit.    Treatment Conditions:  Lab Results   Component Value Date    HGB 10.0 (L) 04/01/2025    WBC 9.4 04/01/2025    ANEU 6.6 02/07/2025    ANEUTAUTO 5.6 03/28/2025    PLT 2,292 (HH) 04/01/2025        Results reviewed, labs did NOT meet treatment parameters: OK to give if platelets are between .      Post Infusion Assessment:  N/a.       Discharge Plan:   Patient discharged in stable condition accompanied by: self.  Departure Mode: Ambulatory.      Rowan Reed RN

## 2025-04-03 ENCOUNTER — PATIENT OUTREACH (OUTPATIENT)
Dept: ONCOLOGY | Facility: HOSPITAL | Age: 45
End: 2025-04-03
Payer: COMMERCIAL

## 2025-04-03 NOTE — PROGRESS NOTES
[facility]: Cancer Care                                                                                          Writer called patient to review her CBC lab results and to confirm if she stopped taking her Avatrombopag Maleate prescription. Per patient report, she stopped taking the Avatrombopag Maleate prescription on 3/18/2205 when she was instructed to stop taking it at her 3/18/2025 lab and nplate injection appointment. Her last nplate injection was on 3/18/2025. Writer ended the call. Writer informed Dr. Haywood about the patient stopping her Avatrombopag Maleate on 3/18/2025.     Signature:  Nubia Toledo RN

## 2025-04-08 ENCOUNTER — PATIENT OUTREACH (OUTPATIENT)
Dept: ONCOLOGY | Facility: HOSPITAL | Age: 45
End: 2025-04-08

## 2025-04-08 ENCOUNTER — LAB (OUTPATIENT)
Dept: INFUSION THERAPY | Facility: HOSPITAL | Age: 45
End: 2025-04-08
Attending: INTERNAL MEDICINE
Payer: COMMERCIAL

## 2025-04-08 DIAGNOSIS — D69.3 IDIOPATHIC THROMBOCYTOPENIC PURPURA (H): ICD-10-CM

## 2025-04-08 LAB
BASOPHILS # BLD AUTO: 0.1 10E3/UL (ref 0–0.2)
BASOPHILS NFR BLD AUTO: 1 %
EOSINOPHIL # BLD AUTO: 0.3 10E3/UL (ref 0–0.7)
EOSINOPHIL NFR BLD AUTO: 4 %
ERYTHROCYTE [DISTWIDTH] IN BLOOD BY AUTOMATED COUNT: 14.1 % (ref 10–15)
HCT VFR BLD AUTO: 31.8 % (ref 35–47)
HGB BLD-MCNC: 10.2 G/DL (ref 11.7–15.7)
IMM GRANULOCYTES # BLD: 0 10E3/UL
IMM GRANULOCYTES NFR BLD: 0 %
LYMPHOCYTES # BLD AUTO: 3.2 10E3/UL (ref 0.8–5.3)
LYMPHOCYTES NFR BLD AUTO: 44 %
MCH RBC QN AUTO: 26.2 PG (ref 26.5–33)
MCHC RBC AUTO-ENTMCNC: 32.1 G/DL (ref 31.5–36.5)
MCV RBC AUTO: 82 FL (ref 78–100)
MONOCYTES # BLD AUTO: 0.5 10E3/UL (ref 0–1.3)
MONOCYTES NFR BLD AUTO: 7 %
NEUTROPHILS # BLD AUTO: 3.1 10E3/UL (ref 1.6–8.3)
NEUTROPHILS NFR BLD AUTO: 44 %
NRBC # BLD AUTO: 0 10E3/UL
NRBC BLD AUTO-RTO: 0 /100
PLATELET # BLD AUTO: 151 10E3/UL (ref 150–450)
RBC # BLD AUTO: 3.9 10E6/UL (ref 3.8–5.2)
WBC # BLD AUTO: 7.1 10E3/UL (ref 4–11)

## 2025-04-08 PROCEDURE — 85025 COMPLETE CBC W/AUTO DIFF WBC: CPT

## 2025-04-08 PROCEDURE — 36415 COLL VENOUS BLD VENIPUNCTURE: CPT

## 2025-04-08 PROCEDURE — 85014 HEMATOCRIT: CPT

## 2025-04-08 NOTE — PROGRESS NOTES
[facility]: Cancer Care                                                                                          Writer called the patient to let her know that per Dr. Haywood and Caprice, the patient's platelets decreased but decreased drastically, so patient should restart taking Avatrombopag Maleate as prescribed. Patient verbalized the correct dosage and was agreeable to the plan.    Signature:  Nubia Toledo RN

## 2025-04-15 ENCOUNTER — APPOINTMENT (OUTPATIENT)
Dept: INFUSION THERAPY | Facility: HOSPITAL | Age: 45
End: 2025-04-15
Payer: COMMERCIAL

## 2025-04-15 ENCOUNTER — ONCOLOGY VISIT (OUTPATIENT)
Dept: ONCOLOGY | Facility: HOSPITAL | Age: 45
End: 2025-04-15
Payer: COMMERCIAL

## 2025-04-15 ENCOUNTER — LAB (OUTPATIENT)
Dept: INFUSION THERAPY | Facility: HOSPITAL | Age: 45
End: 2025-04-15
Payer: COMMERCIAL

## 2025-04-15 VITALS
SYSTOLIC BLOOD PRESSURE: 141 MMHG | WEIGHT: 205 LBS | HEIGHT: 59 IN | DIASTOLIC BLOOD PRESSURE: 63 MMHG | HEART RATE: 82 BPM | OXYGEN SATURATION: 99 % | RESPIRATION RATE: 16 BRPM | TEMPERATURE: 98.2 F | BODY MASS INDEX: 41.33 KG/M2

## 2025-04-15 DIAGNOSIS — D69.3 IDIOPATHIC THROMBOCYTOPENIC PURPURA (H): ICD-10-CM

## 2025-04-15 LAB
BASOPHILS # BLD AUTO: 0.1 10E3/UL (ref 0–0.2)
BASOPHILS NFR BLD AUTO: 1 %
EOSINOPHIL # BLD AUTO: 0.5 10E3/UL (ref 0–0.7)
EOSINOPHIL NFR BLD AUTO: 5 %
ERYTHROCYTE [DISTWIDTH] IN BLOOD BY AUTOMATED COUNT: 14.9 % (ref 10–15)
HCT VFR BLD AUTO: 30.8 % (ref 35–47)
HGB BLD-MCNC: 10.2 G/DL (ref 11.7–15.7)
IMM GRANULOCYTES # BLD: 0.1 10E3/UL
IMM GRANULOCYTES NFR BLD: 1 %
LYMPHOCYTES # BLD AUTO: 3.6 10E3/UL (ref 0.8–5.3)
LYMPHOCYTES NFR BLD AUTO: 38 %
MCH RBC QN AUTO: 27.2 PG (ref 26.5–33)
MCHC RBC AUTO-ENTMCNC: 33.1 G/DL (ref 31.5–36.5)
MCV RBC AUTO: 82 FL (ref 78–100)
MONOCYTES # BLD AUTO: 0.7 10E3/UL (ref 0–1.3)
MONOCYTES NFR BLD AUTO: 7 %
NEUTROPHILS # BLD AUTO: 4.5 10E3/UL (ref 1.6–8.3)
NEUTROPHILS NFR BLD AUTO: 48 %
NRBC # BLD AUTO: 0 10E3/UL
NRBC BLD AUTO-RTO: 0 /100
PLATELET # BLD AUTO: 459 10E3/UL (ref 150–450)
RBC # BLD AUTO: 3.75 10E6/UL (ref 3.8–5.2)
WBC # BLD AUTO: 9.4 10E3/UL (ref 4–11)

## 2025-04-15 PROCEDURE — 99214 OFFICE O/P EST MOD 30 MIN: CPT | Performed by: NURSE PRACTITIONER

## 2025-04-15 PROCEDURE — 85004 AUTOMATED DIFF WBC COUNT: CPT

## 2025-04-15 PROCEDURE — G2211 COMPLEX E/M VISIT ADD ON: HCPCS | Performed by: NURSE PRACTITIONER

## 2025-04-15 PROCEDURE — 36415 COLL VENOUS BLD VENIPUNCTURE: CPT

## 2025-04-15 PROCEDURE — G0463 HOSPITAL OUTPT CLINIC VISIT: HCPCS | Performed by: NURSE PRACTITIONER

## 2025-04-15 PROCEDURE — 85041 AUTOMATED RBC COUNT: CPT

## 2025-04-15 ASSESSMENT — PAIN SCALES - GENERAL: PAINLEVEL_OUTOF10: NO PAIN (0)

## 2025-04-15 NOTE — PROGRESS NOTES
"Oncology Rooming Note    April 15, 2025 1:00 PM   Patricia Govea is a 44 year old female who presents for:    Chief Complaint   Patient presents with    Oncology Clinic Visit     Follow up labs and infusion     Initial Vitals: BP (!) 141/63 (BP Location: Left arm, Patient Position: Sitting, Cuff Size: Adult Regular)   Pulse 82   Temp 98.2  F (36.8  C) (Oral)   Resp 16   Ht 1.486 m (4' 10.5\")   Wt 93 kg (205 lb)   SpO2 99%   BMI 42.12 kg/m   Estimated body mass index is 42.12 kg/m  as calculated from the following:    Height as of this encounter: 1.486 m (4' 10.5\").    Weight as of this encounter: 93 kg (205 lb). Body surface area is 1.96 meters squared.  No Pain (0) Comment: Data Unavailable   No LMP recorded. Patient has had an injection.  Allergies reviewed: Yes  Medications reviewed: Yes    Medications: Medication refills not needed today.  Pharmacy name entered into [facility]:    [id number] - [facility] [address] AT [id number] [address] AT [facility] & [address] - [place], [place] - [address] [id number] [address] [facility]    Frailty Screening:   Is the patient here for a new oncology consult visit in cancer care? 2. No    PHQ9:  Did this patient require a PHQ9?: No      Clinical concerns: none       Harriet Jon CMA            "

## 2025-04-15 NOTE — PROGRESS NOTES
[facility] Hematology and Oncology Progress Note    Patient: Patricia Govea  MRN: [id number]  Date of Service: Apr 15, 2025          Reason for Visit    Chief Complaint   Patient presents with    Oncology Clinic Visit     Follow up labs and infusion       Assessment and Plan     Cancer Staging   No matching staging information was found for the patient.      1.  Relapsed ITP: Her latest relapse was March 2021, now again 12/27/24.  Has had fairly refractory ITP.  She now most recently was getting Nplate and oral medication avatrombopag.  She did go quite high, up to 2,000,000 weeks ago so we held these medications.  She went from 2 million down to 151 in a week.  After about Holding the Medications for 2 Weeks.  She Then Restarted 40 mg 3 times a week and 20 mg 4 times a week.  Patient's platelets today are 459.  I told her parameters are if she is above 400 we should decrease the dose per up-to-date so we will go down to taking 20 mg a day.  She will continue weekly labs till we have stability of her counts.  They are still quite labile.  She will see Dr. Haywood in 6 to 8 weeks.  Return to call with any bleeding bruising or petechiae complications        ECOG Performance    0 - Independent    Distress Screening (within last 30 days)    1. How concerned are you about your ability to eat? : 0  2. How concerned are you about unintended weight loss or your current weight? : 0  3. How concerned are you about feeling depressed or very sad? : 0  4. How concerned are you about feeling anxious or very scared? : 0  5. Do you struggle with the loss of meaning and cassandra in your life? : Not at all  6. How concerned are you about work and home life issues that may be affected by your cancer? : 0  7. How concerned are you about knowing what resources are available to help you? : 0  8. Do you currently have what you would describe as Buddhism or spiritual struggles?            : Not at all       Pain  Pain Score:  No Pain (0)    Problem List    Patient Active Problem List   Diagnosis    GBS (Guillain Whittier syndrome)    Bell's palsy    Anisocoria    Chronic ITP (idiopathic thrombocytopenia) (H)    Elevated blood pressure reading without diagnosis of hypertension    Cervical intraepithelial neoplasia grade 1    Class 3 severe obesity due to excess calories without serious comorbidity with body mass index (BMI) of 40.0 to 44.9 in adult (H)    Idiopathic thrombocytopenic purpura (H)    Gums, bleeding    Gross hematuria        ______________________________________________________________________________    History of Present Illness    DIAGNOSIS:  Recurrent and refractory. originially had ITP in 2011. Then again in 2019 in pregnancy.  Then again in February 2021.  Now again in December 2024     CURRENT TREATMENT: received 2 days of IVIG in hospital on December 27 and December 28.  Started Promacta 75 mg daily on December 27.  ~not much response.   -1/10-1/13: dexamethasone 40mg daily for 4 days. Repeated 1/27-1/30.   -1/29/25-2/12/25: 3 weekly doses of obinutuzumab.   -1/29/25-2/12/25: started weekly Nplate. Held 2/19 due to plt  429. Was 16 the next week so received again on 2/26/25 and 3/18/25  -3/18/25: started avatrombopag. Platelets were 1034 on 3/25/25(was told to hold avatrombopag), 2292 on 4/1/25. Then was 151 on 4/8/25. Restarted avatrombopag 40mg three days a week and 20mg four days a week.   -4/15/25: platelets 459: change avatrombopag to 20mg daily.        PAST TREATMENT:   Rituxan for 4 weeks starting 2/4/21  Promacta. Started 25mg on 3/2/21. Increased to 50mg on 3/9. Increased to 75mg on 3/23/21. Held starting 4/16/21 due to platelets of 494.   Rituxan and prednisone. Rituxan last dose was March 8, 2019  Steroids  IVIG  Splenectomy 1/28/19. Initial response, but then rapid decrease in platelets.      INTERIM HISTORY:   Is here today to repeat labs.  Overall she feels like she is doing well.  She says she is taking  "the avatrombopag.  She has noticed any bleeding complications.  She is not having any effects from the medication.  She denies any bruising or petechiae.  She still is quite anxious about her platelets.       Review of Systems    Pertinent items are noted in HPI.    Past History    Past Medical History:   Diagnosis Date    Acute idiopathic thrombocytopenic purpura (H) 2004    bone marrow biopsy negative for malignancy    Bell's palsy     Encounter for screening for cervical cancer 12/23/2018    Guillain Barré syndrome 2016    After influenza vaccine    H. pylori infection 2004    History of blood transfusion     Immune thrombocytopenia (H)     2012---saw Dr. Haywood HE Heme    Obesity        PHYSICAL EXAM  BP (!) 141/63 (BP Location: Left arm, Patient Position: Sitting, Cuff Size: Adult Regular)   Pulse 82   Temp 98.2  F (36.8  C) (Oral)   Resp 16   Ht 1.486 m (4' 10.5\")   Wt 93 kg (205 lb)   SpO2 99%   BMI 42.12 kg/m      GENERAL: no acute distress. Cooperative in conversation. Here alone.   RESP: Regular respiratory rate. No expiratory wheezes   MUSCULOSKELETAL: no bilateral leg swelling  NEURO: non focal. Alert and oriented x3.   PSYCH: within normal limits. No depression or anxiety.  SKIN: exposed skin is dry intact.     Lab Results    Recent Results (from the past week)   CBC with platelets and differential   Result Value Ref Range    WBC Count 9.4 4.0 - 11.0 10e3/uL    RBC Count 3.75 (L) 3.80 - 5.20 10e6/uL    Hemoglobin 10.2 (L) 11.7 - 15.7 g/dL    Hematocrit 30.8 (L) 35.0 - 47.0 %    MCV 82 78 - 100 fL    MCH 27.2 26.5 - 33.0 pg    MCHC 33.1 31.5 - 36.5 g/dL    RDW 14.9 10.0 - 15.0 %    Platelet Count 459 (H) 150 - 450 10e3/uL    % Neutrophils 48 %    % Lymphocytes 38 %    % Monocytes 7 %    % Eosinophils 5 %    % Basophils 1 %    % Immature Granulocytes 1 %    NRBCs per 100 WBC 0 <1 /100    Absolute Neutrophils 4.5 1.6 - 8.3 10e3/uL    Absolute Lymphocytes 3.6 0.8 - 5.3 10e3/uL    Absolute Monocytes " 0.7 0.0 - 1.3 10e3/uL    Absolute Eosinophils 0.5 0.0 - 0.7 10e3/uL    Absolute Basophils 0.1 0.0 - 0.2 10e3/uL    Absolute Immature Granulocytes 0.1 <=0.4 10e3/uL    Absolute NRBCs 0.0 10e3/uL       Imaging    No results found.    The longitudinal plan of care for the diagnosis(es)/condition(s) as documented were addressed during this visit. Due to the added complexity in care, I will continue to support Patricia in the subsequent management and with ongoing continuity of care.          Signed by: CASTRO Cameron CNP

## 2025-04-15 NOTE — LETTER
"4/15/2025      Patricia Govea  [address] [place] [address]      Dear Colleague,    Thank you for referring your patient, Patricia Govea, to the [facility]. Please see a copy of my visit note below.    Oncology Rooming Note    April 15, 2025 1:00 PM   Patricia Govea is a 44 year old female who presents for:    Chief Complaint   Patient presents with     Oncology Clinic Visit     Follow up labs and infusion     Initial Vitals: BP (!) 141/63 (BP Location: Left arm, Patient Position: Sitting, Cuff Size: Adult Regular)   Pulse 82   Temp 98.2  F (36.8  C) (Oral)   Resp 16   Ht 1.486 m (4' 10.5\")   Wt 93 kg (205 lb)   SpO2 99%   BMI 42.12 kg/m   Estimated body mass index is 42.12 kg/m  as calculated from the following:    Height as of this encounter: 1.486 m (4' 10.5\").    Weight as of this encounter: 93 kg (205 lb). Body surface area is 1.96 meters squared.  No Pain (0) Comment: Data Unavailable   No LMP recorded. Patient has had an injection.  Allergies reviewed: Yes  Medications reviewed: Yes    Medications: Medication refills not needed today.  Pharmacy name entered into [facility]:    [id number] [place], [place] - [address] AT [id number] [place], [place] - [address] AT [facility] & [address] [facility] [place], [place] - [address] [id number] - [place], [place] - [address] [facility]    Frailty Screening:   Is the patient here for a new oncology consult visit in cancer care? 2. No    PHQ9:  Did this patient require a PHQ9?: No      Clinical concerns: none       [facility] Hematology and Oncology Progress Note    Patient: Patricia Govea  MRN: [id number]  Date of Service: Apr 15, 2025          Reason for Visit    Chief Complaint   Patient presents with     Oncology Clinic Visit     " Follow up labs and infusion       Assessment and Plan     Cancer Staging   No matching staging information was found for the patient.      1.  Relapsed ITP: Her latest relapse was March 2021, now again 12/27/24.  Has had fairly refractory ITP.  She now most recently was getting Nplate and oral medication avatrombopag.  She did go quite high, up to 2,000,000 weeks ago so we held these medications.  She went from 2 million down to 151 in a week.  After about Holding the Medications for 2 Weeks.  She Then Restarted 40 mg 3 times a week and 20 mg 4 times a week.  Patient's platelets today are 459.  I told her parameters are if she is above 400 we should decrease the dose per up-to-date so we will go down to taking 20 mg a day.  She will continue weekly labs till we have stability of her counts.  They are still quite labile.  She will see Dr. Haywood in 6 to 8 weeks.  Return to call with any bleeding bruising or petechiae complications        ECOG Performance    0 - Independent    Distress Screening (within last 30 days)    1. How concerned are you about your ability to eat? : 0  2. How concerned are you about unintended weight loss or your current weight? : 0  3. How concerned are you about feeling depressed or very sad? : 0  4. How concerned are you about feeling anxious or very scared? : 0  5. Do you struggle with the loss of meaning and cassandra in your life? : Not at all  6. How concerned are you about work and home life issues that may be affected by your cancer? : 0  7. How concerned are you about knowing what resources are available to help you? : 0  8. Do you currently have what you would describe as Shinto or spiritual struggles?            : Not at all       Pain  Pain Score: No Pain (0)    Problem List    Patient Active Problem List   Diagnosis     GBS (Guillain Reardan syndrome)     Bell's palsy     Anisocoria     Chronic ITP (idiopathic thrombocytopenia) (H)     Elevated blood pressure reading without diagnosis  of hypertension     Cervical intraepithelial neoplasia grade 1     Class 3 severe obesity due to excess calories without serious comorbidity with body mass index (BMI) of 40.0 to 44.9 in adult (H)     Idiopathic thrombocytopenic purpura (H)     Gums, bleeding     Gross hematuria        ______________________________________________________________________________    History of Present Illness    DIAGNOSIS:  Recurrent and refractory. originially had ITP in 2011. Then again in 2019 in pregnancy.  Then again in February 2021.  Now again in December 2024     CURRENT TREATMENT: received 2 days of IVIG in hospital on December 27 and December 28.  Started Promacta 75 mg daily on December 27.  ~not much response.   -1/10-1/13: dexamethasone 40mg daily for 4 days. Repeated 1/27-1/30.   -1/29/25-2/12/25: 3 weekly doses of obinutuzumab.   -1/29/25-2/12/25: started weekly Nplate. Held 2/19 due to plt  429. Was 16 the next week so received again on 2/26/25 and 3/18/25  -3/18/25: started avatrombopag. Platelets were 1034 on 3/25/25(was told to hold avatrombopag), 2292 on 4/1/25. Then was 151 on 4/8/25. Restarted avatrombopag 40mg three days a week and 20mg four days a week.   -4/15/25: platelets 459: change avatrombopag to 20mg daily.        PAST TREATMENT:   Rituxan for 4 weeks starting 2/4/21  Promacta. Started 25mg on 3/2/21. Increased to 50mg on 3/9. Increased to 75mg on 3/23/21. Held starting 4/16/21 due to platelets of 494.   Rituxan and prednisone. Rituxan last dose was March 8, 2019  Steroids  IVIG  Splenectomy 1/28/19. Initial response, but then rapid decrease in platelets.      INTERIM HISTORY:   Is here today to repeat labs.  Overall she feels like she is doing well.  She says she is taking the avatrombopag.  She has noticed any bleeding complications.  She is not having any effects from the medication.  She denies any bruising or petechiae.  She still is quite anxious about her platelets.       Review of  "Systems    Pertinent items are noted in HPI.    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 pylori infection 2004     History of blood transfusion      Immune thrombocytopenia (H)     2012---saw Dr. Haywood HE Heme     Obesity        PHYSICAL EXAM  BP (!) 141/63 (BP Location: Left arm, Patient Position: Sitting, Cuff Size: Adult Regular)   Pulse 82   Temp 98.2  F (36.8  C) (Oral)   Resp 16   Ht 1.486 m (4' 10.5\")   Wt 93 kg (205 lb)   SpO2 99%   BMI 42.12 kg/m      GENERAL: no acute distress. Cooperative in conversation. Here alone.   RESP: Regular respiratory rate. No expiratory wheezes   MUSCULOSKELETAL: no bilateral leg swelling  NEURO: non focal. Alert and oriented x3.   PSYCH: within normal limits. No depression or anxiety.  SKIN: exposed skin is dry intact.     Lab Results    Recent Results (from the past week)   CBC with platelets and differential   Result Value Ref Range    WBC Count 9.4 4.0 - 11.0 10e3/uL    RBC Count 3.75 (L) 3.80 - 5.20 10e6/uL    Hemoglobin 10.2 (L) 11.7 - 15.7 g/dL    Hematocrit 30.8 (L) 35.0 - 47.0 %    MCV 82 78 - 100 fL    MCH 27.2 26.5 - 33.0 pg    MCHC 33.1 31.5 - 36.5 g/dL    RDW 14.9 10.0 - 15.0 %    Platelet Count 459 (H) 150 - 450 10e3/uL    % Neutrophils 48 %    % Lymphocytes 38 %    % Monocytes 7 %    % Eosinophils 5 %    % Basophils 1 %    % Immature Granulocytes 1 %    NRBCs per 100 WBC 0 <1 /100    Absolute Neutrophils 4.5 1.6 - 8.3 10e3/uL    Absolute Lymphocytes 3.6 0.8 - 5.3 10e3/uL    Absolute Monocytes 0.7 0.0 - 1.3 10e3/uL    Absolute Eosinophils 0.5 0.0 - 0.7 10e3/uL    Absolute Basophils 0.1 0.0 - 0.2 10e3/uL    Absolute Immature Granulocytes 0.1 <=0.4 10e3/uL    Absolute NRBCs 0.0 10e3/uL "       Imaging    No results found.    The longitudinal plan of care for the diagnosis(es)/condition(s) as documented were addressed during this visit. Due to the added complexity in care, I will continue to support Patricia in the subsequent management and with ongoing continuity of care.          Signed by: CASTRO Cameron CNP      Again, thank you for allowing me to participate in the care of your patient.        Sincerely,        CASTRO Cameron CNP    Electronically signed

## 2025-04-16 ENCOUNTER — TELEPHONE (OUTPATIENT)
Dept: ONCOLOGY | Facility: HOSPITAL | Age: 45
End: 2025-04-16
Payer: COMMERCIAL

## 2025-04-16 DIAGNOSIS — D69.3 IDIOPATHIC THROMBOCYTOPENIC PURPURA (H): ICD-10-CM

## 2025-04-16 NOTE — TELEPHONE ENCOUNTER
I received a voice message today from [facility].  They are calling because patient's insurance is requiring a prior authorization to be completed on Doptelet.  Per the message, they faxed over a request for clinical documentation so they can start the prior authorization process.  I will send this information over to Christianne Storey today to see if she an assist.     Tiffanie Constantino RN on 4/16/2025 at 9:32 AM

## 2025-04-16 NOTE — TELEPHONE ENCOUNTER
Prior Authorization Approval    Medication: DOPTELET 20 MG PO TABS  Authorization Effective Date: 4/1/2025  Authorization Expiration Date: 4/16/2026  Approved Dose/Quantity: 30/30  Reference #: [id number]   Insurance Company: [employer] - Phone [phone] Fax [phone]  Expected CoPay: $  0  Which Pharmacy is filling the prescription: [facility] - [place] [address]  Pharmacy Notified: Yes  Patient Notified: Yes

## 2025-04-17 ENCOUNTER — PATIENT OUTREACH (OUTPATIENT)
Dept: ONCOLOGY | Facility: HOSPITAL | Age: 45
End: 2025-04-17
Payer: COMMERCIAL

## 2025-04-17 NOTE — PROGRESS NOTES
[facility]: Cancer Care                                                                                            Situation: Patient chart reviewed by care coordinator.    Background: Patient was seen in clinic for a follow up visit with Caprice on 4/15/2025 for the management of idiopathic thrombocytopenic purpura. Patient was being treated with romiplostim injections and avatrombopag (doptelet).       Assessment: Patient will decrease the avatrombopag dose to 20mg a day instead of 40mg. Patient will continue to check weekly labs till there is stability in her platelet count per Caprice's office visit note on 4/15/2025.     Plan/Recommendations: Patient is scheduled for weekly lab only visits and is scheduled for a follow up visit with Dr. Haywood and labs on 6/10/2025.     Signature:  Nubia Toledo RN

## 2025-04-22 ENCOUNTER — DOCUMENTATION ONLY (OUTPATIENT)
Dept: ONCOLOGY | Facility: HOSPITAL | Age: 45
End: 2025-04-22

## 2025-04-22 ENCOUNTER — LAB (OUTPATIENT)
Dept: INFUSION THERAPY | Facility: HOSPITAL | Age: 45
End: 2025-04-22
Payer: COMMERCIAL

## 2025-04-22 ENCOUNTER — PATIENT OUTREACH (OUTPATIENT)
Dept: ONCOLOGY | Facility: HOSPITAL | Age: 45
End: 2025-04-22

## 2025-04-22 DIAGNOSIS — D69.3 IDIOPATHIC THROMBOCYTOPENIC PURPURA (H): ICD-10-CM

## 2025-04-22 LAB
BASOPHILS # BLD AUTO: 0.1 10E3/UL (ref 0–0.2)
BASOPHILS NFR BLD AUTO: 1 %
EOSINOPHIL # BLD AUTO: 0.4 10E3/UL (ref 0–0.7)
EOSINOPHIL NFR BLD AUTO: 5 %
ERYTHROCYTE [DISTWIDTH] IN BLOOD BY AUTOMATED COUNT: 15.6 % (ref 10–15)
HCT VFR BLD AUTO: 30.3 % (ref 35–47)
HGB BLD-MCNC: 10 G/DL (ref 11.7–15.7)
IMM GRANULOCYTES # BLD: 0.1 10E3/UL
IMM GRANULOCYTES NFR BLD: 1 %
LYMPHOCYTES # BLD AUTO: 3.2 10E3/UL (ref 0.8–5.3)
LYMPHOCYTES NFR BLD AUTO: 34 %
MCH RBC QN AUTO: 26.9 PG (ref 26.5–33)
MCHC RBC AUTO-ENTMCNC: 33 G/DL (ref 31.5–36.5)
MCV RBC AUTO: 82 FL (ref 78–100)
MONOCYTES # BLD AUTO: 0.7 10E3/UL (ref 0–1.3)
MONOCYTES NFR BLD AUTO: 8 %
NEUTROPHILS # BLD AUTO: 4.9 10E3/UL (ref 1.6–8.3)
NEUTROPHILS NFR BLD AUTO: 52 %
NRBC # BLD AUTO: 0 10E3/UL
NRBC BLD AUTO-RTO: 0 /100
PLATELET # BLD AUTO: 1475 10E3/UL (ref 150–450)
RBC # BLD AUTO: 3.72 10E6/UL (ref 3.8–5.2)
WBC # BLD AUTO: 9.4 10E3/UL (ref 4–11)

## 2025-04-22 PROCEDURE — 85004 AUTOMATED DIFF WBC COUNT: CPT

## 2025-04-22 PROCEDURE — 36415 COLL VENOUS BLD VENIPUNCTURE: CPT

## 2025-04-22 NOTE — PROGRESS NOTES
DATE/TIME OF CALL RECEIVED FROM LAB:  04/22/25 at 2:13 PM   LAB TEST:  CBC/diff  LAB VALUE:  Platelet 1475  PROVIDER NOTIFIED?: Yes  PROVIDER NAME: Caprice Elena CNP and DM Delacruz  DATE/TIME LAB VALUE REPORTED TO PROVIDER: 4/22/25 at 2:13 PM  MECHANISM OF PROVIDER NOTIFICATION:  Secure Chat  PROVIDER RESPONSE: Noted. Patient will return to the clinic in one week for another lab check.  Tiffanie Constantino RN on 4/22/2025 at 2:38 PM

## 2025-04-22 NOTE — PROGRESS NOTES
[facility]: Cancer Care                                                                                          Writer called patient to let her know that per Caprice, the patient should hold her avatrombopag  prescription till she comes back to the clinic to recheck a CBC. Per Caprice, if the platelets come down, patient may resume avatrombopag prescription 4 times a week instead of every day. Patient verbalized understanding.     Signature:  Nubia Toledo RN

## 2025-04-29 ENCOUNTER — LAB (OUTPATIENT)
Dept: INFUSION THERAPY | Facility: HOSPITAL | Age: 45
End: 2025-04-29
Payer: COMMERCIAL

## 2025-04-29 ENCOUNTER — PATIENT OUTREACH (OUTPATIENT)
Dept: ONCOLOGY | Facility: HOSPITAL | Age: 45
End: 2025-04-29

## 2025-04-29 DIAGNOSIS — D69.3 IDIOPATHIC THROMBOCYTOPENIC PURPURA (H): ICD-10-CM

## 2025-04-29 LAB
BASOPHILS # BLD AUTO: 0.1 10E3/UL (ref 0–0.2)
BASOPHILS NFR BLD AUTO: 1 %
EOSINOPHIL # BLD AUTO: 0.3 10E3/UL (ref 0–0.7)
EOSINOPHIL NFR BLD AUTO: 3 %
ERYTHROCYTE [DISTWIDTH] IN BLOOD BY AUTOMATED COUNT: 15.3 % (ref 10–15)
HCT VFR BLD AUTO: 30.4 % (ref 35–47)
HGB BLD-MCNC: 10.2 G/DL (ref 11.7–15.7)
IMM GRANULOCYTES # BLD: 0 10E3/UL
IMM GRANULOCYTES NFR BLD: 0 %
LYMPHOCYTES # BLD AUTO: 3.3 10E3/UL (ref 0.8–5.3)
LYMPHOCYTES NFR BLD AUTO: 41 %
MCH RBC QN AUTO: 26.9 PG (ref 26.5–33)
MCHC RBC AUTO-ENTMCNC: 33.6 G/DL (ref 31.5–36.5)
MCV RBC AUTO: 80 FL (ref 78–100)
MONOCYTES # BLD AUTO: 0.6 10E3/UL (ref 0–1.3)
MONOCYTES NFR BLD AUTO: 7 %
NEUTROPHILS # BLD AUTO: 3.8 10E3/UL (ref 1.6–8.3)
NEUTROPHILS NFR BLD AUTO: 47 %
NRBC # BLD AUTO: 0 10E3/UL
NRBC BLD AUTO-RTO: 0 /100
PLATELET # BLD AUTO: 670 10E3/UL (ref 150–450)
RBC # BLD AUTO: 3.79 10E6/UL (ref 3.8–5.2)
WBC # BLD AUTO: 8.1 10E3/UL (ref 4–11)

## 2025-04-29 PROCEDURE — 36415 COLL VENOUS BLD VENIPUNCTURE: CPT

## 2025-04-29 PROCEDURE — 85004 AUTOMATED DIFF WBC COUNT: CPT

## 2025-04-29 NOTE — PROGRESS NOTES
[facility]: Cancer Care                                                                                          Writer called patient to let her know that per Caprice, patient's platelets did decrease but it is still outside the normal range of 150-450K, so patient should hold the avatrombopag medication and check platelets again on Tuesday 5/6/2025. Patient verbalized understanding and was agreeable to the plan.     Signature:  Nubia Toledo RN

## 2025-05-06 ENCOUNTER — PATIENT OUTREACH (OUTPATIENT)
Dept: ONCOLOGY | Facility: HOSPITAL | Age: 45
End: 2025-05-06

## 2025-05-06 ENCOUNTER — LAB (OUTPATIENT)
Dept: INFUSION THERAPY | Facility: HOSPITAL | Age: 45
End: 2025-05-06
Attending: INTERNAL MEDICINE
Payer: COMMERCIAL

## 2025-05-06 LAB
BASOPHILS # BLD AUTO: 0.1 10E3/UL (ref 0–0.2)
BASOPHILS NFR BLD AUTO: 1 %
EOSINOPHIL # BLD AUTO: 0.3 10E3/UL (ref 0–0.7)
EOSINOPHIL NFR BLD AUTO: 4 %
ERYTHROCYTE [DISTWIDTH] IN BLOOD BY AUTOMATED COUNT: 15.6 % (ref 10–15)
HCT VFR BLD AUTO: 31.4 % (ref 35–47)
HGB BLD-MCNC: 10.6 G/DL (ref 11.7–15.7)
IMM GRANULOCYTES # BLD: 0 10E3/UL
IMM GRANULOCYTES NFR BLD: 0 %
LYMPHOCYTES # BLD AUTO: 3.5 10E3/UL (ref 0.8–5.3)
LYMPHOCYTES NFR BLD AUTO: 51 %
MCH RBC QN AUTO: 26.6 PG (ref 26.5–33)
MCHC RBC AUTO-ENTMCNC: 33.8 G/DL (ref 31.5–36.5)
MCV RBC AUTO: 79 FL (ref 78–100)
MONOCYTES # BLD AUTO: 0.5 10E3/UL (ref 0–1.3)
MONOCYTES NFR BLD AUTO: 7 %
NEUTROPHILS # BLD AUTO: 2.6 10E3/UL (ref 1.6–8.3)
NEUTROPHILS NFR BLD AUTO: 37 %
NRBC # BLD AUTO: 0 10E3/UL
NRBC BLD AUTO-RTO: 0 /100
PLATELET # BLD AUTO: 82 10E3/UL (ref 150–450)
RBC # BLD AUTO: 3.98 10E6/UL (ref 3.8–5.2)
WBC # BLD AUTO: 6.9 10E3/UL (ref 4–11)

## 2025-05-06 PROCEDURE — 85004 AUTOMATED DIFF WBC COUNT: CPT | Performed by: NURSE PRACTITIONER

## 2025-05-06 PROCEDURE — 36415 COLL VENOUS BLD VENIPUNCTURE: CPT

## 2025-05-06 NOTE — PROGRESS NOTES
[facility]: Cancer Care                                                                                          Writer called the patient to let her know that per Caprice CHAPMAN, the patient's platelets decreased so the patient should start taking her avatrombopag prescription at 20mg three times a week starting today 5/6/2025 and following a Tuesday, Thursday, Saturday schedule. Patient verbalized understanding and confirmed that she would start taking her avatrombopag prescription today 5/6/2025. Patient will return to clinic in a week to recheck a CBC and look at platelet level.    Signature:  Nubia Toledo RN

## 2025-05-13 ENCOUNTER — LAB (OUTPATIENT)
Dept: INFUSION THERAPY | Facility: HOSPITAL | Age: 45
End: 2025-05-13
Attending: INTERNAL MEDICINE
Payer: COMMERCIAL

## 2025-05-13 ENCOUNTER — PATIENT OUTREACH (OUTPATIENT)
Dept: ONCOLOGY | Facility: HOSPITAL | Age: 45
End: 2025-05-13

## 2025-05-13 DIAGNOSIS — D69.3 IDIOPATHIC THROMBOCYTOPENIC PURPURA (H): ICD-10-CM

## 2025-05-13 LAB
ERYTHROCYTE [DISTWIDTH] IN BLOOD BY AUTOMATED COUNT: 16.8 % (ref 10–15)
HCT VFR BLD AUTO: 30.3 % (ref 35–47)
HGB BLD-MCNC: 9.7 G/DL (ref 11.7–15.7)
MCH RBC QN AUTO: 26 PG (ref 26.5–33)
MCHC RBC AUTO-ENTMCNC: 32 G/DL (ref 31.5–36.5)
MCV RBC AUTO: 81 FL (ref 78–100)
PLATELET # BLD AUTO: 575 10E3/UL (ref 150–450)
RBC # BLD AUTO: 3.73 10E6/UL (ref 3.8–5.2)
WBC # BLD AUTO: 6.9 10E3/UL (ref 4–11)

## 2025-05-13 PROCEDURE — 36415 COLL VENOUS BLD VENIPUNCTURE: CPT

## 2025-05-13 PROCEDURE — 85041 AUTOMATED RBC COUNT: CPT

## 2025-05-13 NOTE — PROGRESS NOTES
[facility]: Cancer Care                                                                                          Writer called patient to let her know that per Caprice CHAPMAN, the patient should decrease the frequency of avatrombopag from three times a week on Tuesdays, Thursdays and Saturdays to two times a week on Tuesdays and Saturdays and then recheck her CBC a week later on Tuesday 5/20/2025. Patient verbalized understanding.     Signature:  Nubia Toledo RN

## 2025-05-20 ENCOUNTER — PATIENT OUTREACH (OUTPATIENT)
Dept: ONCOLOGY | Facility: HOSPITAL | Age: 45
End: 2025-05-20

## 2025-05-20 ENCOUNTER — LAB (OUTPATIENT)
Dept: INFUSION THERAPY | Facility: HOSPITAL | Age: 45
End: 2025-05-20
Attending: INTERNAL MEDICINE
Payer: COMMERCIAL

## 2025-05-20 DIAGNOSIS — D69.3 IDIOPATHIC THROMBOCYTOPENIC PURPURA (H): ICD-10-CM

## 2025-05-20 LAB
ERYTHROCYTE [DISTWIDTH] IN BLOOD BY AUTOMATED COUNT: 17.2 % (ref 10–15)
HCT VFR BLD AUTO: 32.5 % (ref 35–47)
HGB BLD-MCNC: 10.5 G/DL (ref 11.7–15.7)
MCH RBC QN AUTO: 25.9 PG (ref 26.5–33)
MCHC RBC AUTO-ENTMCNC: 32.3 G/DL (ref 31.5–36.5)
MCV RBC AUTO: 80 FL (ref 78–100)
PLATELET # BLD AUTO: 603 10E3/UL (ref 150–450)
RBC # BLD AUTO: 4.05 10E6/UL (ref 3.8–5.2)
WBC # BLD AUTO: 9 10E3/UL (ref 4–11)

## 2025-05-20 PROCEDURE — 85027 COMPLETE CBC AUTOMATED: CPT

## 2025-05-20 PROCEDURE — 36415 COLL VENOUS BLD VENIPUNCTURE: CPT

## 2025-05-20 NOTE — PROGRESS NOTES
[facility]: Cancer Care                                                                                          Writer called patient to let her know that per Caprice CHAPMAN, the patient should decrease the frequency of avatrombopag from one time a week on Tuesdays and then recheck her CBC a week later on Tuesday 5/27/2025. Patient stated that she took her avatrombopag this morning Tuesday 5/20/2025. Patient verbalized understanding.     Signature:  Nubia Toledo RN

## 2025-05-27 ENCOUNTER — LAB (OUTPATIENT)
Dept: INFUSION THERAPY | Facility: HOSPITAL | Age: 45
End: 2025-05-27
Attending: NURSE PRACTITIONER
Payer: COMMERCIAL

## 2025-05-27 ENCOUNTER — PATIENT OUTREACH (OUTPATIENT)
Dept: ONCOLOGY | Facility: HOSPITAL | Age: 45
End: 2025-05-27

## 2025-05-27 DIAGNOSIS — D69.3 IDIOPATHIC THROMBOCYTOPENIC PURPURA (H): ICD-10-CM

## 2025-05-27 LAB
ERYTHROCYTE [DISTWIDTH] IN BLOOD BY AUTOMATED COUNT: 17.2 % (ref 10–15)
HCT VFR BLD AUTO: 32.9 % (ref 35–47)
HGB BLD-MCNC: 10.5 G/DL (ref 11.7–15.7)
MCH RBC QN AUTO: 25.8 PG (ref 26.5–33)
MCHC RBC AUTO-ENTMCNC: 31.9 G/DL (ref 31.5–36.5)
MCV RBC AUTO: 81 FL (ref 78–100)
PLATELET # BLD AUTO: 245 10E3/UL (ref 150–450)
RBC # BLD AUTO: 4.07 10E6/UL (ref 3.8–5.2)
WBC # BLD AUTO: 7.3 10E3/UL (ref 4–11)

## 2025-05-27 PROCEDURE — 85018 HEMOGLOBIN: CPT

## 2025-05-27 PROCEDURE — 36415 COLL VENOUS BLD VENIPUNCTURE: CPT

## 2025-05-27 NOTE — PROGRESS NOTES
[facility]: Cancer Care                                                                                          Writer called patient to let her know that per Caprice CHAPMAN, the patient should continue taking avatrombopag once a week on Tuesdays and then recheck her CBC two more times on Tuesday 6/3 and Tuesday 6/10. Patient verbalized understanding.     Signature:  Nubia Toledo RN

## 2025-06-03 ENCOUNTER — PATIENT OUTREACH (OUTPATIENT)
Dept: ONCOLOGY | Facility: HOSPITAL | Age: 45
End: 2025-06-03

## 2025-06-03 ENCOUNTER — LAB (OUTPATIENT)
Dept: INFUSION THERAPY | Facility: HOSPITAL | Age: 45
End: 2025-06-03
Attending: NURSE PRACTITIONER
Payer: COMMERCIAL

## 2025-06-03 DIAGNOSIS — D69.3 IDIOPATHIC THROMBOCYTOPENIC PURPURA (H): ICD-10-CM

## 2025-06-03 LAB
ERYTHROCYTE [DISTWIDTH] IN BLOOD BY AUTOMATED COUNT: 17.9 % (ref 10–15)
HCT VFR BLD AUTO: 32.9 % (ref 35–47)
HGB BLD-MCNC: 11.2 G/DL (ref 11.7–15.7)
MCH RBC QN AUTO: 26.9 PG (ref 26.5–33)
MCHC RBC AUTO-ENTMCNC: 34 G/DL (ref 31.5–36.5)
MCV RBC AUTO: 79 FL (ref 78–100)
PLATELET # BLD AUTO: 244 10E3/UL (ref 150–450)
RBC # BLD AUTO: 4.16 10E6/UL (ref 3.8–5.2)
WBC # BLD AUTO: 7.2 10E3/UL (ref 4–11)

## 2025-06-03 PROCEDURE — 36415 COLL VENOUS BLD VENIPUNCTURE: CPT

## 2025-06-03 PROCEDURE — 85014 HEMATOCRIT: CPT

## 2025-06-03 NOTE — PROGRESS NOTES
[facility]: Cancer Care                                                                                          Writer called patient to let her know that per Caprice's direction from last week 5/27, the patient should continue to take the Avatrombopag medication once a week on Tuesdays as long as her platelets continue to be within normal range and stable. Since patient's platelets were within normal range and stable today, the patient can continue with the same regimen of taking the Avatrombopag medication once a week on Tuesdays. Patient will return to the clinic for a lab and follow up appt with Dr. Haywood. Patient verbalized understanding.     Signature:  Nubia Toledo RN

## 2025-06-10 ENCOUNTER — ONCOLOGY VISIT (OUTPATIENT)
Dept: ONCOLOGY | Facility: HOSPITAL | Age: 45
End: 2025-06-10
Attending: NURSE PRACTITIONER
Payer: COMMERCIAL

## 2025-06-10 ENCOUNTER — LAB (OUTPATIENT)
Dept: INFUSION THERAPY | Facility: HOSPITAL | Age: 45
End: 2025-06-10
Payer: COMMERCIAL

## 2025-06-10 VITALS
SYSTOLIC BLOOD PRESSURE: 144 MMHG | TEMPERATURE: 99.4 F | BODY MASS INDEX: 40.52 KG/M2 | OXYGEN SATURATION: 98 % | DIASTOLIC BLOOD PRESSURE: 75 MMHG | HEIGHT: 59 IN | RESPIRATION RATE: 16 BRPM | WEIGHT: 201 LBS | HEART RATE: 64 BPM

## 2025-06-10 DIAGNOSIS — D69.3 IDIOPATHIC THROMBOCYTOPENIC PURPURA (H): ICD-10-CM

## 2025-06-10 DIAGNOSIS — D69.3 CHRONIC ITP (IDIOPATHIC THROMBOCYTOPENIA) (H): Primary | ICD-10-CM

## 2025-06-10 LAB
ERYTHROCYTE [DISTWIDTH] IN BLOOD BY AUTOMATED COUNT: 18.3 % (ref 10–15)
HCT VFR BLD AUTO: 33.8 % (ref 35–47)
HGB BLD-MCNC: 11 G/DL (ref 11.7–15.7)
MCH RBC QN AUTO: 26.6 PG (ref 26.5–33)
MCHC RBC AUTO-ENTMCNC: 32.5 G/DL (ref 31.5–36.5)
MCV RBC AUTO: 82 FL (ref 78–100)
PLATELET # BLD AUTO: 276 10E3/UL (ref 150–450)
RBC # BLD AUTO: 4.14 10E6/UL (ref 3.8–5.2)
WBC # BLD AUTO: 8.5 10E3/UL (ref 4–11)

## 2025-06-10 PROCEDURE — 99214 OFFICE O/P EST MOD 30 MIN: CPT | Performed by: NURSE PRACTITIONER

## 2025-06-10 PROCEDURE — 36415 COLL VENOUS BLD VENIPUNCTURE: CPT

## 2025-06-10 PROCEDURE — 85041 AUTOMATED RBC COUNT: CPT

## 2025-06-10 PROCEDURE — G0463 HOSPITAL OUTPT CLINIC VISIT: HCPCS | Performed by: NURSE PRACTITIONER

## 2025-06-10 PROCEDURE — G2211 COMPLEX E/M VISIT ADD ON: HCPCS | Performed by: NURSE PRACTITIONER

## 2025-06-10 ASSESSMENT — PAIN SCALES - GENERAL: PAINLEVEL_OUTOF10: NO PAIN (0)

## 2025-06-10 NOTE — LETTER
"6/10/2025      Patricia Govea  [address] [place] [address]      Dear Colleague,    Thank you for referring your patient, Patricia Govea, to the [facility]CER [facility]. Please see a copy of my visit note below.    Oncology Rooming Note    Mayelin 10, 2025 2:46 PM   Patricia Govea is a 44 year old female who presents for:    Chief Complaint   Patient presents with     Oncology Clinic Visit     8 week follow up labs     Initial Vitals: There were no vitals taken for this visit. Estimated body mass index is 42.12 kg/m  as calculated from the following:    Height as of 4/15/25: 1.486 m (4' 10.5\").    Weight as of 4/15/25: 93 kg (205 lb). There is no height or weight on file to calculate BSA.  Data Unavailable Comment: Data Unavailable   No LMP recorded. Patient has had an injection.  Allergies reviewed: Yes  Medications reviewed: Yes    Medications: Medication refills not needed today.  Pharmacy name entered into [facility]:    [facility][id number] - [place], [place] - [address] AT [id number] [place], [place] - [address] [facility] - [place], [place] - [address] [facility] #[id number] - [place], [place] - [address] [facility] - [place], [place] - [address]    Frailty Screening:   Is the patient here for a new oncology consult visit in cancer care? 2. No    PHQ9:  Did this patient require a PHQ9?: No      Clinical concerns: none       Harriet Jon CMA              [facility] Hematology and Oncology Progress Note    Patient: Patricia Govea  MRN: [id number]  Date of Service: Thad 10, 2025          Reason for Visit    Chief Complaint   Patient presents with     Oncology Clinic Visit     8 week follow up labs       Assessment and Plan     Cancer Staging   No matching staging information was found for " the patient.      1.  Relapsed ITP: Her latest relapse was March 2021, now again 12/27/24.  Has had fairly refractory ITP.  She now most recently was getting Nplate and oral medication avatrombopag.  She did go quite high, up to 2,000,000 weeks ago so we held these medications.  Has quite labile platelets.  She has not received Nplate March 18.  She has been taking different doses of avatrombopag.  March 20 we decreased the Alia trauma PEG to 20 mg just once a week on Tuesdays and she has been holding steady and normal since then.  Her platelets are normal today.  We will continue to do that dosing.  She will get her platelets checked roughly every 2 to 3 weeks.  She will see Dr. Haywood in 3 to 4 months        ECOG Performance    0 - Independent    Distress Screening (within last 30 days)    1. How concerned are you about your ability to eat? : 0  2. How concerned are you about unintended weight loss or your current weight? : 0  3. How concerned are you about feeling depressed or very sad? : 0  4. How concerned are you about feeling anxious or very scared? : 0  5. Do you struggle with the loss of meaning and cassandra in your life? : Not at all  6. How concerned are you about work and home life issues that may be affected by your cancer? : 0  7. How concerned are you about knowing what resources are available to help you? : 0  8. Do you currently have what you would describe as Voodoo or spiritual struggles?            : Not at all       Pain  Pain Score: No Pain (0)    Problem List    Patient Active Problem List   Diagnosis     GBS (Guillain [place] syndrome)     Bell's palsy     Anisocoria     Chronic ITP (idiopathic thrombocytopenia) (H)     Elevated blood pressure reading without diagnosis of hypertension     Cervical intraepithelial neoplasia grade 1     Class 3 severe obesity due to excess calories without serious comorbidity with body mass index (BMI) of 40.0 to 44.9 in adult (H)     Idiopathic thrombocytopenic  purpura (H)     Gums, bleeding     Gross hematuria        ______________________________________________________________________________    History of Present Illness    DIAGNOSIS:  Recurrent and refractory. originially had ITP in 2011. Then again in 2019 in pregnancy.  Then again in February 2021.  Now again in December 2024     CURRENT TREATMENT: received 2 days of IVIG in hospital on December 27 and December 28.  Started Promacta 75 mg daily on December 27.  ~not much response.   -1/10-1/13: dexamethasone 40mg daily for 4 days. Repeated 1/27-1/30.   -1/29/25-2/12/25: 3 weekly doses of obinutuzumab.   -1/29/25-2/12/25: started weekly Nplate. Held 2/19 due to plt  429. Was 16 the next week so received again on 2/26/25 and 3/18/25  -3/18/25: started avatrombopag. Platelets were 1034 on 3/25/25(was told to hold avatrombopag), 2292 on 4/1/25. Then was 151 on 4/8/25. Restarted avatrombopag 40mg three days a week and 20mg four days a week.   -4/15/25: platelets 459: change avatrombopag to 20mg daily. Slowly decreased due to high platelets.   -5/20/25: decreased avatrombopag to 20mg once a week. Platelets normal since then.      PAST TREATMENT:   Rituxan for 4 weeks starting 2/4/21  Promacta. Started 25mg on 3/2/21. Increased to 50mg on 3/9. Increased to 75mg on 3/23/21. Held starting 4/16/21 due to platelets of 494.   Rituxan and prednisone. Rituxan last dose was March 8, 2019  Steroids  IVIG  Splenectomy 1/28/19. Initial response, but then rapid decrease in platelets.      INTERIM HISTORY:   Is here today to repeat labs.  Overall she feels like she is doing well.  She says she is taking the avatrombopag the week on Tuesdays.  She has been doing that for the last couple of weeks.  She is tolerating this well without any side effects.  She denies any new bone or back pain issues.  Denies any bleeding or bruising.  Has not noticed any petechiae.      Review of Systems    Pertinent items are noted in HPI.    Past  "History    Past Medical History:   Diagnosis Date     Acute idiopathic thrombocytopenic purpura (H) 2004    bone marrow biopsy negative for malignancy     Bell's palsy      Encounter for screening for cervical cancer 12/23/2018     Guillain Barré syndrome 2016    After influenza vaccine     H. pylori infection 2004     History of blood transfusion      Immune thrombocytopenia (H)     2012---saw Dr. Haywood HE Heme     Obesity        PHYSICAL EXAM  BP (!) 144/75 (BP Location: Left arm, Patient Position: Sitting, Cuff Size: Adult Large)   Pulse 64   Temp 99.4  F (37.4  C) (Oral)   Resp 16   Ht 1.486 m (4' 10.5\")   Wt 91.2 kg (201 lb)   SpO2 98%   BMI 41.29 kg/m      GENERAL: no acute distress. Cooperative in conversation. Here alone.   RESP: Regular respiratory rate. No expiratory wheezes   MUSCULOSKELETAL: no bilateral leg swelling  NEURO: non focal. Alert and oriented x3.   PSYCH: within normal limits. No depression or anxiety.  SKIN: exposed skin is dry intact.     Lab Results    Recent Results (from the past week)   CBC with platelets   Result Value Ref Range    WBC Count 8.5 4.0 - 11.0 10e3/uL    RBC Count 4.14 3.80 - 5.20 10e6/uL    Hemoglobin 11.0 (L) 11.7 - 15.7 g/dL    Hematocrit 33.8 (L) 35.0 - 47.0 %    MCV 82 78 - 100 fL    MCH 26.6 26.5 - 33.0 pg    MCHC 32.5 31.5 - 36.5 g/dL    RDW 18.3 (H) 10.0 - 15.0 %    Platelet Count 276 150 - 450 10e3/uL       Imaging    No results found.    The longitudinal plan of care for the diagnosis(es)/condition(s) as documented were addressed during this visit. Due to the added complexity in care, I will continue to support Patricia in the subsequent management and with ongoing continuity of care.          Signed by: CASTRO Cameron CNP    Again, thank you for allowing me to participate in the care of your patient.        Sincerely,        CASTRO Cameron CNP    Electronically signed"

## 2025-06-10 NOTE — PROGRESS NOTES
"Oncology Rooming Note    Mayelin 10, 2025 2:46 PM   Patricia Govea is a 44 year old female who presents for:    Chief Complaint   Patient presents with    Oncology Clinic Visit     8 week follow up labs     Initial Vitals: There were no vitals taken for this visit. Estimated body mass index is 42.12 kg/m  as calculated from the following:    Height as of 4/15/25: 1.486 m (4' 10.5\").    Weight as of 4/15/25: 93 kg (205 lb). There is no height or weight on file to calculate BSA.  Data Unavailable Comment: Data Unavailable   No LMP recorded. Patient has had an injection.  Allergies reviewed: Yes  Medications reviewed: Yes    Medications: Medication refills not needed today.  Pharmacy name entered into [facility]:    [facility] #[id number] - [place], [place] - [address] AT [id number] [place], [place] - [address] AT [facility] [address] - [place], [place] - [address] [place] [address]/PHARMACY #[id number] - [place], [place] - [address] [facility] - [place], [place] - [address] [facility]     Frailty Screening:   Is the patient here for a new oncology consult visit in cancer care? 2. No    PHQ9:  Did this patient require a PHQ9?: No      Clinical concerns: none       Harriet Jon CMA            "

## 2025-06-10 NOTE — PROGRESS NOTES
[facility] Hematology and Oncology Progress Note    Patient: Patricia Govea  MRN: [id number]  Date of Service: Thad 10, 2025          Reason for Visit    Chief Complaint   Patient presents with    Oncology Clinic Visit     8 week follow up labs       Assessment and Plan     Cancer Staging   No matching staging information was found for the patient.      1.  Relapsed ITP: Her latest relapse was March 2021, now again 12/27/24.  Has had fairly refractory ITP.  She now most recently was getting Nplate and oral medication avatrombopag.  She did go quite high, up to 2,000,000 weeks ago so we held these medications.  Has quite labile platelets.  She has not received Nplate March 18.  She has been taking different doses of avatrombopag.  March 20 we decreased the Alia trauma PEG to 20 mg just once a week on Tuesdays and she has been holding steady and normal since then.  Her platelets are normal today.  We will continue to do that dosing.  She will get her platelets checked roughly every 2 to 3 weeks.  She will see Dr. Haywood in 3 to 4 months        ECOG Performance    0 - Independent    Distress Screening (within last 30 days)    1. How concerned are you about your ability to eat? : 0  2. How concerned are you about unintended weight loss or your current weight? : 0  3. How concerned are you about feeling depressed or very sad? : 0  4. How concerned are you about feeling anxious or very scared? : 0  5. Do you struggle with the loss of meaning and cassandra in your life? : Not at all  6. How concerned are you about work and home life issues that may be affected by your cancer? : 0  7. How concerned are you about knowing what resources are available to help you? : 0  8. Do you currently have what you would describe as Congregational or spiritual struggles?            : Not at all       Pain  Pain Score: No Pain (0)    Problem List    Patient Active Problem List   Diagnosis    GBS (Guillain [place] syndrome)    Bell's  palsy    Anisocoria    Chronic ITP (idiopathic thrombocytopenia) (H)    Elevated blood pressure reading without diagnosis of hypertension    Cervical intraepithelial neoplasia grade 1    Class 3 severe obesity due to excess calories without serious comorbidity with body mass index (BMI) of 40.0 to 44.9 in adult (H)    Idiopathic thrombocytopenic purpura (H)    Gums, bleeding    Gross hematuria        ______________________________________________________________________________    History of Present Illness    DIAGNOSIS:  Recurrent and refractory. originially had ITP in 2011. Then again in 2019 in pregnancy.  Then again in February 2021.  Now again in December 2024     CURRENT TREATMENT: received 2 days of IVIG in hospital on December 27 and December 28.  Started Promacta 75 mg daily on December 27.  ~not much response.   -1/10-1/13: dexamethasone 40mg daily for 4 days. Repeated 1/27-1/30.   -1/29/25-2/12/25: 3 weekly doses of obinutuzumab.   -1/29/25-2/12/25: started weekly Nplate. Held 2/19 due to plt  429. Was 16 the next week so received again on 2/26/25 and 3/18/25  -3/18/25: started avatrombopag. Platelets were 1034 on 3/25/25(was told to hold avatrombopag), 2292 on 4/1/25. Then was 151 on 4/8/25. Restarted avatrombopag 40mg three days a week and 20mg four days a week.   -4/15/25: platelets 459: change avatrombopag to 20mg daily. Slowly decreased due to high platelets.   -5/20/25: decreased avatrombopag to 20mg once a week. Platelets normal since then.      PAST TREATMENT:   Rituxan for 4 weeks starting 2/4/21  Promacta. Started 25mg on 3/2/21. Increased to 50mg on 3/9. Increased to 75mg on 3/23/21. Held starting 4/16/21 due to platelets of 494.   Rituxan and prednisone. Rituxan last dose was March 8, 2019  Steroids  IVIG  Splenectomy 1/28/19. Initial response, but then rapid decrease in platelets.      INTERIM HISTORY:   Is here today to repeat labs.  Overall she feels like she is doing well.  She says she is  "taking the avatrombopag the week on Tuesdays.  She has been doing that for the last couple of weeks.  She is tolerating this well without any side effects.  She denies any new bone or back pain issues.  Denies any bleeding or bruising.  Has not noticed any petechiae.      Review of Systems    Pertinent items are noted in HPI.    Past History    Past Medical History:   Diagnosis Date    Acute idiopathic thrombocytopenic purpura (H) 2004    bone marrow biopsy negative for malignancy    Bell's palsy     Encounter for screening for cervical cancer 12/23/2018    Guillain Barré syndrome 2016    After influenza vaccine    H. pylori infection 2004    History of blood transfusion     Immune thrombocytopenia (H)     2012---saw Dr. Haywood HE Heme    Obesity        PHYSICAL EXAM  BP (!) 144/75 (BP Location: Left arm, Patient Position: Sitting, Cuff Size: Adult Large)   Pulse 64   Temp 99.4  F (37.4  C) (Oral)   Resp 16   Ht 1.486 m (4' 10.5\")   Wt 91.2 kg (201 lb)   SpO2 98%   BMI 41.29 kg/m      GENERAL: no acute distress. Cooperative in conversation. Here alone.   RESP: Regular respiratory rate. No expiratory wheezes   MUSCULOSKELETAL: no bilateral leg swelling  NEURO: non focal. Alert and oriented x3.   PSYCH: within normal limits. No depression or anxiety.  SKIN: exposed skin is dry intact.     Lab Results    Recent Results (from the past week)   CBC with platelets   Result Value Ref Range    WBC Count 8.5 4.0 - 11.0 10e3/uL    RBC Count 4.14 3.80 - 5.20 10e6/uL    Hemoglobin 11.0 (L) 11.7 - 15.7 g/dL    Hematocrit 33.8 (L) 35.0 - 47.0 %    MCV 82 78 - 100 fL    MCH 26.6 26.5 - 33.0 pg    MCHC 32.5 31.5 - 36.5 g/dL    RDW 18.3 (H) 10.0 - 15.0 %    Platelet Count 276 150 - 450 10e3/uL       Imaging    No results found.    The longitudinal plan of care for the diagnosis(es)/condition(s) as documented were addressed during this visit. Due to the added complexity in care, I will continue to support Patricia in the " subsequent management and with ongoing continuity of care.          Signed by: CASTRO Cameron CNP

## 2025-06-24 ENCOUNTER — LAB (OUTPATIENT)
Dept: INFUSION THERAPY | Facility: HOSPITAL | Age: 45
End: 2025-06-24
Payer: COMMERCIAL

## 2025-06-24 DIAGNOSIS — D69.3 IDIOPATHIC THROMBOCYTOPENIC PURPURA (H): ICD-10-CM

## 2025-06-24 LAB
ERYTHROCYTE [DISTWIDTH] IN BLOOD BY AUTOMATED COUNT: 18.4 % (ref 10–15)
HCT VFR BLD AUTO: 31.3 % (ref 35–47)
HGB BLD-MCNC: 10.8 G/DL (ref 11.7–15.7)
MCH RBC QN AUTO: 27.9 PG (ref 26.5–33)
MCHC RBC AUTO-ENTMCNC: 34.5 G/DL (ref 31.5–36.5)
MCV RBC AUTO: 81 FL (ref 78–100)
PLATELET # BLD AUTO: 315 10E3/UL (ref 150–450)
RBC # BLD AUTO: 3.87 10E6/UL (ref 3.8–5.2)
WBC # BLD AUTO: 6.9 10E3/UL (ref 4–11)

## 2025-06-24 PROCEDURE — 36415 COLL VENOUS BLD VENIPUNCTURE: CPT

## 2025-06-24 PROCEDURE — 85014 HEMATOCRIT: CPT

## 2025-06-25 ENCOUNTER — PATIENT OUTREACH (OUTPATIENT)
Dept: ONCOLOGY | Facility: HOSPITAL | Age: 45
End: 2025-06-25
Payer: COMMERCIAL

## 2025-06-25 NOTE — PROGRESS NOTES
[facility]: Cancer Care                                                                                          Writer called patient to let her know that per Caprice's direction from 5/27, the patient should continue to take the Avatrombopag medication once a week on Tuesdays as long as her platelets continue to be within normal range and stable. Since patient's platelets were within normal range and stable on 6/24, the patient can continue with the same regimen of taking the Avatrombopag medication once a week on Tuesdays. Patient will return to the clinic for a lab appt to recheck a CBC on 7/8. Patient verbalized understanding.     Signature:  Nubia Toledo RN

## 2025-07-08 ENCOUNTER — LAB (OUTPATIENT)
Dept: INFUSION THERAPY | Facility: HOSPITAL | Age: 45
End: 2025-07-08
Payer: COMMERCIAL

## 2025-07-08 DIAGNOSIS — D69.3 IDIOPATHIC THROMBOCYTOPENIC PURPURA (H): ICD-10-CM

## 2025-07-08 LAB
ERYTHROCYTE [DISTWIDTH] IN BLOOD BY AUTOMATED COUNT: 17.8 % (ref 10–15)
HCT VFR BLD AUTO: 33.4 % (ref 35–47)
HGB BLD-MCNC: 11.4 G/DL (ref 11.7–15.7)
MCH RBC QN AUTO: 28.2 PG (ref 26.5–33)
MCHC RBC AUTO-ENTMCNC: 34.1 G/DL (ref 31.5–36.5)
MCV RBC AUTO: 83 FL (ref 78–100)
PLATELET # BLD AUTO: 220 10E3/UL (ref 150–450)
RBC # BLD AUTO: 4.04 10E6/UL (ref 3.8–5.2)
WBC # BLD AUTO: 6.1 10E3/UL (ref 4–11)

## 2025-07-08 PROCEDURE — 85048 AUTOMATED LEUKOCYTE COUNT: CPT

## 2025-07-08 PROCEDURE — 36415 COLL VENOUS BLD VENIPUNCTURE: CPT

## 2025-07-10 ENCOUNTER — PATIENT OUTREACH (OUTPATIENT)
Dept: ONCOLOGY | Facility: HOSPITAL | Age: 45
End: 2025-07-10
Payer: COMMERCIAL

## 2025-07-10 NOTE — PROGRESS NOTES
[facility]: Cancer Care                                                                                          Writer called patient to let her know that per Caprice's direction from 5/27, the patient should continue to take the Avatrombopag medication once a week on Tuesdays as long as her platelets continue to be within normal range and stable. Since patient's platelets were within normal range and stable on 7/8, the patient can continue with the same regimen of taking the Avatrombopag medication once a week on Tuesdays. Patient will return to the clinic for a lab appt to recheck a CBC on 7/22. Patient verbalized understanding.     Signature:  Nubia Toledo RN

## 2025-07-12 ENCOUNTER — HEALTH MAINTENANCE LETTER (OUTPATIENT)
Age: 45
End: 2025-07-12

## 2025-07-22 ENCOUNTER — PATIENT OUTREACH (OUTPATIENT)
Dept: ONCOLOGY | Facility: HOSPITAL | Age: 45
End: 2025-07-22

## 2025-07-22 ENCOUNTER — LAB (OUTPATIENT)
Dept: INFUSION THERAPY | Facility: HOSPITAL | Age: 45
End: 2025-07-22
Payer: COMMERCIAL

## 2025-07-22 DIAGNOSIS — D69.3 IDIOPATHIC THROMBOCYTOPENIC PURPURA (H): ICD-10-CM

## 2025-07-22 LAB
ERYTHROCYTE [DISTWIDTH] IN BLOOD BY AUTOMATED COUNT: 17 % (ref 10–15)
HCT VFR BLD AUTO: 36.9 % (ref 35–47)
HGB BLD-MCNC: 12.2 G/DL (ref 11.7–15.7)
MCH RBC QN AUTO: 27.5 PG (ref 26.5–33)
MCHC RBC AUTO-ENTMCNC: 33.1 G/DL (ref 31.5–36.5)
MCV RBC AUTO: 83 FL (ref 78–100)
PLATELET # BLD AUTO: 277 10E3/UL (ref 150–450)
RBC # BLD AUTO: 4.44 10E6/UL (ref 3.8–5.2)
WBC # BLD AUTO: 8.6 10E3/UL (ref 4–11)

## 2025-07-22 PROCEDURE — 36415 COLL VENOUS BLD VENIPUNCTURE: CPT

## 2025-07-22 PROCEDURE — 85018 HEMOGLOBIN: CPT

## 2025-07-22 NOTE — PROGRESS NOTES
[facility]: Cancer Care                                                                                          Writer called patient to let her know that per Caprice, the patient should continue to take the Avatrombopag medication once a week on Tuesdays since her platelets were within normal range and stable. Per Caprice, patient's lab appts will get spread out to every 3 weeks instead of every two weeks. Patient was agreeable to the plan. Patient will return to the clinic for a lab appt to recheck a CBC on 8/12.    Signature:  Nubia Toledo RN

## 2025-08-05 ENCOUNTER — PATIENT OUTREACH (OUTPATIENT)
Dept: ONCOLOGY | Facility: HOSPITAL | Age: 45
End: 2025-08-05

## 2025-08-05 ENCOUNTER — LAB (OUTPATIENT)
Dept: INFUSION THERAPY | Facility: HOSPITAL | Age: 45
End: 2025-08-05
Payer: COMMERCIAL

## 2025-08-05 DIAGNOSIS — D69.3 CHRONIC ITP (IDIOPATHIC THROMBOCYTOPENIA) (H): Primary | ICD-10-CM

## 2025-08-05 DIAGNOSIS — D69.3 CHRONIC ITP (IDIOPATHIC THROMBOCYTOPENIA) (H): ICD-10-CM

## 2025-08-05 LAB
ERYTHROCYTE [DISTWIDTH] IN BLOOD BY AUTOMATED COUNT: 15.9 % (ref 10–15)
HCT VFR BLD AUTO: 34.4 % (ref 35–47)
HGB BLD-MCNC: 11.8 G/DL (ref 11.7–15.7)
MCH RBC QN AUTO: 28.4 PG (ref 26.5–33)
MCHC RBC AUTO-ENTMCNC: 34.3 G/DL (ref 31.5–36.5)
MCV RBC AUTO: 83 FL (ref 78–100)
PLATELET # BLD AUTO: 267 10E3/UL (ref 150–450)
RBC # BLD AUTO: 4.16 10E6/UL (ref 3.8–5.2)
WBC # BLD AUTO: 6.9 10E3/UL (ref 4–11)

## 2025-08-05 PROCEDURE — 36415 COLL VENOUS BLD VENIPUNCTURE: CPT

## 2025-08-05 PROCEDURE — 85027 COMPLETE CBC AUTOMATED: CPT

## 2025-08-26 ENCOUNTER — LAB (OUTPATIENT)
Dept: INFUSION THERAPY | Facility: HOSPITAL | Age: 45
End: 2025-08-26
Attending: INTERNAL MEDICINE
Payer: COMMERCIAL

## 2025-08-26 DIAGNOSIS — D69.3 CHRONIC ITP (IDIOPATHIC THROMBOCYTOPENIA) (H): ICD-10-CM

## 2025-08-26 DIAGNOSIS — D69.3 CHRONIC ITP (IDIOPATHIC THROMBOCYTOPENIA) (H): Primary | ICD-10-CM

## 2025-08-26 LAB
ERYTHROCYTE [DISTWIDTH] IN BLOOD BY AUTOMATED COUNT: 15.8 % (ref 10–15)
HCT VFR BLD AUTO: 35.6 % (ref 35–47)
HGB BLD-MCNC: 12 G/DL (ref 11.7–15.7)
MCH RBC QN AUTO: 28.6 PG (ref 26.5–33)
MCHC RBC AUTO-ENTMCNC: 33.7 G/DL (ref 31.5–36.5)
MCV RBC AUTO: 84.8 FL (ref 78–100)
PLATELET # BLD AUTO: 444 10E3/UL (ref 150–450)
RBC # BLD AUTO: 4.2 10E6/UL (ref 3.8–5.2)
WBC # BLD AUTO: 7.39 10E3/UL (ref 4–11)

## 2025-08-26 PROCEDURE — 36415 COLL VENOUS BLD VENIPUNCTURE: CPT

## 2025-08-26 PROCEDURE — 85018 HEMOGLOBIN: CPT

## 2025-08-27 ENCOUNTER — PATIENT OUTREACH (OUTPATIENT)
Dept: ONCOLOGY | Facility: HOSPITAL | Age: 45
End: 2025-08-27
Payer: COMMERCIAL